# Patient Record
Sex: FEMALE | Race: BLACK OR AFRICAN AMERICAN | Employment: OTHER | URBAN - METROPOLITAN AREA
[De-identification: names, ages, dates, MRNs, and addresses within clinical notes are randomized per-mention and may not be internally consistent; named-entity substitution may affect disease eponyms.]

---

## 2017-01-05 RX ORDER — LISINOPRIL 10 MG/1
TABLET ORAL
Qty: 270 TAB | Refills: 0 | Status: SHIPPED | OUTPATIENT
Start: 2017-01-05 | End: 2017-10-13 | Stop reason: SDUPTHER

## 2017-01-05 NOTE — TELEPHONE ENCOUNTER
She was here in October for visit with Ellen Erazo and is on the appt for recheck 2/17/17.  Done for 90 days a courtesy since going to mail order

## 2017-01-26 ENCOUNTER — TELEPHONE (OUTPATIENT)
Dept: FAMILY MEDICINE CLINIC | Age: 82
End: 2017-01-26

## 2017-02-17 ENCOUNTER — OFFICE VISIT (OUTPATIENT)
Dept: FAMILY MEDICINE CLINIC | Age: 82
End: 2017-02-17

## 2017-02-17 VITALS
OXYGEN SATURATION: 96 % | HEIGHT: 62 IN | TEMPERATURE: 97.6 F | DIASTOLIC BLOOD PRESSURE: 79 MMHG | WEIGHT: 170.7 LBS | HEART RATE: 70 BPM | BODY MASS INDEX: 31.41 KG/M2 | RESPIRATION RATE: 16 BRPM | SYSTOLIC BLOOD PRESSURE: 126 MMHG

## 2017-02-17 DIAGNOSIS — E66.9 OBESITY, CLASS I, BMI 30-34.9: ICD-10-CM

## 2017-02-17 DIAGNOSIS — E55.9 VITAMIN D DEFICIENCY: ICD-10-CM

## 2017-02-17 DIAGNOSIS — I65.23 INTERNAL CAROTID ARTERY STENOSIS, BILATERAL: ICD-10-CM

## 2017-02-17 DIAGNOSIS — R29.898 BILATERAL ARM WEAKNESS: ICD-10-CM

## 2017-02-17 DIAGNOSIS — G89.29 INSOMNIA SECONDARY TO CHRONIC PAIN: ICD-10-CM

## 2017-02-17 DIAGNOSIS — G89.29 CHRONIC PAIN OF BOTH SHOULDERS: ICD-10-CM

## 2017-02-17 DIAGNOSIS — R41.89 COGNITIVE IMPAIRMENT: ICD-10-CM

## 2017-02-17 DIAGNOSIS — H26.9 CATARACT: ICD-10-CM

## 2017-02-17 DIAGNOSIS — M25.511 CHRONIC PAIN OF BOTH SHOULDERS: ICD-10-CM

## 2017-02-17 DIAGNOSIS — E78.00 HYPERCHOLESTEREMIA: ICD-10-CM

## 2017-02-17 DIAGNOSIS — R73.9 HYPERGLYCEMIA: ICD-10-CM

## 2017-02-17 DIAGNOSIS — M25.512 CHRONIC PAIN OF BOTH SHOULDERS: ICD-10-CM

## 2017-02-17 DIAGNOSIS — M54.2 NECK PAIN: ICD-10-CM

## 2017-02-17 DIAGNOSIS — R06.09 DOE (DYSPNEA ON EXERTION): ICD-10-CM

## 2017-02-17 DIAGNOSIS — Z87.891 HISTORY OF TOBACCO ABUSE: ICD-10-CM

## 2017-02-17 DIAGNOSIS — H91.92 HEARING LOSS, LEFT: ICD-10-CM

## 2017-02-17 DIAGNOSIS — G47.01 INSOMNIA SECONDARY TO CHRONIC PAIN: ICD-10-CM

## 2017-02-17 DIAGNOSIS — I10 ESSENTIAL HYPERTENSION: Primary | ICD-10-CM

## 2017-02-17 LAB — HBA1C MFR BLD HPLC: 5.5 %

## 2017-02-17 RX ORDER — TRAMADOL HYDROCHLORIDE 50 MG/1
50 TABLET ORAL
Qty: 40 TAB | Refills: 0 | Status: SHIPPED | OUTPATIENT
Start: 2017-02-17 | End: 2018-02-19 | Stop reason: ALTCHOICE

## 2017-02-17 RX ORDER — METHOCARBAMOL 500 MG/1
500 TABLET, FILM COATED ORAL 4 TIMES DAILY
Qty: 40 TAB | Refills: 1 | Status: SHIPPED | OUTPATIENT
Start: 2017-02-17 | End: 2018-02-19

## 2017-02-17 NOTE — PROGRESS NOTES
HISTORY OF PRESENT ILLNESS  Manjeet Gomes is a [de-identified] y.o. female presents with High Blood Sugar; Cholesterol Problem; Referral Follow Up (Eye and lungs); Memory Loss; Hearing Problem; Shoulder Pain; Arm Pain; and Neck Pain    Agree with nurse note. Her daughter is present today. Hypertensive, hyperglycemic pt with hypercholesterolemia, Vitamin D deficiency, BL internal carotid artery stenosis, and hx of tobacco abuse presents to the office with a BP of 126/79. For BP, she takes Lisinopril 10 mg BID, tolerating well. She weighs 170 lbs, gained 2 lbs since last ov. Today, Hgb A1C was 5.5, down from 5.8. She takes Aspirin 81 mg daily, Vitamin D 2,000IU daily, Calcium with Vitamin D daily, and Omega 3 Fatty Acids, tolerating well. Pt reports some difficulty remembering names. She also forgets why she goes to do something but eventually the \"why\" will come to her. She is not forgetting her family members. Of note, she scored 12 on 6CIT in 08/2016. She requests a referral to neuropsych testing. Pt complains of BL shoulder pain, BL UE pain, BL neck pain, and HAs that keep her awake at night. The pain worsens when she bends her head forward. She has difficulty combing her hair due to her upper arm pain and weakness. She also has difficulty lifting items due to the shoulder pain and upper arm pain. She takes Tylenol with temporary relief. She uses a heating pad with relief. 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 She continues with difficulty and requests a referral to an audiologist.     Pt is sp L cataract removal on 2/22/16 and R cataract removal on 10/24/16, performed by ophthalmologist, Dr. Armana Wright. She has new glasses and denies any problems. Health Maintenance    Pt's most recent colonoscopy due to GIB was on 11/24/14 with GI, Dr. Aden Recinos. No further colonoscopy needed.      Pt's most recent DEXA scan was on 7/22/14; normal.  Pt's most recent mammogram was on 4/29/15; normal     Written by dara Ventura, as dictated by Dr. Perfecto Suggs DO.    ROS    Review of Systems negative except as noted above in HPI. ALLERGIES:    Allergies   Allergen Reactions    Penicillins Hives       CURRENT MEDICATIONS:    Outpatient Prescriptions Marked as Taking for the 2/17/17 encounter (Office Visit) with Yarelis Carcamo DO   Medication Sig Dispense Refill    traMADol (ULTRAM) 50 mg tablet Take 1 Tab by mouth every six (6) hours as needed for Pain. Max Daily Amount: 200 mg. Indications: Pain 40 Tab 0    methocarbamol (ROBAXIN) 500 mg tablet Take 1 Tab by mouth four (4) times daily. Indications: MUSCLE SPASM 40 Tab 1    lisinopril (PRINIVIL, ZESTRIL) 10 mg tablet TAKE 1 TABLET IN THE MORNING AND TAKE 2 TABLETS IN THE EVENING FOR HYPERTENSION (Patient taking differently: TAKE 1 TABLET IN THE MORNING AND TAKE 1 TABLETS IN THE EVENING FOR HYPERTENSION) 270 Tab 0    cholecalciferol, vitamin D3, 2,000 unit tab Take 1 Tab by mouth daily.  Omega-3 Fatty Acids 300 mg cap Take 1 Cap by mouth daily.  Calcium-Cholecalciferol, D3, 600 mg(1,500mg) -400 unit cap Take  by mouth daily.  ACETAMINOPHEN (TYLENOL PO) Take 325 mg by mouth two (2) times a day.  aspirin 81 mg chewable tablet Take 81 mg by mouth daily. PAST MEDICAL HISTORY:    Past Medical History   Diagnosis Date    Cataract      Bilaterally. Dr. Brandy Simpson. Dr. Marylou Ledesma. Dr. Natalie Dotson.  Chest pain 10/2015     Dr. Atiya Esquivel.  Diverticulosis 09/2008     Dr. Aneesh Stevens. Dr. Mica Benavides.  DJD (degenerative joint disease) 09/30/05     cervical, worse C6-7, C7-T1. Left AC joint.  DJD (degenerative joint disease) of knee      bilateral  Dr. Kerman Schwab. Dr. Riddhi NULL (dyspnea on exertion) 10/2015     Dr. Atiya Esquivel.     Essential hypertension, benign 1968    Foster child     GIB (gastrointestinal bleeding) 11/17/14     due to internal hemorrhoids. Dr. Disha Ramos Heart palpitations 11/12/15     due to PVCs. Dr. Yogi Salmon.  Heartburn     Hypercholesteremia     Hyperglycemia 2014    Internal carotid artery stenosis 2007     bilateral.  Dr. Oskar Wiley Internal hemorrhoids 09/2008, 11/2014     Dr. Cee Ratliff. Dr. Alvino Matamoros.  Knee pain      Left. Dr. Red De La Torre.  Pulmonic valve insufficiency 10/2015     Mild to Mod. Dr. Roman Reyna. EF 50-55%    PVD (peripheral vascular disease) ([address] 2007     Dr. Susan Villa    Raynaud's phenomenon 1968    Shortening, leg, congenital      left    Vitamin D deficiency 10/10/10       PAST SURGICAL HISTORY:    Past Surgical History   Procedure Laterality Date    Hx polypectomy  09/05/2008     rectal Dr. Alvarado Friendly Hx hemorrhoidectomy  10/03/2008     internal and external    Hx carotid endarterectomy Right 01/19/2011     [facility]. Dr. Susan Villa    Hx gi  1957     FISSURECTOMY.  Hx gi  10/03/2008     PROCTOPLASTY due to rectal prolapse    Hx knee replacement Left 07/2013     due to Severe OA. Dr. Timur Barrett.  Hx breast lumpectomy Right 2008     benign. Dr. Gaby Rushing.  Hx colonoscopy  11/24/14     due to GIB. Dr. Alvino Matamoros.  Hx cataract removal Bilateral 02/22/2016, 10/24/2016     L then R Dr. Rubin John.  Hx colonoscopy  09/05/08     with polypectomy. benign. Dr. Omega Parrish. due q 10 yrs.        FAMILY HISTORY:    Family History   Problem Relation Age of Onset    Heart Attack Mother 79    Heart Attack Sister      x 3 sisters    Heart Attack Brother      x 3 brothers       SOCIAL HISTORY:    Social History     Social History    Marital status:      Spouse name: N/A    Number of children: N/A    Years of education: N/A     Social History Main Topics    Smoking status: Former Smoker     Packs/day: 1.00     Years: 15.00     Types: Cigarettes     Quit date: 1/1/1968    Smokeless tobacco: Never Used    Alcohol use No    Drug use: No    Sexual activity: No     Other Topics Concern    None     Social History Narrative       IMMUNIZATIONS:    Immunization History   Administered Date(s) Administered    Pneumococcal Conjugate (PCV-13) 08/17/2016    Pneumococcal Vaccine (Unspecified Type) 10/22/2009         PHYSICAL EXAMINATION    Vital Signs    Visit Vitals    /79 (BP 1 Location: Left arm, BP Patient Position: Sitting)    Pulse 70    Temp 97.6 °F (36.4 °C) (Oral)    Resp 16    Ht 5' 2\" (1.575 m)    Wt 170 lb 11.2 oz (77.4 kg)    SpO2 96%    BMI 31.22 kg/m2       Weight Metrics 2/17/2017 10/12/2016 8/17/2016 7/8/2016 6/21/2016 6/19/2016 4/14/2016   Weight 170 lb 11.2 oz 168 lb 3.2 oz 170 lb 169 lb 3.2 oz 171 lb 3.2 oz 170 lb 174 lb 4.8 oz   BMI 31.22 kg/m2 30.76 kg/m2 31.09 kg/m2 30.94 kg/m2 33.44 kg/m2 33.2 kg/m2 32.95 kg/m2       General appearance - Well nourished. Well appearing. Well developed. No acute distress. Overweight. Head - Normocephalic. Atraumatic. Eyes - pupils equal and reactive. Extraocular eye movements intact. Sclera anicteric. Mildly injected sclera. Ears - Hearing is grossly normal bilaterally. Nose - normal and patent. No polyps noted. No erythema. No discharge. Mouth - mucous membranes with adequate moisture. Posterior pharynx normal with cobblestone appearance. No erythema, white exudate or obstruction. Neck - supple. Midline trachea. No carotid bruits noted bilaterally. No thyromegaly noted. Chest - clear to auscultation bilaterally anteriorly and posteriorly. No wheezes. No rales or rhonchi. Breath sounds are symmetrical bilaterally. Unlabored respirations. Heart - normal rate. Regular rhythm. Normal S1, S2. No murmur noted. No rubs, clicks or gallops noted. Abdomen - soft and distended. No masses or organomegaly. No rebound, rigidity or guarding. Bowel sounds normal x 4 quadrants. No tenderness noted.   Neurological - awake, alert and oriented to person, place, and time and event. Cranial nerves II through XII intact. Clear speech. Muscle strength is +5/5 x 4 extremities. Sensation is intact to light touch bilaterally. Steady gait. Heme/Lymph - peripheral pulses normal x 4 extremities. No peripheral edema is noted. Musculoskeletal - Intact x 4 extremities. Decreased ROM x 2 UE. BL pain UE pain with abduction greater than 90 degrees. .  Pain on palpation at BL shoulder girdles extending into proximal UEs. No pain on palpation of the bilateral elbows, wrists, hands. Back exam - normal range of motion. No pain on palpation of the spinous processes in the cervical, thoracic, lumbar, sacral regions. No CVA tenderness. Increased muscle tension in the paravertebral musculature in the occipital, cervical, thoracic, lumbar and sacral regions. Skin - no rashes, erythema, ecchymosis, lacerations, abrasions, suspicious moles noted  Psychological -   normal behavior, dress and thought processes. Good insight. Good eye contact. Normal affect. Appropriate mood. Normal speech.       DATA REVIEWED    Results for orders placed or performed in visit on 02/17/17   AMB POC HEMOGLOBIN A1C   Result Value Ref Range    Hemoglobin A1c (POC) 5.5 %     Lab Results   Component Value Date/Time    WBC 4.6 06/19/2016 06:00 PM    HGB 12.7 06/19/2016 06:00 PM    HCT 37.5 06/19/2016 06:00 PM    PLATELET 004 37/02/6578 06:00 PM    MCV 94.2 06/19/2016 06:00 PM     Lab Results   Component Value Date/Time    Sodium 139 06/19/2016 06:00 PM    Potassium 3.7 06/19/2016 06:00 PM    Chloride 106 06/19/2016 06:00 PM    CO2 25 06/19/2016 06:00 PM    Anion gap 8 06/19/2016 06:00 PM    Glucose 112 06/19/2016 06:00 PM    BUN 22 06/19/2016 06:00 PM    Creatinine 0.94 06/19/2016 06:00 PM    BUN/Creatinine ratio 23 06/19/2016 06:00 PM    GFR est AA >60 06/19/2016 06:00 PM    GFR est non-AA 57 06/19/2016 06:00 PM    Calcium 8.5 06/19/2016 06:00 PM    Bilirubin, total 0.4 06/19/2016 06:00 PM    AST (SGOT) 15 06/19/2016 06:00 PM    Alk. phosphatase 95 06/19/2016 06:00 PM    Protein, total 7.1 06/19/2016 06:00 PM    Albumin 3.5 06/19/2016 06:00 PM    Globulin 3.6 06/19/2016 06:00 PM    A-G Ratio 1.0 06/19/2016 06:00 PM    ALT (SGPT) 17 06/19/2016 06:00 PM     Lab Results   Component Value Date/Time    Cholesterol, total 201 04/18/2016 09:23 AM    Cholesterol, Total 208 04/15/2014 08:45 AM    HDL Cholesterol 76 04/18/2016 09:23 AM    LDL, calculated 111 04/18/2016 09:23 AM    VLDL, calculated 14 04/18/2016 09:23 AM    Triglyceride 72 04/18/2016 09:23 AM    CHOL/HDL Ratio 2.8 05/05/2010 08:45 AM     Lab Results   Component Value Date/Time    Vitamin D 25-Hydroxy 24 10/08/2010 04:19 PM    VITAMIN D, 25-HYDROXY 39.9 04/18/2016 09:23 AM       Lab Results   Component Value Date/Time    Hemoglobin A1c 5.8 04/18/2016 09:23 AM    Hemoglobin A1c (POC) 5.5 02/17/2017 09:40 AM     Lab Results   Component Value Date/Time    TSH 1.440 04/18/2016 09:23 AM       ASSESSMENT and PLAN      ICD-10-CM ICD-9-CM    1. Essential hypertension I10 401.9 LIPID PANEL      METABOLIC PANEL, COMPREHENSIVE      TSH 3RD GENERATION      VITAMIN D, 25 HYDROXY      MICROALBUMIN, UR, RAND W/ MICROALBUMIN/CREA RATIO      URINALYSIS W/ RFLX MICROSCOPIC      VITAMIN B12 & FOLATE    stable   2. Hypercholesteremia E78.00 272.0 LIPID PANEL      METABOLIC PANEL, COMPREHENSIVE      TSH 3RD GENERATION   3. Cognitive impairment R41.89 294.9 REFERRAL TO NEUROPSYCHOLOGY      TSH 3RD GENERATION      VITAMIN B12 & FOLATE   4. Hyperglycemia R73.9 790.29 AMB POC HEMOGLOBIN X1W      METABOLIC PANEL, COMPREHENSIVE      URINALYSIS W/ RFLX MICROSCOPIC   5. Vitamin D deficiency E55.9 268.9 VITAMIN D, 25 HYDROXY   6. Chronic pain of both shoulders M25.512 719.41 REFERRAL TO ORTHOPEDICS    G89.29 338.29 traMADol (ULTRAM) 50 mg tablet    M25.511  methocarbamol (ROBAXIN) 500 mg tablet   7. History of tobacco abuse Z87.891 V15.82    8.  Hearing loss, left H91.92 389.9 REFERRAL TO ENT-OTOLARYNGOLOGY   9. Internal carotid artery stenosis, bilateral I65.23 433.10      433.30    10. Bilateral arm weakness M62.81 729.89     due to neck vs shoulder pain   11. Cataract H26.9 366.9     resolved since removed 2016   12. NULL (dyspnea on exertion) R06.09 786.09 CBC W/O DIFF    resolved   13. Obesity, Class I, BMI 30-34.9 E66.9 278.00    14. Neck pain M54.2 723.1 traMADol (ULTRAM) 50 mg tablet      methocarbamol (ROBAXIN) 500 mg tablet    bilaterally   15. Insomnia secondary to chronic pain G89.29 338.29     G47.01 327.01        Discussed the patient's BMI with her. The BMI follow up plan is as follows: I have counseled this patient on diet and exercise regimens. Addressed weight, diet and exercise with patient. Decrease carbohydrates (white foods, sweet foods, sweet drinks and alcohol), increase green leafy vegetables and protein (lean meats and beans) with each meal.  Avoid fried foods. Eat 3-5 small meals daily. Do not skip meals. Increase water intake. Increase physical activity to 30 minutes daily for health benefit or 60 minutes daily to prevent weight regain, as tolerated. Get 7-8 hours uninterrupted sleep nightly. Chart reviewed and updated. Continue current medications and care. Start OTC Vitamin B12 daily. If Tylenol does not work enough then take Tramadol 50 mg. Take a 1/2 tablet of Robaxin 750 mg prn spasms. Prescriptions written and sent to pharmacy; medication side effects discussed. Robaxin 750 mg.   Prescription given to patient during office visit today. Tramadol 50 mg. Cautioned pt about addictive potential.   Most recent tests reviewed. Recheck pertinent labs when fasting. Get recent office visit notes from Dr. John Garcia. Advised pt to sign release. Referrals given; patient urged to keep appointments with specialists. Neuropsych. ENT. Ortho.    Counseled patient on health concerns:  BP, hyperglycemia, memory changes, hearing loss, shoulder care, upper back care, and neck care. Relevant handouts given and discussed with patient. Immunizations noted. Declines shingles and flu vaccines. Advise tetanus vaccine when pt has a cut, animal bite, or burn. Offered empathy, support, legitimation, prayers, partnership to patient. Praised patient for progress. Advance Care Booklet given at office visit. Discussed with pt today. Referred to [facility] and blue folder given. Staff message sent to [facility] [employer] team.   Follow-up Disposition:  Return in about 6 months (around 8/17/2017) for referral follow up, bp, results, mwv after 08/17/17. Patient was offered a choice/choices in the treatment plan today. Patient expresses understanding of the plan and agrees with recommendations. Written by dara Robin, as dictated by Dr. Christa Gowers, DO. Documentation True and Accepted by Lesli Gold. Lizabeth Ayala. Patient Instructions        Healthy Upper Back: Exercises  Your Care Instructions  Here are some examples of exercises for your upper back. Start each exercise slowly. Ease off the exercise if you start to have pain. Your doctor or physical therapist will tell you when you can start these exercises and which ones will work best for you. How to do the exercises  Lower neck and upper back stretch    1. Stretch your arms out in front of your body. Clasp one hand on top of your other hand. 2. Gently reach out so that you feel your shoulder blades stretching away from each other. 3. Gently bend your head forward. 4. Hold for 15 to 30 seconds. 5. Repeat 2 to 4 times. Midback stretch    Note: If you have knee pain, do not do this exercise. 1. Kneel on the floor, and sit back on your ankles. 2. Lean forward, place your hands on the floor, and stretch your arms out in front of you. Rest your head between your arms. 3. Gently push your chest toward the floor, reaching as far in front of you as possible.   4. Hold for 15 to 30 seconds. 5. Repeat 2 to 4 times. Shoulder rolls    1. Sit comfortably with your feet shoulder-width apart. You can also do this exercise while standing. 2. Roll your shoulders up, then back, and then down in a smooth, circular motion. 3. Repeat 2 to 4 times. Wall push-up    1. Stand against a wall with your feet about 12 to 24 inches back from the wall. If you feel any pain when you do this exercise, stand closer to the wall. 2. Place your hands on the wall slightly wider apart than your shoulders, and lean forward. 3. Gently lean your body toward the wall. Then push back to your starting position. Keep the motion smooth and controlled. 4. Repeat 8 to 12 times. Resisted shoulder blade squeeze    Note: For this exercise, you will need elastic exercise material, such as surgical tubing or Thera-Band. 1. Sit or stand, holding the band in both hands in front of you. Keep your elbows close to your sides, bent at a 90-degree angle. Your palms should face up. 2. Squeeze your shoulder blades together, and move your arms to the outside, stretching the band. Be sure to keep your elbows at your sides while you do this. 3. Relax. 4. Repeat 8 to 12 times. Resisted rows    Note: For this exercise, you will need elastic exercise material, such as surgical tubing or Thera-Band. 1. Put the band around a solid object, such as a bedpost, at about waist level. Hold one end of the band in each hand. 2. With your elbows at your sides and bent to 90 degrees, pull the band back to move your shoulder blades toward each other. Return to the starting position. 3. Repeat 8 to 12 times. Follow-up care is a key part of your treatment and safety. Be sure to make and go to all appointments, and call your doctor if you are having problems. It's also a good idea to know your test results and keep a list of the medicines you take. Where can you learn more? Go to http://pepe-constantino.info/.   Enter I972 in the search box to learn more about \"Healthy Upper Back: Exercises. \"  Current as of: May 23, 2016  Content Version: 11.1  © 4883-4747 [employer]. Care instructions adapted under license by [employer] (which disclaims liability or warranty for this information). If you have questions about a medical condition or this instruction, always ask your healthcare professional. [facility] [address] any warranty or liability for your use of this information. Shoulder Stretches: Exercises  Your Care Instructions  Here are some examples of exercises for your shoulder. Start each exercise slowly. Ease off the exercise if you start to have pain. Your doctor or physical therapist will tell you when you can start these exercises and which ones will work best for you. How to do the exercises  Note: These exercises should cause you to feel a gentle stretch, but no pain. Shoulder stretch    6.  a doorway and place one arm against the door frame. Your elbow should be a little higher than your shoulder. 7. Relax your shoulders as you lean forward, allowing your chest and shoulder muscles to stretch. You can also turn your body slightly away from your arm to stretch the muscles even more. 8. Hold for 15 to 30 seconds. 9. Repeat 2 to 4 times with each arm. Shoulder and chest stretch    Shoulder and chest stretch  6. While sitting, relax your upper body so you slump slightly in your chair. 7. As you breathe in, straighten your back and open your arms out to the sides. 8. Gently pull your shoulder blades back and downward. 9. Hold for 15 to 30 seconds as your breathe normally. 10. Repeat 2 to 4 times. Overhead stretch    4. Reach up over your head with both arms. 5. Hold for 15 to 30 seconds. 6. Repeat 2 to 4 times. Follow-up care is a key part of your treatment and safety. Be sure to make and go to all appointments, and call your doctor if you are having problems.  It's also a good idea to know your test results and keep a list of the medicines you take. Where can you learn more? Go to http://pepe-constantino.info/. Enter S254 in the search box to learn more about \"Shoulder Stretches: Exercises. \"  Current as of: May 23, 2016  Content Version: 11.1  © 0964-9851 [employer]. Care instructions adapted under license by [employer] (which disclaims liability or warranty for this information). If you have questions about a medical condition or this instruction, always ask your healthcare professional. [address] any warranty or liability for your use of this information. Neck: Exercises  Your Care Instructions  Here are some examples of typical rehabilitation exercises for your condition. Start each exercise slowly. Ease off the exercise if you start to have pain. Your doctor or physical therapist will tell you when you can start these exercises and which ones will work best for you. How to do the exercises  Note: Stretching should make you feel a gentle stretch, but no pain. Stop any strengthening exercise that makes pain worse. Neck stretch    10. This stretch works best if you keep your shoulder down as you lean away from it. To help you remember to do this, start by relaxing your shoulders and lightly holding on to your thighs or your chair. 11. Tilt your head toward your shoulder and hold for 15 to 30 seconds. Let the weight of your head stretch your muscles. 12. If you would like a little added stretch, use your hand to gently and steadily pull your head toward your shoulder. For example, keeping your right shoulder down, lean your head to the left. 13. Repeat 2 to 4 times toward each shoulder. Diagonal neck stretch    11. Turn your head slightly toward the direction you will be stretching, and tilt your head diagonally toward your chest and hold for 15 to 30 seconds.   12. If you would like a little added stretch, use your hand to gently and steadily pull your head forward on the diagonal.  13. Repeat 2 to 4 times toward each side. Dorsal glide stretch    7. Sit or stand tall and look straight ahead. 8. Slowly tuck your chin as you glide your head backward over your body  9. Hold for a count of 6, and then relax for up to 10 seconds. 10. Repeat 8 to 12 times. Note: The dorsal glide stretches the back of the neck. If you feel pain, do not glide so far back. Some people find this exercise easier to do while lying on their backs with an ice pack on the neck. Chest and shoulder stretch    5. Sit or stand tall and glide your head backward as in the dorsal glide stretch. 6. Raise both arms so that your hands are next to your ears. 7. Take a deep breath, and as you breathe out, lower your elbows down and behind your back. You will feel your shoulder blades slide down and together, and at the same time you will feel a stretch across your chest and the front of your shoulders. 8. Hold for about 6 seconds, and then relax for up to 10 seconds. 9. Repeat 8 to 12 times. Strengthening: Hands on head    5. Move your head backward, forward, and side to side against gentle pressure from your hands, holding each position for about 6 seconds. 6. Repeat 8 to 12 times. Follow-up care is a key part of your treatment and safety. Be sure to make and go to all appointments, and call your doctor if you are having problems. It's also a good idea to know your test results and keep a list of the medicines you take. Where can you learn more? Go to http://pepe-constantino.info/. Enter P975 in the search box to learn more about \"Neck: Exercises. \"  Current as of: May 23, 2016  Content Version: 11.1  © 2482-9831 [employer]. Care instructions adapted under license by [employer] (which disclaims liability or warranty for this information).  If you have questions about a medical condition or this instruction, always ask your healthcare professional. [place] [address] any warranty or liability for your use of this information. Neck Pain: Care Instructions  Your Care Instructions  You can have neck pain anywhere from the bottom of your head to the top of your shoulders. It can spread to the upper back or arms. Injuries, painting a ceiling, sleeping with your neck twisted, staying in one position for too long, and many other activities can cause neck pain. Most neck pain gets better with home care. Your doctor may recommend medicine to relieve pain or relax your muscles. He or she may suggest exercise and physical therapy to increase flexibility and relieve stress. You may need to wear a special (cervical) collar to support your neck for a day or two. Follow-up care is a key part of your treatment and safety. Be sure to make and go to all appointments, and call your doctor if you are having problems. It's also a good idea to know your test results and keep a list of the medicines you take. How can you care for yourself at home? · Try using a heating pad on a low or medium setting for 15 to 20 minutes every 2 or 3 hours. Try a warm shower in place of one session with the heating pad. · You can also try an ice pack for 10 to 15 minutes every 2 to 3 hours. Put a thin cloth between the ice and your skin. · Take pain medicines exactly as directed. ¨ If the doctor gave you a prescription medicine for pain, take it as prescribed. ¨ If you are not taking a prescription pain medicine, ask your doctor if you can take an over-the-counter medicine. · If your doctor recommends a cervical collar, wear it exactly as directed. When should you call for help? Call your doctor now or seek immediate medical care if:  · You have new or worsening numbness in your arms, buttocks or legs. · You have new or worsening weakness in your arms or legs.  (This could make it hard to stand up.)  · You lose control of your bladder or bowels. Watch closely for changes in your health, and be sure to contact your doctor if:  · Your neck pain is getting worse. · You are not getting better after 1 week. · You do not get better as expected. Where can you learn more? Go to http://pepe-constantino.info/. Enter [id number] in the search box to learn more about \"Neck Pain: Care Instructions. \"  Current as of: May 23, 2016  Content Version: 11.1  © 7140-1161 [employer]. Care instructions adapted under license by [employer] (which disclaims liability or warranty for this information). If you have questions about a medical condition or this instruction, always ask your healthcare professional. [address] any warranty or liability for your use of this information.

## 2017-02-17 NOTE — PATIENT INSTRUCTIONS
Healthy Upper Back: Exercises  Your Care Instructions  Here are some examples of exercises for your upper back. Start each exercise slowly. Ease off the exercise if you start to have pain. Your doctor or physical therapist will tell you when you can start these exercises and which ones will work best for you. How to do the exercises  Lower neck and upper back stretch    1. Stretch your arms out in front of your body. Clasp one hand on top of your other hand. 2. Gently reach out so that you feel your shoulder blades stretching away from each other. 3. Gently bend your head forward. 4. Hold for 15 to 30 seconds. 5. Repeat 2 to 4 times. Midback stretch    Note: If you have knee pain, do not do this exercise. 1. Kneel on the floor, and sit back on your ankles. 2. Lean forward, place your hands on the floor, and stretch your arms out in front of you. Rest your head between your arms. 3. Gently push your chest toward the floor, reaching as far in front of you as possible. 4. Hold for 15 to 30 seconds. 5. Repeat 2 to 4 times. Shoulder rolls    1. Sit comfortably with your feet shoulder-width apart. You can also do this exercise while standing. 2. Roll your shoulders up, then back, and then down in a smooth, circular motion. 3. Repeat 2 to 4 times. Wall push-up    1. Stand against a wall with your feet about 12 to 24 inches back from the wall. If you feel any pain when you do this exercise, stand closer to the wall. 2. Place your hands on the wall slightly wider apart than your shoulders, and lean forward. 3. Gently lean your body toward the wall. Then push back to your starting position. Keep the motion smooth and controlled. 4. Repeat 8 to 12 times. Resisted shoulder blade squeeze    Note: For this exercise, you will need elastic exercise material, such as surgical tubing or Thera-Band. 1. Sit or stand, holding the band in both hands in front of you.  Keep your elbows close to your sides, bent at a 90-degree angle. Your palms should face up. 2. Squeeze your shoulder blades together, and move your arms to the outside, stretching the band. Be sure to keep your elbows at your sides while you do this. 3. Relax. 4. Repeat 8 to 12 times. Resisted rows    Note: For this exercise, you will need elastic exercise material, such as surgical tubing or Thera-Band. 1. Put the band around a solid object, such as a bedpost, at about waist level. Hold one end of the band in each hand. 2. With your elbows at your sides and bent to 90 degrees, pull the band back to move your shoulder blades toward each other. Return to the starting position. 3. Repeat 8 to 12 times. Follow-up care is a key part of your treatment and safety. Be sure to make and go to all appointments, and call your doctor if you are having problems. It's also a good idea to know your test results and keep a list of the medicines you take. Where can you learn more? Go to http://pepe-constantino.info/. Enter O920 in the search box to learn more about \"Healthy Upper Back: Exercises. \"  Current as of: May 23, 2016  Content Version: 11.1  © 5318-8087 [employer]. Care instructions adapted under license by [employer] (which disclaims liability or warranty for this information). If you have questions about a medical condition or this instruction, always ask your healthcare professional. [place] [address] any warranty or liability for your use of this information. Shoulder Stretches: Exercises  Your Care Instructions  Here are some examples of exercises for your shoulder. Start each exercise slowly. Ease off the exercise if you start to have pain. Your doctor or physical therapist will tell you when you can start these exercises and which ones will work best for you. How to do the exercises  Note: These exercises should cause you to feel a gentle stretch, but no pain.   Shoulder stretch    6.  a doorway and place one arm against the door frame. Your elbow should be a little higher than your shoulder. 7. Relax your shoulders as you lean forward, allowing your chest and shoulder muscles to stretch. You can also turn your body slightly away from your arm to stretch the muscles even more. 8. Hold for 15 to 30 seconds. 9. Repeat 2 to 4 times with each arm. Shoulder and chest stretch    Shoulder and chest stretch  6. While sitting, relax your upper body so you slump slightly in your chair. 7. As you breathe in, straighten your back and open your arms out to the sides. 8. Gently pull your shoulder blades back and downward. 9. Hold for 15 to 30 seconds as your breathe normally. 10. Repeat 2 to 4 times. Overhead stretch    4. Reach up over your head with both arms. 5. Hold for 15 to 30 seconds. 6. Repeat 2 to 4 times. Follow-up care is a key part of your treatment and safety. Be sure to make and go to all appointments, and call your doctor if you are having problems. It's also a good idea to know your test results and keep a list of the medicines you take. Where can you learn more? Go to http://pepe-constantino.info/. Enter S254 in the search box to learn more about \"Shoulder Stretches: Exercises. \"  Current as of: May 23, 2016  Content Version: 11.1  © 2824-4151 [employer] Care instructions adapted under license by [employer] (which disclaims liability or warranty for this information). If you have questions about a medical condition or this instruction, always ask your healthcare professional. [place] [address] any warranty or liability for your use of this information. Neck: Exercises  Your Care Instructions  Here are some examples of typical rehabilitation exercises for your condition. Start each exercise slowly. Ease off the exercise if you start to have pain.   Your doctor or physical therapist will tell you when you can start these exercises and which ones will work best for you. How to do the exercises  Note: Stretching should make you feel a gentle stretch, but no pain. Stop any strengthening exercise that makes pain worse. Neck stretch    10. This stretch works best if you keep your shoulder down as you lean away from it. To help you remember to do this, start by relaxing your shoulders and lightly holding on to your thighs or your chair. 11. Tilt your head toward your shoulder and hold for 15 to 30 seconds. Let the weight of your head stretch your muscles. 12. If you would like a little added stretch, use your hand to gently and steadily pull your head toward your shoulder. For example, keeping your right shoulder down, lean your head to the left. 13. Repeat 2 to 4 times toward each shoulder. Diagonal neck stretch    11. Turn your head slightly toward the direction you will be stretching, and tilt your head diagonally toward your chest and hold for 15 to 30 seconds. 12. If you would like a little added stretch, use your hand to gently and steadily pull your head forward on the diagonal.  13. Repeat 2 to 4 times toward each side. Dorsal glide stretch    7. Sit or stand tall and look straight ahead. 8. Slowly tuck your chin as you glide your head backward over your body  9. Hold for a count of 6, and then relax for up to 10 seconds. 10. Repeat 8 to 12 times. Note: The dorsal glide stretches the back of the neck. If you feel pain, do not glide so far back. Some people find this exercise easier to do while lying on their backs with an ice pack on the neck. Chest and shoulder stretch    5. Sit or stand tall and glide your head backward as in the dorsal glide stretch. 6. Raise both arms so that your hands are next to your ears. 7. Take a deep breath, and as you breathe out, lower your elbows down and behind your back.  You will feel your shoulder blades slide down and together, and at the same time you will feel a stretch across your chest and the front of your shoulders. 8. Hold for about 6 seconds, and then relax for up to 10 seconds. 9. Repeat 8 to 12 times. Strengthening: Hands on head    5. Move your head backward, forward, and side to side against gentle pressure from your hands, holding each position for about 6 seconds. 6. Repeat 8 to 12 times. Follow-up care is a key part of your treatment and safety. Be sure to make and go to all appointments, and call your doctor if you are having problems. It's also a good idea to know your test results and keep a list of the medicines you take. Where can you learn more? Go to http://pepe-constantino.info/. Enter P975 in the search box to learn more about \"Neck: Exercises. \"  Current as of: May 23, 2016  Content Version: 11.1  © 3971-2468 [employer]. Care instructions adapted under license by [employer] (which disclaims liability or warranty for this information). If you have questions about a medical condition or this instruction, always ask your healthcare professional. [address] any warranty or liability for your use of this information. Neck Pain: Care Instructions  Your Care Instructions  You can have neck pain anywhere from the bottom of your head to the top of your shoulders. It can spread to the upper back or arms. Injuries, painting a ceiling, sleeping with your neck twisted, staying in one position for too long, and many other activities can cause neck pain. Most neck pain gets better with home care. Your doctor may recommend medicine to relieve pain or relax your muscles. He or she may suggest exercise and physical therapy to increase flexibility and relieve stress. You may need to wear a special (cervical) collar to support your neck for a day or two. Follow-up care is a key part of your treatment and safety.  Be sure to make and go to all appointments, and call your doctor if you are having problems. It's also a good idea to know your test results and keep a list of the medicines you take. How can you care for yourself at home? · Try using a heating pad on a low or medium setting for 15 to 20 minutes every 2 or 3 hours. Try a warm shower in place of one session with the heating pad. · You can also try an ice pack for 10 to 15 minutes every 2 to 3 hours. Put a thin cloth between the ice and your skin. · Take pain medicines exactly as directed. ¨ If the doctor gave you a prescription medicine for pain, take it as prescribed. ¨ If you are not taking a prescription pain medicine, ask your doctor if you can take an over-the-counter medicine. · If your doctor recommends a cervical collar, wear it exactly as directed. When should you call for help? Call your doctor now or seek immediate medical care if:  · You have new or worsening numbness in your arms, buttocks or legs. · You have new or worsening weakness in your arms or legs. (This could make it hard to stand up.)  · You lose control of your bladder or bowels. Watch closely for changes in your health, and be sure to contact your doctor if:  · Your neck pain is getting worse. · You are not getting better after 1 week. · You do not get better as expected. Where can you learn more? Go to http://pepe-constantino.info/. Enter [id number] in the search box to learn more about \"Neck Pain: Care Instructions. \"  Current as of: May 23, 2016  Content Version: 11.1  © 5948-4000 [employer]. Care instructions adapted under license by "[employer]" (which disclaims liability or warranty for this information). If you have questions about a medical condition or this instruction, always ask your healthcare professional. [address] any warranty or liability for your use of this information.

## 2017-02-17 NOTE — MR AVS SNAPSHOT
Visit Information Date & Time Provider Department Dept. Phone Encounter #  
 2/17/2017  9:15 AM Merna Huang DO [facility] [phone] [id number] Follow-up Instructions Return in about 6 months (around 8/17/2017) for referral follow up, bp, results, mwv after 08/17/17. Routing History Upcoming Health Maintenance Date Due  
 MEDICARE YEARLY EXAM 8/18/2017 GLAUCOMA SCREENING Q2Y 8/22/2018 DTaP/Tdap/Td series (2 - Td) 2/17/2027 Allergies as of 2/17/2017  Review Complete On: 2/17/2017 By: Merna Huang DO Severity Noted Reaction Type Reactions Penicillins High 10/22/2009    Hives Current Immunizations  Reviewed on 2/17/2017 Name Date Pneumococcal Conjugate (PCV-13) 8/17/2016 Pneumococcal Vaccine (Unspecified Type) 10/22/2009 Reviewed by Merna Huang DO on 2/17/2017 at  9:59 AM  
You Were Diagnosed With   
  
 Codes Comments Essential hypertension    -  Primary ICD-10-CM: I10 
ICD-9-CM: 401.9 stable Hypercholesteremia     ICD-10-CM: E78.00 ICD-9-CM: 272.0 Cognitive impairment     ICD-10-CM: R41.89 ICD-9-CM: 294.9 Hyperglycemia     ICD-10-CM: R73.9 ICD-9-CM: 790.29 Vitamin D deficiency     ICD-10-CM: E55.9 ICD-9-CM: 268.9 Chronic pain of both shoulders     ICD-10-CM: M25.512, G89.29, M25.511 ICD-9-CM: 719.41, 338.29 History of tobacco abuse     ICD-10-CM: Z87.891 ICD-9-CM: V15.82 Hearing loss, left     ICD-10-CM: H91.92 
ICD-9-CM: 389.9 Internal carotid artery stenosis, bilateral     ICD-10-CM: I65.23 ICD-9-CM: 433.10, 433.30 Bilateral arm weakness     ICD-10-CM: M62.81 ICD-9-CM: 729.89 due to neck vs shoulder pain Cataract     ICD-10-CM: H26.9 ICD-9-CM: 366.9 resolved since removed 2016 NULL (dyspnea on exertion)     ICD-10-CM: R06.09 
ICD-9-CM: 786.09 resolved Obesity, Class I, BMI 30-34.9     ICD-10-CM: E66.9 ICD-9-CM: 278.00   
 Neck pain     ICD-10-CM: M54.2 ICD-9-CM: 723.1 bilaterally Insomnia secondary to chronic pain     ICD-10-CM: G89.29, G47.01 
ICD-9-CM: 338.29, 327.01 Vitals BP Pulse Temp Resp Height(growth percentile) Weight(growth percentile) 126/79 (BP 1 Location: Left arm, BP Patient Position: Sitting) 70 97.6 °F (36.4 °C) (Oral) 16 5' 2\" (1.575 m) 170 lb 11.2 oz (77.4 kg) SpO2 BMI OB Status Smoking Status 96% 31.22 kg/m2 Postmenopausal Former Smoker BMI and BSA Data Body Mass Index Body Surface Area  
 31.22 kg/m 2 1.84 m 2 Preferred Pharmacy Pharmacy Name [facility] [address] [place] [address] Your Updated Medication List  
  
   
This list is accurate as of: 2/17/17 10:26 AM.  Always use your most recent med list.  
  
  
  
  
 albuterol 90 mcg/actuation inhaler Commonly known as:  PROVENTIL HFA, VENTOLIN HFA, PROAIR HFA Take 2 Puffs by inhalation every four (4) hours as needed for Wheezing or Shortness of Breath. Indications: BRONCHOSPASM PREVENTION  
  
 aspirin 81 mg chewable tablet Take 81 mg by mouth daily. Calcium-Cholecalciferol (D3) 600 mg(1,500mg) -400 unit Cap Take  by mouth daily. cholecalciferol (vitamin D3) 2,000 unit Tab Take 1 Tab by mouth daily. fluticasone-salmeterol 250-50 mcg/dose diskus inhaler Commonly known as:  ADVAIR DISKUS Take 1 Puff by inhalation two (2) times a day. Indications: PREVENTION OF BRONCHOSPASMS WITH EMPHYSEMA  
  
 lisinopril 10 mg tablet Commonly known as:  PRINIVIL, ZESTRIL  
TAKE 1 TABLET IN THE MORNING AND TAKE 2 TABLETS IN THE EVENING FOR HYPERTENSION  
  
 methocarbamol 500 mg tablet Commonly known as:  ROBAXIN Take 1 Tab by mouth four (4) times daily. Indications: MUSCLE SPASM  
  
 mometasone 50 mcg/actuation nasal spray Commonly known as:  NASONEX  
2 Sprays by Both Nostrils route daily. Indications: ALLERGIC RHINITIS Omega-3 Fatty Acids 300 mg Cap Take 1 Cap by mouth daily. traMADol 50 mg tablet Commonly known as:  ULTRAM  
Take 1 Tab by mouth every six (6) hours as needed for Pain. Max Daily Amount: 200 mg. Indications: Pain TYLENOL PO Take 325 mg by mouth two (2) times a day. Prescriptions Printed Refills  
 traMADol (ULTRAM) 50 mg tablet 0 Sig: Take 1 Tab by mouth every six (6) hours as needed for Pain. Max Daily Amount: 200 mg. Indications: Pain Class: Print Route: Oral  
  
Prescriptions Sent to Pharmacy Refills  
 methocarbamol (ROBAXIN) 500 mg tablet 1 Sig: Take 1 Tab by mouth four (4) times daily. Indications: MUSCLE SPASM Class: Normal  
 Pharmacy: [facility] [address], [place]en [facility] #: [phone] Route: Oral  
  
We Performed the Following AMB POC HEMOGLOBIN A1C [08277 CPT(R)] CBC W/O DIFF [44165 CPT(R)] LIPID PANEL [03277 CPT(R)] METABOLIC PANEL, COMPREHENSIVE [37879 CPT(R)] MICROALBUMIN, UR, RAND W/ MICROALBUMIN/CREA RATIO [id number] CPT(R)] REFERRAL TO ENT-OTOLARYNGOLOGY [XYQ47 Custom] Comments:  
 Please evaluate patient for L hearing loss REFERRAL TO NEUROPSYCHOLOGY [[id number] Custom] Comments:  
 Please evaluate patient for cognitive impairment, 6CIT score of 12. REFERRAL TO ORTHOPEDICS [[id number] Custom] Comments:  
 Please evaluate patient for BL shoulder pain TSH 3RD GENERATION [07131 CPT(R)] URINALYSIS W/ RFLX MICROSCOPIC [05055 CPT(R)] VITAMIN B12 & FOLATE [29884 CPT(R)] VITAMIN D, 25 HYDROXY [id number] CPT(R)] Follow-up Instructions Return in about 6 months (around 8/17/2017) for referral follow up, bp, results, mwv after 08/17/17. Referral Information Referral ID Referred By Referred To  
  
 [id number] [facility] [address], [facility] Zay Jackson Visits Status Start Date End Date 1 New Request 2/17/17 2/17/18 If your referral has a status of pending review or denied, additional information will be sent to support the outcome of this decision. Referral ID Referred By Referred To  
 [id number] [address] Yaminie Feast [place] [address] Nguyen [address] [place], [address] [facility] Phone: [phone] Fax: [phone] Visits Status Start Date End Date 1 New Request 2/17/17 2/17/18 If your referral has a status of pending review or denied, additional information will be sent to support the outcome of this decision. Referral ID Referred By Referred To  
 [id number] [facility] [address] [place], [address] Phone: [phone] Fax: [phone] Visits Status Start Date End Date 1 New Request 2/17/17 2/17/18 If your referral has a status of pending review or denied, additional information will be sent to support the outcome of this decision. Patient Instructions Healthy Upper Back: Exercises Your Care Instructions Here are some examples of exercises for your upper back. Start each exercise slowly. Ease off the exercise if you start to have pain. Your doctor or physical therapist will tell you when you can start these exercises and which ones will work best for you. How to do the exercises Lower neck and upper back stretch 1. Stretch your arms out in front of your body. Clasp one hand on top of your other hand. 2. Gently reach out so that you feel your shoulder blades stretching away from each other. 3. Gently bend your head forward. 4. Hold for 15 to 30 seconds. 5. Repeat 2 to 4 times. Midback stretch Note: If you have knee pain, do not do this exercise. 1. Kneel on the floor, and sit back on your ankles.  
2. Lean forward, place your hands on the floor, and stretch your arms out in front of you. Rest your head between your arms. 3. Gently push your chest toward the floor, reaching as far in front of you as possible. 4. Hold for 15 to 30 seconds. 5. Repeat 2 to 4 times. Shoulder rolls 1. Sit comfortably with your feet shoulder-width apart. You can also do this exercise while standing. 2. Roll your shoulders up, then back, and then down in a smooth, circular motion. 3. Repeat 2 to 4 times. Wall push-up 1. Stand against a wall with your feet about 12 to 24 inches back from the wall. If you feel any pain when you do this exercise, stand closer to the wall. 2. Place your hands on the wall slightly wider apart than your shoulders, and lean forward. 3. Gently lean your body toward the wall. Then push back to your starting position. Keep the motion smooth and controlled. 4. Repeat 8 to 12 times. Resisted shoulder blade squeeze Note: For this exercise, you will need elastic exercise material, such as surgical tubing or Thera-Band. 1. Sit or stand, holding the band in both hands in front of you. Keep your elbows close to your sides, bent at a 90-degree angle. Your palms should face up. 2. Squeeze your shoulder blades together, and move your arms to the outside, stretching the band. Be sure to keep your elbows at your sides while you do this. 3. Relax. 4. Repeat 8 to 12 times. Resisted rows Note: For this exercise, you will need elastic exercise material, such as surgical tubing or Thera-Band. 1. Put the band around a solid object, such as a bedpost, at about waist level. Hold one end of the band in each hand. 2. With your elbows at your sides and bent to 90 degrees, pull the band back to move your shoulder blades toward each other. Return to the starting position. 3. Repeat 8 to 12 times. Follow-up care is a key part of your treatment and safety.  Be sure to make and go to all appointments, and call your doctor if you are having problems. It's also a good idea to know your test results and keep a list of the medicines you take. Where can you learn more? Go to http://pepe-constantino.info/. Enter E411 in the search box to learn more about \"Healthy Upper Back: Exercises. \" Current as of: May 23, 2016 Content Version: 11.1 © 6612-7425 Eye[employer]te. Care instructions adapted under license by [employer] (which disclaims liability or warranty for this information). If you have questions about a medical condition or this instruction, always ask your healthcare professional. [address] any warranty or liability for your use of this information. Shoulder Stretches: Exercises Your Care Instructions Here are some examples of exercises for your shoulder. Start each exercise slowly. Ease off the exercise if you start to have pain. Your doctor or physical therapist will tell you when you can start these exercises and which ones will work best for you. How to do the exercises Note: These exercises should cause you to feel a gentle stretch, but no pain. Shoulder stretch 6.  a doorway and place one arm against the door frame. Your elbow should be a little higher than your shoulder. 7. Relax your shoulders as you lean forward, allowing your chest and shoulder muscles to stretch. You can also turn your body slightly away from your arm to stretch the muscles even more. 8. Hold for 15 to 30 seconds. 9. Repeat 2 to 4 times with each arm. Shoulder and chest stretch Shoulder and chest stretch 6. While sitting, relax your upper body so you slump slightly in your chair. 7. As you breathe in, straighten your back and open your arms out to the sides. 8. Gently pull your shoulder blades back and downward. 9. Hold for 15 to 30 seconds as your breathe normally. 10. Repeat 2 to 4 times. Overhead stretch 4. Reach up over your head with both arms. 5. Hold for 15 to 30 seconds. 6. Repeat 2 to 4 times. Follow-up care is a key part of your treatment and safety. Be sure to make and go to all appointments, and call your doctor if you are having problems. It's also a good idea to know your test results and keep a list of the medicines you take. Where can you learn more? Go to http://pepe-constantino.info/. Enter S254 in the search box to learn more about \"Shoulder Stretches: Exercises. \" Current as of: May 23, 2016 Content Version: 11.1 © 2187-3951 [employer] Moment. Care instructions adapted under license by PaymentOne (which disclaims liability or warranty for this information). If you have questions about a medical condition or this instruction, always ask your healthcare professional. [address] any warranty or liability for your use of this information. Neck: Exercises Your Care Instructions Here are some examples of typical rehabilitation exercises for your condition. Start each exercise slowly. Ease off the exercise if you start to have pain. Your doctor or physical therapist will tell you when you can start these exercises and which ones will work best for you. How to do the exercises Note: Stretching should make you feel a gentle stretch, but no pain. Stop any strengthening exercise that makes pain worse. Neck stretch 10. This stretch works best if you keep your shoulder down as you lean away from it. To help you remember to do this, start by relaxing your shoulders and lightly holding on to your thighs or your chair. 11. Tilt your head toward your shoulder and hold for 15 to 30 seconds. Let the weight of your head stretch your muscles. 12. If you would like a little added stretch, use your hand to gently and steadily pull your head toward your shoulder. For example, keeping your right shoulder down, lean your head to the left. 13. Repeat 2 to 4 times toward each shoulder. Diagonal neck stretch 11. Turn your head slightly toward the direction you will be stretching, and tilt your head diagonally toward your chest and hold for 15 to 30 seconds. 12. If you would like a little added stretch, use your hand to gently and steadily pull your head forward on the diagonal. 
13. Repeat 2 to 4 times toward each side. Dorsal glide stretch 7. Sit or stand tall and look straight ahead. 8. Slowly tuck your chin as you glide your head backward over your body 9. Hold for a count of 6, and then relax for up to 10 seconds. 10. Repeat 8 to 12 times. Note: The dorsal glide stretches the back of the neck. If you feel pain, do not glide so far back. Some people find this exercise easier to do while lying on their backs with an ice pack on the neck. Chest and shoulder stretch 5. Sit or stand tall and glide your head backward as in the dorsal glide stretch. 6. Raise both arms so that your hands are next to your ears. 7. Take a deep breath, and as you breathe out, lower your elbows down and behind your back. You will feel your shoulder blades slide down and together, and at the same time you will feel a stretch across your chest and the front of your shoulders. 8. Hold for about 6 seconds, and then relax for up to 10 seconds. 9. Repeat 8 to 12 times. Strengthening: Hands on head 5. Move your head backward, forward, and side to side against gentle pressure from your hands, holding each position for about 6 seconds. 6. Repeat 8 to 12 times. Follow-up care is a key part of your treatment and safety. Be sure to make and go to all appointments, and call your doctor if you are having problems. It's also a good idea to know your test results and keep a list of the medicines you take. Where can you learn more? Go to http://pepe-constantino.info/. Enter P975 in the search box to learn more about \"Neck: Exercises. \" Current as of: May 23, 2016 Content Version: 11.1 © 2331-7311 InComm. Care instructions adapted under license by Flaskon (which disclaims liability or warranty for this information). If you have questions about a medical condition or this instruction, always ask your healthcare professional. [address] any warranty or liability for your use of this information. Neck Pain: Care Instructions Your Care Instructions You can have neck pain anywhere from the bottom of your head to the top of your shoulders. It can spread to the upper back or arms. Injuries, painting a ceiling, sleeping with your neck twisted, staying in one position for too long, and many other activities can cause neck pain. Most neck pain gets better with home care. Your doctor may recommend medicine to relieve pain or relax your muscles. He or she may suggest exercise and physical therapy to increase flexibility and relieve stress. You may need to wear a special (cervical) collar to support your neck for a day or two. Follow-up care is a key part of your treatment and safety. Be sure to make and go to all appointments, and call your doctor if you are having problems. It's also a good idea to know your test results and keep a list of the medicines you take. How can you care for yourself at home? · Try using a heating pad on a low or medium setting for 15 to 20 minutes every 2 or 3 hours. Try a warm shower in place of one session with the heating pad. · You can also try an ice pack for 10 to 15 minutes every 2 to 3 hours. Put a thin cloth between the ice and your skin. · Take pain medicines exactly as directed. ¨ If the doctor gave you a prescription medicine for pain, take it as prescribed. ¨ If you are not taking a prescription pain medicine, ask your doctor if you can take an over-the-counter medicine. · If your doctor recommends a cervical collar, wear it exactly as directed. When should you call for help? Call your doctor now or seek immediate medical care if: 
· You have new or worsening numbness in your arms, buttocks or legs. · You have new or worsening weakness in your arms or legs. (This could make it hard to stand up.) · You lose control of your bladder or bowels. Watch closely for changes in your health, and be sure to contact your doctor if: 
· Your neck pain is getting worse. · You are not getting better after 1 week. · You do not get better as expected. Where can you learn more? Go to http://pepe-constantino.info/. Enter [id number] in the search box to learn more about \"Neck Pain: Care Instructions. \" Current as of: May 23, 2016 Content Version: 11.1 © 4388-9882 Chilicon Power. Care instructions adapted under license by [employer] ([employer]) (which disclaims liability or warranty for this information). If you have questions about a medical condition or this instruction, always ask your healthcare professional. [address] any warranty or liability for your use of this information. Introducing [facility]! Christen Salinas introduces [employer] patient portal. Now you can access parts of your medical record, email your doctor's office, and request medication refills online. 1. In your internet browser, go to [email or web address] 2. Click on the First Time User? Click Here link in the Sign In box. You will see the New Member Sign Up page. 3. Enter your [employer] Access Code exactly as it appears below. You will not need to use this code after youve completed the sign-up process. If you do not sign up before the expiration date, you must request a new code. · [employer]k-[employer] Access Code: [email or web address] Expires: 5/18/2017 10:26 AM 
 
4. Enter the last four digits of your Social Security Number (xxxx) and Date of Birth (mm/dd/yyyy) as indicated and click Submit. You will be taken to the next sign-up page. 5. Create a [employer] ID. This will be your [employer] login ID and cannot be changed, so think of one that is secure and easy to remember. 6. Create a [employer] password. You can change your password at any time. 7. Enter your Password Reset Question and Answer. This can be used at a later time if you forget your password. 8. Enter your e-mail address. You will receive e-mail notification when new information is available in [address] 9. Click Sign Up. You can now view and download portions of your medical record. 10. Click the Download Summary menu link to download a portable copy of your medical information. If you have questions, please visit the Frequently Asked Questions section of the [employer] website. Remember, [employer] is NOT to be used for urgent needs. For medical emergencies, dial 911. Now available from your iPhone and Android! Please provide this summary of care documentation to your next provider. Your primary care clinician is listed as Radha Hagen. If you have any questions after today's visit, please call [phone].

## 2017-02-17 NOTE — ACP (ADVANCE CARE PLANNING)
Discussed ACP with patient. Gave pt an [facility] folder. Accepts referral to [facility] Cho[facility] team.  NN Radha Williamson RN and Luke Fernández RN notified by staff message for follow up.

## 2017-02-17 NOTE — PROGRESS NOTES
Chief Complaint   Patient presents with    High Blood Sugar    Cholesterol Problem    Referral Follow Up     Eye and lungs     1. Have you been to the ER, urgent care clinic since your last visit? Hospitalized since your last visit? No    2. Have you seen or consulted any other health care providers outside of the [address] [place] [address] since your last visit? Include any pap smears or colon screening. No    In the event something were to happen to you and you were unable to speak on your behalf, do you have an Advance Directive/ Living Will in place stating your wishes? NO    If yes, do we have a copy on file NO    If no, would you like information:   Yes, patient offered booklet and accepted. Was given at office visit.

## 2017-02-18 LAB
APPEARANCE UR: CLEAR
BILIRUB UR QL STRIP: NEGATIVE
COLOR UR: YELLOW
GLUCOSE UR QL: NEGATIVE
HGB UR QL STRIP: NEGATIVE
KETONES UR QL STRIP: NEGATIVE
LEUKOCYTE ESTERASE UR QL STRIP: NEGATIVE
MICRO URNS: NORMAL
NITRITE UR QL STRIP: NEGATIVE
PH UR STRIP: 6.5 [PH] (ref 5–7.5)
PROT UR QL STRIP: NEGATIVE
SP GR UR: 1.01 (ref 1–1.03)
UROBILINOGEN UR STRIP-MCNC: 0.2 MG/DL (ref 0.2–1)

## 2017-02-25 LAB
25(OH)D3+25(OH)D2 SERPL-MCNC: 49.1 NG/ML (ref 30–100)
ALBUMIN SERPL-MCNC: 4.3 G/DL (ref 3.5–4.7)
ALBUMIN/CREAT UR: 2.9 MG/G CREAT (ref 0–30)
ALBUMIN/GLOB SERPL: 1.7 {RATIO} (ref 1.1–2.5)
ALP SERPL-CCNC: 110 IU/L (ref 39–117)
ALT SERPL-CCNC: 8 IU/L (ref 0–32)
AST SERPL-CCNC: 19 IU/L (ref 0–40)
BILIRUB SERPL-MCNC: 0.6 MG/DL (ref 0–1.2)
BUN SERPL-MCNC: 16 MG/DL (ref 8–27)
BUN/CREAT SERPL: 18 (ref 11–26)
CALCIUM SERPL-MCNC: 10.3 MG/DL (ref 8.7–10.3)
CHLORIDE SERPL-SCNC: 98 MMOL/L (ref 96–106)
CHOLEST SERPL-MCNC: 227 MG/DL (ref 100–199)
CO2 SERPL-SCNC: 25 MMOL/L (ref 18–29)
CREAT SERPL-MCNC: 0.87 MG/DL (ref 0.57–1)
CREAT UR-MCNC: 113 MG/DL
ERYTHROCYTE [DISTWIDTH] IN BLOOD BY AUTOMATED COUNT: 15.2 % (ref 12.3–15.4)
FOLATE SERPL-MCNC: 13.5 NG/ML
GLOBULIN SER CALC-MCNC: 2.6 G/DL (ref 1.5–4.5)
GLUCOSE SERPL-MCNC: 95 MG/DL (ref 65–99)
HCT VFR BLD AUTO: 44.5 % (ref 34–46.6)
HDLC SERPL-MCNC: 78 MG/DL
HGB BLD-MCNC: 14.6 G/DL (ref 11.1–15.9)
INTERPRETATION, 910389: NORMAL
LDLC SERPL CALC-MCNC: 134 MG/DL (ref 0–99)
MCH RBC QN AUTO: 31.9 PG (ref 26.6–33)
MCHC RBC AUTO-ENTMCNC: 32.8 G/DL (ref 31.5–35.7)
MCV RBC AUTO: 97 FL (ref 79–97)
MICROALBUMIN UR-MCNC: 3.3 UG/ML
PLATELET # BLD AUTO: 249 X10E3/UL (ref 150–379)
POTASSIUM SERPL-SCNC: 5.3 MMOL/L (ref 3.5–5.2)
PROT SERPL-MCNC: 6.9 G/DL (ref 6–8.5)
RBC # BLD AUTO: 4.58 X10E6/UL (ref 3.77–5.28)
SODIUM SERPL-SCNC: 140 MMOL/L (ref 134–144)
TRIGL SERPL-MCNC: 77 MG/DL (ref 0–149)
TSH SERPL DL<=0.005 MIU/L-ACNC: 3.24 UIU/ML (ref 0.45–4.5)
VIT B12 SERPL-MCNC: 424 PG/ML (ref 211–946)
VLDLC SERPL CALC-MCNC: 15 MG/DL (ref 5–40)
WBC # BLD AUTO: 6 X10E3/UL (ref 3.4–10.8)

## 2017-06-12 ENCOUNTER — OFFICE VISIT (OUTPATIENT)
Dept: NEUROLOGY | Age: 82
End: 2017-06-12

## 2017-06-12 DIAGNOSIS — R47.89 WORD FINDING PROBLEM: ICD-10-CM

## 2017-06-12 DIAGNOSIS — F09 MILD COGNITIVE DISORDER: Primary | ICD-10-CM

## 2017-06-12 DIAGNOSIS — F43.22 ADJUSTMENT DISORDER WITH ANXIETY: ICD-10-CM

## 2017-06-12 NOTE — PROGRESS NOTES
[address] [facility],5Th Floor  [address]   [place] [address] [place] [address] [place] [address], [facility] [address]   [phone] Office   [phone] Fax      Neuropsychology    Initial Diagnostic Interview Note      Referral:  DO Macario Martinez is a [de-identified] y.o. right handed   female who was accompanied by her daughter to the initial clinical interview on 6/12/17 . She was a late to this appointment, but I was able to see them today. Please refer to her medical records for details pertaining to her history. Briefly, the patient reported that she completed the 11th grade without having to repeat a grade or major cognitive issues. She lives alone. She sometimes forgets the content of conversations, misplaces things, and forgets people's names. She starts tasks and does not complete all the time. This has been going on for about six months now. Daughter says there is short term memory decline. Forgets when they are going to meet for breakfast the next day. She reports being independent for driving without issues. She takes her own medications and does her own bills. Takes care of her home the best she can, she has some orthopaedic issues. No falls. Food tastes like nothing to her now. Sense of smell okay. No history of stroke, meningitis/encephalitis, ZABRINA Fever, Lupus, Lyme, CVA, TBI, sz, etc.  No known family of dementia. She gets anxious and down sometimes. Daughter agrees that patient gets anxious from time to time, more of a recent issue. No counseling or psychiatrist.   No legal troubles. No known family history of dementia, mood problems. Enjoys going out with family, going to [employer]. No previous neuropsych.        Neuropsychological Mental Status Exam (NMSE):  Historian: Good  Praxis: No UE apraxia  R/L Orientation: Intact to self and to other  Dress: within normal limits Weight:Overweight  Appearance/Hygiene: within normal limits   Gait: within normal limits   Assistive Devices: Glasses  Mood: within normal limits   Affect: within normal limits   Comprehension: within normal limits   Thought Process: within normal limits   Expressive Language: within normal limits   Receptive Language: within normal limits   Motor:  No cognitive or motor perseveration  ETOH: Denied  Tobacco: Denied  Illicit: Denied  SI/HI: Denied  Psychosis: Denied  Insight: Within normal limits  Judgment: Within normal limits  Other Psych:      Past Medical History:   Diagnosis Date    Cataract     Bilaterally. Dr. Yaakov Johnston. Dr. Prather Comment. Dr. Em Vasquez.  Chest pain 10/2015    Dr. Demetri Plasencia.  Diverticulosis 09/2008    Dr. Rachel Menendez. Dr. Pau Hope.  DJD (degenerative joint disease) 09/30/05    cervical, worse C6-7, C7-T1. Left AC joint.  DJD (degenerative joint disease) of knee     bilateral  Dr. Yary Reynoso. Dr. Carlos NULL (dyspnea on exertion) 10/2015    Dr. Demetri Plasencia.  Essential hypertension, benign 1968    Foster child     GIB (gastrointestinal bleeding) 11/17/14    due to internal hemorrhoids. Dr. Raul Mills Heart palpitations 11/12/15    due to PVCs. Dr. Dori Hilton.  Heartburn     Hypercholesteremia     Hyperglycemia 2014    Internal carotid artery stenosis 2007    bilateral.  Dr. Helen Crump Internal hemorrhoids 09/2008, 11/2014    Dr. Domi Mcclendon. Dr. Emiliana Alejo.  Knee pain     Left. Dr. Sejal Priest. 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 Dr. Demetri Plasencia. EF 50-55%    PVD (peripheral vascular disease) ([facility] [address] 2007    Dr. Jean Escalante    Raynaud's phenomenon 0284    Shortening, leg, congenital     left    Vitamin D deficiency 10/10/10       Past Surgical History:   Procedure Laterality Date    HX BREAST LUMPECTOMY Right 2008    benign. Dr. Last Berman.  HX CAROTID ENDARTERECTOMY Right 01/19/2011    [facility].   Dr. Shade Chakraborty Rosy    HX CATARACT REMOVAL Bilateral 02/22/2016, 10/24/2016    L then R Dr. Neena Hayes.  HX COLONOSCOPY  11/24/14    due to GIB. Dr. Stefania Escamilla.  HX COLONOSCOPY  09/05/08    with polypectomy. benign. Dr. Marthe Osler. due q 10 yrs.  HX GI  1957    FISSURECTOMY.  HX GI  10/03/2008    PROCTOPLASTY due to rectal prolapse    HX HEMORRHOIDECTOMY  10/03/2008    internal and external    HX KNEE REPLACEMENT Left 07/2013    due to Severe OA. Dr. Zenobia Hollis.  HX POLYPECTOMY  09/05/2008    rectal Dr. Kym Serna       Allergies   Allergen Reactions    Penicillins Hives       Family History   Problem Relation Age of Onset    Heart Attack Mother 79    Heart Attack Sister      x 3 sisters    Heart Attack Brother      x 3 brothers       Social History   Substance Use Topics    Smoking status: Former Smoker     Packs/day: 1.00     Years: 15.00     Types: Cigarettes     Quit date: 1/1/1968    Smokeless tobacco: Never Used    Alcohol use No       Current Outpatient Prescriptions   Medication Sig Dispense Refill    traMADol (ULTRAM) 50 mg tablet Take 1 Tab by mouth every six (6) hours as needed for Pain. Max Daily Amount: 200 mg. Indications: Pain 40 Tab 0    methocarbamol (ROBAXIN) 500 mg tablet Take 1 Tab by mouth four (4) times daily. Indications: MUSCLE SPASM 40 Tab 1    lisinopril (PRINIVIL, ZESTRIL) 10 mg tablet TAKE 1 TABLET IN THE MORNING AND TAKE 2 TABLETS IN THE EVENING FOR HYPERTENSION (Patient taking differently: TAKE 1 TABLET IN THE MORNING AND TAKE 1 TABLETS IN THE EVENING FOR HYPERTENSION) 270 Tab 0    mometasone (NASONEX) 50 mcg/actuation nasal spray 2 Sprays by Both Nostrils route daily. Indications: ALLERGIC RHINITIS 1 Container 5    albuterol (PROVENTIL HFA, VENTOLIN HFA, PROAIR HFA) 90 mcg/actuation inhaler Take 2 Puffs by inhalation every four (4) hours as needed for Wheezing or Shortness of Breath.  Indications: BRONCHOSPASM PREVENTION 1 Inhaler 1    fluticasone-salmeterol (ADVAIR DISKUS) 250-50 mcg/dose diskus inhaler Take 1 Puff by inhalation two (2) times a day. Indications: PREVENTION OF BRONCHOSPASMS WITH EMPHYSEMA 1 Inhaler 0    cholecalciferol, vitamin D3, 2,000 unit tab Take 1 Tab by mouth daily.  Omega-3 Fatty Acids 300 mg cap Take 1 Cap by mouth daily.  Calcium-Cholecalciferol, D3, 600 mg(1,500mg) -400 unit cap Take  by mouth daily.  ACETAMINOPHEN (TYLENOL PO) Take 325 mg by mouth two (2) times a day.  aspirin 81 mg chewable tablet Take 81 mg by mouth daily. Plan:  Obtain authorization for testing from insurance company. Report to follow once testing, scoring, and interpretation completed. ? Organic based neurocognitive issues versus mood disorder or combination of same. ? Problems organic, functional, or both? This note will not be viewable in [address]       MMSE (not billed as [id number]): do on testing date    Clock: (not billed as [id number]): do on testing date    TOPF: (not billed as [id number]): 31

## 2017-07-27 ENCOUNTER — OFFICE VISIT (OUTPATIENT)
Dept: NEUROLOGY | Age: 82
End: 2017-07-27

## 2017-07-27 DIAGNOSIS — G30.1 LATE ONSET ALZHEIMER'S DISEASE WITHOUT BEHAVIORAL DISTURBANCE (HCC): Primary | ICD-10-CM

## 2017-07-27 DIAGNOSIS — F41.1 GENERALIZED ANXIETY DISORDER: ICD-10-CM

## 2017-07-27 DIAGNOSIS — F02.80 LATE ONSET ALZHEIMER'S DISEASE WITHOUT BEHAVIORAL DISTURBANCE (HCC): Primary | ICD-10-CM

## 2017-07-27 DIAGNOSIS — F33.0 DEPRESSION, MAJOR, RECURRENT, MILD (HCC): ICD-10-CM

## 2017-08-01 NOTE — PROGRESS NOTES
[address] [facility],5Th Floor  [address]   [place] [address] [facility]    [phone] Office   [phone] Fax      Neuropsychological Evaluation Report    Referral:  Elmer Adu, DO Marisue Dubin is a [de-identified] y.o. right handed   female who was accompanied by her daughter to the initial clinical interview on 6/12/17 . She was a late to this appointment, but I was able to see them today. Please refer to her medical records for details pertaining to her history. Briefly, the patient reported that she completed the 11th grade without having to repeat a grade or major cognitive issues. She lives alone. She sometimes forgets the content of conversations, misplaces things, and forgets people's names. She starts tasks and does not complete all the time. This has been going on for about six months now. Daughter says there is short term memory decline. Forgets when they are going to meet for breakfast the next day. She reports being independent for driving without issues. She takes her own medications and does her own bills. Takes care of her home the best she can, she has some orthopaedic issues. No falls. Food tastes like nothing to her now. Sense of smell okay. No history of stroke, meningitis/encephalitis, ZABRINA Fever, Lupus, Lyme, CVA, TBI, sz, etc.  No known family of dementia. She gets anxious and down sometimes. Daughter agrees that patient gets anxious from time to time, more of a recent issue. No counseling or psychiatrist.   No legal troubles. No known family history of dementia, mood problems. Enjoys going out with family, going to [employer]. No previous neuropsych.        Neuropsychological Mental Status Exam (NMSE):  Historian: Good  Praxis: No UE apraxia  R/L Orientation: Intact to self and to other  Dress: within normal limits   Weight:Overweight  Appearance/Hygiene: within normal limits   Gait: within normal limits   Assistive Devices: Glasses  Mood: within normal limits   Affect: within normal limits   Comprehension: within normal limits   Thought Process: within normal limits   Expressive Language: within normal limits   Receptive Language: within normal limits   Motor:  No cognitive or motor perseveration  ETOH: Denied  Tobacco: Denied  Illicit: Denied  SI/HI: Denied  Psychosis: Denied  Insight: Within normal limits  Judgment: Within normal limits  Other Psych:      Past Medical History:   Diagnosis Date    Cataract     Bilaterally. Dr. Margaret Viveros. Dr. Christian Haas. Dr. Jose Soto.  Chest pain 10/2015    Dr. Shaaron Schaumann.  Diverticulosis 09/2008    Dr. Ria Shultz. Dr. Quyen Parham.  DJD (degenerative joint disease) 09/30/05    cervical, worse C6-7, C7-T1. Left AC joint.  DJD (degenerative joint disease) of knee     bilateral  Dr. Vicki Estrada. Dr. Jenifer NULL (dyspnea on exertion) 10/2015    Dr. Shaaron Schaumann.  Essential hypertension, benign 1968    Foster child     GIB (gastrointestinal bleeding) 11/17/14    due to internal hemorrhoids. Dr. Warren Nicole Heart palpitations 11/12/15    due to PVCs. Dr. Pina Mahoney.  Heartburn     Hypercholesteremia     Hyperglycemia 2014    Internal carotid artery stenosis 2007    bilateral.  Dr. Pulido Figures Internal hemorrhoids 09/2008, 11/2014    Dr. Dain Figueredo. Dr. Jeffry Moncada.  Knee pain     Left. Dr. Ariel Mix. 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 Dr. Shaaron Schaumann. EF 50-55%    PVD (peripheral vascular disease) ([facility] [address] 2007    Dr. Yaw Moreau    Raynaud's phenomenon 1220    Shortening, leg, congenital     left    Vitamin D deficiency 10/10/10       Past Surgical History:   Procedure Laterality Date    HX BREAST LUMPECTOMY Right 2008    benign. Dr. Semaj Mehta.  HX CAROTID ENDARTERECTOMY Right 01/19/2011    [facility].   Dr. Escobar Neighbor Bilateral 02/22/2016, 10/24/2016    L then R Dr. Lynn Celeste.  HX COLONOSCOPY  11/24/14    due to GIB. Dr. Diego Darden.  HX COLONOSCOPY  09/05/08    with polypectomy. benign. Dr. Vikki Bravo. due q 10 yrs.  HX GI  1957    FISSURECTOMY.  HX GI  10/03/2008    PROCTOPLASTY due to rectal prolapse    HX HEMORRHOIDECTOMY  10/03/2008    internal and external    HX KNEE REPLACEMENT Left 07/2013    due to Severe OA. Dr. Winston Najera.  HX POLYPECTOMY  09/05/2008    rectal Dr. Sonja Horne       Allergies   Allergen Reactions    Penicillins Hives       Family History   Problem Relation Age of Onset    Heart Attack Mother 79    Heart Attack Sister      x 3 sisters    Heart Attack Brother      x 3 brothers       Social History   Substance Use Topics    Smoking status: Former Smoker     Packs/day: 1.00     Years: 15.00     Types: Cigarettes     Quit date: 1/1/1968    Smokeless tobacco: Never Used    Alcohol use No       Current Outpatient Prescriptions   Medication Sig Dispense Refill    traMADol (ULTRAM) 50 mg tablet Take 1 Tab by mouth every six (6) hours as needed for Pain. Max Daily Amount: 200 mg. Indications: Pain 40 Tab 0    methocarbamol (ROBAXIN) 500 mg tablet Take 1 Tab by mouth four (4) times daily. Indications: MUSCLE SPASM 40 Tab 1    lisinopril (PRINIVIL, ZESTRIL) 10 mg tablet TAKE 1 TABLET IN THE MORNING AND TAKE 2 TABLETS IN THE EVENING FOR HYPERTENSION (Patient taking differently: TAKE 1 TABLET IN THE MORNING AND TAKE 1 TABLETS IN THE EVENING FOR HYPERTENSION) 270 Tab 0    mometasone (NASONEX) 50 mcg/actuation nasal spray 2 Sprays by Both Nostrils route daily. Indications: ALLERGIC RHINITIS 1 Container 5    albuterol (PROVENTIL HFA, VENTOLIN HFA, PROAIR HFA) 90 mcg/actuation inhaler Take 2 Puffs by inhalation every four (4) hours as needed for Wheezing or Shortness of Breath.  Indications: BRONCHOSPASM PREVENTION 1 Inhaler 1    fluticasone-salmeterol (ADVAIR DISKUS) 250-50 mcg/dose diskus inhaler Take 1 Puff by inhalation two (2) times a day. Indications: PREVENTION OF BRONCHOSPASMS WITH EMPHYSEMA 1 Inhaler 0    cholecalciferol, vitamin D3, 2,000 unit tab Take 1 Tab by mouth daily.  Omega-3 Fatty Acids 300 mg cap Take 1 Cap by mouth daily.  Calcium-Cholecalciferol, D3, 600 mg(1,500mg) -400 unit cap Take  by mouth daily.  ACETAMINOPHEN (TYLENOL PO) Take 325 mg by mouth two (2) times a day.  aspirin 81 mg chewable tablet Take 81 mg by mouth daily. Plan:  Obtain authorization for testing from insurance company. Report to follow once testing, scoring, and interpretation completed. ? Organic based neurocognitive issues versus mood disorder or combination of same. ? Problems organic, functional, or both? This note will not be viewable in [address] Neuropsychological Test Results  Patient Testing 7/27/17 Report Completed 8/1/17  A Psychometrist Assisted w/ portions of this evaluation while under my direct  supervision    The following evaluation procedures/tests were administered:      Neuropsychologist Performed, Interpreted, & Reported:  Neuropsychological Mental Status Exam, Revised Memory & Behavior Checklist,  Mini Mental Status Exam, Clock Drawing Test, Bal-Melzack Pain Questionnaire, Test Of Premorbid Functioning, History Taking  & Clinical Interview With The Patient, Additional History Taking w/ The Patient's Daughter,  Review Of Available Records. Psychometrist Administered under Neuropsychologist Supervision & Neuropsychologist Interpreted & Neuropsychologist Reported:  Verbal Fluency Tests, Rogelio & Rogelio - Revised, Trailmaking Test Parts A & B, Wechsler Adult Intelligence Scale - IV, Sarah Continuous Performance Test - III, [employer] - II, Grooved Pegboard, Douglas Depression Inventory - II, Douglas Anxiety Inventory, Personality Assessment Inventory.       Test Findings:  Test Findings:  Note:  The patients raw data have been compared with currently available norms which include demographic corrections for age, gender, and/or education. Sometimes, the patients scores are compared to demographically similar individuals as close to the patients age, education level, etc., as possible. \"Average\" is viewed as being +/- 1 standard deviation (SD) from the stated mean for a particular test score. \"Low average\" is viewed as being between 1 and 2 SD below the mean, and above average is viewed as being 1 and 2 SD above the mean. Scores falling in the borderline range (between 1-1/2 and 2 SD below the mean) are viewed with particular attention as to whether they are normal or abnormal neurocognitive test scores. Other methods of inference in analyzing the test data are also utilized, including the pattern and range of scores in the profile, bilateral motor functions, and the presence, if any, of pathognomonic signs. Behaviorally, the patient was friendly and cooperative and appeared motivated to perform well during this examination. Within this context, the results of this evaluation are viewed as a valid reflection of the patients actual neurocognitive and emotional status. The patient's score of 22/30 on the Mini-Mental Status Exam was impaired. In this regard, she was not oriented to date or floor. Serial 7s were 4/5 correct. Recall for three words after a brief delay was 0/3 correct. Comprehension and carry out of a three step command was 2/3 correct. Visual construction was impaired. Clock drawing was impaired. Her structured word list fluency, as assessed by the FAS Test, was within the average range with a T score of 48. Category fluency was within the below average range with a T score of 44. Confrontation naming ability, as assessed by the [facility] - Revised, was within the above average range at 47/60 correct (T = 55).   This pattern of performance is not indicative of a patient who is at increased risk for day-to-day problems with verbal fluency or confrontation naming. The patient was administered the Sarah Continuous Performance Test - III and review of the subscales within this instrument revealed numerous concerns for inattentiveness without impulsivity. This pattern of performance is indicative of a patient who is at increased risk for day-to-day problems with sustained visual attention/concentration. The patient is not showing problems with working memory capacity (18th %ile) though processing speed (5th %ile) was borderline on the WAIS-IV. Her Verbal Comprehension Index score of 81 was within the low average range. Her Perceptual Reasoning Index score of 86 was low average. These scores are somewhat lower than expected based on an assessment of premorbid functioning. The patient was administered the [facility] Verbal Learning Test  - II and generated an impaired range (and positive) learning curve over five repeated auditory word list learning trials. An interference trial was impaired. Free and cued, short and long delayed recall were all impaired. Recognition and forced choice recall were impaired. This pattern of performance is indicative of a patient who is at increased risk for day-to-day problems with auditory learning and/or memory. Simple timed visual motor sequencing (Trailmaking Test Part A) was within the mildly impaired range with a T score of 39. Her performance on a similar, but more complex task of timed visual motor sequencing (Trailmaking Test Part B) was within the average range with a T score of 50. She made only one sequencing error on this latter test.  This pattern of performance is not indicative of a patient who is at increased risk for day-to-day problems with executive functioning. Fine motor dexterity was within the normal range bilaterally.   This does not raise concern for a particularly lateralized brain dysfunction. The patient rated her current level of pain as \"2/5 - Discomforting\" on the Bal-Melzack Pain Questionnaire. She reported pain in her feet/toes. Her Douglas Depression Inventory -II score of 11 was within the minimally depressed range. Her Douglas Anxiety Inventory score of 8 reflected mild anxiety. The patient's responses on the Personality Assessment Inventory were deemed valid for interpretation. Within this context, there are physical signs of mild depression and anxiety. Self-concept is fixed and negative, and she may inwardly be more troubled by self-doubt and misgivings about her adequacy than is readily apparent to others. The personality profile is otherwise normal.      Impressions & Recommendations: This is an abnormal range Neuropsychological Evaluation with respect to neurocognitive functioning. In this regard, she is showing impairments with mental status, visual attention, processing speed, auditory learning, and auditory memory. At the same time, her verbal fluency, confrontation naming, working memory, perceptual reasoning, verbal comprehension, bilateral fine motor dexterity, and executive functioning remain normal.  These are important strengths which may serve to mask underlying cognitive deficits at times. Emotionally, there is concern for mild depression and anxiety. In my opinion, this appears to be a case of mild dementia exacerbated by mild depression and anxiety. I suggest consideration for medication for memory, attention, and depression. Counseling may prove helpful as well. She should be encouraged to remain as mentally, socially, and physically active as possible. I find her competent to make medical decisions, financial decisions, to vote, to , and to own a firearm. She should, however, assign a POA if this has not been done so already. Additionally, she likely requires supervision for those domains pertaining to memory.   This includes medication management supervision and supervision of financial dealings. While I am not yet overly concerned about driving safety, a formal evaluation of driving safety may be wise in the near future. The family is supportive and so long as they are able to assist with the supervision as noted, I agree with her current living arrangement. Baseline now established. Follow up prn. Clinical correlation is, of course, indicated. I will discuss these findings with the patient when she follows up with me in the near future. A follow up Neuropsychological Evaluation is indicated on a prn basis, especially if there are any cognitive and/or emotional changes. DIAGNOSES:  Dementia - Mild     Major Depression - Mild     Anxiety - Mild     The above information is based upon information currently available to me. If there is any additional information of which I am currently unaware, I would be more than happy to review it upon having it made available to me. Thank you for the opportunity to see this interesting individual.     Sincerely,       Keiyr Bethea. Chastity Au PsyD, EdS    CC:  Madisyn Tuttle DO    2 units -[id number]-  1.75 hours Record review. Review of history provided by patient. Review of collaborative information. Testing by Clinician. Review of raw data. Scoring. Report writing of individual tests administered by Clinician. Integration of individual tests administered by psychometrist (that were previously reported and billed under psychometry code below) with testing by clinician and review of records/history/collaborative information. Case Conceptualization, Report writing. Coordination Of Care. 4 units  -20104 - 3.75 hours Psychometrist test prep, administration, and scoring under clinician's direct supervision.   Clinical interpretation of individual tests administered by psychometrist .  Clinician report of individual tests administered by psychometrist.    \"Unit\" is defined by CPT/National Guidelines (31 - 60 minutes). Integral services including scoring of raw data, data interpretation, case conceptualization, report writing etcetera were initiated after the patient finished testing/raw data collected and was completed on the date the report was signed.

## 2017-08-18 ENCOUNTER — OFFICE VISIT (OUTPATIENT)
Dept: FAMILY MEDICINE CLINIC | Age: 82
End: 2017-08-18

## 2017-08-18 VITALS
HEIGHT: 62 IN | DIASTOLIC BLOOD PRESSURE: 80 MMHG | WEIGHT: 177.1 LBS | OXYGEN SATURATION: 97 % | BODY MASS INDEX: 32.59 KG/M2 | RESPIRATION RATE: 16 BRPM | SYSTOLIC BLOOD PRESSURE: 128 MMHG | HEART RATE: 71 BPM | TEMPERATURE: 97.7 F

## 2017-08-18 DIAGNOSIS — F03.A0 MILD DEMENTIA: ICD-10-CM

## 2017-08-18 DIAGNOSIS — Z00.00 MEDICARE ANNUAL WELLNESS VISIT, SUBSEQUENT: Primary | ICD-10-CM

## 2017-08-18 DIAGNOSIS — R26.81 GAIT INSTABILITY: ICD-10-CM

## 2017-08-18 DIAGNOSIS — Z96.652 S/P TKR (TOTAL KNEE REPLACEMENT) USING CEMENT, LEFT: ICD-10-CM

## 2017-08-18 DIAGNOSIS — E55.9 VITAMIN D DEFICIENCY: ICD-10-CM

## 2017-08-18 DIAGNOSIS — Q72.812 SHORTENING, LEG, CONGENITAL, LEFT: ICD-10-CM

## 2017-08-18 DIAGNOSIS — M25.561 CHRONIC PAIN OF BOTH KNEES: ICD-10-CM

## 2017-08-18 DIAGNOSIS — I10 ESSENTIAL HYPERTENSION: ICD-10-CM

## 2017-08-18 DIAGNOSIS — Z87.19 HISTORY OF GI BLEED: ICD-10-CM

## 2017-08-18 DIAGNOSIS — G89.29 CHRONIC PAIN OF BOTH KNEES: ICD-10-CM

## 2017-08-18 DIAGNOSIS — E78.00 HYPERCHOLESTEREMIA: ICD-10-CM

## 2017-08-18 DIAGNOSIS — R73.9 HYPERGLYCEMIA: ICD-10-CM

## 2017-08-18 DIAGNOSIS — M25.562 CHRONIC PAIN OF BOTH KNEES: ICD-10-CM

## 2017-08-18 DIAGNOSIS — Z98.890 HISTORY OF CEA (CAROTID ENDARTERECTOMY): ICD-10-CM

## 2017-08-18 DIAGNOSIS — M79.604 BILATERAL LEG PAIN: ICD-10-CM

## 2017-08-18 DIAGNOSIS — Z13.31 DEPRESSION SCREENING: ICD-10-CM

## 2017-08-18 DIAGNOSIS — I73.9 PVD (PERIPHERAL VASCULAR DISEASE) (HCC): ICD-10-CM

## 2017-08-18 DIAGNOSIS — E66.9 OBESITY, CLASS I, BMI 30-34.9: ICD-10-CM

## 2017-08-18 DIAGNOSIS — I65.23 INTERNAL CAROTID ARTERY STENOSIS, BILATERAL: ICD-10-CM

## 2017-08-18 DIAGNOSIS — M54.2 NECK PAIN: ICD-10-CM

## 2017-08-18 DIAGNOSIS — H61.23 BILATERAL IMPACTED CERUMEN: ICD-10-CM

## 2017-08-18 DIAGNOSIS — I37.1 PULMONARY VALVE INSUFFICIENCY, UNSPECIFIED ETIOLOGY: ICD-10-CM

## 2017-08-18 DIAGNOSIS — I35.8 AORTIC VALVE SCLEROSIS: ICD-10-CM

## 2017-08-18 DIAGNOSIS — M79.605 BILATERAL LEG PAIN: ICD-10-CM

## 2017-08-18 DIAGNOSIS — H04.123 DRY EYE SYNDROME, BILATERAL: ICD-10-CM

## 2017-08-18 RX ORDER — ASPIRIN 81 MG/1
81 TABLET ORAL DAILY
Qty: 90 TAB | Refills: 3
Start: 2017-08-18 | End: 2021-01-01

## 2017-08-18 RX ORDER — DONEPEZIL HYDROCHLORIDE 5 MG/1
5 TABLET, FILM COATED ORAL
Qty: 30 TAB | Refills: 11 | Status: SHIPPED | OUTPATIENT
Start: 2017-08-18 | End: 2018-02-19 | Stop reason: ALTCHOICE

## 2017-08-18 NOTE — PROGRESS NOTES
Chief Complaint   Patient presents with    Annual Wellness Visit    Referral Follow Up     ENT/Ortho/Neuropsych    Results    Blood Pressure Check     1. Have you been to the ER, urgent care clinic since your last visit? Hospitalized since your last visit? No    2. Have you seen or consulted any other health care providers outside of the [address] [place] [address] since your last visit? Include any pap smears or colon screening. No    In the event something were to happen to you and you were unable to speak on your behalf, do you have an Advance Directive/ Living Will in place stating your wishes? NO    If yes, do we have a copy on file NO    If no, would you like information:   Pt offered and declined. Pt's daughter is with her today. Pt did not go see the ENT or Ortho as referred; did go see neuropsych--note in encounters.

## 2017-08-18 NOTE — PROGRESS NOTES
Socorro Pandey is a 81 y.o. female and presents for annual Medicare Wellness Visit. Problem List: Reviewed with patient and discussed risk factors. 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 Madelyn Zuleta MD (Gastroenterology)  Jerome Loco MD (Orthopedic Surgery)    PSH: Reviewed with patient  Past Surgical History:   Procedure Laterality Date    HX ATHERECTOMY Left 09/01/2011    popliteal atherectomy and angioplasty. Dr. Osmar Leon Right 2008    benign. Dr. Lynnae Claude.  HX CAROTID ENDARTERECTOMY Right 01/19/2011    ICA. Dr. Marlen Orr Bilateral 02/22/2016, 10/24/2016    L then R Dr. Ara Soulier.  HX COLONOSCOPY  11/24/14    due to GIB. Dr. Debbie Rainey.  HX COLONOSCOPY  09/05/08    with polypectomy. benign. Dr. Mahesh Hernandez. due q 10 yrs.  HX GI  1957    FISSURECTOMY.     HX GI  10/03/2008    PROCTOPLASTY due to rectal prolapse    HX HEMORRHOIDECTOMY  10/03/2008    internal and external    HX KNEE REPLACEMENT Left 07/2013    due to Severe OA. Dr. Amaris Cooper.  HX POLYPECTOMY  09/05/2008    rectal Dr. Carissa Avery        SH: Reviewed with patient  Social History   Substance Use Topics    Smoking status: Former Smoker     Packs/day: 1.00     Years: 15.00     Types: Cigarettes     Quit date: 1/1/1968    Smokeless tobacco: Never Used    Alcohol use No       FH: Reviewed with patient  Family History   Problem Relation Age of Onset    Heart Attack Mother 79    Heart Attack Sister      x 3 sisters    Heart Attack Brother      x 3 brothers       Medications/Allergies: Reviewed with patient  Current Outpatient Prescriptions on File Prior to Visit   Medication Sig Dispense Refill    lisinopril (PRINIVIL, ZESTRIL) 10 mg tablet TAKE 1 TABLET IN THE MORNING AND TAKE 2 TABLETS IN THE EVENING FOR HYPERTENSION (Patient taking differently: TAKE 1 TABLET IN THE MORNING AND TAKE 1 TABLETS IN THE EVENING FOR HYPERTENSION) 270 Tab 0    cholecalciferol, vitamin D3, 2,000 unit tab Take 1 Tab by mouth daily.  Omega-3 Fatty Acids 300 mg cap Take 1 Cap by mouth daily.  Calcium-Cholecalciferol, D3, 600 mg(1,500mg) -400 unit cap Take  by mouth daily.  ACETAMINOPHEN (TYLENOL PO) Take 325 mg by mouth two (2) times a day.  traMADol (ULTRAM) 50 mg tablet Take 1 Tab by mouth every six (6) hours as needed for Pain. Max Daily Amount: 200 mg. Indications: Pain 40 Tab 0    methocarbamol (ROBAXIN) 500 mg tablet Take 1 Tab by mouth four (4) times daily. Indications: MUSCLE SPASM 40 Tab 1    mometasone (NASONEX) 50 mcg/actuation nasal spray 2 Sprays by Both Nostrils route daily. Indications: ALLERGIC RHINITIS 1 Container 5    albuterol (PROVENTIL HFA, VENTOLIN HFA, PROAIR HFA) 90 mcg/actuation inhaler Take 2 Puffs by inhalation every four (4) hours as needed for Wheezing or Shortness of Breath.  Indications: BRONCHOSPASM PREVENTION 1 Inhaler 1  fluticasone-salmeterol (ADVAIR DISKUS) 250-50 mcg/dose diskus inhaler Take 1 Puff by inhalation two (2) times a day. Indications: PREVENTION OF BRONCHOSPASMS WITH EMPHYSEMA 1 Inhaler 0     No current facility-administered medications on file prior to visit. Allergies   Allergen Reactions    Penicillins Hives       Objective:  Visit Vitals    /79 (BP 1 Location: Right arm, BP Patient Position: Sitting)    Pulse 71    Temp 97.7 °F (36.5 °C) (Oral)    Resp 16    Ht 5' 2.01\" (1.575 m)    Wt 177 lb 1.6 oz (80.3 kg)    SpO2 97%    BMI 32.38 kg/m2    Body mass index is 32.38 kg/(m^2). Assessment of cognitive impairment: Alert and oriented x 4    Depression Screen:   PHQ over the last two weeks 8/18/2017   Little interest or pleasure in doing things Not at all   Feeling down, depressed or hopeless Not at all   Total Score PHQ 2 0       Fall Risk Assessment:    Fall Risk Assessment, last 12 mths 8/18/2017   Able to walk? Yes   Fall in past 12 months? No       Functional Ability:   Does the patient exhibit a steady gait? Yes with distances, due to leg pain. Sees Dr. Raleigh Lorenzo. How long did it take the patient to get up and walk from a sitting position? INSTANTLY AND ASSISTANCE   Is the patient self reliant?  (ie can do own laundry, meals, household chores)  yes     Does the patient handle his/her own medications? yes     Does the patient handle his/her own money? yes     Is the patients home safe (ie good lighting, handrails on stairs and bath, etc.)? yes     Did you notice or did patient express any hearing difficulties? Yes. PT DECLINES REFERRAL TODAY. Did you notice or did patient express any vision difficulties? yes     Were distance and reading eye charts used? no       Advance Care Planning:   Patient was offered the opportunity to discuss advance care planning:  yes     Does patient have an Advance Directive:  no   If no, did you provide information on [facility]? Yes.  REFERRED TO HONORING CHOICES. Plan:      ICD-10-CM ICD-9-CM    1. Medicare annual wellness visit, subsequent Z00.00 V70.0    2. Essential hypertension I10 401.9     stable   3. Hypercholesteremia E78.00 272.0    4. Internal carotid artery stenosis, bilateral I65.23 433.10      433.30     L 50-79%, R <10%  after R endardectomy   5. Mild dementia F03.90 294.20 donepezil (ARICEPT) 5 mg tablet   6. Bilateral leg pain M79.604 729.5 REFERRAL TO ORTHOPEDIC SURGERY    M79.605      due to PVD vs knee pain vs other   7. Neck pain M54.2 723.1     due to OA vs somatic dysfunction vs other   8. Chronic pain of both knees M25.561 719.46 REFERRAL TO ORTHOPEDIC SURGERY    M25.562 338.29     G89.29      L>R, due to L short leg vs s/p L TKR vs OA vs other   9. Hyperglycemia R73.9 790.29    10. Vitamin D deficiency E55.9 268.9     stable   11. S/P TKR (total knee replacement) using cement, left Z96.652 V43.65 REFERRAL TO ORTHOPEDIC SURGERY   12. PVD (peripheral vascular disease) (HCC) I73.9 443.9 aspirin delayed-release 81 mg tablet   13. Gait instability R26.81 781.2 REFERRAL TO ORTHOPEDIC SURGERY    due to leg pain vs dementia vs other   14. Shortening, leg, congenital, left Q72.812 755.30 REFERRAL TO ORTHOPEDIC SURGERY   15. History of GI bleed Z87.19 V12.79    16. History of CEA (carotid endarterectomy) Z98.890 V45.89 aspirin delayed-release 81 mg tablet   17. Obesity, Class I, BMI 30-34.9 E66.9 278.00    18. Pulmonary valve insufficiency, unspecified etiology I37.1 424.3    19. Aortic valve sclerosis I35.8 424.1    20. Dry eye syndrome, bilateral H04. 123 375.15    21.  Depression screening Z13.89 V79.0        Orders Placed This Encounter    REFERRAL TO ORTHOPEDIC SURGERY    aspirin delayed-release 81 mg tablet    donepezil (ARICEPT) 5 mg tablet       Health Maintenance   Topic Date Due    MEDICARE YEARLY EXAM  08/18/2017    INFLUENZA AGE 9 TO ADULT  10/02/2017 (Originally 8/1/2017)    GLAUCOMA SCREENING Q2Y 06/22/2019    DTaP/Tdap/Td series (2 - Td) 02/17/2027    OSTEOPOROSIS SCREENING (DEXA)  Completed    ZOSTER VACCINE AGE 60>  Addressed    Pneumococcal 65+ Low/Medium Risk  Completed       *Patient verbalized understanding and agreement with the plan. A copy of the After Visit Summary with personalized health plan was given to the patient today.

## 2017-08-18 NOTE — PROGRESS NOTES
HISTORY OF PRESENT ILLNESS  Gurjit Williamson is a 81 y.o. female presents with Annual Wellness Visit; Referral Follow Up (ENT/Ortho/Neuropsych); Results; and Blood Pressure Check    Agree with nurse note. Her daughter is present with her today. Hypertensive, hyperglycemic pt with hypercholesterolemia, aortic valve sclerosis, pulmonary valve insufficiency, Vit D deficiency, and hx of GI bleed presents to the office with a BP of 144/79. For BP, she takes Lisinopril 10 mg daily, tolerating well. She requests her most recent labs from 02/24/17. Vit B12 was 424. CBC was WNL. Urine microalbumin was 3.3. Vit D was 49.1. TSH was 3.24. Potassium was 5.3. Creatinine was 0.97. LDL was 134, up from 111. She has had some nasal congestion with discharge and occasional sinus headaches. She is not using any allergy mediations. Tylenol helps the headaches. Pt weighs 177 lbs, gained 7 lbs since 02/2017. Her BL leg pain keeps her from walking as much. Her knees also \"almost give away\" sometimes. Her L knee is worse than the R and her L leg is shorter than her R. She has a hx of L TKR in 07/2013 due to severe OA, performed by orthopedist, Dr. Ayla Mckeon. Pt with BL ICA stenosis, PVA, and hx of R CEA resulting in <10% stenosis on the R side. She last saw Dr. Riddhi Caceres on 07/19/17. She is s/p L popliteal atherectomy and angioplasty since 09/2011. He ordered a carotid doppler US which she had performed that day. Ana Torres noted it was essentially unchanged when compared to previous studies but velocities have increase in L ICA but remain with 50-79%. He also planned to order a LE Resting PVR but pt does not recall having that performed. She is able to tolerate Aspirin 81 mg daily. She continues with neck pain, which she attributes to arthritis. She had neuropsych testing performed with Dr. Iesha Pedroza on 07/27/17.  Dx'd Late onset Alzheimer's disease w/o behavioral disturbance, mild recurrent major depression, and LACHELLE. Dr. Jeffrey Ragsdale noted, \"In my opinion, this appears to be a case of mild dementia exacerbated by mild depression and anxiety. I suggest consideration for medication for memory, attention, and depression. Counseling may prove helpful as well. She should be encouraged to remain as mentally, socially, and physically active as possible. I find her competent to make medical decisions, financial decisions, to vote, to , and to own a firearm. She should, however, assign a POA if this has not been done so already. Additionally, she likely requires supervision for those domains pertaining to memory. This includes medication management supervision and supervision of financial dealings. While I am not yet overly concerned about driving safety, a formal evaluation of driving safety may be wise in the near future. The family is supportive and so long as they are able to assist with the supervision as noted, I agree with her current living arrangement. Baseline now established. \"     Pt denies depression today and admits to worrying sometimes but feels like it is not too bothersome. She is very comfortable going to her daughter when she worries and feels comfortable after that interaction. No SI/SA. She admits she has difficulty hearing but does not want to see anymore doctors or have anymore testing right now. Health Maintenance    Pt's most recent colonoscopy was on 09/05/08 with GI, Dr. Neeta Norton F/U 10 years. She saw ophthalmologist, Dr. Hal Reyes on 06/22/17. Dx'd dry eye syndrome BL and BL pseudophakia. Subsequent MWV completed today. Written by dara Stevens, as dictated by Dr. Steven Peter DO.    HOLLIE    Review of Systems negative except as noted above in HPI.     ALLERGIES:    Allergies   Allergen Reactions    Penicillins Hives       CURRENT MEDICATIONS:    Outpatient Prescriptions Marked as Taking for the 8/18/17 encounter (Office Visit) with Tony Butler, DO   Medication Sig Dispense Refill    aspirin delayed-release 81 mg tablet Take 1 Tab by mouth daily. Indications: myocardial infarction prevention 90 Tab 3    donepezil (ARICEPT) 5 mg tablet Take 1 Tab by mouth nightly. Indications: MILD TO MODERATE ALZHEIMER'S TYPE DEMENTIA 30 Tab 11    lisinopril (PRINIVIL, ZESTRIL) 10 mg tablet TAKE 1 TABLET IN THE MORNING AND TAKE 2 TABLETS IN THE EVENING FOR HYPERTENSION (Patient taking differently: TAKE 1 TABLET IN THE MORNING AND TAKE 1 TABLETS IN THE EVENING FOR HYPERTENSION) 270 Tab 0    cholecalciferol, vitamin D3, 2,000 unit tab Take 1 Tab by mouth daily.  Omega-3 Fatty Acids 300 mg cap Take 1 Cap by mouth daily.  Calcium-Cholecalciferol, D3, 600 mg(1,500mg) -400 unit cap Take  by mouth daily.  ACETAMINOPHEN (TYLENOL PO) Take 325 mg by mouth two (2) times a day. PAST MEDICAL HISTORY:    Past Medical History:   Diagnosis Date    Bilateral dry eyes 2017    Dr. Benton Fleming    Cataract     Bilaterally. Dr. Dallas Deal. Dr. Caprice Lopez. Dr. Benton Fleming.  Chest pain 10/2015    Dr. Miguel Estevez.  Dementia 07/2017    triggered by mild LACHELLE, depression. Dr. Marie [facility].  Diverticulosis 09/2008    Dr. Nina Sinclair. Dr. Morgan Blount.  DJD (degenerative joint disease) 09/30/05    cervical, worse C6-7, C7-T1. Left AC joint.  DJD (degenerative joint disease) of knee     bilateral  Dr. Sohan Mahan. Dr. Cindy NULL (dyspnea on exertion) 10/2015    Dr. Miguel Estevez.  Essential hypertension, benign 1968    Foster child     GIB (gastrointestinal bleeding) 11/17/14    due to internal hemorrhoids. Dr. Aleisha Carter Heart palpitations 11/12/15    due to PVCs. Dr. Jeannine Pitt.  Heartburn     Hypercholesteremia     Hyperglycemia 2014    Internal carotid artery stenosis 2007    bilateral.  Dr. De Acosta Internal hemorrhoids 09/2008, 11/2014    Dr. Carissa Avery. Dr. Janice Carrasquillo.  Knee pain     Left. Dr. Ladonna Hassan. 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 Dr. Miguel Estevez. EF 50-55%    PVD (peripheral vascular disease) ([facility] [address] 2007    Dr. Freeman Aldridge    Raynaud's phenomenon 1968    Shortening, leg, congenital     left    Vitamin D deficiency 10/10/10       PAST SURGICAL HISTORY:    Past Surgical History:   Procedure Laterality Date    HX ATHERECTOMY Left 09/01/2011    popliteal atherectomy and angioplasty. Dr. Marilee Caban Right 2008    benign. Dr. Moustapha Michelle.  HX CAROTID ENDARTERECTOMY Right 01/19/2011    ICA. Dr. Luda Buckner Bilateral 02/22/2016, 10/24/2016    L then R Dr. Benton Fleming.  HX COLONOSCOPY  11/24/14    due to GIB. Dr. Cleveland Burr.  HX COLONOSCOPY  09/05/08    with polypectomy. benign. Dr. Nina Sinclair. due q 10 yrs.  HX GI  1957    FISSURECTOMY.  HX GI  10/03/2008    PROCTOPLASTY due to rectal prolapse    HX HEMORRHOIDECTOMY  10/03/2008    internal and external    HX KNEE REPLACEMENT Left 07/2013    due to Severe OA. Dr. Amaris Cooper.      HX POLYPECTOMY  09/05/2008    rectal Dr. Jamar Cedillo:    Family History   Problem Relation Age of Onset    Heart Attack Mother 79    Heart Attack Sister      x 3 sisters    Heart Attack Brother      x 3 brothers       SOCIAL HISTORY:    Social History     Social History    Marital status:      Spouse name: N/A    Number of children: N/A    Years of education: N/A     Social History Main Topics    Smoking status: Former Smoker     Packs/day: 1.00     Years: 15.00     Types: Cigarettes     Quit date: 1/1/1968    Smokeless tobacco: Never Used    Alcohol use No    Drug use: No    Sexual activity: No     Other Topics Concern    None     Social History Narrative       IMMUNIZATIONS:    Immunization History   Administered Date(s) Administered    Pneumococcal Conjugate (PCV-13) 08/17/2016    ZZZ-RETIRED (DO NOT USE) Pneumococcal Vaccine (Unspecified Type) 10/22/2009         PHYSICAL EXAMINATION    Vital Signs    Visit Vitals    /79 (BP 1 Location: Right arm, BP Patient Position: Sitting)    Pulse 71    Temp 97.7 °F (36.5 °C) (Oral)    Resp 16    Ht 5' 2.01\" (1.575 m)    Wt 177 lb 1.6 oz (80.3 kg)    SpO2 97%    BMI 32.38 kg/m2       Weight Metrics 8/18/2017 2/17/2017 10/12/2016 8/17/2016 7/8/2016 6/21/2016 6/19/2016   Weight 177 lb 1.6 oz 170 lb 11.2 oz 168 lb 3.2 oz 170 lb 169 lb 3.2 oz 171 lb 3.2 oz 170 lb   BMI 32.38 kg/m2 31.22 kg/m2 30.76 kg/m2 31.09 kg/m2 30.94 kg/m2 33.44 kg/m2 33.2 kg/m2       General appearance - Well nourished. Well appearing. Well developed. No acute distress. Obese. Head - Normocephalic. Atraumatic. Eyes - pupils equal and reactive. Extraocular eye movements intact. Sclera anicteric. Mildly injected sclera. Ears - Hearing is grossly normal bilaterally. 75% occlusion with dark brown cerumen on L. 25% occlusion on the R. Nose - normal and patent. No polyps noted. No erythema. No discharge. Mouth - mucous membranes with adequate moisture. Posterior pharynx normal with cobblestone appearance. No erythema, white exudate or obstruction. Neck - supple. Midline trachea. No carotid bruits noted bilaterally. No thyromegaly noted. Chest - clear to auscultation bilaterally anteriorly and posteriorly. No wheezes. No rales or rhonchi. Breath sounds are symmetrical bilaterally. Unlabored respirations. Heart - normal rate. Regular rhythm. Normal S1, S2. No murmur noted. No rubs, clicks or gallops noted. Abdomen - soft and distended. No masses or organomegaly. No rebound, rigidity or guarding. Bowel sounds normal x 4 quadrants. No tenderness noted. Neurological - awake, alert and oriented to person, place, and time and event. Cranial nerves II through XII intact. Clear speech. Muscle strength is +5/5 x 4 extremities.   Sensation is intact to light touch bilaterally. Rocking steady gait with choppy strides. Heme/Lymph - peripheral pulses normal x 4 extremities. No peripheral edema is noted. Musculoskeletal - Intact x 4 extremities. Full ROM x 4 extremities. No pain with movement. Arthritic changes noted in BL knees, R>L. No tenderness in the pelvis, pubic bone, bilateral hips, knees, ankles. Back exam - normal range of motion. No pain on palpation of the spinous processes in the cervical, thoracic, lumbar, sacral regions. No CVA tenderness. Skin - no rashes, erythema, ecchymosis, lacerations, abrasions, suspicious moles noted  Psychological -   normal behavior, dress and thought processes. Good insight. Good eye contact. Normal affect. Appropriate mood. Normal speech. DATA REVIEWED    Results for orders placed or performed in visit on 02/17/17   LIPID PANEL   Result Value Ref Range    Cholesterol, total 227 (H) 100 - 199 mg/dL    Triglyceride 77 0 - 149 mg/dL    HDL Cholesterol 78 >39 mg/dL    VLDL, calculated 15 5 - 40 mg/dL    LDL, calculated 134 (H) 0 - 99 mg/dL   METABOLIC PANEL, COMPREHENSIVE   Result Value Ref Range    Glucose 95 65 - 99 mg/dL    BUN 16 8 - 27 mg/dL    Creatinine 0.87 0.57 - 1.00 mg/dL    GFR est non-AA 63 >59 mL/min/1.73    GFR est AA 73 >59 mL/min/1.73    BUN/Creatinine ratio 18 11 - 26    Sodium 140 134 - 144 mmol/L    Potassium 5.3 (H) 3.5 - 5.2 mmol/L    Chloride 98 96 - 106 mmol/L    CO2 25 18 - 29 mmol/L    Calcium 10.3 8.7 - 10.3 mg/dL    Protein, total 6.9 6.0 - 8.5 g/dL    Albumin 4.3 3.5 - 4.7 g/dL    GLOBULIN, TOTAL 2.6 1.5 - 4.5 g/dL    A-G Ratio 1.7 1.1 - 2.5    Bilirubin, total 0.6 0.0 - 1.2 mg/dL    Alk.  phosphatase 110 39 - 117 IU/L    AST (SGOT) 19 0 - 40 IU/L    ALT (SGPT) 8 0 - 32 IU/L   TSH 3RD GENERATION   Result Value Ref Range    TSH 3.240 0.450 - 4.500 uIU/mL   VITAMIN D, 25 HYDROXY   Result Value Ref Range    VITAMIN D, 25-HYDROXY 49.1 30.0 - 100.0 ng/mL   MICROALBUMIN, UR, RAND W/ MICROALBUMIN/CREA RATIO   Result Value Ref Range    Creatinine, urine 113.0 Not Estab. mg/dL    Microalbumin, urine 3.3 Not Estab. ug/mL    Microalb/Creat ratio (ug/mg creat.) 2.9 0.0 - 30.0 mg/g creat   URINALYSIS W/ RFLX MICROSCOPIC   Result Value Ref Range    Specific Gravity 1.015 1.005 - 1.030    pH (UA) 6.5 5.0 - 7.5    Color Yellow Yellow    Appearance Clear Clear    Leukocyte Esterase Negative Negative    Protein Negative Negative/Trace    Glucose Negative Negative    Ketone Negative Negative    Blood Negative Negative    Bilirubin Negative Negative    Urobilinogen 0.2 0.2 - 1.0 mg/dL    Nitrites Negative Negative    Microscopic Examination Comment    CBC W/O DIFF   Result Value Ref Range    WBC 6.0 3.4 - 10.8 x10E3/uL    RBC 4.58 3.77 - 5.28 x10E6/uL    HGB 14.6 11.1 - 15.9 g/dL    HCT 44.5 34.0 - 46.6 %    MCV 97 79 - 97 fL    MCH 31.9 26.6 - 33.0 pg    MCHC 32.8 31.5 - 35.7 g/dL    RDW 15.2 12.3 - 15.4 %    PLATELET 279 339 - 038 x10E3/uL   VITAMIN B12 & FOLATE   Result Value Ref Range    Vitamin B12 424 211 - 946 pg/mL    Folate 13.5 >3.0 ng/mL   CVD REPORT   Result Value Ref Range    INTERPRETATION Note    AMB POC HEMOGLOBIN A1C   Result Value Ref Range    Hemoglobin A1c (POC) 5.5 %       ASSESSMENT and PLAN      ICD-10-CM ICD-9-CM    1. Medicare annual wellness visit, subsequent Z00.00 V70.0    2. Essential hypertension I10 401.9     stable   3. Hypercholesteremia E78.00 272.0    4. Internal carotid artery stenosis, bilateral I65.23 433.10      433.30     L 50-79%, R <10%  after R endardectomy   5. Mild dementia F03.90 294.20 donepezil (ARICEPT) 5 mg tablet   6. Bilateral leg pain M79.604 729.5 REFERRAL TO ORTHOPEDIC SURGERY    M79.605      due to PVD vs knee pain vs other   7. Neck pain M54.2 723.1     due to OA vs somatic dysfunction vs other   8.  Chronic pain of both knees M25.561 719.46 REFERRAL TO ORTHOPEDIC SURGERY    M25.562 338.29     G89.29      L>R, due to L short leg vs s/p L TKR vs OA vs other   9. Hyperglycemia R73.9 790.29    10. Vitamin D deficiency E55.9 268.9     stable   11. S/P TKR (total knee replacement) using cement, left Z96.652 V43.65 REFERRAL TO ORTHOPEDIC SURGERY   12. PVD (peripheral vascular disease) ([facility]) I73.9 443.9 aspirin delayed-release 81 mg tablet   13. Gait instability R26.81 781.2 REFERRAL TO ORTHOPEDIC SURGERY    due to leg pain vs dementia vs other   14. Shortening, leg, congenital, left Q72.812 755.30 REFERRAL TO ORTHOPEDIC SURGERY   15. History of GI bleed Z87.19 V12.79    16. History of CEA (carotid endarterectomy) Z98.890 V45.89 aspirin delayed-release 81 mg tablet   17. Obesity, Class I, BMI 30-34.9 E66.9 278.00    18. Pulmonary valve insufficiency, unspecified etiology I37.1 424.3    19. Aortic valve sclerosis I35.8 424.1    20. Dry eye syndrome, bilateral H04. 123 375.15    21. Depression screening Z13.89 V79.0    22. Bilateral impacted cerumen H61.23 380.4     L>R        Discussed the patient's BMI with her. The BMI follow up plan is as follows: I have counseled this patient on diet and exercise regimens. Decrease carbohydrates (white foods, sweet foods, sweet drinks and alcohol), increase green leafy vegetables and protein (lean meats and beans) with each meal.  Avoid fried foods. Eat 3-5 small meals daily. Do not skip meals. Increase water intake. Increase physical activity to 30 minutes daily for health benefit or 60 minutes daily to prevent weight regain, as tolerated. Get 7-8 hours uninterrupted sleep nightly. Chart reviewed and updated. Ear irrigation procedure explained. Verbal consent received for BL ear irrigation. Pt tolerated procedure well. No complications. Her daughter will contact Dr. Zuleyka Burgess office regarding PVR testing which they do not believe was performed. Continue current medications and care. Start Aricept 5 mg daily. Pt declines statin therapy today and prefers to work harder on diet and exercise.  Recommend OTC nasal spray daily. Ok to continue with Tylenol; do not exceed 3g per day. Prescriptions written and sent to pharmacy; medication side effects discussed. Aricept 5 mg. Most recent tests reviewed from 02/2017. Discussed neuropsych testing results with pt and her daughter. Recent office visit notes from Dr. Vasile Lawrence, Dr. Jeff Krause, and Dr. Amber Grimaldo reviewed. Referrals given; patient urged to keep appointments with specialists. Ortho. Counseled patient on health concerns:  Knee care, PVD, leg pain, memory, cholesterol, BP, and allergies. Relevant handouts given and discussed with patient. Immunizations noted. Offered empathy, support, legitimation, prayers, partnership to patient. Praised patient for progress. Advance Care Booklet given at office visit. Discussed with pt today. Referred to [facility] choices team.   Follow-up Disposition:  Return in about 6 months (around 2/18/2018) for bp check, cholesterol, memory . Patient was offered a choice/choices in the treatment plan today. Patient expresses understanding of the plan and agrees with recommendations. More than 60 mins spent face to face with patient and more than 50% of this time spent in counseling and coordinating care. Written by dara Sim, as dictated by Dr. Anastasiia Niño DO. Documentation True and Accepted by Nettie Mota. Gauri Miller. Patient Instructions          Neck Arthritis: Exercises  Your Care Instructions  Here are some examples of typical rehabilitation exercises for your condition. Start each exercise slowly. Ease off the exercise if you start to have pain. Your doctor or physical therapist will tell you when you can start these exercises and which ones will work best for you. How to do the exercises  Neck stretches to the side    1. This stretch works best if you keep your shoulder down as you lean away from it.  To help you remember to do this, start by relaxing your shoulders and lightly holding on to your thighs or your chair. 2. Tilt your head toward your shoulder and hold for 15 to 30 seconds. Let the weight of your head stretch your muscles. 3. Repeat 2 to 4 times toward each shoulder. Chin tuck    1. Lie on the floor with a rolled-up towel under your neck. Your head should be touching the floor. 2. Slowly bring your chin toward your chest.  3. Hold for a count of 6, and then relax for up to 10 seconds. 4. Repeat 8 to 12 times. Active cervical rotation    1. Sit in a firm chair, or stand up straight. 2. Keeping your chin level, turn your head to the right, and hold for 15 to 30 seconds. 3. Turn your head to the left and hold for 15 to 30 seconds. 4. Repeat 2 to 4 times to each side. Shoulder blade squeeze    1. While standing, squeeze your shoulder blades together. 2. Do not raise your shoulders up as you are squeezing. 3. Hold for 6 seconds. 4. Repeat 8 to 12 times. Shoulder rolls    1. Sit comfortably with your feet shoulder-width apart. You can also do this exercise standing up. 2. Roll your shoulders up, then back, and then down in a smooth, circular motion. 3. Repeat 2 to 4 times. Follow-up care is a key part of your treatment and safety. Be sure to make and go to all appointments, and call your doctor if you are having problems. It's also a good idea to know your test results and keep a list of the medicines you take. Where can you learn more? Go to http://pepe-constantino.info/. Enter R485 in the search box to learn more about \"Neck Arthritis: Exercises. \"  Current as of: March 21, 2017  Content Version: 11.3  © 8151-9980 [employer], [employer] (powering [employer]). Care instructions adapted under license by [employer] (which disclaims liability or warranty for this information).  If you have questions about a medical condition or this instruction, always ask your healthcare professional. [address] any warranty or liability for your use of this information. Neck: Exercises  Your Care Instructions  Here are some examples of typical rehabilitation exercises for your condition. Start each exercise slowly. Ease off the exercise if you start to have pain. Your doctor or physical therapist will tell you when you can start these exercises and which ones will work best for you. How to do the exercises  Note: Stretching should make you feel a gentle stretch, but no pain. Stop any strengthening exercise that makes pain worse. Neck stretch    4. This stretch works best if you keep your shoulder down as you lean away from it. To help you remember to do this, start by relaxing your shoulders and lightly holding on to your thighs or your chair. 5. Tilt your head toward your shoulder and hold for 15 to 30 seconds. Let the weight of your head stretch your muscles. 6. If you would like a little added stretch, use your hand to gently and steadily pull your head toward your shoulder. For example, keeping your right shoulder down, lean your head to the left. 7. Repeat 2 to 4 times toward each shoulder. Diagonal neck stretch    5. Turn your head slightly toward the direction you will be stretching, and tilt your head diagonally toward your chest and hold for 15 to 30 seconds. 6. If you would like a little added stretch, use your hand to gently and steadily pull your head forward on the diagonal.  7. Repeat 2 to 4 times toward each side. Dorsal glide stretch    5. Sit or stand tall and look straight ahead. 6. Slowly tuck your chin as you glide your head backward over your body  7. Hold for a count of 6, and then relax for up to 10 seconds. 8. Repeat 8 to 12 times. Note: The dorsal glide stretches the back of the neck. If you feel pain, do not glide so far back. Some people find this exercise easier to do while lying on their backs with an ice pack on the neck. Chest and shoulder stretch    5.  Sit or stand tall and glide your head backward as in the dorsal glide stretch. 6. Raise both arms so that your hands are next to your ears. 7. Take a deep breath, and as you breathe out, lower your elbows down and behind your back. You will feel your shoulder blades slide down and together, and at the same time you will feel a stretch across your chest and the front of your shoulders. 8. Hold for about 6 seconds, and then relax for up to 10 seconds. 9. Repeat 8 to 12 times. Strengthening: Hands on head    4. Move your head backward, forward, and side to side against gentle pressure from your hands, holding each position for about 6 seconds. 5. Repeat 8 to 12 times. Follow-up care is a key part of your treatment and safety. Be sure to make and go to all appointments, and call your doctor if you are having problems. It's also a good idea to know your test results and keep a list of the medicines you take. Where can you learn more? Go to http://pepeKeyViewconstantino.info/. Enter P975 in the search box to learn more about \"Neck: Exercises. \"  Current as of: March 21, 2017  Content Version: 11.3  © 6014-5344 [employer]. Care instructions adapted under license by [employer] (which disclaims liability or warranty for this information). If you have questions about a medical condition or this instruction, always ask your healthcare professional. [address] any warranty or liability for your use of this information. Healthy Upper Back: Exercises  Your Care Instructions  Here are some examples of exercises for your upper back. Start each exercise slowly. Ease off the exercise if you start to have pain. Your doctor or physical therapist will tell you when you can start these exercises and which ones will work best for you. How to do the exercises  Lower neck and upper back stretch    8. Stretch your arms out in front of your body. Clasp one hand on top of your other hand.   9. Gently reach out so that you feel your shoulder blades stretching away from each other. 10. Gently bend your head forward. 11. Hold for 15 to 30 seconds. 12. Repeat 2 to 4 times. Midback stretch    Note: If you have knee pain, do not do this exercise. 8. Kneel on the floor, and sit back on your ankles. 9. Lean forward, place your hands on the floor, and stretch your arms out in front of you. Rest your head between your arms. 10. Gently push your chest toward the floor, reaching as far in front of you as possible. 11. Hold for 15 to 30 seconds. 12. Repeat 2 to 4 times. Shoulder rolls    9. Sit comfortably with your feet shoulder-width apart. You can also do this exercise while standing. 10. Roll your shoulders up, then back, and then down in a smooth, circular motion. 11. Repeat 2 to 4 times. Wall push-up    10. Stand against a wall with your feet about 12 to 24 inches back from the wall. If you feel any pain when you do this exercise, stand closer to the wall. 11. Place your hands on the wall slightly wider apart than your shoulders, and lean forward. 12. Gently lean your body toward the wall. Then push back to your starting position. Keep the motion smooth and controlled. 13. Repeat 8 to 12 times. Resisted shoulder blade squeeze    Note: For this exercise, you will need elastic exercise material, such as surgical tubing or Thera-Band. 6. Sit or stand, holding the band in both hands in front of you. Keep your elbows close to your sides, bent at a 90-degree angle. Your palms should face up. 7. Squeeze your shoulder blades together, and move your arms to the outside, stretching the band. Be sure to keep your elbows at your sides while you do this. 8. Relax. 9. Repeat 8 to 12 times. Resisted rows    Note: For this exercise, you will need elastic exercise material, such as surgical tubing or Thera-Band. 1. Put the band around a solid object, such as a bedpost, at about waist level. Hold one end of the band in each hand.   2. With your elbows at your sides and bent to 90 degrees, pull the band back to move your shoulder blades toward each other. Return to the starting position. 3. Repeat 8 to 12 times. Follow-up care is a key part of your treatment and safety. Be sure to make and go to all appointments, and call your doctor if you are having problems. It's also a good idea to know your test results and keep a list of the medicines you take. Where can you learn more? Go to http://pepe-constantino.info/. Enter W279 in the search box to learn more about \"Healthy Upper Back: Exercises. \"  Current as of: March 21, 2017  Content Version: 11.3  © 9056-5992 [employer]. Care instructions adapted under license by "[employer] (which disclaims liability or warranty for this information). If you have questions about a medical condition or this instruction, always ask your healthcare professional. [place] [address] any warranty or liability for your use of this information. Hyperlipidemia: After Your Visit  Your Care Instructions  Hyperlipidemia is too much fat in your blood. The body has several kinds of fat, including cholesterol and triglycerides. Your body needs fat for many things, such as making new cells. But too much fat in your blood increases your chances of having a heart attack or stroke. You may be able to lower your cholesterol and triglycerides with a heart-healthy diet, exercise, and if needed, medicine. Your doctor may want you to try lifestyle changes first to see whether they lower the fat in your blood. You may need to take medicine if lifestyle changes do not lower the fat in your blood enough. Follow-up care is a key part of your treatment and safety. Be sure to make and go to all appointments, and call your doctor if you are having problems. Its also a good idea to know your test results and keep a list of the medicines you take.   How can you care for yourself at home?  Take your medicines  · Take your medicines exactly as prescribed. Call your doctor if you think you are having a problem with your medicine. · If you take medicine to lower your cholesterol, go to follow-up visits. You will need to have blood tests. · Do not take large doses of niacin, which is a B vitamin, while taking medicine called statins. It may increase the chance of muscle pain and liver problems. · Talk to your doctor about avoiding grapefruit juice if you are taking statins. Grapefruit juice can raise the level of this medicine in your blood. This could increase side effects. Eat more fruits, vegetables, and fiber  · Fruits and vegetables have lots of nutrients that help protect against heart disease, and they have littleif anyfat. Try to eat at least five servings a day. Dark green, deep orange, or yellow fruits and vegetables are healthy choices. · Keep carrots, celery, and other veggies handy for snacks. Buy fruit that is in season and store it where you can see it so that you will be tempted to eat it. Cook dishes that have a lot of veggies in them, such as stir-fries and soups. · Foods high in fiber may reduce your cholesterol and provide important vitamins and minerals. High-fiber foods include whole-grain cereals and breads, oatmeal, beans, brown rice, citrus fruits, and apples. · Buy whole-grain breads and cereals instead of white bread and pastries. Limit saturated fat  · Read food labels and try to avoid saturated fat and trans fat. They increase your risk of heart disease. · Use olive or canola oil when you cook. Try cholesterol-lowering spreads, such as Benecol or Take Control. · Bake, broil, grill, or steam foods instead of frying them. · Limit the amount of high-fat meats you eat, including hot dogs and sausages. Cut out all visible fat when you prepare meat. · Eat fish, skinless poultry, and soy products such as tofu instead of high-fat meats.  Soybeans may be especially good for your heart. Eat at least two servings of fish a week. Certain fish, such as salmon, contain omega-3 fatty acids, which may help reduce your risk of heart attack. · Choose low-fat or fat-free milk and dairy products. Get exercise, limit alcohol, and quit smoking  · Get more exercise. Work with your doctor to set up an exercise program. Even if you can do only a small amount, exercise will help you get stronger, have more energy, and manage your weight and your stress. Walking is an easy way to get exercise. Gradually increase the amount you walk every day. Aim for at least 30 minutes on most days of the week. You also may want to swim, bike, or do other activities. · Limit alcohol to no more than 2 drinks a day for men and 1 drink a day for women. · Do not smoke. If you need help quitting, talk to your doctor about stop-smoking programs and medicines. These can increase your chances of quitting for good. When should you call for help? Call 911 anytime you think you may need emergency care. For example, call if:  · You have symptoms of a heart attack. These may include:  ¨ Chest pain or pressure, or a strange feeling in the chest.  ¨ Sweating. ¨ Shortness of breath. ¨ Nausea or vomiting. ¨ Pain, pressure, or a strange feeling in the back, neck, jaw, or upper belly or in one or both shoulders or arms. ¨ Lightheadedness or sudden weakness. ¨ A fast or irregular heartbeat. After you call 911, the  may tell you to chew 1 adult-strength or 2 to 4 low-dose aspirin. Wait for an ambulance. Do not try to drive yourself. · You have signs of a stroke. These may include:  ¨ Sudden numbness, paralysis, or weakness in your face, arm, or leg, especially on only one side of your body. ¨ New problems with walking or balance. ¨ Sudden vision changes. ¨ Drooling or slurred speech. ¨ New problems speaking or understanding simple statements, or feeling confused.   ¨ A sudden, severe headache that is different from past headaches. · You passed out (lost consciousness). Call your doctor now or seek immediate medical care if:  · You have muscle pain or weakness. Watch closely for changes in your health, and be sure to contact your doctor if:  · You are very tired. · You have an upset stomach, gas, constipation, or belly pain or cramps. Where can you learn more? Go to OrSense.be  Enter C406 in the search box to learn more about \"Hyperlipidemia: After Your Visit. \"   © 1384-4373 [employer]. Care instructions adapted under license by [address] [place] [address] (which disclaims liability or warranty for this information). This care instruction is for use with your licensed healthcare professional. If you have questions about a medical condition or this instruction, always ask your healthcare professional. [place] [address] any warranty or liability for your use of this information. Content Version: 9.9.645783; Last Revised: October 13, 2011                 Alzheimer's Disease: Care Instructions  Your Care Instructions  Alzheimer's disease is a type of dementia. It causes memory loss and affects judgment, language, and behavior. You may have trouble making decisions or may get lost in places that you used to know well. Alzheimer's disease is different than mild memory loss that occurs with aging. It is not clear what causes Alzheimer's disease, but it is the most common form of dementia in older adults. Finding out that you have this disease is a shock. You may be afraid and worried about how the condition will change your life. Although there is no cure at this time, medicine in some cases may slow memory loss for a while. Other medicines may be able to help you sleep or cope with depression and behavior changes. Alzheimer's disease is different for everyone. It may take many years to develop. In some cases, people can function well for a long time.  In the early stage of the disease, you can do things at home to make life easier and safer. You also can keep doing your hobbies and other activities. Many people find comfort in planning now for their future needs. Follow-up care is a key part of your treatment and safety. Be sure to make and go to all appointments, and call your doctor if you are having problems. Its also a good idea to know your test results and keep a list of the medicines you take. How can you care for yourself at home? Taking care of yourself  · If your doctor gives you medicines, take them exactly as prescribed. Call your doctor if you think you are having a problem with your medicine. You will get more details on the medicines your doctor prescribes. · Eat a balanced diet. Get plenty of whole grains, fruits, and vegetables every day. If you are not hungry at mealtimes, eat snacks at midmorning and in the afternoon. Try drinks such as Boost, Ensure, or Sustacal if you are having trouble keeping your weight up. · Stay active. Exercise such as walking may slow the decline of your mental abilities. Try to stay active mentally too. Read and work crossword puzzles if you enjoy these activities. · If you have trouble sleeping, do not nap during the day. Get regular exercise (but not within several hours of bedtime). Drink a glass of warm milk or caffeine-free herbal tea before going to bed. · Ask your doctor about support groups and other resources in your area. They can help people who have Alzheimer's disease and their families. · Be patient. You may find that a task takes you longer than it used to. · If you have not already done so, make a list of advance directives. Advance directives are instructions to your doctor and family members about what kind of care you want if you become unable to speak or express yourself. Talk to a  about making a will, if you do not already have one. Keeping schedules  · Develop a routine.  You will feel less frustrated or confused if you have a clear, simple plan of what to do every day. ¨ Make lists of your medicines and when to take them. ¨ Write down appointments and other tasks in a calendar. ¨ Put sticky notes around the house to help you remember events and other things you have to do. ¨ Schedule activities and tasks for times of the day when you are best able to handle them. Staying safe  · Tell someone when you are going out and where you are going. Let the person know when you will be back. Before you go out alone, write down where you are going, how to get there, and how to get back home. Do this even if you have gone there many times before. Take someone along with you when possible. · Make your home safe. Tack down rugs, put no-slip tape in the tub, use handrails, and put safety switches on stoves and appliances. · Have a family member or other caregiver tell you whether you are driving badly. Deciding to stop driving is very hard for many people. Driving helps you feel independent. Your state s license bureau can do a driving test if there is any question. Plan for other means of getting around when you are no longer able to drive. · Use strong lighting, especially at night. Put night-lights in bedrooms, hallways, and bathrooms. · Lower the hot water temperature setting to 120°F or lower to avoid burns. When should you call for help? Call 911 anytime you think you may need emergency care. For example, call if:  · You are lost and do not know whom to call. · You are injured and do not know whom to call. Call your doctor now or seek immediate medical care if:  · Your symptoms suddenly get much worse. Watch closely for changes in your health, and be sure to contact your doctor if:  · You want more information about how you can take care of yourself. Where can you learn more? Go to http://pepe-constantino.info/.   Enter Y179 in the search box to learn more about \"Alzheimer's Disease: Care Instructions. \"  Current as of: July 26, 2016  Content Version: 11.3  © 6126-7834 [employer]. Care instructions adapted under license by [employer] (which disclaims liability or warranty for this information). If you have questions about a medical condition or this instruction, always ask your healthcare professional. [facility] [address] any warranty or liability for your use of this information. Seasonal Allergies: Care Instructions  Your Care Instructions  Allergies occur when your body's defense system (immune system) overreacts to certain substances. The immune system treats a harmless substance as if it were a harmful germ or virus. Many things can cause this to happen. Examples include pollens, medicine, food, dust, animal dander, and mold. Your allergies are seasonal if you have symptoms just at certain times of the year. In that case, you are probably allergic to pollens from certain trees, grasses, or weeds. Allergies can be mild or severe. Over-the-counter allergy medicine may help with some symptoms. Read and follow all instructions on the label. Managing your allergies is an important part of staying healthy. Your doctor may suggest that you have tests to help find the cause of your allergies. When you know what things trigger your symptoms, you can avoid them. This can prevent allergy symptoms and other health problems. In some cases, immunotherapy might help. For this treatment, you get shots or use pills that have a small amount of certain allergens in them. Your body \"gets used to\" the allergen, so you react less to it over time. This kind of treatment may help prevent or reduce some allergy symptoms. Follow-up care is a key part of your treatment and safety. Be sure to make and go to all appointments, and call your doctor if you are having problems.  It's also a good idea to know your test results and keep a list of the medicines you take. How can you care for yourself at home? · Be safe with medicines. Take your medicines exactly as prescribed. Call your doctor if you think you are having a problem with your medicine. · During your allergy season, keep windows closed. If you need to use air-conditioning, change or clean all filters every month. Take a shower and change your clothes after you have been outside. · Stay inside when pollen counts are high. Vacuum once or twice a week. Use a vacuum  with a HEPA filter or a double-thickness filter. When should you call for help? Give an epinephrine shot if:  · You think you are having a severe allergic reaction. After giving an epinephrine shot, call 911, even if you feel better. Call 911 if:  · You have symptoms of a severe allergic reaction. These may include:  ¨ Sudden raised, red areas (hives) all over your body. ¨ Swelling of the throat, mouth, lips, or tongue. ¨ Trouble breathing. ¨ Passing out (losing consciousness). Or you may feel very lightheaded or suddenly feel weak, confused, or restless. · You have been given an epinephrine shot, even if you feel better. Call your doctor now or seek immediate medical care if:  · You have symptoms of an allergic reaction, such as:  ¨ A rash or hives (raised, red areas on the skin). ¨ Itching. ¨ Swelling. ¨ Belly pain, nausea, or vomiting. Watch closely for changes in your health, and be sure to contact your doctor if:  · You do not get better as expected. Where can you learn more? Go to http://pepe-constantino.info/. Enter J912 in the search box to learn more about \"Seasonal Allergies: Care Instructions. \"  Current as of: September 29, 2016  Content Version: 11.3  © 9722-5361 [employer]. Care instructions adapted under license by [employer] (which disclaims liability or warranty for this information).  If you have questions about a medical condition or this instruction, always ask your healthcare professional. [place] [address] any warranty or liability for your use of this information. Advise patient to start taking Over The Counter allergy medication (Allegra, Zyrtec, Claritin, Xyzal or Alavert) daily. If you have itchy, watery eyes you can try OTC Zaditor or Zyrtec Allergy Eye drops. If you have a stuffy nose, please try OTC Flonase, Flonase Sensimist, Rhinocort, or Nasacort AQ 2 squirts up each nostril once a day (adults) or 1 squirt up each nostril once a day (children). Use allergy free, dye free, fragrance free products on your body and hair. After being outdoors, brush hair vigorously, wash face and arms, rinse nostrils with a nasal saline spray and consider changing your clothing. Dust furniture frequently and wear a mask while doing it. Vacuum floors weekly. Remove stuffed animals or extra pillows from the bed. Clean bedding in hot water weekly. Sometimes allergies can be so severe that 2 nasal sprays and 2 pills may be needed to control symptoms. Seasonal Allergies: Care Instructions  Your Care Instructions  Allergies occur when your body's defense system (immune system) overreacts to certain substances. The immune system treats a harmless substance as if it were a harmful germ or virus. Many things can cause this to happen. Examples include pollens, medicine, food, dust, animal dander, and mold. Your allergies are seasonal if you have symptoms just at certain times of the year. In that case, you are probably allergic to pollens from certain trees, grasses, or weeds. Allergies can be mild or severe. Over-the-counter allergy medicine may help with some symptoms. Read and follow all instructions on the label. Managing your allergies is an important part of staying healthy. Your doctor may suggest that you have tests to help find the cause of your allergies. When you know what things trigger your symptoms, you can avoid them.  This can prevent allergy symptoms and other health problems. In some cases, immunotherapy might help. For this treatment, you get shots or use pills that have a small amount of certain allergens in them. Your body \"gets used to\" the allergen, so you react less to it over time. This kind of treatment may help prevent or reduce some allergy symptoms. Follow-up care is a key part of your treatment and safety. Be sure to make and go to all appointments, and call your doctor if you are having problems. It's also a good idea to know your test results and keep a list of the medicines you take. How can you care for yourself at home? · Be safe with medicines. Take your medicines exactly as prescribed. Call your doctor if you think you are having a problem with your medicine. · During your allergy season, keep windows closed. If you need to use air-conditioning, change or clean all filters every month. Take a shower and change your clothes after you have been outside. · Stay inside when pollen counts are high. Vacuum once or twice a week. Use a vacuum  with a HEPA filter or a double-thickness filter. When should you call for help? Give an epinephrine shot if:  · You think you are having a severe allergic reaction. After giving an epinephrine shot, call 911, even if you feel better. Call 911 if:  · You have symptoms of a severe allergic reaction. These may include:  ¨ Sudden raised, red areas (hives) all over your body. ¨ Swelling of the throat, mouth, lips, or tongue. ¨ Trouble breathing. ¨ Passing out (losing consciousness). Or you may feel very lightheaded or suddenly feel weak, confused, or restless. · You have been given an epinephrine shot, even if you feel better. Call your doctor now or seek immediate medical care if:  · You have symptoms of an allergic reaction, such as:  ¨ A rash or hives (raised, red areas on the skin). ¨ Itching. ¨ Swelling. ¨ Belly pain, nausea, or vomiting.   Watch closely for changes in your health, and be sure to contact your doctor if:  · You do not get better as expected. Where can you learn more? Go to http://pepe-constantino.info/. Enter J912 in the search box to learn more about \"Seasonal Allergies: Care Instructions. \"  Current as of: September 29, 2016  Content Version: 11.3  © 1446-0595 [employer]. Care instructions adapted under license by [employer] (which disclaims liability or warranty for this information). If you have questions about a medical condition or this instruction, always ask your healthcare professional. [place] [address] any warranty or liability for your use of this information.

## 2017-08-18 NOTE — MR AVS SNAPSHOT
Visit Information Date & Time Provider Department Dept. Phone Encounter #  
 8/18/2017  9:15 AM DO Rojelio Tomlin [phone] [id number] Follow-up Instructions Return in about 6 months (around 2/18/2018) for bp check, cholesterol, memory . Your Appointments 9/26/2017  2:20 PM  
Follow Up with Shade Smalls PsyD 1990 [facility] ([address]) Appt Note: office feedback. ..[address] [facility] 99 [id number] [phone]  
  
   
 [place] 1922 [facility] [address] [id number] [address] Upcoming Health Maintenance Date Due INFLUENZA AGE 9 TO ADULT 10/2/2017* MEDICARE YEARLY EXAM 8/19/2018 GLAUCOMA SCREENING Q2Y 6/22/2019 DTaP/Tdap/Td series (2 - Td) 2/17/2027 *Topic was postponed. The date shown is not the original due date. Allergies as of 8/18/2017  Review Complete On: 8/18/2017 By: Quentin Brooks Severity Noted Reaction Type Reactions Penicillins High 10/22/2009    Hives Current Immunizations  Reviewed on 2/17/2017 Name Date Pneumococcal Conjugate (PCV-13) 8/17/2016 ZZZ-RETIRED (DO NOT USE) Pneumococcal Vaccine (Unspecified Type) 10/22/2009 Not reviewed this visit You Were Diagnosed With   
  
 Codes Comments Medicare annual wellness visit, subsequent    -  Primary ICD-10-CM: Z00.00 ICD-9-CM: V70.0 Essential hypertension     ICD-10-CM: I10 
ICD-9-CM: 401.9 stable Hypercholesteremia     ICD-10-CM: E78.00 ICD-9-CM: 272.0 Internal carotid artery stenosis, bilateral     ICD-10-CM: I65.23 ICD-9-CM: 433.10, 433.30 L 50-79%, R <10%  after R endardectomy Mild dementia     ICD-10-CM: F03.90 ICD-9-CM: 294.20 Bilateral leg pain     ICD-10-CM: M79.604, M79.605 ICD-9-CM: 729.5 due to PVD vs knee pain vs other Neck pain     ICD-10-CM: M54.2 ICD-9-CM: 723.1 due to OA vs somatic dysfunction vs other Chronic pain of both knees     ICD-10-CM: M25.561, M25.562, G89.29 ICD-9-CM: 719.46, 338.29 L>R, due to L short leg vs s/p L TKR vs OA vs other Hyperglycemia     ICD-10-CM: R73.9 ICD-9-CM: 790.29 Vitamin D deficiency     ICD-10-CM: E55.9 ICD-9-CM: 268.9 stable S/P TKR (total knee replacement) using cement, left     ICD-10-CM: T67.353 ICD-9-CM: V43.65 PVD (peripheral vascular disease) ([facility]     ICD-10-CM: I73.9 ICD-9-CM: 443.9 Gait instability     ICD-10-CM: R26.81 
ICD-9-CM: 781.2 due to leg pain vs dementia vs other Shortening, leg, congenital, left     ICD-10-CM: J35.693 ICD-9-CM: 755.30 History of GI bleed     ICD-10-CM: Z87.19 ICD-9-CM: V12.79 History of CEA (carotid endarterectomy)     ICD-10-CM: I26.144 ICD-9-CM: V45.89 Obesity, Class I, BMI 30-34.9     ICD-10-CM: E66.9 ICD-9-CM: 278.00 Pulmonary valve insufficiency, unspecified etiology     ICD-10-CM: I37.1 ICD-9-CM: 424.3 Aortic valve sclerosis     ICD-10-CM: I35.8 ICD-9-CM: 424.1 Dry eye syndrome, bilateral     ICD-10-CM: R88.621 ICD-9-CM: 375.15 Depression screening     ICD-10-CM: Z13.89 ICD-9-CM: V79.0 Vitals BP Pulse Temp Resp Height(growth percentile) Weight(growth percentile) 144/79 (BP 1 Location: Right arm, BP Patient Position: Sitting) 71 97.7 °F (36.5 °C) (Oral) 16 5' 2.01\" (1.575 m) 177 lb 1.6 oz (80.3 kg) SpO2 BMI OB Status Smoking Status 97% 32.38 kg/m2 Postmenopausal Former Smoker BMI and BSA Data Body Mass Index Body Surface Area  
 32.38 kg/m 2 1.87 m 2 Preferred Pharmacy Pharmacy Name Phone [facility] [address] [place] [address] Your Updated Medication List  
  
   
This list is accurate as of: 8/18/17 10:51 AM.  Always use your most recent med list.  
  
  
  
  
 albuterol 90 mcg/actuation inhaler Commonly known as:  PROVENTIL HFA, VENTOLIN HFA, PROAIR HFA Take 2 Puffs by inhalation every four (4) hours as needed for Wheezing or Shortness of Breath. Indications: BRONCHOSPASM PREVENTION  
  
 aspirin delayed-release 81 mg tablet Take 1 Tab by mouth daily. Indications: myocardial infarction prevention Calcium-Cholecalciferol (D3) 600 mg(1,500mg) -400 unit Cap Take  by mouth daily. cholecalciferol (vitamin D3) 2,000 unit Tab Take 1 Tab by mouth daily. donepezil 5 mg tablet Commonly known as:  ARICEPT Take 1 Tab by mouth nightly. 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  
  
 fluticasone-salmeterol 250-50 mcg/dose diskus inhaler Commonly known as:  ADVAIR DISKUS Take 1 Puff by inhalation two (2) times a day. Indications: PREVENTION OF BRONCHOSPASMS WITH EMPHYSEMA  
  
 lisinopril 10 mg tablet Commonly known as:  PRINIVIL, ZESTRIL  
TAKE 1 TABLET IN THE MORNING AND TAKE 2 TABLETS IN THE EVENING FOR HYPERTENSION  
  
 methocarbamol 500 mg tablet Commonly known as:  ROBAXIN Take 1 Tab by mouth four (4) times daily. Indications: MUSCLE SPASM  
  
 mometasone 50 mcg/actuation nasal spray Commonly known as:  NASONEX  
2 Sprays by Both Nostrils route daily. Indications: ALLERGIC RHINITIS Omega-3 Fatty Acids 300 mg Cap Take 1 Cap by mouth daily. traMADol 50 mg tablet Commonly known as:  ULTRAM  
Take 1 Tab by mouth every six (6) hours as needed for Pain. Max Daily Amount: 200 mg. Indications: Pain TYLENOL PO Take 325 mg by mouth two (2) times a day. Prescriptions Sent to Pharmacy Refills  
 donepezil (ARICEPT) 5 mg tablet 11 Sig: Take 1 Tab by mouth nightly. 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  
 Pharmacy: [address], [facility] #: [phone] Route: Oral  
  
We Performed the Following REFERRAL TO ORTHOPEDIC SURGERY [REF62 Custom] Comments:  
 Please evaluate patient for BL knee pain, L TKR. Follow-up Instructions Return in about 6 months (around 2/18/2018) for bp check, cholesterol, memory . Referral Information Referral ID Referred By Referred To  
  
 [id number] [facility]   
   [address] [place] Visits Status Start Date End Date 1 New Request 8/18/17 8/18/18 If your referral has a status of pending review or denied, additional information will be sent to support the outcome of this decision. Patient Instructions Neck Arthritis: Exercises Your Care Instructions Here are some examples of typical rehabilitation exercises for your condition. Start each exercise slowly. Ease off the exercise if you start to have pain. Your doctor or physical therapist will tell you when you can start these exercises and which ones will work best for you. How to do the exercises Neck stretches to the side 1. This stretch works best if you keep your shoulder down as you lean away from it. To help you remember to do this, start by relaxing your shoulders and lightly holding on to your thighs or your chair. 2. Tilt your head toward your shoulder and hold for 15 to 30 seconds. Let the weight of your head stretch your muscles. 3. Repeat 2 to 4 times toward each shoulder. Chin tuck 1. Lie on the floor with a rolled-up towel under your neck. Your head should be touching the floor. 2. Slowly bring your chin toward your chest. 
3. Hold for a count of 6, and then relax for up to 10 seconds. 4. Repeat 8 to 12 times. Active cervical rotation 1. Sit in a firm chair, or stand up straight. 2. Keeping your chin level, turn your head to the right, and hold for 15 to 30 seconds. 3. Turn your head to the left and hold for 15 to 30 seconds. 4. Repeat 2 to 4 times to each side. Shoulder blade squeeze 1. While standing, squeeze your shoulder blades together. 2. Do not raise your shoulders up as you are squeezing. 3. Hold for 6 seconds. 4. Repeat 8 to 12 times. Shoulder rolls 1. Sit comfortably with your feet shoulder-width apart. You can also do this exercise standing up. 2. Roll your shoulders up, then back, and then down in a smooth, circular motion. 3. Repeat 2 to 4 times. Follow-up care is a key part of your treatment and safety. Be sure to make and go to all appointments, and call your doctor if you are having problems. It's also a good idea to know your test results and keep a list of the medicines you take. Where can you learn more? Go to http://pepe-constantino.info/. Enter Q978 in the search box to learn more about \"Neck Arthritis: Exercises. \" Current as of: March 21, 2017 Content Version: 11.3 © 3254-1604 Kera. Care instructions adapted under license by [employer] (which disclaims liability or warranty for this information). If you have questions about a medical condition or this instruction, always ask your healthcare professional. [address] any warranty or liability for your use of this information. Neck: Exercises Your Care Instructions Here are some examples of typical rehabilitation exercises for your condition. Start each exercise slowly. Ease off the exercise if you start to have pain. Your doctor or physical therapist will tell you when you can start these exercises and which ones will work best for you. How to do the exercises Note: Stretching should make you feel a gentle stretch, but no pain. Stop any strengthening exercise that makes pain worse. Neck stretch 4. This stretch works best if you keep your shoulder down as you lean away from it. To help you remember to do this, start by relaxing your shoulders and lightly holding on to your thighs or your chair. 5. Tilt your head toward your shoulder and hold for 15 to 30 seconds. Let the weight of your head stretch your muscles. 6. If you would like a little added stretch, use your hand to gently and steadily pull your head toward your shoulder. For example, keeping your right shoulder down, lean your head to the left. 7. Repeat 2 to 4 times toward each shoulder. Diagonal neck stretch 5. Turn your head slightly toward the direction you will be stretching, and tilt your head diagonally toward your chest and hold for 15 to 30 seconds. 6. If you would like a little added stretch, use your hand to gently and steadily pull your head forward on the diagonal. 
7. Repeat 2 to 4 times toward each side. Dorsal glide stretch 5. Sit or stand tall and look straight ahead. 6. Slowly tuck your chin as you glide your head backward over your body 7. Hold for a count of 6, and then relax for up to 10 seconds. 8. Repeat 8 to 12 times. Note: The dorsal glide stretches the back of the neck. If you feel pain, do not glide so far back. Some people find this exercise easier to do while lying on their backs with an ice pack on the neck. Chest and shoulder stretch 5. Sit or stand tall and glide your head backward as in the dorsal glide stretch. 6. Raise both arms so that your hands are next to your ears. 7. Take a deep breath, and as you breathe out, lower your elbows down and behind your back. You will feel your shoulder blades slide down and together, and at the same time you will feel a stretch across your chest and the front of your shoulders. 8. Hold for about 6 seconds, and then relax for up to 10 seconds. 9. Repeat 8 to 12 times. Strengthening: Hands on head 4. Move your head backward, forward, and side to side against gentle pressure from your hands, holding each position for about 6 seconds. 5. Repeat 8 to 12 times. Follow-up care is a key part of your treatment and safety.  Be sure to make and go to all appointments, and call your doctor if you are having problems. It's also a good idea to know your test results and keep a list of the medicines you take. Where can you learn more? Go to http://pepe-constantino.info/. Enter P975 in the search box to learn more about \"Neck: Exercises. \" Current as of: March 21, 2017 Content Version: 11.3 © 6112-5747 MailTrack.io. Care instructions adapted under license by [employer] (which disclaims liability or warranty for this information). If you have questions about a medical condition or this instruction, always ask your healthcare professional. [address] any warranty or liability for your use of this information. Healthy Upper Back: Exercises Your Care Instructions Here are some examples of exercises for your upper back. Start each exercise slowly. Ease off the exercise if you start to have pain. Your doctor or physical therapist will tell you when you can start these exercises and which ones will work best for you. How to do the exercises Lower neck and upper back stretch 8. Stretch your arms out in front of your body. Clasp one hand on top of your other hand. 9. Gently reach out so that you feel your shoulder blades stretching away from each other. 10. Gently bend your head forward. 11. Hold for 15 to 30 seconds. 12. Repeat 2 to 4 times. Midback stretch Note: If you have knee pain, do not do this exercise. 8. Kneel on the floor, and sit back on your ankles. 9. Lean forward, place your hands on the floor, and stretch your arms out in front of you. Rest your head between your arms. 10. Gently push your chest toward the floor, reaching as far in front of you as possible. 11. Hold for 15 to 30 seconds. 12. Repeat 2 to 4 times. Shoulder rolls 9. Sit comfortably with your feet shoulder-width apart. You can also do this exercise while standing. 10. Roll your shoulders up, then back, and then down in a smooth, circular motion. 11. Repeat 2 to 4 times. Wall push-up 10. Stand against a wall with your feet about 12 to 24 inches back from the wall. If you feel any pain when you do this exercise, stand closer to the wall. 11. Place your hands on the wall slightly wider apart than your shoulders, and lean forward. 12. Gently lean your body toward the wall. Then push back to your starting position. Keep the motion smooth and controlled. 13. Repeat 8 to 12 times. Resisted shoulder blade squeeze Note: For this exercise, you will need elastic exercise material, such as surgical tubing or Thera-Band. 6. Sit or stand, holding the band in both hands in front of you. Keep your elbows close to your sides, bent at a 90-degree angle. Your palms should face up. 7. Squeeze your shoulder blades together, and move your arms to the outside, stretching the band. Be sure to keep your elbows at your sides while you do this. 8. Relax. 9. Repeat 8 to 12 times. Resisted rows Note: For this exercise, you will need elastic exercise material, such as surgical tubing or Thera-Band. 1. Put the band around a solid object, such as a bedpost, at about waist level. Hold one end of the band in each hand. 2. With your elbows at your sides and bent to 90 degrees, pull the band back to move your shoulder blades toward each other. Return to the starting position. 3. Repeat 8 to 12 times. Follow-up care is a key part of your treatment and safety. Be sure to make and go to all appointments, and call your doctor if you are having problems. It's also a good idea to know your test results and keep a list of the medicines you take. Where can you learn more? Go to http://pepe-constantino.info/. Enter I690 in the search box to learn more about \"Healthy Upper Back: Exercises. \" Current as of: March 21, 2017 Content Version: 11.3 © 0221-0393 Healthwise, Amore[employer]. Care instructions adapted under license by [employer] (which disclaims liability or warranty for this information). If you have questions about a medical condition or this instruction, always ask your healthcare professional. [address] any warranty or liability for your use of this information. Hyperlipidemia: After Your Visit Your Care Instructions Hyperlipidemia is too much fat in your blood. The body has several kinds of fat, including cholesterol and triglycerides. Your body needs fat for many things, such as making new cells. But too much fat in your blood increases your chances of having a heart attack or stroke. You may be able to lower your cholesterol and triglycerides with a heart-healthy diet, exercise, and if needed, medicine. Your doctor may want you to try lifestyle changes first to see whether they lower the fat in your blood. You may need to take medicine if lifestyle changes do not lower the fat in your blood enough. Follow-up care is a key part of your treatment and safety. Be sure to make and go to all appointments, and call your doctor if you are having problems. Its also a good idea to know your test results and keep a list of the medicines you take. How can you care for yourself at home? Take your medicines · Take your medicines exactly as prescribed. Call your doctor if you think you are having a problem with your medicine. · If you take medicine to lower your cholesterol, go to follow-up visits. You will need to have blood tests. · Do not take large doses of niacin, which is a B vitamin, while taking medicine called statins. It may increase the chance of muscle pain and liver problems. · Talk to your doctor about avoiding grapefruit juice if you are taking statins. Grapefruit juice can raise the level of this medicine in your blood. This could increase side effects. Eat more fruits, vegetables, and fiber · Fruits and vegetables have lots of nutrients that help protect against heart disease, and they have littleif anyfat. Try to eat at least five servings a day. Dark green, deep orange, or yellow fruits and vegetables are healthy choices. · Keep carrots, celery, and other veggies handy for snacks. Buy fruit that is in season and store it where you can see it so that you will be tempted to eat it. Cook dishes that have a lot of veggies in them, such as stir-fries and soups. · Foods high in fiber may reduce your cholesterol and provide important vitamins and minerals. High-fiber foods include whole-grain cereals and breads, oatmeal, beans, brown rice, citrus fruits, and apples. · Buy whole-grain breads and cereals instead of white bread and pastries. Limit saturated fat · Read food labels and try to avoid saturated fat and trans fat. They increase your risk of heart disease. · Use olive or canola oil when you cook. Try cholesterol-lowering spreads, such as Benecol or Take Control. · Bake, broil, grill, or steam foods instead of frying them. · Limit the amount of high-fat meats you eat, including hot dogs and sausages. Cut out all visible fat when you prepare meat. · Eat fish, skinless poultry, and soy products such as tofu instead of high-fat meats. Soybeans may be especially good for your heart. Eat at least two servings of fish a week. Certain fish, such as salmon, contain omega-3 fatty acids, which may help reduce your risk of heart attack. · Choose low-fat or fat-free milk and dairy products. Get exercise, limit alcohol, and quit smoking · Get more exercise. Work with your doctor to set up an exercise program. Even if you can do only a small amount, exercise will help you get stronger, have more energy, and manage your weight and your stress. Walking is an easy way to get exercise. Gradually increase the amount you walk every day. Aim for at least 30 minutes on most days of the week.  You also may want to swim, bike, or do other activities. · Limit alcohol to no more than 2 drinks a day for men and 1 drink a day for women. · Do not smoke. If you need help quitting, talk to your doctor about stop-smoking programs and medicines. These can increase your chances of quitting for good. When should you call for help? Call 911 anytime you think you may need emergency care. For example, call if: 
· You have symptoms of a heart attack. These may include: ¨ Chest pain or pressure, or a strange feeling in the chest. 
¨ Sweating. ¨ Shortness of breath. ¨ Nausea or vomiting. ¨ Pain, pressure, or a strange feeling in the back, neck, jaw, or upper belly or in one or both shoulders or arms. ¨ Lightheadedness or sudden weakness. ¨ A fast or irregular heartbeat. After you call 911, the  may tell you to chew 1 adult-strength or 2 to 4 low-dose aspirin. Wait for an ambulance. Do not try to drive yourself. · You have signs of a stroke. These may include: 
¨ Sudden numbness, paralysis, or weakness in your face, arm, or leg, especially on only one side of your body. ¨ New problems with walking or balance. ¨ Sudden vision changes. ¨ Drooling or slurred speech. ¨ New problems speaking or understanding simple statements, or feeling confused. ¨ A sudden, severe headache that is different from past headaches. · You passed out (lost consciousness). Call your doctor now or seek immediate medical care if: 
· You have muscle pain or weakness. Watch closely for changes in your health, and be sure to contact your doctor if: 
· You are very tired. · You have an upset stomach, gas, constipation, or belly pain or cramps. Where can you learn more? Go to Litesprite.be Enter C406 in the search box to learn more about \"Hyperlipidemia: After Your Visit. \"  
© 1086-3589 Healthwise, Incorporated.  Care instructions adapted under license by [employer] (which disclaims liability or warranty for this information). This care instruction is for use with your licensed healthcare professional. If you have questions about a medical condition or this instruction, always ask your healthcare professional. [address] any warranty or liability for your use of this information. Content Version: 4.0.507071; Last Revised: October 13, 2011 Alzheimer's Disease: Care Instructions Your Care Instructions Alzheimer's disease is a type of dementia. It causes memory loss and affects judgment, language, and behavior. You may have trouble making decisions or may get lost in places that you used to know well. Alzheimer's disease is different than mild memory loss that occurs with aging. It is not clear what causes Alzheimer's disease, but it is the most common form of dementia in older adults. Finding out that you have this disease is a shock. You may be afraid and worried about how the condition will change your life. Although there is no cure at this time, medicine in some cases may slow memory loss for a while. Other medicines may be able to help you sleep or cope with depression and behavior changes. Alzheimer's disease is different for everyone. It may take many years to develop. In some cases, people can function well for a long time. In the early stage of the disease, you can do things at home to make life easier and safer. You also can keep doing your hobbies and other activities. Many people find comfort in planning now for their future needs. Follow-up care is a key part of your treatment and safety. Be sure to make and go to all appointments, and call your doctor if you are having problems. Its also a good idea to know your test results and keep a list of the medicines you take. How can you care for yourself at home? Taking care of yourself · If your doctor gives you medicines, take them exactly as prescribed. Call your doctor if you think you are having a problem with your medicine. You will get more details on the medicines your doctor prescribes. · Eat a balanced diet. Get plenty of whole grains, fruits, and vegetables every day. If you are not hungry at mealtimes, eat snacks at midmorning and in the afternoon. Try drinks such as Boost, Ensure, or Sustacal if you are having trouble keeping your weight up. · Stay active. Exercise such as walking may slow the decline of your mental abilities. Try to stay active mentally too. Read and work crossword puzzles if you enjoy these activities. · If you have trouble sleeping, do not nap during the day. Get regular exercise (but not within several hours of bedtime). Drink a glass of warm milk or caffeine-free herbal tea before going to bed. · Ask your doctor about support groups and other resources in your area. They can help people who have Alzheimer's disease and their families. · Be patient. You may find that a task takes you longer than it used to. · If you have not already done so, make a list of advance directives. Advance directives are instructions to your doctor and family members about what kind of care you want if you become unable to speak or express yourself. Talk to a  about making a will, if you do not already have one. Keeping schedules · Develop a routine. You will feel less frustrated or confused if you have a clear, simple plan of what to do every day. ¨ Make lists of your medicines and when to take them. ¨ Write down appointments and other tasks in a calendar. ¨ Put sticky notes around the house to help you remember events and other things you have to do. ¨ Schedule activities and tasks for times of the day when you are best able to handle them. Staying safe · Tell someone when you are going out and where you are going.  Let the person know when you will be back. Before you go out alone, write down where you are going, how to get there, and how to get back home. Do this even if you have gone there many times before. Take someone along with you when possible. · Make your home safe. Tack down rugs, put no-slip tape in the tub, use handrails, and put safety switches on stoves and appliances. · Have a family member or other caregiver tell you whether you are driving badly. Deciding to stop driving is very hard for many people. Driving helps you feel independent. Your state s license bureau can do a driving test if there is any question. Plan for other means of getting around when you are no longer able to drive. · Use strong lighting, especially at night. Put night-lights in bedrooms, hallways, and bathrooms. · Lower the hot water temperature setting to 120°F or lower to avoid burns. When should you call for help? Call 911 anytime you think you may need emergency care. For example, call if: 
· You are lost and do not know whom to call. · You are injured and do not know whom to call. Call your doctor now or seek immediate medical care if: 
· Your symptoms suddenly get much worse. Watch closely for changes in your health, and be sure to contact your doctor if: 
· You want more information about how you can take care of yourself. Where can you learn more? Go to http://pepe-constantino.info/. Enter Y179 in the search box to learn more about \"Alzheimer's Disease: Care Instructions. \" Current as of: July 26, 2016 Content Version: 11.3 © 6606-8631 [employer]. Care instructions adapted under license by [employer] (which disclaims liability or warranty for this information). If you have questions about a medical condition or this instruction, always ask your healthcare professional. [address] any warranty or liability for your use of this information. Seasonal Allergies: Care Instructions Your Care Instructions Allergies occur when your body's defense system (immune system) overreacts to certain substances. The immune system treats a harmless substance as if it were a harmful germ or virus. Many things can cause this to happen. Examples include pollens, medicine, food, dust, animal dander, and mold. Your allergies are seasonal if you have symptoms just at certain times of the year. In that case, you are probably allergic to pollens from certain trees, grasses, or weeds. Allergies can be mild or severe. Over-the-counter allergy medicine may help with some symptoms. Read and follow all instructions on the label. Managing your allergies is an important part of staying healthy. Your doctor may suggest that you have tests to help find the cause of your allergies. When you know what things trigger your symptoms, you can avoid them. This can prevent allergy symptoms and other health problems. In some cases, immunotherapy might help. For this treatment, you get shots or use pills that have a small amount of certain allergens in them. Your body \"gets used to\" the allergen, so you react less to it over time. This kind of treatment may help prevent or reduce some allergy symptoms. Follow-up care is a key part of your treatment and safety. Be sure to make and go to all appointments, and call your doctor if you are having problems. It's also a good idea to know your test results and keep a list of the medicines you take. How can you care for yourself at home? · Be safe with medicines. Take your medicines exactly as prescribed. Call your doctor if you think you are having a problem with your medicine. · During your allergy season, keep windows closed. If you need to use air-conditioning, change or clean all filters every month. Take a shower and change your clothes after you have been outside. · Stay inside when pollen counts are high. Vacuum once or twice a week. Use a vacuum  with a HEPA filter or a double-thickness filter. When should you call for help? Give an epinephrine shot if: 
· You think you are having a severe allergic reaction. After giving an epinephrine shot, call 911, even if you feel better. Call 911 if: 
· You have symptoms of a severe allergic reaction. These may include: 
¨ Sudden raised, red areas (hives) all over your body. ¨ Swelling of the throat, mouth, lips, or tongue. ¨ Trouble breathing. ¨ Passing out (losing consciousness). Or you may feel very lightheaded or suddenly feel weak, confused, or restless. · You have been given an epinephrine shot, even if you feel better. Call your doctor now or seek immediate medical care if: 
· You have symptoms of an allergic reaction, such as: ¨ A rash or hives (raised, red areas on the skin). ¨ Itching. ¨ Swelling. ¨ Belly pain, nausea, or vomiting. Watch closely for changes in your health, and be sure to contact your doctor if: 
· You do not get better as expected. Where can you learn more? Go to http://pepeFusion Garageconstantino.info/. Enter J912 in the search box to learn more about \"Seasonal Allergies: Care Instructions. \" Current as of: September 29, 2016 Content Version: 11.3 © 2736-0986 [employer]. Care instructions adapted under license by [employer] (which disclaims liability or warranty for this information). If you have questions about a medical condition or this instruction, always ask your healthcare professional. [place] [address] any warranty or liability for your use of this information. Advise patient to start taking Over The Counter allergy medication (Allegra, Zyrtec, Claritin, Xyzal or Alavert) daily. If you have itchy, watery eyes you can try OTC Zaditor or Zyrtec Allergy Eye drops.   If you have a stuffy nose, please try OTC Flonase, Flonase Sensimist, Rhinocort, or Nasacort AQ 2 squirts up each nostril once a day (adults) or 1 squirt up each nostril once a day (children). Use allergy free, dye free, fragrance free products on your body and hair. After being outdoors, brush hair vigorously, wash face and arms, rinse nostrils with a nasal saline spray and consider changing your clothing. Dust furniture frequently and wear a mask while doing it. Vacuum floors weekly. Remove stuffed animals or extra pillows from the bed. Clean bedding in hot water weekly. Sometimes allergies can be so severe that 2 nasal sprays and 2 pills may be needed to control symptoms. Seasonal Allergies: Care Instructions Your Care Instructions Allergies occur when your body's defense system (immune system) overreacts to certain substances. The immune system treats a harmless substance as if it were a harmful germ or virus. Many things can cause this to happen. Examples include pollens, medicine, food, dust, animal dander, and mold. Your allergies are seasonal if you have symptoms just at certain times of the year. In that case, you are probably allergic to pollens from certain trees, grasses, or weeds. Allergies can be mild or severe. Over-the-counter allergy medicine may help with some symptoms. Read and follow all instructions on the label. Managing your allergies is an important part of staying healthy. Your doctor may suggest that you have tests to help find the cause of your allergies. When you know what things trigger your symptoms, you can avoid them. This can prevent allergy symptoms and other health problems. In some cases, immunotherapy might help. For this treatment, you get shots or use pills that have a small amount of certain allergens in them. Your body \"gets used to\" the allergen, so you react less to it over time. This kind of treatment may help prevent or reduce some allergy symptoms. Follow-up care is a key part of your treatment and safety.  Be sure to make and go to all appointments, and call your doctor if you are having problems. It's also a good idea to know your test results and keep a list of the medicines you take. How can you care for yourself at home? · Be safe with medicines. Take your medicines exactly as prescribed. Call your doctor if you think you are having a problem with your medicine. · During your allergy season, keep windows closed. If you need to use air-conditioning, change or clean all filters every month. Take a shower and change your clothes after you have been outside. · Stay inside when pollen counts are high. Vacuum once or twice a week. Use a vacuum  with a HEPA filter or a double-thickness filter. When should you call for help? Give an epinephrine shot if: 
· You think you are having a severe allergic reaction. After giving an epinephrine shot, call 911, even if you feel better. Call 911 if: 
· You have symptoms of a severe allergic reaction. These may include: 
¨ Sudden raised, red areas (hives) all over your body. ¨ Swelling of the throat, mouth, lips, or tongue. ¨ Trouble breathing. ¨ Passing out (losing consciousness). Or you may feel very lightheaded or suddenly feel weak, confused, or restless. · You have been given an epinephrine shot, even if you feel better. Call your doctor now or seek immediate medical care if: 
· You have symptoms of an allergic reaction, such as: ¨ A rash or hives (raised, red areas on the skin). ¨ Itching. ¨ Swelling. ¨ Belly pain, nausea, or vomiting. Watch closely for changes in your health, and be sure to contact your doctor if: 
· You do not get better as expected. Where can you learn more? Go to http://pepe-constantino.info/. Enter J912 in the search box to learn more about \"Seasonal Allergies: Care Instructions. \" Current as of: September 29, 2016 Content Version: 11.3 © 6906-9648 [employer].  Care instructions adapted under license by [address] (which disclaims liability or warranty for this information). If you have questions about a medical condition or this instruction, always ask your healthcare professional. [address] any warranty or liability for your use of this information. Introducing [facility]! Rishabh Pardo introduces [employer] patient portal. Now you can access parts of your medical record, email your doctor's office, and request medication refills online. 1. In your internet browser, go to [email or web address] 2. Click on the First Time User? Click Here link in the Sign In box. You will see the New Member Sign Up page. 3. Enter your [employer] Access Code exactly as it appears below. You will not need to use this code after youve completed the sign-up process. If you do not sign up before the expiration date, you must request a new code. · [employer] Access Code: [email or web address] Expires: 9/10/2017  1:12 PM 
 
4. Enter the last four digits of your Social Security Number (xxxx) and Date of Birth (mm/dd/yyyy) as indicated and click Submit. You will be taken to the next sign-up page. 5. Create a [employer] ID. This will be your [employer] login ID and cannot be changed, so think of one that is secure and easy to remember. 6. Create a [employer] password. You can change your password at any time. 7. Enter your Password Reset Question and Answer. This can be used at a later time if you forget your password. 8. Enter your e-mail address. You will receive e-mail notification when new information is available in [address] 9. Click Sign Up. You can now view and download portions of your medical record. 10. Click the Download Summary menu link to download a portable copy of your medical information. If you have questions, please visit the Frequently Asked Questions section of the [employer] website.  Remember, [employer] is NOT to be used for urgent needs. For medical emergencies, dial 911. Now available from your iPhone and Android! Please provide this summary of care documentation to your next provider. Your primary care clinician is listed as [place]. If you have any questions after today's visit, please call [phone].

## 2017-08-18 NOTE — PATIENT INSTRUCTIONS
Neck Arthritis: Exercises  Your Care Instructions  Here are some examples of typical rehabilitation exercises for your condition. Start each exercise slowly. Ease off the exercise if you start to have pain. Your doctor or physical therapist will tell you when you can start these exercises and which ones will work best for you. How to do the exercises  Neck stretches to the side    1. This stretch works best if you keep your shoulder down as you lean away from it. To help you remember to do this, start by relaxing your shoulders and lightly holding on to your thighs or your chair. 2. Tilt your head toward your shoulder and hold for 15 to 30 seconds. Let the weight of your head stretch your muscles. 3. Repeat 2 to 4 times toward each shoulder. Chin tuck    1. Lie on the floor with a rolled-up towel under your neck. Your head should be touching the floor. 2. Slowly bring your chin toward your chest.  3. Hold for a count of 6, and then relax for up to 10 seconds. 4. Repeat 8 to 12 times. Active cervical rotation    1. Sit in a firm chair, or stand up straight. 2. Keeping your chin level, turn your head to the right, and hold for 15 to 30 seconds. 3. Turn your head to the left and hold for 15 to 30 seconds. 4. Repeat 2 to 4 times to each side. Shoulder blade squeeze    1. While standing, squeeze your shoulder blades together. 2. Do not raise your shoulders up as you are squeezing. 3. Hold for 6 seconds. 4. Repeat 8 to 12 times. Shoulder rolls    1. Sit comfortably with your feet shoulder-width apart. You can also do this exercise standing up. 2. Roll your shoulders up, then back, and then down in a smooth, circular motion. 3. Repeat 2 to 4 times. Follow-up care is a key part of your treatment and safety. Be sure to make and go to all appointments, and call your doctor if you are having problems. It's also a good idea to know your test results and keep a list of the medicines you take.   Where can you learn more? Go to http://pepe-constantino.info/. Enter Z418 in the search box to learn more about \"Neck Arthritis: Exercises. \"  Current as of: March 21, 2017  Content Version: 11.3  © 7451-3330 [employer]. Care instructions adapted under license by [employer] (which disclaims liability or warranty for this information). If you have questions about a medical condition or this instruction, always ask your healthcare professional. [address] any warranty or liability for your use of this information. Neck: Exercises  Your Care Instructions  Here are some examples of typical rehabilitation exercises for your condition. Start each exercise slowly. Ease off the exercise if you start to have pain. Your doctor or physical therapist will tell you when you can start these exercises and which ones will work best for you. How to do the exercises  Note: Stretching should make you feel a gentle stretch, but no pain. Stop any strengthening exercise that makes pain worse. Neck stretch    4. This stretch works best if you keep your shoulder down as you lean away from it. To help you remember to do this, start by relaxing your shoulders and lightly holding on to your thighs or your chair. 5. Tilt your head toward your shoulder and hold for 15 to 30 seconds. Let the weight of your head stretch your muscles. 6. If you would like a little added stretch, use your hand to gently and steadily pull your head toward your shoulder. For example, keeping your right shoulder down, lean your head to the left. 7. Repeat 2 to 4 times toward each shoulder. Diagonal neck stretch    5. Turn your head slightly toward the direction you will be stretching, and tilt your head diagonally toward your chest and hold for 15 to 30 seconds.   6. If you would like a little added stretch, use your hand to gently and steadily pull your head forward on the diagonal.  7. Repeat 2 to 4 times toward each side. Dorsal glide stretch    5. Sit or stand tall and look straight ahead. 6. Slowly tuck your chin as you glide your head backward over your body  7. Hold for a count of 6, and then relax for up to 10 seconds. 8. Repeat 8 to 12 times. Note: The dorsal glide stretches the back of the neck. If you feel pain, do not glide so far back. Some people find this exercise easier to do while lying on their backs with an ice pack on the neck. Chest and shoulder stretch    5. Sit or stand tall and glide your head backward as in the dorsal glide stretch. 6. Raise both arms so that your hands are next to your ears. 7. Take a deep breath, and as you breathe out, lower your elbows down and behind your back. You will feel your shoulder blades slide down and together, and at the same time you will feel a stretch across your chest and the front of your shoulders. 8. Hold for about 6 seconds, and then relax for up to 10 seconds. 9. Repeat 8 to 12 times. Strengthening: Hands on head    4. Move your head backward, forward, and side to side against gentle pressure from your hands, holding each position for about 6 seconds. 5. Repeat 8 to 12 times. Follow-up care is a key part of your treatment and safety. Be sure to make and go to all appointments, and call your doctor if you are having problems. It's also a good idea to know your test results and keep a list of the medicines you take. Where can you learn more? Go to http://pepe-constantino.info/. Enter P975 in the search box to learn more about \"Neck: Exercises. \"  Current as of: March 21, 2017  Content Version: 11.3  © 3924-0739 [employer]. Care instructions adapted under license by [employer] (which disclaims liability or warranty for this information).  If you have questions about a medical condition or this instruction, always ask your healthcare professional. Areli Arana disclaims any warranty or liability for your use of this information. Healthy Upper Back: Exercises  Your Care Instructions  Here are some examples of exercises for your upper back. Start each exercise slowly. Ease off the exercise if you start to have pain. Your doctor or physical therapist will tell you when you can start these exercises and which ones will work best for you. How to do the exercises  Lower neck and upper back stretch    8. Stretch your arms out in front of your body. Clasp one hand on top of your other hand. 9. Gently reach out so that you feel your shoulder blades stretching away from each other. 10. Gently bend your head forward. 11. Hold for 15 to 30 seconds. 12. Repeat 2 to 4 times. Midback stretch    Note: If you have knee pain, do not do this exercise. 8. Kneel on the floor, and sit back on your ankles. 9. Lean forward, place your hands on the floor, and stretch your arms out in front of you. Rest your head between your arms. 10. Gently push your chest toward the floor, reaching as far in front of you as possible. 11. Hold for 15 to 30 seconds. 12. Repeat 2 to 4 times. Shoulder rolls    9. Sit comfortably with your feet shoulder-width apart. You can also do this exercise while standing. 10. Roll your shoulders up, then back, and then down in a smooth, circular motion. 11. Repeat 2 to 4 times. Wall push-up    10. Stand against a wall with your feet about 12 to 24 inches back from the wall. If you feel any pain when you do this exercise, stand closer to the wall. 11. Place your hands on the wall slightly wider apart than your shoulders, and lean forward. 12. Gently lean your body toward the wall. Then push back to your starting position. Keep the motion smooth and controlled. 13. Repeat 8 to 12 times. Resisted shoulder blade squeeze    Note: For this exercise, you will need elastic exercise material, such as surgical tubing or Thera-Band.   6. Sit or stand, holding the band in both hands in front of you. Keep your elbows close to your sides, bent at a 90-degree angle. Your palms should face up. 7. Squeeze your shoulder blades together, and move your arms to the outside, stretching the band. Be sure to keep your elbows at your sides while you do this. 8. Relax. 9. Repeat 8 to 12 times. Resisted rows    Note: For this exercise, you will need elastic exercise material, such as surgical tubing or Thera-Band. 1. Put the band around a solid object, such as a bedpost, at about waist level. Hold one end of the band in each hand. 2. With your elbows at your sides and bent to 90 degrees, pull the band back to move your shoulder blades toward each other. Return to the starting position. 3. Repeat 8 to 12 times. Follow-up care is a key part of your treatment and safety. Be sure to make and go to all appointments, and call your doctor if you are having problems. It's also a good idea to know your test results and keep a list of the medicines you take. Where can you learn more? Go to http://pepe-constantino.info/. Enter A052 in the search box to learn more about \"Healthy Upper Back: Exercises. \"  Current as of: March 21, 2017  Content Version: 11.3  © 7778-6745 [employer]. Care instructions adapted under license by [employer] (which disclaims liability or warranty for this information). If you have questions about a medical condition or this instruction, always ask your healthcare professional. [place] [address] any warranty or liability for your use of this information. Hyperlipidemia: After Your Visit  Your Care Instructions  Hyperlipidemia is too much fat in your blood. The body has several kinds of fat, including cholesterol and triglycerides. Your body needs fat for many things, such as making new cells. But too much fat in your blood increases your chances of having a heart attack or stroke.   You may be able to lower your cholesterol and triglycerides with a heart-healthy diet, exercise, and if needed, medicine. Your doctor may want you to try lifestyle changes first to see whether they lower the fat in your blood. You may need to take medicine if lifestyle changes do not lower the fat in your blood enough. Follow-up care is a key part of your treatment and safety. Be sure to make and go to all appointments, and call your doctor if you are having problems. Its also a good idea to know your test results and keep a list of the medicines you take. How can you care for yourself at home? Take your medicines  · Take your medicines exactly as prescribed. Call your doctor if you think you are having a problem with your medicine. · If you take medicine to lower your cholesterol, go to follow-up visits. You will need to have blood tests. · Do not take large doses of niacin, which is a B vitamin, while taking medicine called statins. It may increase the chance of muscle pain and liver problems. · Talk to your doctor about avoiding grapefruit juice if you are taking statins. Grapefruit juice can raise the level of this medicine in your blood. This could increase side effects. Eat more fruits, vegetables, and fiber  · Fruits and vegetables have lots of nutrients that help protect against heart disease, and they have littleif anyfat. Try to eat at least five servings a day. Dark green, deep orange, or yellow fruits and vegetables are healthy choices. · Keep carrots, celery, and other veggies handy for snacks. Buy fruit that is in season and store it where you can see it so that you will be tempted to eat it. Cook dishes that have a lot of veggies in them, such as stir-fries and soups. · Foods high in fiber may reduce your cholesterol and provide important vitamins and minerals. High-fiber foods include whole-grain cereals and breads, oatmeal, beans, brown rice, citrus fruits, and apples.   · Buy whole-grain breads and cereals instead of white bread and pastries. Limit saturated fat  · Read food labels and try to avoid saturated fat and trans fat. They increase your risk of heart disease. · Use olive or canola oil when you cook. Try cholesterol-lowering spreads, such as Benecol or Take Control. · Bake, broil, grill, or steam foods instead of frying them. · Limit the amount of high-fat meats you eat, including hot dogs and sausages. Cut out all visible fat when you prepare meat. · Eat fish, skinless poultry, and soy products such as tofu instead of high-fat meats. Soybeans may be especially good for your heart. Eat at least two servings of fish a week. Certain fish, such as salmon, contain omega-3 fatty acids, which may help reduce your risk of heart attack. · Choose low-fat or fat-free milk and dairy products. Get exercise, limit alcohol, and quit smoking  · Get more exercise. Work with your doctor to set up an exercise program. Even if you can do only a small amount, exercise will help you get stronger, have more energy, and manage your weight and your stress. Walking is an easy way to get exercise. Gradually increase the amount you walk every day. Aim for at least 30 minutes on most days of the week. You also may want to swim, bike, or do other activities. · Limit alcohol to no more than 2 drinks a day for men and 1 drink a day for women. · Do not smoke. If you need help quitting, talk to your doctor about stop-smoking programs and medicines. These can increase your chances of quitting for good. When should you call for help? Call 911 anytime you think you may need emergency care. For example, call if:  · You have symptoms of a heart attack. These may include:  ¨ Chest pain or pressure, or a strange feeling in the chest.  ¨ Sweating. ¨ Shortness of breath. ¨ Nausea or vomiting. ¨ Pain, pressure, or a strange feeling in the back, neck, jaw, or upper belly or in one or both shoulders or arms. ¨ Lightheadedness or sudden weakness.   ¨ A fast or irregular heartbeat. After you call 911, the  may tell you to chew 1 adult-strength or 2 to 4 low-dose aspirin. Wait for an ambulance. Do not try to drive yourself. · You have signs of a stroke. These may include:  ¨ Sudden numbness, paralysis, or weakness in your face, arm, or leg, especially on only one side of your body. ¨ New problems with walking or balance. ¨ Sudden vision changes. ¨ Drooling or slurred speech. ¨ New problems speaking or understanding simple statements, or feeling confused. ¨ A sudden, severe headache that is different from past headaches. · You passed out (lost consciousness). Call your doctor now or seek immediate medical care if:  · You have muscle pain or weakness. Watch closely for changes in your health, and be sure to contact your doctor if:  · You are very tired. · You have an upset stomach, gas, constipation, or belly pain or cramps. Where can you learn more? Go to Triparazzi.be  Enter C406 in the search box to learn more about \"Hyperlipidemia: After Your Visit. \"   © 0844-4530 [employer]. Care instructions adapted under license by [facility] (which disclaims liability or warranty for this information). This care instruction is for use with your licensed healthcare professional. If you have questions about a medical condition or this instruction, always ask your healthcare professional. [address] any warranty or liability for your use of this information. Content Version: 0.9.572611; Last Revised: October 13, 2011                 Alzheimer's Disease: Care Instructions  Your Care Instructions  Alzheimer's disease is a type of dementia. It causes memory loss and affects judgment, language, and behavior. You may have trouble making decisions or may get lost in places that you used to know well. Alzheimer's disease is different than mild memory loss that occurs with aging.  It is not clear what causes Alzheimer's disease, but it is the most common form of dementia in older adults. Finding out that you have this disease is a shock. You may be afraid and worried about how the condition will change your life. Although there is no cure at this time, medicine in some cases may slow memory loss for a while. Other medicines may be able to help you sleep or cope with depression and behavior changes. Alzheimer's disease is different for everyone. It may take many years to develop. In some cases, people can function well for a long time. In the early stage of the disease, you can do things at home to make life easier and safer. You also can keep doing your hobbies and other activities. Many people find comfort in planning now for their future needs. Follow-up care is a key part of your treatment and safety. Be sure to make and go to all appointments, and call your doctor if you are having problems. Its also a good idea to know your test results and keep a list of the medicines you take. How can you care for yourself at home? Taking care of yourself  · If your doctor gives you medicines, take them exactly as prescribed. Call your doctor if you think you are having a problem with your medicine. You will get more details on the medicines your doctor prescribes. · Eat a balanced diet. Get plenty of whole grains, fruits, and vegetables every day. If you are not hungry at mealtimes, eat snacks at midmorning and in the afternoon. Try drinks such as Boost, Ensure, or Sustacal if you are having trouble keeping your weight up. · Stay active. Exercise such as walking may slow the decline of your mental abilities. Try to stay active mentally too. Read and work crossword puzzles if you enjoy these activities. · If you have trouble sleeping, do not nap during the day. Get regular exercise (but not within several hours of bedtime). Drink a glass of warm milk or caffeine-free herbal tea before going to bed.   · Ask your doctor about support groups and other resources in your area. They can help people who have Alzheimer's disease and their families. · Be patient. You may find that a task takes you longer than it used to. · If you have not already done so, make a list of advance directives. Advance directives are instructions to your doctor and family members about what kind of care you want if you become unable to speak or express yourself. Talk to a  about making a will, if you do not already have one. Keeping schedules  · Develop a routine. You will feel less frustrated or confused if you have a clear, simple plan of what to do every day. ¨ Make lists of your medicines and when to take them. ¨ Write down appointments and other tasks in a calendar. ¨ Put sticky notes around the house to help you remember events and other things you have to do. ¨ Schedule activities and tasks for times of the day when you are best able to handle them. Staying safe  · Tell someone when you are going out and where you are going. Let the person know when you will be back. Before you go out alone, write down where you are going, how to get there, and how to get back home. Do this even if you have gone there many times before. Take someone along with you when possible. · Make your home safe. Tack down rugs, put no-slip tape in the tub, use handrails, and put safety switches on stoves and appliances. · Have a family member or other caregiver tell you whether you are driving badly. Deciding to stop driving is very hard for many people. Driving helps you feel independent. Your state s license bureau can do a driving test if there is any question. Plan for other means of getting around when you are no longer able to drive. · Use strong lighting, especially at night. Put night-lights in bedrooms, hallways, and bathrooms. · Lower the hot water temperature setting to 120°F or lower to avoid burns. When should you call for help?   Call [phone] anytime you think you may need emergency care. For example, call if:  · You are lost and do not know whom to call. · You are injured and do not know whom to call. Call your doctor now or seek immediate medical care if:  · Your symptoms suddenly get much worse. Watch closely for changes in your health, and be sure to contact your doctor if:  · You want more information about how you can take care of yourself. Where can you learn more? Go to http://pepe-constantino.info/. Enter Y179 in the search box to learn more about \"Alzheimer's Disease: Care Instructions. \"  Current as of: July 26, 2016  Content Version: 11.3  © 8931-2104 [employer]. Care instructions adapted under license by [employer] (which disclaims liability or warranty for this information). If you have questions about a medical condition or this instruction, always ask your healthcare professional. [place] [address] any warranty or liability for your use of this information. Seasonal Allergies: Care Instructions  Your Care Instructions  Allergies occur when your body's defense system (immune system) overreacts to certain substances. The immune system treats a harmless substance as if it were a harmful germ or virus. Many things can cause this to happen. Examples include pollens, medicine, food, dust, animal dander, and mold. Your allergies are seasonal if you have symptoms just at certain times of the year. In that case, you are probably allergic to pollens from certain trees, grasses, or weeds. Allergies can be mild or severe. Over-the-counter allergy medicine may help with some symptoms. Read and follow all instructions on the label. Managing your allergies is an important part of staying healthy. Your doctor may suggest that you have tests to help find the cause of your allergies. When you know what things trigger your symptoms, you can avoid them.  This can prevent allergy symptoms and other health problems. In some cases, immunotherapy might help. For this treatment, you get shots or use pills that have a small amount of certain allergens in them. Your body \"gets used to\" the allergen, so you react less to it over time. This kind of treatment may help prevent or reduce some allergy symptoms. Follow-up care is a key part of your treatment and safety. Be sure to make and go to all appointments, and call your doctor if you are having problems. It's also a good idea to know your test results and keep a list of the medicines you take. How can you care for yourself at home? · Be safe with medicines. Take your medicines exactly as prescribed. Call your doctor if you think you are having a problem with your medicine. · During your allergy season, keep windows closed. If you need to use air-conditioning, change or clean all filters every month. Take a shower and change your clothes after you have been outside. · Stay inside when pollen counts are high. Vacuum once or twice a week. Use a vacuum  with a HEPA filter or a double-thickness filter. When should you call for help? Give an epinephrine shot if:  · You think you are having a severe allergic reaction. After giving an epinephrine shot, call 911, even if you feel better. Call 911 if:  · You have symptoms of a severe allergic reaction. These may include:  ¨ Sudden raised, red areas (hives) all over your body. ¨ Swelling of the throat, mouth, lips, or tongue. ¨ Trouble breathing. ¨ Passing out (losing consciousness). Or you may feel very lightheaded or suddenly feel weak, confused, or restless. · You have been given an epinephrine shot, even if you feel better. Call your doctor now or seek immediate medical care if:  · You have symptoms of an allergic reaction, such as:  ¨ A rash or hives (raised, red areas on the skin). ¨ Itching. ¨ Swelling. ¨ Belly pain, nausea, or vomiting.   Watch closely for changes in your health, and be sure to contact your doctor if:  · You do not get better as expected. Where can you learn more? Go to http://pepe-constantino.info/. Enter J912 in the search box to learn more about \"Seasonal Allergies: Care Instructions. \"  Current as of: September 29, 2016  Content Version: 11.3  © 2728-2436 [employer]. Care instructions adapted under license by [employer] (which disclaims liability or warranty for this information). If you have questions about a medical condition or this instruction, always ask your healthcare professional. [address] any warranty or liability for your use of this information. Advise patient to start taking Over The Counter allergy medication (Allegra, Zyrtec, Claritin, Xyzal or Alavert) daily. If you have itchy, watery eyes you can try OTC Zaditor or Zyrtec Allergy Eye drops. If you have a stuffy nose, please try OTC Flonase, Flonase Sensimist, Rhinocort, or Nasacort AQ 2 squirts up each nostril once a day (adults) or 1 squirt up each nostril once a day (children). Use allergy free, dye free, fragrance free products on your body and hair. After being outdoors, brush hair vigorously, wash face and arms, rinse nostrils with a nasal saline spray and consider changing your clothing. Dust furniture frequently and wear a mask while doing it. Vacuum floors weekly. Remove stuffed animals or extra pillows from the bed. Clean bedding in hot water weekly. Sometimes allergies can be so severe that 2 nasal sprays and 2 pills may be needed to control symptoms. Seasonal Allergies: Care Instructions  Your Care Instructions  Allergies occur when your body's defense system (immune system) overreacts to certain substances. The immune system treats a harmless substance as if it were a harmful germ or virus. Many things can cause this to happen. Examples include pollens, medicine, food, dust, animal dander, and mold.   Your allergies are seasonal if you have symptoms just at certain times of the year. In that case, you are probably allergic to pollens from certain trees, grasses, or weeds. Allergies can be mild or severe. Over-the-counter allergy medicine may help with some symptoms. Read and follow all instructions on the label. Managing your allergies is an important part of staying healthy. Your doctor may suggest that you have tests to help find the cause of your allergies. When you know what things trigger your symptoms, you can avoid them. This can prevent allergy symptoms and other health problems. In some cases, immunotherapy might help. For this treatment, you get shots or use pills that have a small amount of certain allergens in them. Your body \"gets used to\" the allergen, so you react less to it over time. This kind of treatment may help prevent or reduce some allergy symptoms. Follow-up care is a key part of your treatment and safety. Be sure to make and go to all appointments, and call your doctor if you are having problems. It's also a good idea to know your test results and keep a list of the medicines you take. How can you care for yourself at home? · Be safe with medicines. Take your medicines exactly as prescribed. Call your doctor if you think you are having a problem with your medicine. · During your allergy season, keep windows closed. If you need to use air-conditioning, change or clean all filters every month. Take a shower and change your clothes after you have been outside. · Stay inside when pollen counts are high. Vacuum once or twice a week. Use a vacuum  with a HEPA filter or a double-thickness filter. When should you call for help? Give an epinephrine shot if:  · You think you are having a severe allergic reaction. After giving an epinephrine shot, call 911, even if you feel better. Call 911 if:  · You have symptoms of a severe allergic reaction.  These may include:  ¨ Sudden raised, red areas (hives) all over your body. ¨ Swelling of the throat, mouth, lips, or tongue. ¨ Trouble breathing. ¨ Passing out (losing consciousness). Or you may feel very lightheaded or suddenly feel weak, confused, or restless. · You have been given an epinephrine shot, even if you feel better. Call your doctor now or seek immediate medical care if:  · You have symptoms of an allergic reaction, such as:  ¨ A rash or hives (raised, red areas on the skin). ¨ Itching. ¨ Swelling. ¨ Belly pain, nausea, or vomiting. Watch closely for changes in your health, and be sure to contact your doctor if:  · You do not get better as expected. Where can you learn more? Go to http://pepe-constantino.info/. Enter J912 in the search box to learn more about \"Seasonal Allergies: Care Instructions. \"  Current as of: September 29, 2016  Content Version: 11.3  © 4578-5638 [employer]. Care instructions adapted under license by [employer] (which disclaims liability or warranty for this information). If you have questions about a medical condition or this instruction, always ask your healthcare professional. [address] any warranty or liability for your use of this information.

## 2017-10-24 RX ORDER — LISINOPRIL 10 MG/1
TABLET ORAL
Qty: 270 TAB | Refills: 3 | Status: SHIPPED | OUTPATIENT
Start: 2017-10-24 | End: 2018-12-10 | Stop reason: SDUPTHER

## 2017-10-24 NOTE — TELEPHONE ENCOUNTER
Local pharmacy on file called this morning for a weeks worth of Lisinopril to hold pt over until mail order delivers medication to pt. [employer] approved.

## 2017-11-01 ENCOUNTER — TELEPHONE (OUTPATIENT)
Dept: FAMILY MEDICINE CLINIC | Age: 82
End: 2017-11-01

## 2017-11-01 NOTE — TELEPHONE ENCOUNTER
Patients daughter calling on behalf of patient. Says patient is suffering with bad sinus h/a and in the front of her eyes. She wants to know what she can get over the counter for the patient to take. Her contact # is [phone].

## 2017-11-02 NOTE — TELEPHONE ENCOUNTER
Advised protocol for clearing congestion:  Increase fluid intake, especially water to thin mucous and boost the immune system. Avoid sugar and dairy while congested since they thicken mucous. Get plenty of rest!  Gargle 3 times daily and as needed in Listerine or warm salt water vinegar solutions (1 tsp salt, 1 tsp vinegar in 1 cup lukewarm water.)  Use OTC nasal saline spray up each nostril twice daily. Use humidifier at bedtime. Use OTC Mucinex 600 mg twice daily to loosen mucous. Use OTC Tylenol Arthritis or Ibuprofen up to 800 mg up to 3 times daily as needed for pain, fever or headaches. Avoid decongestants and Ibuprofen if you have high blood pressure! If mucous is consistently discolored yellow or green throughout the day for more than a week, call the doctor for an evaluation. I her symptoms last longer than 2 weeks, she will need to be evaluated. Our Nurse Practitioners are happy to see her before the weekend if she desires.

## 2017-11-06 NOTE — TELEPHONE ENCOUNTER
Writer called pt's daughter with Dr. Ara Pierre recommendations for pt's sinus HA's. Writer spoke with Rand pate, verified on PHI/HIPPA release form. Daughter verified pt's . Daughter was informed of recommendations: Advised protocol for clearing congestion:  Increase fluid intake, especially water to thin mucous and boost the immune system. Avoid sugar and dairy while congested since they thicken mucous. Get plenty of rest!  Gargle 3 times daily and as needed in Listerine or warm salt water vinegar solutions (1 tsp salt, 1 tsp vinegar in 1 cup lukewarm water.)  Use OTC nasal saline spray up each nostril twice daily. Use humidifier at bedtime. Use OTC Mucinex 600 mg twice daily to loosen mucous. Use OTC Tylenol Arthritis or Ibuprofen up to 800 mg up to 3 times daily as needed for pain, fever or headaches. Avoid decongestants and Ibuprofen if you have high blood pressure! If mucous is consistently discolored yellow or green throughout the day for more than a week, call the doctor for an evaluation.     I her symptoms last longer than 2 weeks, she will need to be evaluated. Our Nurse Practitioners are happy to see her before the weekend if she desires. Pt verbalized understanding and appreciation.

## 2018-02-19 ENCOUNTER — OFFICE VISIT (OUTPATIENT)
Dept: FAMILY MEDICINE CLINIC | Age: 83
End: 2018-02-19

## 2018-02-19 VITALS
HEIGHT: 62 IN | SYSTOLIC BLOOD PRESSURE: 157 MMHG | BODY MASS INDEX: 30.62 KG/M2 | TEMPERATURE: 98.4 F | OXYGEN SATURATION: 99 % | WEIGHT: 166.4 LBS | HEART RATE: 68 BPM | RESPIRATION RATE: 12 BRPM | DIASTOLIC BLOOD PRESSURE: 71 MMHG

## 2018-02-19 DIAGNOSIS — I65.23 STENOSIS OF BOTH INTERNAL CAROTID ARTERIES: ICD-10-CM

## 2018-02-19 DIAGNOSIS — Z87.19 HISTORY OF GI BLEED: ICD-10-CM

## 2018-02-19 DIAGNOSIS — F03.90 DEMENTIA WITHOUT BEHAVIORAL DISTURBANCE, UNSPECIFIED DEMENTIA TYPE: ICD-10-CM

## 2018-02-19 DIAGNOSIS — E78.00 HYPERCHOLESTEREMIA: ICD-10-CM

## 2018-02-19 DIAGNOSIS — R63.4 WEIGHT LOSS: ICD-10-CM

## 2018-02-19 DIAGNOSIS — Z98.890 HISTORY OF CEA (CAROTID ENDARTERECTOMY): ICD-10-CM

## 2018-02-19 DIAGNOSIS — Z87.891 HISTORY OF TOBACCO ABUSE: ICD-10-CM

## 2018-02-19 DIAGNOSIS — F41.1 GAD (GENERALIZED ANXIETY DISORDER): ICD-10-CM

## 2018-02-19 DIAGNOSIS — M79.604 PAIN IN BOTH LOWER EXTREMITIES: ICD-10-CM

## 2018-02-19 DIAGNOSIS — I10 ESSENTIAL HYPERTENSION: Primary | ICD-10-CM

## 2018-02-19 DIAGNOSIS — I73.9 PVD (PERIPHERAL VASCULAR DISEASE) (HCC): ICD-10-CM

## 2018-02-19 DIAGNOSIS — E55.9 VITAMIN D DEFICIENCY: ICD-10-CM

## 2018-02-19 DIAGNOSIS — H04.123 BILATERAL DRY EYES: ICD-10-CM

## 2018-02-19 DIAGNOSIS — Z96.652 HISTORY OF KNEE REPLACEMENT, TOTAL, LEFT: ICD-10-CM

## 2018-02-19 DIAGNOSIS — E66.9 OBESITY, CLASS I, BMI 30-34.9: ICD-10-CM

## 2018-02-19 DIAGNOSIS — R73.9 HYPERGLYCEMIA: ICD-10-CM

## 2018-02-19 DIAGNOSIS — M79.605 PAIN IN BOTH LOWER EXTREMITIES: ICD-10-CM

## 2018-02-19 RX ORDER — SERTRALINE HYDROCHLORIDE 25 MG/1
25 TABLET, FILM COATED ORAL DAILY
Qty: 30 TAB | Refills: 11 | Status: SHIPPED | OUTPATIENT
Start: 2018-02-19 | End: 2018-03-09

## 2018-02-19 NOTE — MR AVS SNAPSHOT
[address] [place] [address] 
[address] [place]z [address] 
[phone] Patient: Shannan Gonzalez MRN:  [id number] Visit Information Date & Time Provider Department Dept. Phone Encounter #  
 2/19/2018 10:00 AM Roger Li DO [facility] [phone] [id number] Follow-up Instructions Return in about 6 months (around 8/19/2018) for bp, results, mwv. Routing History Upcoming Health Maintenance Date Due  
 MEDICARE YEARLY EXAM 8/19/2018 GLAUCOMA SCREENING Q2Y 6/22/2019 DTaP/Tdap/Td series (2 - Td) 2/17/2027 Allergies as of 2/19/2018  Review Complete On: 2/19/2018 By: Roger Li DO Severity Noted Reaction Type Reactions Penicillins High 10/22/2009    Hives Current Immunizations  Reviewed on 2/17/2017 Name Date Pneumococcal Conjugate (PCV-13) 8/17/2016 ZZZ-RETIRED (DO NOT USE) Pneumococcal Vaccine (Unspecified Type) 10/22/2009 Not reviewed this visit You Were Diagnosed With   
  
 Codes Comments Essential hypertension    -  Primary ICD-10-CM: I10 
ICD-9-CM: 401.9 Hypercholesteremia     ICD-10-CM: E78.00 ICD-9-CM: 272.0 Hyperglycemia     ICD-10-CM: R73.9 ICD-9-CM: 790.29 Dementia without behavioral disturbance, unspecified dementia type     ICD-10-CM: F03.90 ICD-9-CM: 294.20 Mild LACHELLE (generalized anxiety disorder)     ICD-10-CM: F41.1 ICD-9-CM: 300.02 mild, intermittently Weight loss     ICD-10-CM: R63.4 ICD-9-CM: 783.21 11# since 08/2017 due to dementia vs   
 Vitamin D deficiency     ICD-10-CM: E55.9 ICD-9-CM: 268.9 Obesity, Class I, BMI 30-34.9     ICD-10-CM: E66.9 ICD-9-CM: 278.00 Bilateral dry eyes     ICD-10-CM: A07.784 ICD-9-CM: 375.15 Stenosis of both internal carotid arteries     ICD-10-CM: I65.23 ICD-9-CM: 433.10, 433.30 PVD (peripheral vascular disease) ([facility] [place] [facility]     ICD-10-CM: I73.9 ICD-9-CM: 443.9 Pain in both lower extremities     ICD-10-CM: M79.604, M79.605 ICD-9-CM: 729.5 \"tired feeling\" with ambulation History of tobacco abuse     ICD-10-CM: Z87.891 ICD-9-CM: V15.82 History of CEA (carotid endarterectomy)     ICD-10-CM: T04.643 ICD-9-CM: V45.89 History of GI bleed     ICD-10-CM: Z87.19 ICD-9-CM: V12.79 History of knee replacement, total, left     ICD-10-CM: R74.960 ICD-9-CM: V43.65 Vitals BP Pulse Temp Resp Height(growth percentile) Weight(growth percentile) 147/68 (BP 1 Location: Right arm, BP Patient Position: Sitting) 68 98.4 °F (36.9 °C) (Oral) 12 5' 2.01\" (1.575 m) 166 lb 6.4 oz (75.5 kg) SpO2 BMI OB Status Smoking Status 99% 30.43 kg/m2 Postmenopausal Former Smoker Vitals History BMI and BSA Data Body Mass Index Body Surface Area  
 30.43 kg/m 2 1.82 m 2 Preferred Pharmacy Pharmacy Name Phone 500 [address], [address] [place] [address]  [phone] Your Updated Medication List  
  
   
This list is accurate as of: 2/19/18 11:46 AM.  Always use your most recent med list.  
  
  
  
  
 albuterol 90 mcg/actuation inhaler Commonly known as:  PROVENTIL HFA, VENTOLIN HFA, PROAIR HFA Take 2 Puffs by inhalation every four (4) hours as needed for Wheezing or Shortness of Breath. Indications: BRONCHOSPASM PREVENTION  
  
 aspirin delayed-release 81 mg tablet Take 1 Tab by mouth daily. Indications: myocardial infarction prevention Calcium-Cholecalciferol (D3) 600 mg(1,500mg) -400 unit Cap Take  by mouth daily. cholecalciferol (vitamin D3) 2,000 unit Tab Take 1 Tab by mouth daily. lisinopril 10 mg tablet Commonly known as:  PRINIVIL, ZESTRIL  
TAKE 1 TABLET IN THE MORNING AND TAKE 2 TABLETS IN THE EVENING FOR HYPERTENSION  
  
 mometasone 50 mcg/actuation nasal spray Commonly known as:  NASONEX  
2 Sprays by Both Nostrils route daily. Indications: ALLERGIC RHINITIS Omega-3 Fatty Acids 300 mg Cap Take 1 Cap by mouth daily. sertraline 25 mg tablet Commonly known as:  ZOLOFT Take 1 Tab by mouth daily. Indications: Generalized Anxiety Disorder TYLENOL PO Take 325 mg by mouth two (2) times a day. Prescriptions Sent to Pharmacy Refills  
 sertraline (ZOLOFT) 25 mg tablet 11 Sig: Take 1 Tab by mouth daily. Indications: Generalized Anxiety Disorder Class: Normal  
 Pharmacy: [address], [facility] [place]  Ph #: [phone] Route: Oral  
  
We Performed the Following CBC W/O DIFF [40466 CPT(R)] HEMOGLOBIN A1C WITH EAG [40577 CPT(R)] LIPID PANEL [20547 CPT(R)] METABOLIC PANEL, COMPREHENSIVE [58512 CPT(R)] MICROALBUMIN, UR, RAND W/ MICROALBUMIN/CREA RATIO [id number] CPT(R)] TSH 3RD GENERATION [85387 CPT(R)] URINALYSIS W/ RFLX MICROSCOPIC [80663 CPT(R)] VITAMIN D, 25 HYDROXY [id number] CPT(R)] Follow-up Instructions Return in about 6 months (around 8/19/2018) for bp, results, mwv. Patient Instructions Allergies: Care Instructions Your Care Instructions Allergies occur when your body's defense system (immune system) overreacts to certain substances. The immune system treats a harmless substance as if it were a harmful germ or virus. Many things can cause this overreaction, including pollens, medicine, food, dust, animal dander, and mold. Allergies can be mild or severe. Mild allergies can be managed with home treatment. But medicine may be needed to prevent problems. Managing your allergies is an important part of staying healthy. Your doctor may suggest that you have allergy testing to help find out what is causing your allergies. When you know what things trigger your symptoms, you can avoid them. This can prevent allergy symptoms and other health problems.  
For severe allergies that cause reactions that affect your whole body (anaphylactic reactions), your doctor may prescribe a shot of epinephrine to carry with you in case you have a severe reaction. Learn how to give yourself the shot and keep it with you at all times. Make sure it is not . Follow-up care is a key part of your treatment and safety. Be sure to make and go to all appointments, and call your doctor if you are having problems. It's also a good idea to know your test results and keep a list of the medicines you take. How can you care for yourself at home? · If you have been told by your doctor that dust or dust mites are causing your allergy, decrease the dust around your bed: 
[facility] sheets, pillowcases, and other bedding in hot water every week. ¨ Use dust-proof covers for pillows, duvets, and mattresses. Avoid plastic covers because they tear easily and do not \"breathe. \" Wash as instructed on the label. ¨ Do not use any blankets and pillows that you do not need. ¨ Use blankets that you can wash in your washing machine. ¨ Consider removing drapes and carpets, which attract and hold dust, from your bedroom. · If you are allergic to house dust and mites, do not use home humidifiers. Your doctor can suggest ways you can control dust and mites. · Look for signs of cockroaches. Cockroaches cause allergic reactions. Use cockroach baits to get rid of them. Then, clean your home well. Cockroaches like areas where grocery bags, newspapers, empty bottles, or cardboard boxes are stored. Do not keep these inside your home, and keep trash and food containers sealed. Seal off any spots where cockroaches might enter your home. · If you are allergic to mold, get rid of furniture, rugs, and drapes that smell musty. Check for mold in the bathroom. · If you are allergic to outdoor pollen or mold spores, use air-conditioning. Change or clean all filters every month. Keep windows closed. · If you are allergic to pollen, stay inside when pollen counts are high. Use a vacuum  with a HEPA filter or a double-thickness filter at least two times each week. · Stay inside when air pollution is bad. Avoid paint fumes, perfumes, and other strong odors. · Avoid conditions that make your allergies worse. Stay away from smoke. Do not smoke or let anyone else smoke in your house. Do not use fireplaces or wood-burning stoves. · If you are allergic to your pets, change the air filter in your furnace every month. Use high-efficiency filters. · If you are allergic to pet dander, keep pets outside or out of your bedroom. Old carpet and cloth furniture can hold a lot of animal dander. You may need to replace them. When should you call for help? Give an epinephrine shot if: 
? · You think you are having a severe allergic reaction. ? · You have symptoms in more than one body area, such as mild nausea and an itchy mouth. ? After giving an epinephrine shot call 911, even if you feel better. ?Call 911 if: 
? · You have symptoms of a severe allergic reaction. These may include: 
¨ Sudden raised, red areas (hives) all over your body. ¨ Swelling of the throat, mouth, lips, or tongue. ¨ Trouble breathing. ¨ Passing out (losing consciousness). Or you may feel very lightheaded or suddenly feel weak, confused, or restless. ? · You have been given an epinephrine shot, even if you feel better. ?Call your doctor now or seek immediate medical care if: 
? · You have symptoms of an allergic reaction, such as: ¨ A rash or hives (raised, red areas on the skin). ¨ Itching. ¨ Swelling. ¨ Belly pain, nausea, or vomiting. ? Watch closely for changes in your health, and be sure to contact your doctor if: 
? · You do not get better as expected. Where can you learn more? Go to http://pepe-constantino.info/. Enter T108 in the search box to learn more about \"Allergies: Care Instructions. \" Current as of: September 29, 2016 Content Version: 11.4 © 4306-6073 Near Page. Care instructions adapted under license by [employer] (which disclaims liability or warranty for this information). If you have questions about a medical condition or this instruction, always ask your healthcare professional. [address] any warranty or liability for your use of this information. Managing Your Allergies: Care Instructions Your Care Instructions Managing your allergies is an important part of staying healthy. Your doctor will help you find out what may be causing the allergies. Common causes of allergy symptoms are house dust and dust mites, animal dander, mold, and pollen. As soon as you know what triggers your symptoms, try to reduce your exposure to your triggers. This can help prevent allergy symptoms, asthma, and other health problems. Ask your doctor about allergy medicine or immunotherapy. These treatments may help reduce or prevent allergy symptoms. Follow-up care is a key part of your treatment and safety. Be sure to make and go to all appointments, and call your doctor if you are having problems. It's also a good idea to know your test results and keep a list of the medicines you take. How can you care for yourself at home? · If you think that dust or dust mites are causing your allergies: 
¨ Wash sheets, pillowcases, and other bedding every week in hot water. ¨ Use airtight, dust-proof covers for pillows, duvets, and mattresses. Avoid plastic covers, because they tend to tear quickly and do not \"breathe. \" Wash according to the instructions. ¨ Remove extra blankets and pillows that you don't need. ¨ Use blankets that are machine-washable. ¨ Don't use home humidifiers. They can help mites live longer. · Use air-conditioning. Change or clean all filters every month. Keep windows closed. Use high-efficiency air filters. Don't use window or attic fans, which draw dust into the air. · If you're allergic to pet dander, keep pets outside or, at the very least, out of your bedroom. Old carpet and cloth-covered furniture can hold a lot of animal dander. You may need to replace them. · Look for signs of cockroaches. Use cockroach baits to get rid of them. Then clean your home well. · If you're allergic to mold, don't keep indoor plants, because molds can grow in soil. Get rid of furniture, rugs, and drapes that smell musty. Check for mold in the bathroom. · If you're allergic to pollen, stay inside when pollen counts are high. · Don't smoke or let anyone else smoke in your house. Don't use fireplaces or wood-burning stoves. Avoid paint fumes, perfumes, and other strong odors. When should you call for help? Give an epinephrine shot if: 
? · You think you are having a severe allergic reaction. ? After giving an epinephrine shot call 911, even if you feel better. ?Call 911 if: 
? · You have symptoms of a severe allergic reaction. These may include: 
¨ Sudden raised, red areas (hives) all over your body. ¨ Swelling of the throat, mouth, lips, or tongue. ¨ Trouble breathing. ¨ Passing out (losing consciousness). Or you may feel very lightheaded or suddenly feel weak, confused, or restless. ? · You have been given an epinephrine shot, even if you feel better. ?Call your doctor now or seek immediate medical care if: 
? · You have symptoms of an allergic reaction, such as: ¨ A rash or hives (raised, red areas on the skin). ¨ Itching. ¨ Swelling. ¨ Belly pain, nausea, or vomiting. ? Watch closely for changes in your health, and be sure to contact your doctor if: 
? · Your allergies get worse. ? · You need help controlling your allergies. ? · You have questions about allergy testing. ? · You do not get better as expected. Where can you learn more? Go to http://pepe-constantino.info/.  
Enter L249 in the search box to learn more about \"Managing Your Allergies: Care Instructions. \" Current as of: September 29, 2016 Content Version: 11.4 © 0052-1955 [employer]ven. Care instructions adapted under license by [employer] (which disclaims liability or warranty for this information). If you have questions about a medical condition or this instruction, always ask your healthcare professional. [facility] [address] any warranty or liability for your use of this information. Seasonal Allergies: Care Instructions Your Care Instructions Allergies occur when your body's defense system (immune system) overreacts to certain substances. The immune system treats a harmless substance as if it were a harmful germ or virus. Many things can cause this to happen. Examples include pollens, medicine, food, dust, animal dander, and mold. Your allergies are seasonal if you have symptoms just at certain times of the year. In that case, you are probably allergic to pollens from certain trees, grasses, or weeds. Allergies can be mild or severe. Over-the-counter allergy medicine may help with some symptoms. Read and follow all instructions on the label. Managing your allergies is an important part of staying healthy. Your doctor may suggest that you have tests to help find the cause of your allergies. When you know what things trigger your symptoms, you can avoid them. This can prevent allergy symptoms and other health problems. In some cases, immunotherapy might help. For this treatment, you get shots or use pills that have a small amount of certain allergens in them. Your body \"gets used to\" the allergen, so you react less to it over time. This kind of treatment may help prevent or reduce some allergy symptoms. Follow-up care is a key part of your treatment and safety. Be sure to make and go to all appointments, and call your doctor if you are having problems. It's also a good idea to know your test results and keep a list of the medicines you take. How can you care for yourself at home? · Be safe with medicines. Take your medicines exactly as prescribed. Call your doctor if you think you are having a problem with your medicine. · During your allergy season, keep windows closed. If you need to use air-conditioning, change or clean all filters every month. Take a shower and change your clothes after you have been outside. · Stay inside when pollen counts are high. Vacuum once or twice a week. Use a vacuum  with a HEPA filter or a double-thickness filter. When should you call for help? Give an epinephrine shot if: 
? · You think you are having a severe allergic reaction. ? After giving an epinephrine shot, call 911, even if you feel better. ?Call 911 if: 
? · You have symptoms of a severe allergic reaction. These may include: 
¨ Sudden raised, red areas (hives) all over your body. ¨ Swelling of the throat, mouth, lips, or tongue. ¨ Trouble breathing. ¨ Passing out (losing consciousness). Or you may feel very lightheaded or suddenly feel weak, confused, or restless. ? · You have been given an epinephrine shot, even if you feel better. ?Call your doctor now or seek immediate medical care if: 
? · You have symptoms of an allergic reaction, such as: ¨ A rash or hives (raised, red areas on the skin). ¨ Itching. ¨ Swelling. ¨ Belly pain, nausea, or vomiting. ? Watch closely for changes in your health, and be sure to contact your doctor if: 
? · You do not get better as expected. Where can you learn more? Go to http://pepe-constantino.info/. Enter J912 in the search box to learn more about \"Seasonal Allergies: Care Instructions. \" Current as of: September 29, 2016 Content Version: 11.4 © 3663-5311 [employer]. Care instructions adapted under license by [employer] (which disclaims liability or warranty for this information).  If you have questions about a medical condition or this instruction, always ask your healthcare professional. [place] [address] any warranty or liability for your use of this information. Advise patient to start taking Over The Counter allergy medication (Allegra, Zyrtec, Claritin, Xyzal or Alavert) daily. If you have itchy, watery eyes you can try OTC Zaditor or Zyrtec Allergy Eye drops. If you have a stuffy nose, please try OTC Flonase, Flonase Sensimist, Rhinocort, or Nasacort AQ 2 squirts up each nostril once a day (adults) or 1 squirt up each nostril once a day (children). Use allergy free, dye free, fragrance free products on your body and hair. After being outdoors, brush hair vigorously, wash face and arms, rinse nostrils with a nasal saline spray and consider changing your clothing. Dust furniture frequently and wear a mask while doing it. Vacuum floors weekly. Remove stuffed animals or extra pillows from the bed. Clean bedding in hot water weekly. Sometimes allergies can be so severe that 2 nasal sprays and 2 pills may be needed to control symptoms. Dry Eyes: Care Instructions Your Care Instructions Dry eyes can be uncomfortable. The dryness may make your eyes feel dry or hot. Your eyes may also water a lot. In some cases, dry eyes make it feel like there is sand or dirt in your eyes. From time to time, dry eyes may cause you to have blurry vision. But dry eyes don't usually cause lasting problems with vision. There are many causes of dry eyes. Sometimes dry weather, smoke, or pollution can bother the eyes. Other times, allergies or contact lenses irritate the eyes. Older people often have dry eyes because our eyes do not make as many tears as we age. In some cases, diseases can cause dry eyes. These include rheumatoid arthritis, lupus, and Sjögren's syndrome. In other cases, medicines are to blame. Your doctor may want to do tests to help find the cause of your dry eyes. You can work with your doctor to find ways to help your eyes feel better. Home treatment often helps. Follow-up care is a key part of your treatment and safety. Be sure to make and go to all appointments, and call your doctor if you are having problems. It's also a good idea to know your test results and keep a list of the medicines you take. How can you care for yourself at home? · Take breaks often when you read, watch TV, or use a computer. Close your eyes. Do not rub your eyes. Artificial tears may help you when you do these activities. You can buy these without a prescription. · Avoid smoke and other things that irritate the eyes. · Wear sunglasses that wrap around the sides of the head. These can protect the eyes from sun, wind, dust, and dirt. · Use a vaporizer or humidifier to add moisture to your bedroom. Follow the directions for cleaning the machine. · Do not use fans while you sleep. · If you usually wear contact lenses, use rewetting drops or wear your glasses until your eyes feel better. · Be safe with medicines. Take your medicine exactly as prescribed. Call your doctor if you think you are having a problem with your medicine. · Try using artificial tears at least 4 times a day. · If you need drops more than 4 times a day, use artificial tears without preservatives. They may irritate the eyes less. · Use a lubricating eye ointment or eye gel at bedtime. These are thicker and last longer, so you may have less burning, dryness, and itching when you wake up. Be aware that they may blur your vision for a short time. · To put in eyedrops or ointment: ¨ Tilt your head back, and pull the lower eyelid down with one finger. ¨ Drop or squirt the medicine inside the lower lid. ¨ Close your eye for 30 to 60 seconds to let the drops or ointment move around. ¨ Do not touch the ointment or dropper tip to your eyelashes or any other surface. · Put a warm, moist cloth on your eyelids every morning for about 5 minutes. Then massage your eyelids lightly. This helps increase the natural wetness of your eyes. When should you call for help? Watch closely for changes in your health, and be sure to contact your doctor if: 
? · Your eyes are still dry, irritated, or teary, and artificial tears do not help. ? · You do not get better as expected. Where can you learn more? Go to http://pepe-constantino.info/. Enter D231 in the search box to learn more about \"Dry Eyes: Care Instructions. \" Current as of: March 3, 2017 Content Version: 11.4 © 2808-3832 [employer]. Care instructions adapted under license by [employer] (which disclaims liability or warranty for this information). If you have questions about a medical condition or this instruction, always ask your healthcare professional. [address] any warranty or liability for your use of this information. Introducing [facility]! Agustín Mesa introduces Eyeonplay patient portal. Now you can access parts of your medical record, email your doctor's office, and request medication refills online. 1. In your internet browser, go to [email or web address] 2. Click on the First Time User? Click Here link in the Sign In box. You will see the New Member Sign Up page. 3. Enter your Eyeonplay Access Code exactly as it appears below. You will not need to use this code after youve completed the sign-up process. If you do not sign up before the expiration date, you must request a new code. · Eyeonplay Access Code: [email or web address] Expires: 5/20/2018 11:46 AM 
 
4. Enter the last four digits of your Social Security Number (xxxx) and Date of Birth (mm/dd/yyyy) as indicated and click Submit. You will be taken to the next sign-up page. 5. Create a Eyeonplay ID.  This will be your Eyeonplay login ID and cannot be changed, so think of one that is secure and easy to remember. 6. Create a RingRang password. You can change your password at any time. 7. Enter your Password Reset Question and Answer. This can be used at a later time if you forget your password. 8. Enter your e-mail address. You will receive e-mail notification when new information is available in [address] 9. Click Sign Up. You can now view and download portions of your medical record. 10. Click the Download Summary menu link to download a portable copy of your medical information. If you have questions, please visit the Frequently Asked Questions section of the RingRang website. Remember, RingRang is NOT to be used for urgent needs. For medical emergencies, dial 911. Now available from your iPhone and Android! Please provide this summary of care documentation to your next provider. Your primary care clinician is listed as Ezekiel Lambert. If you have any questions after today's visit, please call [phone].

## 2018-02-19 NOTE — PROGRESS NOTES
Chief Complaint   Patient presents with    Blood Pressure Check    Cholesterol Problem    Memory Loss     F/U     1. Have you been to the ER, urgent care clinic since your last visit? Hospitalized since your last visit? No    2. Have you seen or consulted any other health care providers outside of the [address] [place] [address] since your last visit? Include any pap smears or colon screening. No     In the event something were to happen to you and you were unable to speak on your behalf, do you have an Advance Directive/ Living Will in place stating your wishes? NO    If yes, do we have a copy on file NO    If no, would you like information:   Pt declined    Pt's dtr is with her today.

## 2018-02-19 NOTE — PROGRESS NOTES
HISTORY OF PRESENT ILLNESS  Chris Mcguire is a 81 y.o. female presents with Blood Pressure Check; Cholesterol Problem; Memory Loss (F/U); and Labs    Agree with nurse note. Her daughter is present today. Hypertensive, hyperglycemic pt with hypercholesterolemia, vit d deficiency, and hx of GI bleed presents to the office with a BP of 147/68. For BP, she takes Lisinopril 10 mg daily, tolerating well. She weighs 166 lbs, down 11 lbs since 08/2017. Patient denies vision changes, headaches, dizziness, chest pain, SOB, or swelling. Pt with PVD, BL ICA stenosis, hx of CEA, and hx of tobacco abuse. She has BL leg weakness, which she describes as her legs feeling tired. She has a hx of L TKR in 2013. She saw vascular surgeon, Dr. Deja Hamilton on 07/19/17 for carotid stenosis. He was planning to perform LE resting PVR and BL cerebrovascular examination. F/U 1 year. She had neuropsych testing performed with Dr. Pat Kenyon on 07/27/17. Dx'd Late onset Alzheimer's disease w/o behavioral disturbance, mild recurrent major depression, and LACHELLE. She goes to [employer] and enjoys doing word puzzles. She manages her own finances. She denies feeling depressed but acknowledges feeling anxious sometimes. She was rx'd Aricept 5 mg in 08/2017 but never started. Denies SI/HI. She has a runny nose and dry eyes. Patient denies fever, chills, ear pain, dizziness, post nasal drainage, sore throat, headache, itchy or watery eyes, chest pain or tightness, SOB, wheezing, cough, GI symptoms, bladder symptoms and body aches. She has lost her taste for certain foods and is unsure why. Health Maintenance    She saw ophthalmologist, Dr. Magaly Chahal in 06/2017. Recommend artifical tears for dry eyes. F/U 1 year. Written by dara Shaffer, as dictated by Dr. Yumiko Sarmiento DO.    HOLLIE    Review of Systems negative except as noted above in HPI.     ALLERGIES:    Allergies   Allergen Reactions    Penicillins Hives CURRENT MEDICATIONS:    Outpatient Prescriptions Marked as Taking for the 2/19/18 encounter (Office Visit) with Jayce Paredes DO   Medication Sig Dispense Refill    sertraline (ZOLOFT) 25 mg tablet Take 1 Tab by mouth daily. Indications: Generalized Anxiety Disorder 30 Tab 11    lisinopril (PRINIVIL, ZESTRIL) 10 mg tablet TAKE 1 TABLET IN THE MORNING AND TAKE 2 TABLETS IN THE EVENING FOR HYPERTENSION 270 Tab 3    aspirin delayed-release 81 mg tablet Take 1 Tab by mouth daily. Indications: myocardial infarction prevention 90 Tab 3    mometasone (NASONEX) 50 mcg/actuation nasal spray 2 Sprays by Both Nostrils route daily. Indications: ALLERGIC RHINITIS 1 Container 5    cholecalciferol, vitamin D3, 2,000 unit tab Take 1 Tab by mouth daily.  Omega-3 Fatty Acids 300 mg cap Take 1 Cap by mouth daily.  Calcium-Cholecalciferol, D3, 600 mg(1,500mg) -400 unit cap Take  by mouth daily.  ACETAMINOPHEN (TYLENOL PO) Take 325 mg by mouth two (2) times a day. PAST MEDICAL HISTORY:    Past Medical History:   Diagnosis Date    Bilateral dry eyes 2017    Dr. Angelina Lopez    Cataract     Bilaterally. Dr. Bertha Eller. Dr. Saji Sánchez. Dr. Angelina Lopez.  Chest pain 10/2015    Dr. Michel Murray.  Dementia 07/2017    triggered by mild LACHELLE, depression. Dr. Fox Platt.  Diverticulosis 09/2008    Dr. Peter Vo. Dr. Nelson Washington.  DJD (degenerative joint disease) 09/30/05    cervical, worse C6-7, C7-T1. Left AC joint.  DJD (degenerative joint disease) of knee     bilateral  Dr. Lesley Kwon. Dr. Ollie NULL (dyspnea on exertion) 10/2015    Dr. Michel Murray.  Essential hypertension, benign 1968    Foster child     GIB (gastrointestinal bleeding) 11/17/14    due to internal hemorrhoids. Dr. Jess Linton Heart palpitations 11/12/15    due to PVCs. Dr. Darrion Ramos.     Heartburn     Hypercholesteremia     Hyperglycemia 2014    Internal carotid artery stenosis 2007 bilateral.  Dr. Mile Castro Internal hemorrhoids 09/2008, 11/2014    Dr. Troy Mendiola. Dr. Fabrizio Jaime.  Knee pain     Left. Dr. Valeri Cartwright. 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 Dr. Joseph Sethi. EF 50-55%    PVD (peripheral vascular disease) ([address] 2007    Dr. Baryden Zacarias    Raynaud's phenomenon 1968    Shortening, leg, congenital     left    Vitamin D deficiency 10/10/10       PAST SURGICAL HISTORY:    Past Surgical History:   Procedure Laterality Date    HX ATHERECTOMY Left 09/01/2011    popliteal atherectomy and angioplasty. Dr. Homero Gonzalez Right 2008    benign. Dr. Maurilio Summers.  HX CAROTID ENDARTERECTOMY Right 01/19/2011    ICA. Dr. Raleigh Rosario Bilateral 02/22/2016, 10/24/2016    L then R Dr. [facility].  HX COLONOSCOPY  11/24/14    due to GIB. Dr. Fabrizio Jaime.  HX COLONOSCOPY  09/05/08    with polypectomy. benign. Dr. Sid Mauricio. due q 10 yrs.  HX GI  1957    FISSURECTOMY.  HX GI  10/03/2008    PROCTOPLASTY due to rectal prolapse    HX HEMORRHOIDECTOMY  10/03/2008    internal and external    HX KNEE REPLACEMENT Left 07/2013    due to Severe OA. Dr. Luke Hernandez.      HX POLYPECTOMY  09/05/2008    rectal Dr. Latasha Beckford:    Family History   Problem Relation Age of Onset    Heart Attack Mother 79    Heart Attack Sister      x 3 sisters    Heart Attack Brother      x 3 brothers       SOCIAL HISTORY:    Social History     Social History    Marital status:      Spouse name: N/A    Number of children: N/A    Years of education: N/A     Social History Main Topics    Smoking status: Former Smoker     Packs/day: 1.00     Years: 15.00     Types: Cigarettes     Quit date: 1/1/1968    Smokeless tobacco: Never Used    Alcohol use No    Drug use: No    Sexual activity: No     Other Topics Concern    None     Social History Narrative       IMMUNIZATIONS: Immunization History   Administered Date(s) Administered    Pneumococcal Conjugate (PCV-13) 08/17/2016    ZZZ-RETIRED (DO NOT USE) Pneumococcal Vaccine (Unspecified Type) 10/22/2009         PHYSICAL EXAMINATION    Vital Signs    Visit Vitals    /68 (BP 1 Location: Right arm, BP Patient Position: Sitting)    Pulse 68    Temp 98.4 °F (36.9 °C) (Oral)    Resp 12    Ht 5' 2.01\" (1.575 m)    Wt 166 lb 6.4 oz (75.5 kg)    SpO2 99%    BMI 30.43 kg/m2       Weight Metrics 2/19/2018 8/18/2017 2/17/2017 10/12/2016 8/17/2016 7/8/2016 6/21/2016   Weight 166 lb 6.4 oz 177 lb 1.6 oz 170 lb 11.2 oz 168 lb 3.2 oz 170 lb 169 lb 3.2 oz 171 lb 3.2 oz   BMI 30.43 kg/m2 32.38 kg/m2 31.22 kg/m2 30.76 kg/m2 31.09 kg/m2 30.94 kg/m2 33.44 kg/m2       General appearance - Well nourished. Well appearing. Well developed. No acute distress. Obese. Head - Normocephalic. Atraumatic. Eyes - pupils equal and reactive. Extraocular eye movements intact. Sclera anicteric. Mildly injected sclera. Ears - Hearing is grossly normal bilaterally. Nose - normal and patent. No polyps noted. No erythema. No discharge. Occasionally sniffles. Mouth - mucous membranes with adequate moisture. Posterior pharynx normal with cobblestone appearance. No erythema, white exudate or obstruction. Neck - supple. Midline trachea. Carotid bruits noted on the L. No thyromegaly noted. Chest - clear to auscultation bilaterally anteriorly and posteriorly. No wheezes. No rales or rhonchi. Breath sounds are symmetrical bilaterally. Unlabored respirations. Heart - normal rate. Regular rhythm. Normal S1, S2. No murmur noted. No rubs, clicks or gallops noted. Abdomen - soft and distended. No masses or organomegaly. No rebound, rigidity or guarding. Bowel sounds normal x 4 quadrants. No tenderness noted. Neurological - awake, alert and oriented to person, place, and time and event. Cranial nerves II through XII intact.   Clear speech. Muscle strength is +5/5 x 4 extremities. Sensation is intact to light touch bilaterally. Steady gait. Heme/Lymph - peripheral pulses normal x 4 extremities. No peripheral edema is noted. Musculoskeletal - Intact x 4 extremities. Full ROM x 4 extremities. No pain with movement. Arthritic changes noted in BL hands. Back exam - normal range of motion. No pain on palpation of the spinous processes in the cervical, thoracic, lumbar, sacral regions. No CVA tenderness. Skin - no rashes, erythema, ecchymosis, lacerations, abrasions, suspicious moles noted  Psychological -   normal behavior, dress and thought processes. Good insight. Good eye contact. Normal affect. Appropriate mood. Normal speech. DATA REVIEWED    Lab Results   Component Value Date/Time    WBC 6.0 02/24/2017 08:51 AM    HGB 14.6 02/24/2017 08:51 AM    HCT 44.5 02/24/2017 08:51 AM    PLATELET 938 58/37/0763 08:51 AM    MCV 97 02/24/2017 08:51 AM     Lab Results   Component Value Date/Time    Sodium 140 02/24/2017 08:51 AM    Potassium 5.3 (H) 02/24/2017 08:51 AM    Chloride 98 02/24/2017 08:51 AM    CO2 25 02/24/2017 08:51 AM    Anion gap 8 06/19/2016 06:00 PM    Glucose 95 02/24/2017 08:51 AM    BUN 16 02/24/2017 08:51 AM    Creatinine 0.87 02/24/2017 08:51 AM    BUN/Creatinine ratio 18 02/24/2017 08:51 AM    GFR est AA 73 02/24/2017 08:51 AM    GFR est non-AA 63 02/24/2017 08:51 AM    Calcium 10.3 02/24/2017 08:51 AM    Bilirubin, total 0.6 02/24/2017 08:51 AM    AST (SGOT) 19 02/24/2017 08:51 AM    Alk.  phosphatase 110 02/24/2017 08:51 AM    Protein, total 6.9 02/24/2017 08:51 AM    Albumin 4.3 02/24/2017 08:51 AM    Globulin 3.6 06/19/2016 06:00 PM    A-G Ratio 1.7 02/24/2017 08:51 AM    ALT (SGPT) 8 02/24/2017 08:51 AM     Lab Results   Component Value Date/Time    Cholesterol, total 227 (H) 02/24/2017 08:51 AM    HDL Cholesterol 78 02/24/2017 08:51 AM    LDL, calculated 134 (H) 02/24/2017 08:51 AM    VLDL, calculated 15 02/24/2017 08:51 AM    Triglyceride 77 02/24/2017 08:51 AM    CHOL/HDL Ratio 2.8 05/05/2010 08:45 AM     Lab Results   Component Value Date/Time    Vitamin D 25-Hydroxy 24 (L) 10/08/2010 04:19 PM    VITAMIN D, 25-HYDROXY 49.1 02/24/2017 08:51 AM       Lab Results   Component Value Date/Time    Hemoglobin A1c 5.8 (H) 04/18/2016 09:23 AM    Hemoglobin A1c (POC) 5.5 02/17/2017 09:40 AM     Lab Results   Component Value Date/Time    TSH 3.240 02/24/2017 08:51 AM     Lab Results   Component Value Date/Time    Microalb/Creat ratio (ug/mg creat.) 2.9 02/24/2017 08:51 AM       ASSESSMENT and PLAN      ICD-10-CM ICD-9-CM    1. Essential hypertension I10 401.9 URINALYSIS W/ RFLX MICROSCOPIC      MICROALBUMIN, UR, RAND W/ MICROALBUMIN/CREA RATIO      LIPID PANEL      TSH 3RD GENERATION      METABOLIC PANEL, COMPREHENSIVE   2. Hypercholesteremia E78.00 272.0 LIPID PANEL      TSH 3RD GENERATION      METABOLIC PANEL, COMPREHENSIVE   3. Hyperglycemia R73.9 790.29 HEMOGLOBIN A1C WITH EAG      METABOLIC PANEL, COMPREHENSIVE   4. Dementia without behavioral disturbance, unspecified dementia type F03.90 294.20     Mild   5. LACHELLE (generalized anxiety disorder) F41.1 300.02 sertraline (ZOLOFT) 25 mg tablet      REFERRAL TO NEUROLOGY    mild, intermittently   6. Weight loss R63.4 783.21 CBC W/O DIFF      METABOLIC PANEL, COMPREHENSIVE    11# since 08/2017 due to dementia vs    7. Vitamin D deficiency E55.9 268.9 VITAMIN D, 25 HYDROXY   8. Obesity, Class I, BMI 30-34.9 E66.9 278.00    9. Bilateral dry eyes H04. 123 375.15    10. Stenosis of both internal carotid arteries I65.23 433.10      433.30    11. PVD (peripheral vascular disease) ([facility]) I73.9 443.9    12. Pain in both lower extremities M79.604 729.5     M79.605      \"tired feeling\" with ambulation   13. History of tobacco abuse Z87.891 V15.82    14. History of CEA (carotid endarterectomy) Z98.890 V45.89    15. History of GI bleed Z87.19 V12.79    16.  History of knee replacement, total, left Z96.652 V43.65        Discussed the patient's BMI with her. The BMI follow up plan is as follows: I have counseled this patient on diet and exercise regimens. Decrease carbohydrates (white foods, sweet foods, sweet drinks and alcohol), increase green leafy vegetables and protein (lean meats and beans) with each meal.  Avoid fried foods. Eat 3-5 small meals daily. Do not skip meals. Increase water intake. Increase physical activity to 30 minutes daily for health benefit, as tolerated. Get 7-8 hours uninterrupted sleep nightly. Chart reviewed and updated. Continue current medications and care. Start Zoloft 25 mg daily. Will defer asking pt to start Aricept to neurologist. If her taste aversion continues after better addressing allergies then I recommend she mention this to the neurologist and we can also consider ENT referral.  Prescriptions written and sent to pharmacy; medication side effects discussed. Zoloft 25 mg. Most recent tests reviewed. Recheck pertinent labs when fasting. Recent office visit notes from Dr. Fidel Sanchez reviewed. Referrals given; patient urged to keep appointments with specialists. Neuro. Counseled patient on health concerns:  BP, allergies, and LACHELLE. Relevant handouts given and discussed with patient. Immunizations noted. Offered empathy, support, legitimation, prayers, partnership to patient. Praised patient for progress. Advance Care Booklet given at office visit. Referred to honoring choices; staff message sent to facilitator. Follow-up Disposition:  Return in about 6 months (around 8/19/2018) for bp, results, mwv. Patient was offered a choice/choices in the treatment plan today. Patient expresses understanding of the plan and agrees with recommendations. Written by dara Shaffer, as dictated by Dr. Yumiko Sarmiento DO. Documentation True and Accepted by Baron Hobbs. Riley Adams.       Patient Instructions          Allergies: Care Instructions  Your Care Instructions    Allergies occur when your body's defense system (immune system) overreacts to certain substances. The immune system treats a harmless substance as if it were a harmful germ or virus. Many things can cause this overreaction, including pollens, medicine, food, dust, animal dander, and mold. Allergies can be mild or severe. Mild allergies can be managed with home treatment. But medicine may be needed to prevent problems. Managing your allergies is an important part of staying healthy. Your doctor may suggest that you have allergy testing to help find out what is causing your allergies. When you know what things trigger your symptoms, you can avoid them. This can prevent allergy symptoms and other health problems. For severe allergies that cause reactions that affect your whole body (anaphylactic reactions), your doctor may prescribe a shot of epinephrine to carry with you in case you have a severe reaction. Learn how to give yourself the shot and keep it with you at all times. Make sure it is not . Follow-up care is a key part of your treatment and safety. Be sure to make and go to all appointments, and call your doctor if you are having problems. It's also a good idea to know your test results and keep a list of the medicines you take. How can you care for yourself at home? · If you have been told by your doctor that dust or dust mites are causing your allergy, decrease the dust around your bed:  [facility] sheets, pillowcases, and other bedding in hot water every week. ¨ Use dust-proof covers for pillows, duvets, and mattresses. Avoid plastic covers because they tear easily and do not \"breathe. \" Wash as instructed on the label. ¨ Do not use any blankets and pillows that you do not need. ¨ Use blankets that you can wash in your washing machine. ¨ Consider removing drapes and carpets, which attract and hold dust, from your bedroom.   · If you are allergic to house dust and mites, do not use home humidifiers. Your doctor can suggest ways you can control dust and mites. · Look for signs of cockroaches. Cockroaches cause allergic reactions. Use cockroach baits to get rid of them. Then, clean your home well. Cockroaches like areas where grocery bags, newspapers, empty bottles, or cardboard boxes are stored. Do not keep these inside your home, and keep trash and food containers sealed. Seal off any spots where cockroaches might enter your home. · If you are allergic to mold, get rid of furniture, rugs, and drapes that smell musty. Check for mold in the bathroom. · If you are allergic to outdoor pollen or mold spores, use air-conditioning. Change or clean all filters every month. Keep windows closed. · If you are allergic to pollen, stay inside when pollen counts are high. Use a vacuum  with a HEPA filter or a double-thickness filter at least two times each week. · Stay inside when air pollution is bad. Avoid paint fumes, perfumes, and other strong odors. · Avoid conditions that make your allergies worse. Stay away from smoke. Do not smoke or let anyone else smoke in your house. Do not use fireplaces or wood-burning stoves. · If you are allergic to your pets, change the air filter in your furnace every month. Use high-efficiency filters. · If you are allergic to pet dander, keep pets outside or out of your bedroom. Old carpet and cloth furniture can hold a lot of animal dander. You may need to replace them. When should you call for help? Give an epinephrine shot if:  ? · You think you are having a severe allergic reaction. ? · You have symptoms in more than one body area, such as mild nausea and an itchy mouth. ? After giving an epinephrine shot call 911, even if you feel better. ?Call 911 if:  ? · You have symptoms of a severe allergic reaction. These may include:  ¨ Sudden raised, red areas (hives) all over your body.   ¨ Swelling of the throat, mouth, lips, or tongue. ¨ Trouble breathing. ¨ Passing out (losing consciousness). Or you may feel very lightheaded or suddenly feel weak, confused, or restless. ? · You have been given an epinephrine shot, even if you feel better. ?Call your doctor now or seek immediate medical care if:  ? · You have symptoms of an allergic reaction, such as:  ¨ A rash or hives (raised, red areas on the skin). ¨ Itching. ¨ Swelling. ¨ Belly pain, nausea, or vomiting. ? Watch closely for changes in your health, and be sure to contact your doctor if:  ? · You do not get better as expected. Where can you learn more? Go to http://pepeDobletconstantino.info/. Enter I576 in the search box to learn more about \"Allergies: Care Instructions. \"  Current as of: September 29, 2016  Content Version: 11.4  © 6795-8939 [employer]. Care instructions adapted under license by [employer] (which disclaims liability or warranty for this information). If you have questions about a medical condition or this instruction, always ask your healthcare professional. [address] any warranty or liability for your use of this information. Managing Your Allergies: Care Instructions  Your Care Instructions    Managing your allergies is an important part of staying healthy. Your doctor will help you find out what may be causing the allergies. Common causes of allergy symptoms are house dust and dust mites, animal dander, mold, and pollen. As soon as you know what triggers your symptoms, try to reduce your exposure to your triggers. This can help prevent allergy symptoms, asthma, and other health problems. Ask your doctor about allergy medicine or immunotherapy. These treatments may help reduce or prevent allergy symptoms. Follow-up care is a key part of your treatment and safety. Be sure to make and go to all appointments, and call your doctor if you are having problems.  It's also a good idea to know your test results and keep a list of the medicines you take. How can you care for yourself at home? · If you think that dust or dust mites are causing your allergies:  ¨ Wash sheets, pillowcases, and other bedding every week in hot water. ¨ Use airtight, dust-proof covers for pillows, duvets, and mattresses. Avoid plastic covers, because they tend to tear quickly and do not \"breathe. \" Wash according to the instructions. ¨ Remove extra blankets and pillows that you don't need. ¨ Use blankets that are machine-washable. ¨ Don't use home humidifiers. They can help mites live longer. · Use air-conditioning. Change or clean all filters every month. Keep windows closed. Use high-efficiency air filters. Don't use window or attic fans, which draw dust into the air. · If you're allergic to pet dander, keep pets outside or, at the very least, out of your bedroom. Old carpet and cloth-covered furniture can hold a lot of animal dander. You may need to replace them. · Look for signs of cockroaches. Use cockroach baits to get rid of them. Then clean your home well. · If you're allergic to mold, don't keep indoor plants, because molds can grow in soil. Get rid of furniture, rugs, and drapes that smell musty. Check for mold in the bathroom. · If you're allergic to pollen, stay inside when pollen counts are high. · Don't smoke or let anyone else smoke in your house. Don't use fireplaces or wood-burning stoves. Avoid paint fumes, perfumes, and other strong odors. When should you call for help? Give an epinephrine shot if:  ? · You think you are having a severe allergic reaction. ? After giving an epinephrine shot call 911, even if you feel better. ?Call 911 if:  ? · You have symptoms of a severe allergic reaction. These may include:  ¨ Sudden raised, red areas (hives) all over your body. ¨ Swelling of the throat, mouth, lips, or tongue. ¨ Trouble breathing. ¨ Passing out (losing consciousness).  Or you may feel very lightheaded or suddenly feel weak, confused, or restless. ? · You have been given an epinephrine shot, even if you feel better. ?Call your doctor now or seek immediate medical care if:  ? · You have symptoms of an allergic reaction, such as:  ¨ A rash or hives (raised, red areas on the skin). ¨ Itching. ¨ Swelling. ¨ Belly pain, nausea, or vomiting. ? Watch closely for changes in your health, and be sure to contact your doctor if:  ? · Your allergies get worse. ? · You need help controlling your allergies. ? · You have questions about allergy testing. ? · You do not get better as expected. Where can you learn more? Go to http://pepe-constantino.info/. Enter L249 in the search box to learn more about \"Managing Your Allergies: Care Instructions. \"  Current as of: September 29, 2016  Content Version: 11.4  © 8520-8476 [employer]. Care instructions adapted under license by [employer] (which disclaims liability or warranty for this information). If you have questions about a medical condition or this instruction, always ask your healthcare professional. [place] [address] any warranty or liability for your use of this information. Seasonal Allergies: Care Instructions  Your Care Instructions  Allergies occur when your body's defense system (immune system) overreacts to certain substances. The immune system treats a harmless substance as if it were a harmful germ or virus. Many things can cause this to happen. Examples include pollens, medicine, food, dust, animal dander, and mold. Your allergies are seasonal if you have symptoms just at certain times of the year. In that case, you are probably allergic to pollens from certain trees, grasses, or weeds. Allergies can be mild or severe. Over-the-counter allergy medicine may help with some symptoms. Read and follow all instructions on the label.   Managing your allergies is an important part of staying healthy. Your doctor may suggest that you have tests to help find the cause of your allergies. When you know what things trigger your symptoms, you can avoid them. This can prevent allergy symptoms and other health problems. In some cases, immunotherapy might help. For this treatment, you get shots or use pills that have a small amount of certain allergens in them. Your body \"gets used to\" the allergen, so you react less to it over time. This kind of treatment may help prevent or reduce some allergy symptoms. Follow-up care is a key part of your treatment and safety. Be sure to make and go to all appointments, and call your doctor if you are having problems. It's also a good idea to know your test results and keep a list of the medicines you take. How can you care for yourself at home? · Be safe with medicines. Take your medicines exactly as prescribed. Call your doctor if you think you are having a problem with your medicine. · During your allergy season, keep windows closed. If you need to use air-conditioning, change or clean all filters every month. Take a shower and change your clothes after you have been outside. · Stay inside when pollen counts are high. Vacuum once or twice a week. Use a vacuum  with a HEPA filter or a double-thickness filter. When should you call for help? Give an epinephrine shot if:  ? · You think you are having a severe allergic reaction. ? After giving an epinephrine shot, call 911, even if you feel better. ?Call 911 if:  ? · You have symptoms of a severe allergic reaction. These may include:  ¨ Sudden raised, red areas (hives) all over your body. ¨ Swelling of the throat, mouth, lips, or tongue. ¨ Trouble breathing. ¨ Passing out (losing consciousness). Or you may feel very lightheaded or suddenly feel weak, confused, or restless. ? · You have been given an epinephrine shot, even if you feel better.    ?Call your doctor now or seek immediate medical care if:  ? · You have symptoms of an allergic reaction, such as:  ¨ A rash or hives (raised, red areas on the skin). ¨ Itching. ¨ Swelling. ¨ Belly pain, nausea, or vomiting. ? Watch closely for changes in your health, and be sure to contact your doctor if:  ? · You do not get better as expected. Where can you learn more? Go to http://pepe-constantino.info/. Enter J912 in the search box to learn more about \"Seasonal Allergies: Care Instructions. \"  Current as of: September 29, 2016  Content Version: 11.4  © 7494-5569 [employer]. Care instructions adapted under license by [employer] (which disclaims liability or warranty for this information). If you have questions about a medical condition or this instruction, always ask your healthcare professional. [address] any warranty or liability for your use of this information. Advise patient to start taking Over The Counter allergy medication (Allegra, Zyrtec, Claritin, Xyzal or Alavert) daily. If you have itchy, watery eyes you can try OTC Zaditor or Zyrtec Allergy Eye drops. If you have a stuffy nose, please try OTC Flonase, Flonase Sensimist, Rhinocort, or Nasacort AQ 2 squirts up each nostril once a day (adults) or 1 squirt up each nostril once a day (children). Use allergy free, dye free, fragrance free products on your body and hair. After being outdoors, brush hair vigorously, wash face and arms, rinse nostrils with a nasal saline spray and consider changing your clothing. Dust furniture frequently and wear a mask while doing it. Vacuum floors weekly. Remove stuffed animals or extra pillows from the bed. Clean bedding in hot water weekly. Sometimes allergies can be so severe that 2 nasal sprays and 2 pills may be needed to control symptoms. Dry Eyes: Care Instructions  Your Care Instructions    Dry eyes can be uncomfortable. The dryness may make your eyes feel dry or hot. Your eyes may also water a lot. In some cases, dry eyes make it feel like there is sand or dirt in your eyes. From time to time, dry eyes may cause you to have blurry vision. But dry eyes don't usually cause lasting problems with vision. There are many causes of dry eyes. Sometimes dry weather, smoke, or pollution can bother the eyes. Other times, allergies or contact lenses irritate the eyes. Older people often have dry eyes because our eyes do not make as many tears as we age. In some cases, diseases can cause dry eyes. These include rheumatoid arthritis, lupus, and Sjögren's syndrome. In other cases, medicines are to blame. Your doctor may want to do tests to help find the cause of your dry eyes. You can work with your doctor to find ways to help your eyes feel better. Home treatment often helps. Follow-up care is a key part of your treatment and safety. Be sure to make and go to all appointments, and call your doctor if you are having problems. It's also a good idea to know your test results and keep a list of the medicines you take. How can you care for yourself at home? · Take breaks often when you read, watch TV, or use a computer. Close your eyes. Do not rub your eyes. Artificial tears may help you when you do these activities. You can buy these without a prescription. · Avoid smoke and other things that irritate the eyes. · Wear sunglasses that wrap around the sides of the head. These can protect the eyes from sun, wind, dust, and dirt. · Use a vaporizer or humidifier to add moisture to your bedroom. Follow the directions for cleaning the machine. · Do not use fans while you sleep. · If you usually wear contact lenses, use rewetting drops or wear your glasses until your eyes feel better. · Be safe with medicines. Take your medicine exactly as prescribed. Call your doctor if you think you are having a problem with your medicine. · Try using artificial tears at least 4 times a day.   · If you need drops more than 4 times a day, use artificial tears without preservatives. They may irritate the eyes less. · Use a lubricating eye ointment or eye gel at bedtime. These are thicker and last longer, so you may have less burning, dryness, and itching when you wake up. Be aware that they may blur your vision for a short time. · To put in eyedrops or ointment:  ¨ Tilt your head back, and pull the lower eyelid down with one finger. ¨ Drop or squirt the medicine inside the lower lid. ¨ Close your eye for 30 to 60 seconds to let the drops or ointment move around. ¨ Do not touch the ointment or dropper tip to your eyelashes or any other surface. · Put a warm, moist cloth on your eyelids every morning for about 5 minutes. Then massage your eyelids lightly. This helps increase the natural wetness of your eyes. When should you call for help? Watch closely for changes in your health, and be sure to contact your doctor if:  ? · Your eyes are still dry, irritated, or teary, and artificial tears do not help. ? · You do not get better as expected. Where can you learn more? Go to http://pepe-constantino.info/. Enter D231 in the search box to learn more about \"Dry Eyes: Care Instructions. \"  Current as of: March 3, 2017  Content Version: 11.4  © 0150-9962 [employer]. Care instructions adapted under license by [employer] (which disclaims liability or warranty for this information). If you have questions about a medical condition or this instruction, always ask your healthcare professional. [place] [address] any warranty or liability for your use of this information.

## 2018-02-19 NOTE — PATIENT INSTRUCTIONS
Allergies: Care Instructions  Your Care Instructions    Allergies occur when your body's defense system (immune system) overreacts to certain substances. The immune system treats a harmless substance as if it were a harmful germ or virus. Many things can cause this overreaction, including pollens, medicine, food, dust, animal dander, and mold. Allergies can be mild or severe. Mild allergies can be managed with home treatment. But medicine may be needed to prevent problems. Managing your allergies is an important part of staying healthy. Your doctor may suggest that you have allergy testing to help find out what is causing your allergies. When you know what things trigger your symptoms, you can avoid them. This can prevent allergy symptoms and other health problems. For severe allergies that cause reactions that affect your whole body (anaphylactic reactions), your doctor may prescribe a shot of epinephrine to carry with you in case you have a severe reaction. Learn how to give yourself the shot and keep it with you at all times. Make sure it is not . Follow-up care is a key part of your treatment and safety. Be sure to make and go to all appointments, and call your doctor if you are having problems. It's also a good idea to know your test results and keep a list of the medicines you take. How can you care for yourself at home? · If you have been told by your doctor that dust or dust mites are causing your allergy, decrease the dust around your bed:  [facility] sheets, pillowcases, and other bedding in hot water every week. ¨ Use dust-proof covers for pillows, duvets, and mattresses. Avoid plastic covers because they tear easily and do not \"breathe. \" Wash as instructed on the label. ¨ Do not use any blankets and pillows that you do not need. ¨ Use blankets that you can wash in your washing machine. ¨ Consider removing drapes and carpets, which attract and hold dust, from your bedroom.   · If you are allergic to house dust and mites, do not use home humidifiers. Your doctor can suggest ways you can control dust and mites. · Look for signs of cockroaches. Cockroaches cause allergic reactions. Use cockroach baits to get rid of them. Then, clean your home well. Cockroaches like areas where grocery bags, newspapers, empty bottles, or cardboard boxes are stored. Do not keep these inside your home, and keep trash and food containers sealed. Seal off any spots where cockroaches might enter your home. · If you are allergic to mold, get rid of furniture, rugs, and drapes that smell musty. Check for mold in the bathroom. · If you are allergic to outdoor pollen or mold spores, use air-conditioning. Change or clean all filters every month. Keep windows closed. · If you are allergic to pollen, stay inside when pollen counts are high. Use a vacuum  with a HEPA filter or a double-thickness filter at least two times each week. · Stay inside when air pollution is bad. Avoid paint fumes, perfumes, and other strong odors. · Avoid conditions that make your allergies worse. Stay away from smoke. Do not smoke or let anyone else smoke in your house. Do not use fireplaces or wood-burning stoves. · If you are allergic to your pets, change the air filter in your furnace every month. Use high-efficiency filters. · If you are allergic to pet dander, keep pets outside or out of your bedroom. Old carpet and cloth furniture can hold a lot of animal dander. You may need to replace them. When should you call for help? Give an epinephrine shot if:  ? · You think you are having a severe allergic reaction. ? · You have symptoms in more than one body area, such as mild nausea and an itchy mouth. ? After giving an epinephrine shot call 911, even if you feel better. ?Call 911 if:  ? · You have symptoms of a severe allergic reaction. These may include:  ¨ Sudden raised, red areas (hives) all over your body.   ¨ Swelling of the throat, mouth, lips, or tongue. ¨ Trouble breathing. ¨ Passing out (losing consciousness). Or you may feel very lightheaded or suddenly feel weak, confused, or restless. ? · You have been given an epinephrine shot, even if you feel better. ?Call your doctor now or seek immediate medical care if:  ? · You have symptoms of an allergic reaction, such as:  ¨ A rash or hives (raised, red areas on the skin). ¨ Itching. ¨ Swelling. ¨ Belly pain, nausea, or vomiting. ? Watch closely for changes in your health, and be sure to contact your doctor if:  ? · You do not get better as expected. Where can you learn more? Go to http://pepe-constantino.info/. Enter X610 in the search box to learn more about \"Allergies: Care Instructions. \"  Current as of: September 29, 2016  Content Version: 11.4  © 3881-9712 [employer]. Care instructions adapted under license by [employer] (which disclaims liability or warranty for this information). If you have questions about a medical condition or this instruction, always ask your healthcare professional. [place] [address] any warranty or liability for your use of this information. Managing Your Allergies: Care Instructions  Your Care Instructions    Managing your allergies is an important part of staying healthy. Your doctor will help you find out what may be causing the allergies. Common causes of allergy symptoms are house dust and dust mites, animal dander, mold, and pollen. As soon as you know what triggers your symptoms, try to reduce your exposure to your triggers. This can help prevent allergy symptoms, asthma, and other health problems. Ask your doctor about allergy medicine or immunotherapy. These treatments may help reduce or prevent allergy symptoms. Follow-up care is a key part of your treatment and safety. Be sure to make and go to all appointments, and call your doctor if you are having problems.  It's also a good idea to know your test results and keep a list of the medicines you take. How can you care for yourself at home? · If you think that dust or dust mites are causing your allergies:  ¨ Wash sheets, pillowcases, and other bedding every week in hot water. ¨ Use airtight, dust-proof covers for pillows, duvets, and mattresses. Avoid plastic covers, because they tend to tear quickly and do not \"breathe. \" Wash according to the instructions. ¨ Remove extra blankets and pillows that you don't need. ¨ Use blankets that are machine-washable. ¨ Don't use home humidifiers. They can help mites live longer. · Use air-conditioning. Change or clean all filters every month. Keep windows closed. Use high-efficiency air filters. Don't use window or attic fans, which draw dust into the air. · If you're allergic to pet dander, keep pets outside or, at the very least, out of your bedroom. Old carpet and cloth-covered furniture can hold a lot of animal dander. You may need to replace them. · Look for signs of cockroaches. Use cockroach baits to get rid of them. Then clean your home well. · If you're allergic to mold, don't keep indoor plants, because molds can grow in soil. Get rid of furniture, rugs, and drapes that smell musty. Check for mold in the bathroom. · If you're allergic to pollen, stay inside when pollen counts are high. · Don't smoke or let anyone else smoke in your house. Don't use fireplaces or wood-burning stoves. Avoid paint fumes, perfumes, and other strong odors. When should you call for help? Give an epinephrine shot if:  ? · You think you are having a severe allergic reaction. ? After giving an epinephrine shot call 911, even if you feel better. ?Call 911 if:  ? · You have symptoms of a severe allergic reaction. These may include:  ¨ Sudden raised, red areas (hives) all over your body. ¨ Swelling of the throat, mouth, lips, or tongue. ¨ Trouble breathing.   ¨ Passing out (losing consciousness). Or you may feel very lightheaded or suddenly feel weak, confused, or restless. ? · You have been given an epinephrine shot, even if you feel better. ?Call your doctor now or seek immediate medical care if:  ? · You have symptoms of an allergic reaction, such as:  ¨ A rash or hives (raised, red areas on the skin). ¨ Itching. ¨ Swelling. ¨ Belly pain, nausea, or vomiting. ? Watch closely for changes in your health, and be sure to contact your doctor if:  ? · Your allergies get worse. ? · You need help controlling your allergies. ? · You have questions about allergy testing. ? · You do not get better as expected. Where can you learn more? Go to http://pepe-constantino.info/. Enter L249 in the search box to learn more about \"Managing Your Allergies: Care Instructions. \"  Current as of: September 29, 2016  Content Version: 11.4  © 5163-1557 [employer]. Care instructions adapted under license by [employer] (which disclaims liability or warranty for this information). If you have questions about a medical condition or this instruction, always ask your healthcare professional. [place] [address] any warranty or liability for your use of this information. Seasonal Allergies: Care Instructions  Your Care Instructions  Allergies occur when your body's defense system (immune system) overreacts to certain substances. The immune system treats a harmless substance as if it were a harmful germ or virus. Many things can cause this to happen. Examples include pollens, medicine, food, dust, animal dander, and mold. Your allergies are seasonal if you have symptoms just at certain times of the year. In that case, you are probably allergic to pollens from certain trees, grasses, or weeds. Allergies can be mild or severe. Over-the-counter allergy medicine may help with some symptoms. Read and follow all instructions on the label.   Managing your allergies is an important part of staying healthy. Your doctor may suggest that you have tests to help find the cause of your allergies. When you know what things trigger your symptoms, you can avoid them. This can prevent allergy symptoms and other health problems. In some cases, immunotherapy might help. For this treatment, you get shots or use pills that have a small amount of certain allergens in them. Your body \"gets used to\" the allergen, so you react less to it over time. This kind of treatment may help prevent or reduce some allergy symptoms. Follow-up care is a key part of your treatment and safety. Be sure to make and go to all appointments, and call your doctor if you are having problems. It's also a good idea to know your test results and keep a list of the medicines you take. How can you care for yourself at home? · Be safe with medicines. Take your medicines exactly as prescribed. Call your doctor if you think you are having a problem with your medicine. · During your allergy season, keep windows closed. If you need to use air-conditioning, change or clean all filters every month. Take a shower and change your clothes after you have been outside. · Stay inside when pollen counts are high. Vacuum once or twice a week. Use a vacuum  with a HEPA filter or a double-thickness filter. When should you call for help? Give an epinephrine shot if:  ? · You think you are having a severe allergic reaction. ? After giving an epinephrine shot, call 911, even if you feel better. ?Call 911 if:  ? · You have symptoms of a severe allergic reaction. These may include:  ¨ Sudden raised, red areas (hives) all over your body. ¨ Swelling of the throat, mouth, lips, or tongue. ¨ Trouble breathing. ¨ Passing out (losing consciousness). Or you may feel very lightheaded or suddenly feel weak, confused, or restless. ? · You have been given an epinephrine shot, even if you feel better.    ?Call your doctor now or seek immediate medical care if:  ? · You have symptoms of an allergic reaction, such as:  ¨ A rash or hives (raised, red areas on the skin). ¨ Itching. ¨ Swelling. ¨ Belly pain, nausea, or vomiting. ? Watch closely for changes in your health, and be sure to contact your doctor if:  ? · You do not get better as expected. Where can you learn more? Go to http://pepe-constantino.info/. Enter J912 in the search box to learn more about \"Seasonal Allergies: Care Instructions. \"  Current as of: September 29, 2016  Content Version: 11.4  © 4412-4593 [employer]. Care instructions adapted under license by [employer] (which disclaims liability or warranty for this information). If you have questions about a medical condition or this instruction, always ask your healthcare professional. [address] any warranty or liability for your use of this information. Advise patient to start taking Over The Counter allergy medication (Allegra, Zyrtec, Claritin, Xyzal or Alavert) daily. If you have itchy, watery eyes you can try OTC Zaditor or Zyrtec Allergy Eye drops. If you have a stuffy nose, please try OTC Flonase, Flonase Sensimist, Rhinocort, or Nasacort AQ 2 squirts up each nostril once a day (adults) or 1 squirt up each nostril once a day (children). Use allergy free, dye free, fragrance free products on your body and hair. After being outdoors, brush hair vigorously, wash face and arms, rinse nostrils with a nasal saline spray and consider changing your clothing. Dust furniture frequently and wear a mask while doing it. Vacuum floors weekly. Remove stuffed animals or extra pillows from the bed. Clean bedding in hot water weekly. Sometimes allergies can be so severe that 2 nasal sprays and 2 pills may be needed to control symptoms. Dry Eyes: Care Instructions  Your Care Instructions    Dry eyes can be uncomfortable.  The dryness may make your eyes feel dry or hot. Your eyes may also water a lot. In some cases, dry eyes make it feel like there is sand or dirt in your eyes. From time to time, dry eyes may cause you to have blurry vision. But dry eyes don't usually cause lasting problems with vision. There are many causes of dry eyes. Sometimes dry weather, smoke, or pollution can bother the eyes. Other times, allergies or contact lenses irritate the eyes. Older people often have dry eyes because our eyes do not make as many tears as we age. In some cases, diseases can cause dry eyes. These include rheumatoid arthritis, lupus, and Sjögren's syndrome. In other cases, medicines are to blame. Your doctor may want to do tests to help find the cause of your dry eyes. You can work with your doctor to find ways to help your eyes feel better. Home treatment often helps. Follow-up care is a key part of your treatment and safety. Be sure to make and go to all appointments, and call your doctor if you are having problems. It's also a good idea to know your test results and keep a list of the medicines you take. How can you care for yourself at home? · Take breaks often when you read, watch TV, or use a computer. Close your eyes. Do not rub your eyes. Artificial tears may help you when you do these activities. You can buy these without a prescription. · Avoid smoke and other things that irritate the eyes. · Wear sunglasses that wrap around the sides of the head. These can protect the eyes from sun, wind, dust, and dirt. · Use a vaporizer or humidifier to add moisture to your bedroom. Follow the directions for cleaning the machine. · Do not use fans while you sleep. · If you usually wear contact lenses, use rewetting drops or wear your glasses until your eyes feel better. · Be safe with medicines. Take your medicine exactly as prescribed. Call your doctor if you think you are having a problem with your medicine.   · Try using artificial tears at least 4 times a day. · If you need drops more than 4 times a day, use artificial tears without preservatives. They may irritate the eyes less. · Use a lubricating eye ointment or eye gel at bedtime. These are thicker and last longer, so you may have less burning, dryness, and itching when you wake up. Be aware that they may blur your vision for a short time. · To put in eyedrops or ointment:  ¨ Tilt your head back, and pull the lower eyelid down with one finger. ¨ Drop or squirt the medicine inside the lower lid. ¨ Close your eye for 30 to 60 seconds to let the drops or ointment move around. ¨ Do not touch the ointment or dropper tip to your eyelashes or any other surface. · Put a warm, moist cloth on your eyelids every morning for about 5 minutes. Then massage your eyelids lightly. This helps increase the natural wetness of your eyes. When should you call for help? Watch closely for changes in your health, and be sure to contact your doctor if:  ? · Your eyes are still dry, irritated, or teary, and artificial tears do not help. ? · You do not get better as expected. Where can you learn more? Go to http://pepe-constantino.info/. Enter D231 in the search box to learn more about \"Dry Eyes: Care Instructions. \"  Current as of: March 3, 2017  Content Version: 11.4  © 6901-7314 [employer]. Care instructions adapted under license by [employer] (which disclaims liability or warranty for this information). If you have questions about a medical condition or this instruction, always ask your healthcare professional. [address] any warranty or liability for your use of this information.

## 2018-02-21 LAB
25(OH)D3+25(OH)D2 SERPL-MCNC: 41.2 NG/ML (ref 30–100)
ALBUMIN SERPL-MCNC: 4 G/DL (ref 3.5–4.7)
ALBUMIN/CREAT UR: <3.4 (ref 0–30)
ALBUMIN/GLOB SERPL: 1.5 {RATIO} (ref 1.2–2.2)
ALP SERPL-CCNC: 101 IU/L (ref 39–117)
ALT SERPL-CCNC: 12 IU/L (ref 0–32)
APPEARANCE UR: CLEAR
AST SERPL-CCNC: 17 IU/L (ref 0–40)
BILIRUB SERPL-MCNC: 0.5 MG/DL (ref 0–1.2)
BILIRUB UR QL STRIP: NEGATIVE
BUN SERPL-MCNC: 13 MG/DL (ref 8–27)
BUN/CREAT SERPL: 15 (ref 12–28)
CALCIUM SERPL-MCNC: 9.5 MG/DL (ref 8.7–10.3)
CHLORIDE SERPL-SCNC: 102 MMOL/L (ref 96–106)
CHOLEST SERPL-MCNC: 216 MG/DL (ref 100–199)
CO2 SERPL-SCNC: 25 MMOL/L (ref 18–29)
COLOR UR: YELLOW
CREAT SERPL-MCNC: 0.84 MG/DL (ref 0.57–1)
CREAT UR-MCNC: 88.8 MG/DL
ERYTHROCYTE [DISTWIDTH] IN BLOOD BY AUTOMATED COUNT: 15.4 % (ref 12.3–15.4)
EST. AVERAGE GLUCOSE BLD GHB EST-MCNC: 108 MG/DL
GFR SERPLBLD CREATININE-BSD FMLA CKD-EPI: 65 ML/MIN/{1.73_M2}
GFR SERPLBLD CREATININE-BSD FMLA CKD-EPI: 75 ML/MIN/{1.73_M2}
GLOBULIN SER CALC-MCNC: 2.6 G/L (ref 1.5–4.5)
GLUCOSE SERPL-MCNC: 102 MG/DL (ref 65–99)
GLUCOSE UR QL: NEGATIVE
HBA1C MFR BLD: 5.4 % (ref 4.8–5.6)
HCT VFR BLD AUTO: 41.3 % (ref 34–46.6)
HDLC SERPL-MCNC: 74 MG/DL
HGB BLD-MCNC: 13.9 G/DL (ref 11.1–15.9)
HGB UR QL STRIP: NEGATIVE
INTERPRETATION, 910389: NORMAL
KETONES UR QL STRIP: NEGATIVE
LDLC SERPL CALC-MCNC: 129 MG/DL (ref 0–99)
LEUKOCYTE ESTERASE UR QL STRIP: NEGATIVE
MCH RBC QN AUTO: 32 PG (ref 26.6–33)
MCHC RBC AUTO-ENTMCNC: 33.7 G/DL (ref 31.5–35.7)
MCV RBC AUTO: 95 FL (ref 79–97)
MICRO URNS: NORMAL
MICROALBUMIN UR-MCNC: <3 UG/ML
NITRITE UR QL STRIP: NEGATIVE
PH UR STRIP: 5 [PH] (ref 5–7.5)
PLATELET # BLD AUTO: 264 X10E3/UL (ref 150–379)
POTASSIUM SERPL-SCNC: 4.5 MMOL/L (ref 3.5–5.2)
PROT SERPL-MCNC: 6.6 G/DL (ref 6–8.5)
PROT UR QL STRIP: NEGATIVE
RBC # BLD AUTO: 4.34 X10E6/UL (ref 3.77–5.28)
SODIUM SERPL-SCNC: 140 MMOL/L (ref 134–144)
SP GR UR: 1.02 (ref 1–1.03)
TRIGL SERPL-MCNC: 67 MG/DL (ref 0–149)
TSH SERPL DL<=0.005 MIU/L-ACNC: 2.27 UIU/ML (ref 0.45–4.5)
UROBILINOGEN UR STRIP-MCNC: 0.2 MG/DL (ref 0.2–1)
VLDLC SERPL CALC-MCNC: 13 MG/DL (ref 5–40)
WBC # BLD AUTO: 4.7 X10E3/UL (ref 3.4–10.8)

## 2018-03-09 ENCOUNTER — OFFICE VISIT (OUTPATIENT)
Dept: NEUROLOGY | Age: 83
End: 2018-03-09

## 2018-03-09 VITALS
WEIGHT: 166 LBS | BODY MASS INDEX: 30.55 KG/M2 | RESPIRATION RATE: 20 BRPM | SYSTOLIC BLOOD PRESSURE: 120 MMHG | DIASTOLIC BLOOD PRESSURE: 74 MMHG | HEIGHT: 62 IN

## 2018-03-09 DIAGNOSIS — F02.80 LATE ONSET ALZHEIMER'S DISEASE WITHOUT BEHAVIORAL DISTURBANCE (HCC): Primary | ICD-10-CM

## 2018-03-09 DIAGNOSIS — G30.1 LATE ONSET ALZHEIMER'S DISEASE WITHOUT BEHAVIORAL DISTURBANCE (HCC): Primary | ICD-10-CM

## 2018-03-09 DIAGNOSIS — R41.3 MEMORY LOSS: ICD-10-CM

## 2018-03-09 RX ORDER — DONEPEZIL HYDROCHLORIDE 5 MG/1
5 TABLET, FILM COATED ORAL
Qty: 90 TAB | Refills: 1 | Status: SHIPPED | OUTPATIENT
Start: 2018-03-09 | End: 2018-08-22 | Stop reason: SDUPTHER

## 2018-03-09 NOTE — MR AVS SNAPSHOT
[facility] [address] [place] [address] 
[phone] Patient: Jenifer Walden MRN:  [id number] Visit Information Date & Time Provider Department Dept. Phone Encounter #  
 3/9/2018 10:00  [facility] at [address] [id number] Follow-up Instructions Routing History Your Appointments 8/21/2018  9:00 AM  
Medicare Physical with Kate Mcmullen DO [facility] [address] (KEANU Raosper) Appt Note: 6 month follow up for bp,results, mwv  
 [address] 
[address] [facility] [address]  
[phone]  
  
   
 [address] [facility] [address] [place] [address] Upcoming Health Maintenance Date Due  
 MEDICARE YEARLY EXAM 8/19/2018 GLAUCOMA SCREENING Q2Y 6/22/2019 DTaP/Tdap/Td series (2 - Td) 2/17/2027 Allergies as of 3/9/2018  Review Complete On: 2/19/2018 By: Kate Mcmullen DO Severity Noted Reaction Type Reactions Penicillins High 10/22/2009    Hives Current Immunizations  Reviewed on 2/17/2017 Name Date Pneumococcal Conjugate (PCV-13) 8/17/2016 ZZZ-RETIRED (DO NOT USE) Pneumococcal Vaccine (Unspecified Type) 10/22/2009 Not reviewed this visit You Were Diagnosed With   
  
 Codes Comments Late onset Alzheimer's disease without behavioral disturbance    -  Primary ICD-10-CM: G30.1, F02.80 ICD-9-CM: 331.0, 294.10 Memory loss     ICD-10-CM: R41.3 ICD-9-CM: 780.93 Vitals BP Resp Height(growth percentile) Weight(growth percentile) BMI OB Status 120/74 20 5' 2.01\" (1.575 m) 166 lb (75.3 kg) 30.35 kg/m2 Postmenopausal  
 Smoking Status Former Smoker Vitals History BMI and BSA Data Body Mass Index Body Surface Area  
 30.35 kg/m 2 1.82 m 2 Preferred Pharmacy Pharmacy Name Phone [facility], [address] [place] [address]  [phone] Your Updated Medication List  
  
   
This list is accurate as of 3/9/18 10:09 AM.  Always use your most recent med list.  
  
  
  
  
 albuterol 90 mcg/actuation inhaler Commonly known as:  PROVENTIL HFA, VENTOLIN HFA, PROAIR HFA Take 2 Puffs by inhalation every four (4) hours as needed for Wheezing or Shortness of Breath. Indications: BRONCHOSPASM PREVENTION  
  
 aspirin delayed-release 81 mg tablet Take 1 Tab by mouth daily. Indications: myocardial infarction prevention Calcium-Cholecalciferol (D3) 600 mg(1,500mg) -400 unit Cap Take  by mouth daily. cholecalciferol (vitamin D3) 2,000 unit Tab Take 1 Tab by mouth daily. donepezil 5 mg tablet Commonly known as:  ARICEPT Take 1 Tab by mouth daily (with breakfast). lisinopril 10 mg tablet Commonly known as:  PRINIVIL, ZESTRIL  
TAKE 1 TABLET IN THE MORNING AND TAKE 2 TABLETS IN THE EVENING FOR HYPERTENSION  
  
 mometasone 50 mcg/actuation nasal spray Commonly known as:  NASONEX  
2 Sprays by Both Nostrils route daily. Indications: ALLERGIC RHINITIS Omega-3 Fatty Acids 300 mg Cap Take 1 Cap by mouth daily. TYLENOL PO Take 325 mg by mouth two (2) times a day. Prescriptions Sent to Pharmacy Refills  
 donepezil (ARICEPT) 5 mg tablet 1 Sig: Take 1 Tab by mouth daily (with breakfast). Class: Normal  
 Pharmacy: [facility] [address] PRASHANTH JACK [address] [place] [address],[address], [facility] [address] Ph #: [phone] Route: Oral  
  
We Performed the Following VITAMIN B12 & FOLATE [80714 CPT(R)] Patient Instructions [address] 1721 What is a living will? A living will is a legal form you use to write down the kind of care you want at the end of your life. It is used by the health professionals who will treat you if you aren't able to decide for yourself. If you put your wishes in writing, your loved ones and others will know what kind of care you want. They won't need to guess. This can ease your mind and be helpful to others. A living will is not the same as an estate or property will. An estate will explains what you want to happen with your money and property after you die. Is a living will a legal document? A living will is a legal document. Each state has its own laws about living patel. If you move to another state, make sure that your living will is legal in the state where you now live. Or you might use a universal form that has been approved by many states. This kind of form can sometimes be completed and stored online. Your electronic copy will then be available wherever you have a connection to the Internet. In most cases, doctors will respect your wishes even if you have a form from a different state. · You don't need an  to complete a living will. But legal advice can be helpful if your state's laws are unclear, your health history is complicated, or your family can't agree on what should be in your living will. · You can change your living will at any time. Some people find that their wishes about end-of-life care change as their health changes. · In addition to making a living will, think about completing a medical power of  form. This form lets you name the person you want to make end-of-life treatment decisions for you (your \"health care agent\") if you're not able to. Many hospitals and nursing homes will give you the forms you need to complete a living will and a medical power of . · Your living will is used only if you can't make or communicate decisions for yourself anymore. If you become able to make decisions again, you can accept or refuse any treatment, no matter what you wrote in your living will. · Your state may offer an online registry.  This is a place where you can store your living will online so the doctors and nurses who need to treat you can find it right away. What should you think about when creating a living will? Talk about your end-of-life wishes with your family members and your doctor. Let them know what you want. That way the people making decisions for you won't be surprised by your choices. Think about these questions as you make your living will: · Do you know enough about life support methods that might be used? If not, talk to your doctor so you know what might be done if you can't breathe on your own, your heart stops, or you're unable to swallow. · What things would you still want to be able to do after you receive life-support methods? Would you want to be able to walk? To speak? To eat on your own? To live without the help of machines? · If you have a choice, where do you want to be cared for? In your home? At a hospital or nursing home? · Do you want certain Restoration practices performed if you become very ill? · If you have a choice at the end of your life, where would you prefer to die? At home? In a hospital or nursing home? Somewhere else? · Would you prefer to be buried or cremated? · Do you want your organs to be donated after you die? What should you do with your living will? · Make sure that your family members and your health care agent have copies of your living will. · Give your doctor a copy of your living will to keep in your medical record. If you have more than one doctor, make sure that each one has a copy. · You may want to put a copy of your living will where it can be easily found. Where can you learn more? Go to http://pepe-constantino.info/. Enter Y178 in the search box to learn more about \"Learning About Living Josafat Malone. \" Current as of: September 24, 2016 Content Version: 11.4 © 0502-1685 Healthwise, Incorporated.  Care instructions adapted under license by [address] (which disclaims liability or warranty for this information). If you have questions about a medical condition or this instruction, always ask your healthcare professional. [address] any warranty or liability for your use of this information. Advance Directives: Care Instructions Your Care Instructions An advance directive is a legal way to state your wishes at the end of your life. It tells your family and your doctor what to do if you can no longer say what you want. There are two main types of advance directives. You can change them any time that your wishes change. · A living will tells your family and your doctor your wishes about life support and other treatment. · A durable power of  for health care lets you name a person to make treatment decisions for you when you can't speak for yourself. This person is called a health care agent. If you do not have an advance directive, decisions about your medical care may be made by a doctor or a  who doesn't know you. It may help to think of an advance directive as a gift to the people who care for you. If you have one, they won't have to make tough decisions by themselves. Follow-up care is a key part of your treatment and safety. Be sure to make and go to all appointments, and call your doctor if you are having problems. It's also a good idea to know your test results and keep a list of the medicines you take. How can you care for yourself at home? · Discuss your wishes with your loved ones and your doctor. This way, there are no surprises. · Many states have a unique form. Or you might use a universal form that has been approved by many states. This kind of form can sometimes be completed and stored online. Your electronic copy will then be available wherever you have a connection to the Internet.  In most cases, doctors will respect your wishes even if you have a form from a different state. · You don't need a  to do an advance directive. But you may want to get legal advice. · Think about these questions when you prepare an advance directive: ¨ Who do you want to make decisions about your medical care if you are not able to? Many people choose a family member or close friend. ¨ Do you know enough about life support methods that might be used? If not, talk to your doctor so you understand. ¨ What are you most afraid of that might happen? You might be afraid of having pain, losing your independence, or being kept alive by machines. ¨ Where would you prefer to die? Choices include your home, a hospital, or a nursing home. ¨ Would you like to have information about hospice care to support you and your family? ¨ Do you want to donate organs when you die? ¨ Do you want certain Bahai practices performed before you die? If so, put your wishes in the advance directive. · Read your advance directive every year, and make changes as needed. When should you call for help? Be sure to contact your doctor if you have any questions. Where can you learn more? Go to http://pepe-constantino.info/. Enter R264 in the search box to learn more about \"Advance Directives: Care Instructions. \" Current as of: September 24, 2016 Content Version: 11.4 © 7640-4782 Healthwise, Incorporated. Care instructions adapted under license by [employer] (which disclaims liability or warranty for this information). If you have questions about a medical condition or this instruction, always ask your healthcare professional. [place] [address] any warranty or liability for your use of this information. PRESCRIPTION REFILL POLICY [facility] Statement to Patients April 1, 2014 In an effort to ensure the large volume of patient prescription refills is processed in the most efficient and expeditious manner, we are asking our patients to assist us by calling your Pharmacy for all prescription refills, this will include also your  Mail Order Pharmacy. The pharmacy will contact our office electronically to continue the refill process. Please do not wait until the last minute to call your pharmacy. We need at least 48 hours (2days) to fill prescriptions. We also encourage you to call your pharmacy before going to  your prescription to make sure it is ready. With regard to controlled substance prescription refill requests (narcotic refills) that need to be picked up at our office, we ask your cooperation by providing us with at least 72 hours (3days) notice that you will need a refill. We will not refill narcotic prescription refill requests after 4:00pm on any weekday, Monday through Thursday, or after 2:00pm on Fridays, or on the weekends. We encourage everyone to explore another way of getting your prescription refill request processed using [employer], our patient web portal through our electronic medical record system. [employer] is an efficient and effective way to communicate your medication request directly to the office and  downloadable as an rafael on your smart phone . [employer] also features a review functionality that allows you to view your medication list as well as leave messages for your physician. Are you ready to get connected? If so please review the attatched instructions or speak to any of our staff to get you set up right away! Thank you so much for your cooperation. Should you have any questions please contact our Practice Administrator. The Physicians and Staff,  [facility] Alzheimer's Disease: Care Instructions Your Care Instructions Alzheimer's disease is a type of dementia. It causes memory loss and affects judgment, language, and behavior.  You may have trouble making decisions or may get lost in places that you used to know well. Alzheimer's disease is different than mild memory loss that occurs with aging. It is not clear what causes Alzheimer's disease, but it is the most common form of dementia in older adults. Finding out that you have this disease is a shock. You may be afraid and worried about how the condition will change your life. Although there is no cure at this time, medicine in some cases may slow memory loss for a while. Other medicines may be able to help you sleep or cope with depression and behavior changes. Alzheimer's disease is different for everyone. It may take many years to develop. In some cases, people can function well for a long time. In the early stage of the disease, you can do things at home to make life easier and safer. You also can keep doing your hobbies and other activities. Many people find comfort in planning now for their future needs. Follow-up care is a key part of your treatment and safety. Be sure to make and go to all appointments, and call your doctor if you are having problems. It's also a good idea to know your test results and keep a list of the medicines you take. How can you care for yourself at home? Taking care of yourself · If your doctor gives you medicines, take them exactly as prescribed. Call your doctor if you think you are having a problem with your medicine. You will get more details on the medicines your doctor prescribes. · Eat a balanced diet. Get plenty of whole grains, fruits, and vegetables every day. If you are not hungry at mealtimes, eat snacks at midmorning and in the afternoon. Try drinks such as Boost, Ensure, or Sustacal if you are having trouble keeping your weight up. · Stay active. Exercise such as walking may slow the decline of your mental abilities. Try to stay active mentally too. Read and work crossword puzzles if you enjoy these activities. · If you have trouble sleeping, do not nap during the day. Get regular exercise (but not within several hours of bedtime). Drink a glass of warm milk or caffeine-free herbal tea before going to bed. · Ask your doctor about support groups and other resources in your area. They can help people who have Alzheimer's disease and their families. · Be patient. You may find that a task takes you longer than it used to. · If you have not already done so, make a list of advance directives. Advance directives are instructions to your doctor and family members about what kind of care you want if you become unable to speak or express yourself. Talk to a  about making a will, if you do not already have one. Keeping schedules · Develop a routine. You will feel less frustrated or confused if you have a clear, simple plan of what to do every day. ¨ Make lists of your medicines and when to take them. ¨ Write down appointments and other tasks in a calendar. ¨ Put sticky notes around the house to help you remember events and other things you have to do. ¨ Schedule activities and tasks for times of the day when you are best able to handle them. Staying safe · Tell someone when you are going out and where you are going. Let the person know when you will be back. Before you go out alone, write down where you are going, how to get there, and how to get back home. Do this even if you have gone there many times before. Take someone along with you when possible. · Make your home safe. Tack down rugs, put no-slip tape in the tub, use handrails, and put safety switches on stoves and appliances. · Have a family member or other caregiver tell you whether you are driving badly. Deciding to stop driving is very hard for many people. Driving helps you feel independent. Your state 's license bureau can do a driving test if there is any question. Plan for other means of getting around when you are no longer able to drive. · Use strong lighting, especially at night. Put night-lights in bedrooms, hallways, and bathrooms. · Lower the hot water temperature setting to 120°F or lower to avoid burns. When should you call for help? Call 911 anytime you think you may need emergency care. For example, call if: 
? · You are lost and do not know whom to call. ? · You are injured and do not know whom to call. ?Call your doctor now or seek immediate medical care if: 
? · Your symptoms suddenly get much worse. ? Watch closely for changes in your health, and be sure to contact your doctor if: 
? · You want more information about how you can take care of yourself. Where can you learn more? Go to http://pepe-constantino.info/. Enter Y179 in the search box to learn more about \"Alzheimer's Disease: Care Instructions. \" Current as of: May 12, 2017 Content Version: 11.4 © 9847-2732 [employer]. Care instructions adapted under license by [employer] (which disclaims liability or warranty for this information). If you have questions about a medical condition or this instruction, always ask your healthcare professional. [place] [address] any warranty or liability for your use of this information. Introducing [facility] [place] [facility]! [facility] introduces [employer] patient portal. Now you can access parts of your medical record, email your doctor's office, and request medication refills online. 1. In your internet browser, go to [email or web address] 2. Click on the First Time User? Click Here link in the Sign In box. You will see the New Member Sign Up page. 3. Enter your [employer] Access Code exactly as it appears below. You will not need to use this code after youve completed the sign-up process. If you do not sign up before the expiration date, you must request a new code. · [employer] Access Code: [email or web address] Expires: 5/20/2018 11:46 AM 
 
 4. Enter the last four digits of your Social Security Number (xxxx) and Date of Birth (mm/dd/yyyy) as indicated and click Submit. You will be taken to the next sign-up page. 5. Create a Bedloo ID. This will be your Bedloo login ID and cannot be changed, so think of one that is secure and easy to remember. 6. Create a Bedloo password. You can change your password at any time. 7. Enter your Password Reset Question and Answer. This can be used at a later time if you forget your password. 8. Enter your e-mail address. You will receive e-mail notification when new information is available in [address] 9. Click Sign Up. You can now view and download portions of your medical record. 10. Click the Download Summary menu link to download a portable copy of your medical information. If you have questions, please visit the Frequently Asked Questions section of the Bedloo website. Remember, [employer] is NOT to be used for urgent needs. For medical emergencies, dial 911. Now available from your iPhone and Android! Please provide this summary of care documentation to your next provider. Your primary care clinician is listed as [address]. If you have any questions after today's visit, please call [phone].

## 2018-03-09 NOTE — PATIENT INSTRUCTIONS
Learning About Living Debbie  What is a living will? A living will is a legal form you use to write down the kind of care you want at the end of your life. It is used by the health professionals who will treat you if you aren't able to decide for yourself. If you put your wishes in writing, your loved ones and others will know what kind of care you want. They won't need to guess. This can ease your mind and be helpful to others. A living will is not the same as an estate or property will. An estate will explains what you want to happen with your money and property after you die. Is a living will a legal document? A living will is a legal document. Each state has its own laws about living patel. If you move to another state, make sure that your living will is legal in the state where you now live. Or you might use a universal form that has been approved by many states. This kind of form can sometimes be completed and stored online. Your electronic copy will then be available wherever you have a connection to the Internet. In most cases, doctors will respect your wishes even if you have a form from a different state. · You don't need an  to complete a living will. But legal advice can be helpful if your state's laws are unclear, your health history is complicated, or your family can't agree on what should be in your living will. · You can change your living will at any time. Some people find that their wishes about end-of-life care change as their health changes. · In addition to making a living will, think about completing a medical power of  form. This form lets you name the person you want to make end-of-life treatment decisions for you (your \"health care agent\") if you're not able to. Many hospitals and nursing homes will give you the forms you need to complete a living will and a medical power of .   · Your living will is used only if you can't make or communicate decisions for yourself anymore. If you become able to make decisions again, you can accept or refuse any treatment, no matter what you wrote in your living will. · Your state may offer an online registry. This is a place where you can store your living will online so the doctors and nurses who need to treat you can find it right away. What should you think about when creating a living will? Talk about your end-of-life wishes with your family members and your doctor. Let them know what you want. That way the people making decisions for you won't be surprised by your choices. Think about these questions as you make your living will:  · Do you know enough about life support methods that might be used? If not, talk to your doctor so you know what might be done if you can't breathe on your own, your heart stops, or you're unable to swallow. · What things would you still want to be able to do after you receive life-support methods? Would you want to be able to walk? To speak? To eat on your own? To live without the help of machines? · If you have a choice, where do you want to be cared for? In your home? At a hospital or nursing home? · Do you want certain Restoration practices performed if you become very ill? · If you have a choice at the end of your life, where would you prefer to die? At home? In a hospital or nursing home? Somewhere else? · Would you prefer to be buried or cremated? · Do you want your organs to be donated after you die? What should you do with your living will? · Make sure that your family members and your health care agent have copies of your living will. · Give your doctor a copy of your living will to keep in your medical record. If you have more than one doctor, make sure that each one has a copy. · You may want to put a copy of your living will where it can be easily found. Where can you learn more? Go to http://pepe-constantino.info/.   Enter C937 in the search box to learn more about \"Learning About Living Debbie. \"  Current as of: September 24, 2016  Content Version: 11.4  © 9316-7100 [employer] Care instructions adapted under license by [employer] (which disclaims liability or warranty for this information). If you have questions about a medical condition or this instruction, always ask your healthcare professional. [address] any warranty or liability for your use of this information. Advance Directives: Care Instructions  Your Care Instructions  An advance directive is a legal way to state your wishes at the end of your life. It tells your family and your doctor what to do if you can no longer say what you want. There are two main types of advance directives. You can change them any time that your wishes change. · A living will tells your family and your doctor your wishes about life support and other treatment. · A durable power of  for health care lets you name a person to make treatment decisions for you when you can't speak for yourself. This person is called a health care agent. If you do not have an advance directive, decisions about your medical care may be made by a doctor or a  who doesn't know you. It may help to think of an advance directive as a gift to the people who care for you. If you have one, they won't have to make tough decisions by themselves. Follow-up care is a key part of your treatment and safety. Be sure to make and go to all appointments, and call your doctor if you are having problems. It's also a good idea to know your test results and keep a list of the medicines you take. How can you care for yourself at home? · Discuss your wishes with your loved ones and your doctor. This way, there are no surprises. · Many states have a unique form. Or you might use a universal form that has been approved by many states. This kind of form can sometimes be completed and stored online.  Your electronic copy will then be available wherever you have a connection to the Internet. In most cases, doctors will respect your wishes even if you have a form from a different state. · You don't need a  to do an advance directive. But you may want to get legal advice. · Think about these questions when you prepare an advance directive:  ¨ Who do you want to make decisions about your medical care if you are not able to? Many people choose a family member or close friend. ¨ Do you know enough about life support methods that might be used? If not, talk to your doctor so you understand. ¨ What are you most afraid of that might happen? You might be afraid of having pain, losing your independence, or being kept alive by machines. ¨ Where would you prefer to die? Choices include your home, a hospital, or a nursing home. ¨ Would you like to have information about hospice care to support you and your family? ¨ Do you want to donate organs when you die? ¨ Do you want certain Moravian practices performed before you die? If so, put your wishes in the advance directive. · Read your advance directive every year, and make changes as needed. When should you call for help? Be sure to contact your doctor if you have any questions. Where can you learn more? Go to http://pepe-constantino.info/. Enter R264 in the search box to learn more about \"Advance Directives: Care Instructions. \"  Current as of: September 24, 2016  Content Version: 11.4  © 6280-4962 [employer]. Care instructions adapted under license by [employer] (which disclaims liability or warranty for this information). If you have questions about a medical condition or this instruction, always ask your healthcare professional. [place] [address] any warranty or liability for your use of this information.   [facility]   Statement to Patients  April 1, 2014      In an effort to ensure the large volume of patient prescription refills is processed in the most efficient and expeditious manner, we are asking our patients to assist us by calling your Pharmacy for all prescription refills, this will include also your  Mail Order Pharmacy. The pharmacy will contact our office electronically to continue the refill process. Please do not wait until the last minute to call your pharmacy. We need at least 48 hours (2days) to fill prescriptions. We also encourage you to call your pharmacy before going to  your prescription to make sure it is ready. With regard to controlled substance prescription refill requests (narcotic refills) that need to be picked up at our office, we ask your cooperation by providing us with at least 72 hours (3days) notice that you will need a refill. We will not refill narcotic prescription refill requests after 4:00pm on any weekday, Monday through Thursday, or after 2:00pm on Fridays, or on the weekends. We encourage everyone to explore another way of getting your prescription refill request processed using [employer], our patient web portal through our electronic medical record system. [employer]IC is an efficient and effective way to communicate your medication request directly to the office and  downloadable as an rafael on your smart phone . [employer] also features a review functionality that allows you to view your medication list as well as leave messages for your physician. Are you ready to get connected? If so please review the attatched instructions or speak to any of our staff to get you set up right away! Thank you so much for your cooperation. Should you have any questions please contact our Practice Administrator. The Physicians and Staff,  [facility]        Alzheimer's Disease: Care Instructions  Your Care Instructions    Alzheimer's disease is a type of dementia.  It causes memory loss and affects judgment, language, and behavior. You may have trouble making decisions or may get lost in places that you used to know well. Alzheimer's disease is different than mild memory loss that occurs with aging. It is not clear what causes Alzheimer's disease, but it is the most common form of dementia in older adults. Finding out that you have this disease is a shock. You may be afraid and worried about how the condition will change your life. Although there is no cure at this time, medicine in some cases may slow memory loss for a while. Other medicines may be able to help you sleep or cope with depression and behavior changes. Alzheimer's disease is different for everyone. It may take many years to develop. In some cases, people can function well for a long time. In the early stage of the disease, you can do things at home to make life easier and safer. You also can keep doing your hobbies and other activities. Many people find comfort in planning now for their future needs. Follow-up care is a key part of your treatment and safety. Be sure to make and go to all appointments, and call your doctor if you are having problems. It's also a good idea to know your test results and keep a list of the medicines you take. How can you care for yourself at home? Taking care of yourself  · If your doctor gives you medicines, take them exactly as prescribed. Call your doctor if you think you are having a problem with your medicine. You will get more details on the medicines your doctor prescribes. · Eat a balanced diet. Get plenty of whole grains, fruits, and vegetables every day. If you are not hungry at mealtimes, eat snacks at midmorning and in the afternoon. Try drinks such as Boost, Ensure, or Sustacal if you are having trouble keeping your weight up. · Stay active. Exercise such as walking may slow the decline of your mental abilities. Try to stay active mentally too.  Read and work crossword puzzles if you enjoy these activities. · If you have trouble sleeping, do not nap during the day. Get regular exercise (but not within several hours of bedtime). Drink a glass of warm milk or caffeine-free herbal tea before going to bed. · Ask your doctor about support groups and other resources in your area. They can help people who have Alzheimer's disease and their families. · Be patient. You may find that a task takes you longer than it used to. · If you have not already done so, make a list of advance directives. Advance directives are instructions to your doctor and family members about what kind of care you want if you become unable to speak or express yourself. Talk to a  about making a will, if you do not already have one. Keeping schedules  · Develop a routine. You will feel less frustrated or confused if you have a clear, simple plan of what to do every day. ¨ Make lists of your medicines and when to take them. ¨ Write down appointments and other tasks in a calendar. ¨ Put sticky notes around the house to help you remember events and other things you have to do. ¨ Schedule activities and tasks for times of the day when you are best able to handle them. Staying safe  · Tell someone when you are going out and where you are going. Let the person know when you will be back. Before you go out alone, write down where you are going, how to get there, and how to get back home. Do this even if you have gone there many times before. Take someone along with you when possible. · Make your home safe. Tack down rugs, put no-slip tape in the tub, use handrails, and put safety switches on stoves and appliances. · Have a family member or other caregiver tell you whether you are driving badly. Deciding to stop driving is very hard for many people. Driving helps you feel independent. Your state 's license bureau can do a driving test if there is any question.  Plan for other means of getting around when you are no longer able to drive. · Use strong lighting, especially at night. Put night-lights in bedrooms, hallways, and bathrooms. · Lower the hot water temperature setting to 120°F or lower to avoid burns. When should you call for help? Call 911 anytime you think you may need emergency care. For example, call if:  ? · You are lost and do not know whom to call. ? · You are injured and do not know whom to call. ?Call your doctor now or seek immediate medical care if:  ? · Your symptoms suddenly get much worse. ? Watch closely for changes in your health, and be sure to contact your doctor if:  ? · You want more information about how you can take care of yourself. Where can you learn more? Go to http://pepe-constantino.info/. Enter Y179 in the search box to learn more about \"Alzheimer's Disease: Care Instructions. \"  Current as of: May 12, 2017  Content Version: 11.4  © 2430-5060 [employer]. Care instructions adapted under license by [employer] (which disclaims liability or warranty for this information). If you have questions about a medical condition or this instruction, always ask your healthcare professional. [address] any warranty or liability for your use of this information.

## 2018-03-09 NOTE — PROGRESS NOTES
Chief Complaint   Patient presents with    Memory Loss       Referred by: Dr. Wood Chakraborty      [facility] in the [place]    Ms. Elvie Bassett is an 72-year-old woman with a history of PAD, hypertension, arthritis here for memory loss. She had formal neuropsychological testing done last year showing mild dementia with depression and anxiety. She carries a diagnosis of mild Alzheimer's disease. She is here with her daughter. The patient continues to live independently and drives short distances. She is very active daily. She likes to go out on outings and visit friends and go shopping. She is reliable on taking her medication. As long as she writes down her reminders she does not forget meetings and the daughter confirms this. Occasional anxiety or depression but nothing sustained. Hygiene and ADLs intact. Review of blood work shows last B12 was checked in 2016 was on lower limits of normal.      Review of Systems   Musculoskeletal: Positive for joint pain. Psychiatric/Behavioral: Positive for memory loss. All other systems reviewed and are negative. Past Medical History:   Diagnosis Date    Bilateral dry eyes 2017    Dr. Radha Candelaria    Cataract     Bilaterally. Dr. Dalton Hernandez. Dr. Viviane Olivera. Dr. Radha Candelaria.  Chest pain 10/2015    Dr. Twana Cranker.  Dementia 07/2017    triggered by mild LACHELLE, depression. Dr. Merlene De Leon.  Diverticulosis 09/2008    Dr. Ludivina Rodríguez. Dr. Mirela Cline.  DJD (degenerative joint disease) 09/30/05    cervical, worse C6-7, C7-T1. Left AC joint.  DJD (degenerative joint disease) of knee     bilateral  Dr. Loyd Mcgrath. Dr. Adrián NULL (dyspnea on exertion) 10/2015    Dr. Twana Cranker.  Essential hypertension, benign 1968    Foster child     GIB (gastrointestinal bleeding) 11/17/14    due to internal hemorrhoids. Dr. Moreno Books Heart palpitations 11/12/15    due to PVCs. Dr. Maninder Del Cid.     Heartburn     Hypercholesteremia     Hyperglycemia 2014    Internal carotid artery stenosis 2007    bilateral.  Dr. Cookie Douglas Internal hemorrhoids 09/2008, 11/2014    Dr. Yosvany Morfin. Dr. Linda Simeon.  Knee pain     Left. Dr. Torin Domínguez. 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 Dr. Shahzad Piper. EF 50-55%    PVD (peripheral vascular disease) ([address] 2007    Dr. Hall Cousin    Raynaud's phenomenon 1968    Shortening, leg, congenital     left    Vitamin D deficiency 10/10/10     Family History   Problem Relation Age of Onset    Heart Attack Mother 79    Heart Attack Sister      x 3 sisters    Heart Attack Brother      x 3 brothers     Social History     Social History    Marital status:      Spouse name: N/A    Number of children: N/A    Years of education: N/A     Occupational History    Not on file. Social History Main Topics    Smoking status: Former Smoker     Packs/day: 1.00     Years: 15.00     Types: Cigarettes     Quit date: 1/1/1968    Smokeless tobacco: Never Used    Alcohol use No    Drug use: No    Sexual activity: No     Other Topics Concern    Not on file     Social History Narrative     Current Outpatient Prescriptions   Medication Sig    donepezil (ARICEPT) 5 mg tablet Take 1 Tab by mouth daily (with breakfast).  lisinopril (PRINIVIL, ZESTRIL) 10 mg tablet TAKE 1 TABLET IN THE MORNING AND TAKE 2 TABLETS IN THE EVENING FOR HYPERTENSION    aspirin delayed-release 81 mg tablet Take 1 Tab by mouth daily. Indications: myocardial infarction prevention    ACETAMINOPHEN (TYLENOL PO) Take 325 mg by mouth two (2) times a day.  mometasone (NASONEX) 50 mcg/actuation nasal spray 2 Sprays by Both Nostrils route daily. Indications: ALLERGIC RHINITIS    albuterol (PROVENTIL HFA, VENTOLIN HFA, PROAIR HFA) 90 mcg/actuation inhaler Take 2 Puffs by inhalation every four (4) hours as needed for Wheezing or Shortness of Breath.  Indications: BRONCHOSPASM PREVENTION    cholecalciferol, vitamin D3, 2,000 unit tab Take 1 Tab by mouth daily.  Omega-3 Fatty Acids 300 mg cap Take 1 Cap by mouth daily.  Calcium-Cholecalciferol, D3, 600 mg(1,500mg) -400 unit cap Take  by mouth daily. No current facility-administered medications for this visit. Allergies   Allergen Reactions    Penicillins Hives         Neurologic Exam     Mental Status   Oriented to person, place, and time. Cranial Nerves   Cranial nerves II through XII intact. Motor Exam   Muscle bulk: normal    Strength   Strength 5/5 throughout. Sensory Exam   Light touch normal.     Gait, Coordination, and Reflexes     Gait  Gait: normal (Slightly antalgic due to joint pain)    Coordination   Romberg: negative    Tremor   Resting tremor: absent    Physical Exam   Constitutional: She is oriented to person, place, and time. She appears well-developed and well-nourished. Cardiovascular: Normal rate. Pulmonary/Chest: Effort normal.   Neurological: She is oriented to person, place, and time. She has normal strength. She has a normal Romberg Test. Gait normal.   Skin: Skin is warm and dry. Psychiatric: She has a normal mood and affect. Her behavior is normal.   Vitals reviewed.     Visit Vitals    /74    Resp 20    Ht 5' 2.01\" (1.575 m)    Wt 75.3 kg (166 lb)    BMI 30.35 kg/m2       Lab Results  Component Value Date/Time   WBC 4.7 02/20/2018 08:25 AM   HGB 13.9 02/20/2018 08:25 AM   HCT 41.3 02/20/2018 08:25 AM   PLATELET 326 13/35/3103 08:25 AM   MCV 95 02/20/2018 08:25 AM     Lab Results  Component Value Date/Time   Hemoglobin A1c 5.4 02/20/2018 08:25 AM   Hemoglobin A1c 5.8 (H) 04/18/2016 09:23 AM   Hemoglobin A1c 5.7 (H) 04/15/2014 08:45 AM   Glucose 102 (H) 02/20/2018 08:25 AM   Glucose (POC) 136 (H) 01/19/2011 07:35 PM   Microalb/Creat ratio (ug/mg creat.) <3.4 02/20/2018 08:25 AM   LDL, calculated 129 (H) 02/20/2018 08:25 AM   Creatinine 0.84 02/20/2018 08:25 AM      Lab Results  Component Value Date/Time   Cholesterol, total 216 (H) 02/20/2018 08:25 AM   HDL Cholesterol 74 02/20/2018 08:25 AM   LDL, calculated 129 (H) 02/20/2018 08:25 AM   Triglyceride 67 02/20/2018 08:25 AM   CHOL/HDL Ratio 2.8 05/05/2010 08:45 AM     Lab Results  Component Value Date/Time   ALT (SGPT) 12 02/20/2018 08:25 AM   AST (SGOT) 17 02/20/2018 08:25 AM   Alk. phosphatase 101 02/20/2018 08:25 AM   Bilirubin, total 0.5 02/20/2018 08:25 AM   Albumin 4.0 02/20/2018 08:25 AM   Protein, total 6.6 02/20/2018 08:25 AM   INR 1.0 06/10/2013 02:22 PM   Prothrombin time 10.7 06/10/2013 02:22 PM   PLATELET 099 51/08/3664 08:25 AM          CT Results (maximum last 3): Results from [place] encounter on 06/19/16   CTA CHEST W WO CONT   Narrative **Final Report**      ICD Codes / Adm. Diagnosis: 894442  [id number] / Shortness of Breath    cough,congestion  Examination:  CT CHEST ANGIOGRAPHY  - [id number] - Jun 19 2016  8:30PM  Accession No:  [id number]  Reason:  Chest pain R/O PE      REPORT:  INDICATION: Chest pain and shortness of breath. COMPARISON: Coronal chest x-ray    EXAM: Precontrast localizer was first performed to verify the levels of the   pulmonary arteries. Subsequently, contiguous axial images were obtained   after the rapid IV bolus administration of 80 cc Isovue-370, followed by   thin section axial reconstructions with multiplanar reformations. Three-dimensional post - processing was performed. CT dose reduction was   achieved through use of a standardized protocol tailored for this   examination and automatic exposure control for dose modulation. Adaptive   statistical iterative reconstruction (ASIR) was utilized. FINDINGS: There is no evidence for pulmonary embolism. There is no pleural   or pericardial effusion. Cardiac size is mildly enlarged involving all 4   chambers. No mediastinal or hilar mass is shown. Mild centrilobular   emphysema is demonstrated. Numerous peripheral coarsened reticular opacities   are shown. IMPRESSION:   1.  No evidence for pulmonary embolism. 2. Coarsened peripheral reticular opacifications equivocal for pulmonary   fibrosis. Mild centrilobular emphysema. Signing/Reading Doctor: Destinee Mercado. Vicente Cervantes ([id number])    Approved: THELMA Cervantes ([id number])  Jun 19 2016  8:48PM                                    Assessment and Plan   Diagnoses and all orders for this visit:    1. Late onset Alzheimer's disease without behavioral disturbance  -     VITAMIN B12 & FOLATE    2. Memory loss  -     VITAMIN B12 & FOLATE    Other orders  -     donepezil (ARICEPT) 5 mg tablet; Take 1 Tab by mouth daily (with breakfast). 76-year-old woman with mild Alzheimer's. ADLs still intact. Still independent. I would recommend starting medication such as donepezil to stabilize her condition as much as possible. Check B12 since last level was lower limits of normal and over a year ago. Continue daily physical mental exercises. Depression and anxiety discussed. She denies any acute depression and anxiety currently. Okay to defer Zoloft for now. Donepezil may have a mild calming effect. I would like to reevaluate her in 3 months. A notice of this visit/encounter being completed has been sent electronically to the patient's PCP and/or referring provider.      Eve Gamez, [address]  Diplomate ABPN

## 2018-03-10 LAB
FOLATE SERPL-MCNC: 12.5 NG/ML
VIT B12 SERPL-MCNC: 632 PG/ML (ref 232–1245)

## 2018-03-13 ENCOUNTER — TELEPHONE (OUTPATIENT)
Dept: NEUROLOGY | Age: 83
End: 2018-03-13

## 2018-03-15 NOTE — TELEPHONE ENCOUNTER
Vitamin B12 looks great.  It is over 600.  This is improved from her last blood work a couple years ago. Pts daughter (on [place]) was notified of this result and voiced understanding of information given.

## 2018-03-22 NOTE — TELEPHONE ENCOUNTER
Pts daughter wants you to know that mother seems to have become a bit defiant since starting the new medication.   Is this normal?

## 2018-03-22 NOTE — TELEPHONE ENCOUNTER
----- Message from [employer] sent at 3/21/2018  3:51 PM EDT -----  Regarding: /Telephone   Heraclio Stacy, daughter would like to speak to nurse. Reason not disclosure. Best contact number is [phone].

## 2018-03-23 NOTE — TELEPHONE ENCOUNTER
Typically this medication would have a more calming effect. I think she should start taking it at bedtime instead of the morning to see if this improves some of her mood. Let us know if things get worse.

## 2018-06-19 ENCOUNTER — TELEPHONE (OUTPATIENT)
Dept: FAMILY MEDICINE CLINIC | Age: 83
End: 2018-06-19

## 2018-06-19 NOTE — TELEPHONE ENCOUNTER
Pharmacy updated to requested [facility] in [place] that dtr is requesting a medication be sent to.

## 2018-06-19 NOTE — TELEPHONE ENCOUNTER
Writer called pt's dtr back, Davonte Wood, in regards to pt being in [place] with her. Writer spoke with dtr. Dtr verified pt . Writer asked dtr if pt was in [place] with her. Dtr stated that yes, she was. Writer stated that a message would be placed in with Dr. Valente Mcburney so she could review since pt does not have any previous tx for fever blisters/cold sore in her chart. And that as soon as Dr. Valente Mcburney gets back with writer with any recommendations, writer will give dtr a call back. Dtr verbalized understanding and appreciation.

## 2018-06-19 NOTE — TELEPHONE ENCOUNTER
Chart reviewed. Please confirm which pharmacy pt used for her last prescription for cold sores. No record of treatment from our office found for this. If she is experiencing any SOB or difficulty swallowing, she must go to the nearest ER. What is she using to treat the sores? When did they start? It would be best if she was evaluated at an Urgent [facility] to confirm the diagnosis and give the proper treatment.

## 2018-06-20 NOTE — TELEPHONE ENCOUNTER
Spoke with patient after obtaining 2 patient identifiers  Per patients daughter her mother is no longer experiencing any issues. She stated she wants to let cold sores run its course and that no further assistance is needed from Dr. Fabiola Malave.

## 2018-08-21 ENCOUNTER — OFFICE VISIT (OUTPATIENT)
Dept: FAMILY MEDICINE CLINIC | Age: 83
End: 2018-08-21

## 2018-08-21 VITALS
SYSTOLIC BLOOD PRESSURE: 130 MMHG | WEIGHT: 159.2 LBS | BODY MASS INDEX: 31.25 KG/M2 | TEMPERATURE: 98 F | HEIGHT: 60 IN | RESPIRATION RATE: 16 BRPM | OXYGEN SATURATION: 98 % | DIASTOLIC BLOOD PRESSURE: 75 MMHG | HEART RATE: 61 BPM

## 2018-08-21 DIAGNOSIS — F03.90 DEMENTIA WITHOUT BEHAVIORAL DISTURBANCE, UNSPECIFIED DEMENTIA TYPE: ICD-10-CM

## 2018-08-21 DIAGNOSIS — R73.9 HYPERGLYCEMIA: ICD-10-CM

## 2018-08-21 DIAGNOSIS — Z00.00 MEDICARE ANNUAL WELLNESS VISIT, SUBSEQUENT: Primary | ICD-10-CM

## 2018-08-21 DIAGNOSIS — Q72.812: ICD-10-CM

## 2018-08-21 DIAGNOSIS — H04.123 CHRONICALLY DRY EYES, BILATERAL: ICD-10-CM

## 2018-08-21 DIAGNOSIS — I10 ESSENTIAL HYPERTENSION: ICD-10-CM

## 2018-08-21 DIAGNOSIS — Z13.31 SCREENING FOR DEPRESSION: ICD-10-CM

## 2018-08-21 DIAGNOSIS — R00.2 HEART PALPITATIONS: ICD-10-CM

## 2018-08-21 DIAGNOSIS — R63.4 WEIGHT LOSS: ICD-10-CM

## 2018-08-21 DIAGNOSIS — E78.00 HYPERCHOLESTEREMIA: ICD-10-CM

## 2018-08-21 DIAGNOSIS — Z13.39 SCREENING FOR ALCOHOLISM: ICD-10-CM

## 2018-08-21 DIAGNOSIS — Z98.890 HISTORY OF CEA (CAROTID ENDARTERECTOMY): ICD-10-CM

## 2018-08-21 DIAGNOSIS — I65.23 STENOSIS OF BOTH INTERNAL CAROTID ARTERIES: ICD-10-CM

## 2018-08-21 DIAGNOSIS — Z82.49 FAMILY HISTORY OF HEART ATTACK: ICD-10-CM

## 2018-08-21 DIAGNOSIS — F43.23 ADJUSTMENT DISORDER WITH MIXED ANXIETY AND DEPRESSED MOOD: ICD-10-CM

## 2018-08-21 DIAGNOSIS — I73.9 PVD (PERIPHERAL VASCULAR DISEASE) (HCC): ICD-10-CM

## 2018-08-21 DIAGNOSIS — Z96.652 HISTORY OF KNEE REPLACEMENT, TOTAL, LEFT: ICD-10-CM

## 2018-08-21 DIAGNOSIS — Z98.890 H/O CAROTID ENDARTERECTOMY: ICD-10-CM

## 2018-08-21 DIAGNOSIS — E55.9 VITAMIN D DEFICIENCY: ICD-10-CM

## 2018-08-21 DIAGNOSIS — E66.9 OBESITY, CLASS I, BMI 30-34.9: ICD-10-CM

## 2018-08-21 RX ORDER — ACETAMINOPHEN 325 MG/1
TABLET ORAL
COMMUNITY
End: 2019-09-25

## 2018-08-21 NOTE — PROGRESS NOTES
HISTORY OF PRESENT ILLNESS  Kyrie Dickey is a 81 y.o. female presents with Blood Pressure Check; Annual Wellness Visit; and Results    Agree with nurse note. Pt with hypertension, hypercholesteremia, hyperglycemia, hx of CEA, hx of L TKR, PVD, hx of carotid endartectomy, congential shortening of LLE, heart palpations, vit d deficiency, ICA stenosis, and family hx of heart attack presents to the office with a BP of 130/75. For BP, she takes Lisinopril 10 mg daily, tolerating well. Weight is 159 lbs, down 6 lbs since 2/2018. Breakfast is oatmeal with raisins. Lunch is a sandwich or hamburger. Dinner is meat with vegetables. She drinks a lot of water and an occasional glass of iced tea with sweet tea. She enjoys working in the yard. Pt requested to review labs from 2/20/2018. Glucose 102, Vit D , , down from 134, Trigs 67, down from 77, HDL 74, Hgb A1c 5.4. Pt with adjustment disorder with mixed anxiety and depressed mood. At last visit, I rx'd Zoloft 25 mg but patient has not taken it because she has not needed it. She feels she is in a good place and has no complaints. Denies SI/HI. She saw neurologist, Dr. Katie Sanchez on 3/9/2018 for f/u of late onset Alzheimer's disease. She is doing very well. Pt is still able to take care of herself and will remember to do things as long as she leaves reminders. Recommended checking B12 levels due to hx of low levels. Continue daily physical and mental exercises. Okay to defer Zoloft and start Aricept 5 mg daily. Pt is not sure if she is taking Aricept 5 mg. Health Maintenance    Annual [address] Pt had eye exam on 6/22/2017 and saw Dr. Arland Ahumada. BL dry eyes, continue with artifical tears. F/U 1 year. Pt has not had a recent mammogram and would like to \"leave it alone\".  DEXA on 7/22/2014 was normal.     Written by dara Feldman, as dictated by Dr. Magan Hill DO.    ROS    Review of Systems negative except as noted above in HPI.    ALLERGIES:    Allergies   Allergen Reactions    Penicillins Hives       CURRENT MEDICATIONS:    Outpatient Prescriptions Marked as Taking for the 8/21/18 encounter (Office Visit) with Vinicius Kimball, DO   Medication Sig Dispense Refill    acetaminophen (TYLENOL) 325 mg tablet Take  by mouth every four (4) hours as needed for Pain.  donepezil (ARICEPT) 5 mg tablet Take 1 Tab by mouth daily (with breakfast). 90 Tab 1    lisinopril (PRINIVIL, ZESTRIL) 10 mg tablet TAKE 1 TABLET IN THE MORNING AND TAKE 2 TABLETS IN THE EVENING FOR HYPERTENSION 270 Tab 3    aspirin delayed-release 81 mg tablet Take 1 Tab by mouth daily. Indications: myocardial infarction prevention 90 Tab 3    mometasone (NASONEX) 50 mcg/actuation nasal spray 2 Sprays by Both Nostrils route daily. Indications: ALLERGIC RHINITIS 1 Container 5    Omega-3 Fatty Acids 300 mg cap Take 1 Cap by mouth daily.  Calcium-Cholecalciferol, D3, 600 mg(1,500mg) -400 unit cap Take  by mouth daily. PAST MEDICAL HISTORY:    Past Medical History:   Diagnosis Date    Bilateral dry eyes 2017    Dr. Kyleigh Randolph    Cataract     Bilaterally. Dr. Corina Morin. Dr. Shlomo Ralph. Dr. Kyleigh Randolph.  Chest pain 10/2015, 2016    Dr. Mei Ferrell. negative Stress Test.    Dementia 07/2017    triggered by mild LACHELLE, depression. Dr. Ivan Veliz. Dr. Garcia People Diverticulosis 09/2008    Dr. Amparo Chavez. Dr. Avelina Jenkins.  DJD (degenerative joint disease) 09/30/05    cervical, worse C6-7, C7-T1. Left AC joint.  DJD (degenerative joint disease) of knee     bilateral  Dr. Lobito Bearden. Dr. Daja NULL (dyspnea on exertion) 10/2015    Dr. Mei Ferrell.  Essential hypertension, benign 1968    Foster child     GIB (gastrointestinal bleeding) 11/17/14    due to internal hemorrhoids. Dr. Yuval Humphries Heart palpitations 11/12/15    due to PVCs. Dr. Niels Lee.     Heartburn     Hypercholesteremia     Hyperglycemia 2014    Internal carotid artery stenosis 2007    bilateral.  Dr. Miguel Ángel Gilbert Internal hemorrhoids 09/2008, 11/2014    Dr. Devon Barrett. Dr. Enoch Moura.  Knee pain     Left. Dr. Maria Ines Tello. 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 Dr. Angelika Weber. EF 50-55%    PVD (peripheral vascular disease) ([address] 2007    Dr. Zeynep Linda    Raynaud's phenomenon 1968    Shortening, leg, congenital     left    Vitamin D deficiency 10/10/10       PAST SURGICAL HISTORY:    Past Surgical History:   Procedure Laterality Date    HX ATHERECTOMY Left 09/01/2011    popliteal atherectomy and angioplasty. Dr. Andrea Castanon Right 2008    benign. Dr. Ana Maria Mcdaniels.  HX CAROTID ENDARTERECTOMY Right 01/19/2011    ICA. Dr. Per Christine Bilateral 02/22/2016, 10/24/2016    L then R Dr. Juan Quispe.  HX COLONOSCOPY  11/24/14    due to GIB. Dr. Enoch Moura.  HX COLONOSCOPY  09/05/08    with polypectomy. benign. Dr. Minnie Alegre. due q 10 yrs.  HX GI  1957    FISSURECTOMY.  HX GI  10/03/2008    PROCTOPLASTY due to rectal prolapse    HX HEMORRHOIDECTOMY  10/03/2008    internal and external    HX KNEE REPLACEMENT Left 07/2013    due to Severe OA. Dr. Felix Sexton.      HX POLYPECTOMY  09/05/2008    rectal Dr. Darian Haque:    Family History   Problem Relation Age of Onset    Heart Attack Mother 79    Heart Attack Sister      x 3 sisters    Heart Attack Brother      x 3 brothers       SOCIAL HISTORY:    Social History     Social History    Marital status:      Spouse name: N/A    Number of children: N/A    Years of education: N/A     Social History Main Topics    Smoking status: Former Smoker     Packs/day: 1.00     Years: 15.00     Types: Cigarettes     Quit date: 1/1/1968    Smokeless tobacco: Never Used    Alcohol use No    Drug use: No    Sexual activity: No     Other Topics Concern    None     Social History Narrative       IMMUNIZATIONS:    Immunization History   Administered Date(s) Administered    Pneumococcal Conjugate (PCV-13) 08/17/2016    ZZZ-RETIRED (DO NOT USE) Pneumococcal Vaccine (Unspecified Type) 10/22/2009         PHYSICAL EXAMINATION    Vital Signs    Visit Vitals    /75 (BP 1 Location: Right arm, BP Patient Position: Sitting)    Pulse 61    Temp 98 °F (36.7 °C) (Oral)    Resp 16    Ht 4' 11.92\" (1.522 m)    Wt 159 lb 3.2 oz (72.2 kg)    SpO2 98%    BMI 31.17 kg/m2       Weight Metrics 8/21/2018 3/9/2018 2/19/2018 8/18/2017 2/17/2017 10/12/2016 8/17/2016   Weight 159 lb 3.2 oz 166 lb 166 lb 6.4 oz 177 lb 1.6 oz 170 lb 11.2 oz 168 lb 3.2 oz 170 lb   BMI 31.17 kg/m2 30.35 kg/m2 30.43 kg/m2 32.38 kg/m2 31.22 kg/m2 30.76 kg/m2 31.09 kg/m2       General appearance - Well nourished. Well appearing. Well developed. No acute distress. Obese. Head - Normocephalic. Atraumatic. Eyes - pupils equal and reactive. Extraocular eye movements intact. Sclera anicteric. Mildly injected sclera. Ears - Hearing is grossly normal bilaterally. Nose - normal and patent. No polyps noted. No erythema. No discharge. Mouth - mucous membranes with adequate moisture. Posterior pharynx normal with cobblestone appearance. No erythema, white exudate or obstruction. Neck - supple. Midline trachea. No carotid bruits noted bilaterally. No thyromegaly noted. Chest - clear to auscultation bilaterally anteriorly and posteriorly. No wheezes. No rales or rhonchi. Breath sounds are symmetrical bilaterally. Unlabored respirations. Heart - normal rate. Regular rhythm. Normal S1, S2. No murmur noted. No rubs, clicks or gallops noted. Abdomen - soft and distended. No masses or organomegaly. No rebound, rigidity or guarding. Bowel sounds normal x 4 quadrants. No tenderness noted. Neurological - awake, alert and oriented to person, place, and time and event.   Cranial nerves II through XII intact. Clear speech. Muscle strength is +5/5 x 4 extremities. Sensation is intact to light touch bilaterally. Steady gait. Heme/Lymph - peripheral pulses normal x 2 extremities. No peripheral edema is noted. +1/4 DP pulse BL   Musculoskeletal - Intact x 4 extremities. Full ROM x 4 extremities. No pain with movement. Back exam - normal range of motion. No pain on palpation of the spinous processes in the cervical, thoracic, lumbar, sacral regions. No CVA tenderness. Skin - no rashes, erythema, ecchymosis, lacerations, abrasions, suspicious moles noted  Psychological -   normal behavior, dress and thought processes. Good insight. Good eye contact. Normal affect. Appropriate mood. Normal speech. DATA REVIEWED    Lab Results   Component Value Date/Time    WBC 4.7 02/20/2018 08:25 AM    HGB 13.9 02/20/2018 08:25 AM    HCT 41.3 02/20/2018 08:25 AM    PLATELET 984 77/06/4710 08:25 AM    MCV 95 02/20/2018 08:25 AM     Lab Results   Component Value Date/Time    Sodium 140 02/20/2018 08:25 AM    Potassium 4.5 02/20/2018 08:25 AM    Chloride 102 02/20/2018 08:25 AM    CO2 25 02/20/2018 08:25 AM    Anion gap 8 06/19/2016 06:00 PM    Glucose 102 (H) 02/20/2018 08:25 AM    BUN 13 02/20/2018 08:25 AM    Creatinine 0.84 02/20/2018 08:25 AM    BUN/Creatinine ratio 15 02/20/2018 08:25 AM    GFR est AA 75 02/20/2018 08:25 AM    GFR est non-AA 65 02/20/2018 08:25 AM    Calcium 9.5 02/20/2018 08:25 AM    Bilirubin, total 0.5 02/20/2018 08:25 AM    AST (SGOT) 17 02/20/2018 08:25 AM    Alk.  phosphatase 101 02/20/2018 08:25 AM    Protein, total 6.6 02/20/2018 08:25 AM    Albumin 4.0 02/20/2018 08:25 AM    Globulin 3.6 06/19/2016 06:00 PM    A-G Ratio 1.5 02/20/2018 08:25 AM    ALT (SGPT) 12 02/20/2018 08:25 AM     Lab Results   Component Value Date/Time    Cholesterol, total 216 (H) 02/20/2018 08:25 AM    HDL Cholesterol 74 02/20/2018 08:25 AM    LDL, calculated 129 (H) 02/20/2018 08:25 AM    VLDL, calculated 13 02/20/2018 08:25 AM    Triglyceride 67 02/20/2018 08:25 AM    CHOL/HDL Ratio 2.8 05/05/2010 08:45 AM     Lab Results   Component Value Date/Time    Vitamin D 25-Hydroxy 24 (L) 10/08/2010 04:19 PM    VITAMIN D, 25-HYDROXY 41.2 02/20/2018 08:25 AM       Lab Results   Component Value Date/Time    Hemoglobin A1c 5.4 02/20/2018 08:25 AM    Hemoglobin A1c (POC) 5.5 02/17/2017 09:40 AM     Lab Results   Component Value Date/Time    TSH 2.270 02/20/2018 08:25 AM       Lab Results   Component Value Date/Time    Microalb/Creat ratio (ug/mg creat.) <3.4 02/20/2018 08:25 AM         ASSESSMENT and PLAN      ICD-10-CM ICD-9-CM    1. Medicare annual wellness visit, subsequent Z00.00 V70.0    2. Essential hypertension I10 401.9 LIPID PANEL      METABOLIC PANEL, COMPREHENSIVE      TSH 3RD GENERATION      MICROALBUMIN, UR, RAND W/ MICROALB/CREAT RATIO      URINALYSIS W/ RFLX MICROSCOPIC   3. Hypercholesteremia E78.00 272.0 LIPID PANEL      METABOLIC PANEL, COMPREHENSIVE   4. Dementia without behavioral disturbance, unspecified dementia type F03.90 294.20 VITAMIN B12 & FOLATE    stable on Aricept 5 mg   5. Adjustment disorder with mixed anxiety and depressed mood F43.23 309.28     stable without Zoloft   6. Hyperglycemia R38.9 032.30 METABOLIC PANEL, COMPREHENSIVE   7. Heart palpitations R00.2 785.1 TSH 3RD GENERATION    stable   8. Vitamin D deficiency E55.9 268.9 VITAMIN D, 25 HYDROXY   9. Chronically dry eyes, bilateral H04. 123 375.15    10. Weight loss R63.4 783.21     7# since 07/2018 due to eating a healthier diet   11. History of CEA (carotid endarterectomy) Z98.890 V45.89    12. Congenital shortening of left lower extremity Q72.812 755.30    13. Obesity, Class I, BMI 30-34.9 E66.9 278.00    14. Stenosis of both internal carotid arteries I65.23 433.10      433.30    15. PVD (peripheral vascular disease) ([facility]) I73.9 443.9    16. H/O carotid endarterectomy Z98.890 V45.89     Right, 2011, stable   17.  Family history of heart attack Z82.49 V17.3    18. History of knee replacement, total, left Z96.652 V43.65    19. Screening for alcoholism Z13.89 V79.1 [place] ANNUAL ALCOHOL SCREEN 15 MIN      IA ANNUAL ALCOHOL SCREEN 15 MIN   20. Screening for depression Z13.89 V79.0 [address]   AWV performed today. Discussed the patient's BMI with her. The BMI follow up plan is as follows: I have counseled this patient on diet and exercise regimens. Decrease carbohydrates (white foods, sweet foods, sweet drinks and alcohol), increase green leafy vegetables and protein (lean meats and beans) with each meal.  Avoid fried foods. Eat 3-5 small meals daily. Do not skip meals. Increase water intake. Increase physical activity to 30 minutes daily for health benefit, as tolerated. Get 7-8 hours uninterrupted sleep nightly. Chart reviewed and updated. Continue current medications and care. Most recent tests reviewed from 2/20/2018. Recheck pertinent labs 2/2019. Recent office visit notes from Dr. Suman Austin reviewed. Get recent office visit notes from Dr. Edie Doherty. Advised pt to sign release. Counseled patient on health concerns:  BP, cholesterol, dementia, hyperglycemia, vit d deficiency, dry eyes, weight management, heart palpations, PVD, and ICA stenosis. Relevant handouts given and discussed with patient. Immunizations noted; Advise flu vaccine between the months September to December. Pt declines Tdap today but advised pt to receive if they have a cut, burn, or bite from an animal. Advised pt to discuss Shingrix vaccine with local pharmacy and insurance company. Offered empathy, support, legitimation, prayers, partnership to patient. Praised patient for progress. ACP handout given today. Accepts honoring choices referral. Staff message sent to [facility]. Follow-up Disposition:  Return in about 6 months (around 2/21/2019) for blood pressure, referral follow up, results.     Patient was offered a choice/choices in the treatment plan today. Patient expresses understanding of the plan and agrees with recommendations. Written by dara Kamara, as dictated by Dr. Rachel Lofton DO. Documentation True and Accepted by Carol Gtz. Lee Odell. Patient Instructions       Medicare Wellness Visit, Female     The best way to live healthy is to have a lifestyle where you eat a well-balanced diet, exercise regularly, limit alcohol use, and quit all forms of tobacco/nicotine, if applicable. Regular preventive services are another way to keep healthy. Preventive services (vaccines, screening tests, monitoring & exams) can help personalize your care plan, which helps you manage your own care. Screening tests can find health problems at the earliest stages, when they are easiest to treat. [facility] follows the current, evidence-based guidelines published by the [facility] Petr Fabiana ([facility]) when recommending preventive services for our patients. Because we follow these guidelines, sometimes recommendations change over time as research supports it. (For example, mammograms used to be recommended annually. Even though Medicare will still pay for an annual mammogram, the newer guidelines recommend a mammogram every two years for women of average risk.)  Of course, you and your doctor may decide to screen more often for some diseases, based on your risk and your health status. Preventive services for you include:  - Medicare offers their members a free annual wellness visit, which is time for you and your primary care provider to discuss and plan for your preventive service needs. Take advantage of this benefit every year!  -All adults over the age of 72 should receive the recommended pneumonia vaccines.  Current USPSTF guidelines recommend a series of two vaccines for the best pneumonia protection.   -All adults should have a flu vaccine yearly and a tetanus vaccine every 10 years. All adults age 61 and older should receive a shingles vaccine once in their lifetime.    -A bone mass density test is recommended when a woman turns 65 to screen for osteoporosis. This test is only recommended one time, as a screening. Some providers will use this same test as a disease monitoring tool if you already have osteoporosis. -All adults age 38-68 who are overweight should have a diabetes screening test once every three years.   -Other screening tests and preventive services for persons with diabetes include: an eye exam to screen for diabetic retinopathy, a kidney function test, a foot exam, and stricter control over your cholesterol.   -Cardiovascular screening for adults with routine risk involves an electrocardiogram (ECG) at intervals determined by your doctor.   -Colorectal cancer screenings should be done for adults age 54-65 with no increased risk factors for colorectal cancer. There are a number of acceptable methods of screening for this type of cancer. Each test has its own benefits and drawbacks. Discuss with your doctor what is most appropriate for you during your annual wellness visit. The different tests include: colonoscopy (considered the best screening method), a fecal occult blood test, a fecal DNA test, and sigmoidoscopy. -Breast cancer screenings are recommended every other year for women of normal risk, age 54-69.  -Cervical cancer screenings for women over age 72 are only recommended with certain risk factors.   -All adults born between [facility] should be screened once for Hepatitis C. Here is a list of your current Health Maintenance items (your personalized list of preventive services) with a due date:  Health Maintenance Due   Topic Date Due    Annual Well Visit  08/19/2018              High Cholesterol: Care Instructions  Your Care Instructions    Cholesterol is a type of fat in your blood.  It is needed for many body functions, such as making new cells. Cholesterol is made by your body. It also comes from food you eat. High cholesterol means that you have too much of the fat in your blood. This raises your risk of a heart attack and stroke. LDL and HDL are part of your total cholesterol. LDL is the \"bad\" cholesterol. High LDL can raise your risk for heart disease, heart attack, and stroke. HDL is the \"good\" cholesterol. It helps clear bad cholesterol from the body. High HDL is linked with a lower risk of heart disease, heart attack, and stroke. Your cholesterol levels help your doctor find out your risk for having a heart attack or stroke. You and your doctor can talk about whether you need to lower your risk and what treatment is best for you. A heart-healthy lifestyle along with medicines can help lower your cholesterol and your risk. The way you choose to lower your risk will depend on how high your risk is for heart attack and stroke. It will also depend on how you feel about taking medicines. Follow-up care is a key part of your treatment and safety. Be sure to make and go to all appointments, and call your doctor if you are having problems. It's also a good idea to know your test results and keep a list of the medicines you take. How can you care for yourself at home? · Eat a variety of foods every day. Good choices include fruits, vegetables, whole grains (like oatmeal), dried beans and peas, nuts and seeds, soy products (like tofu), and fat-free or low-fat dairy products. · Replace butter, margarine, and hydrogenated or partially hydrogenated oils with olive and canola oils. (Canola oil margarine without trans fat is fine.)  · Replace red meat with fish, poultry, and soy protein (like tofu). · Limit processed and packaged foods like chips, crackers, and cookies. · Bake, broil, or steam foods. Don't mari them. · Be physically active. Get at least 30 minutes of exercise on most days of the week. Walking is a good choice.  You also may want to do other activities, such as running, swimming, cycling, or playing tennis or team sports. · Stay at a healthy weight or lose weight by making the changes in eating and physical activity listed above. Losing just a small amount of weight, even 5 to 10 pounds, can reduce your risk for having a heart attack or stroke. · Do not smoke. When should you call for help? Watch closely for changes in your health, and be sure to contact your doctor if:    · You need help making lifestyle changes.     · You have questions about your medicine. Where can you learn more? Go to http://pepeUnitywareconstantino.info/. Enter Q615 in the search box to learn more about \"High Cholesterol: Care Instructions. \"  Current as of: May 10, 2017  Content Version: 11.7  © 5019-2840 [employer]. Care instructions adapted under license by [employer] (which disclaims liability or warranty for this information). If you have questions about a medical condition or this instruction, always ask your healthcare professional. [place] [address] any warranty or liability for your use of this information. Learning About Saul Estrada Rafianneliese  What is a living will? A living will is a legal form you use to write down the kind of care you want at the end of your life. It is used by the health professionals who will treat you if you aren't able to decide for yourself. If you put your wishes in writing, your loved ones and others will know what kind of care you want. They won't need to guess. This can ease your mind and be helpful to others. A living will is not the same as an estate or property will. An estate will explains what you want to happen with your money and property after you die. Is a living will a legal document? A living will is a legal document. Each state has its own laws about living patel.  If you move to another state, make sure that your living will is legal in the state where you now live. Or you might use a universal form that has been approved by many states. This kind of form can sometimes be completed and stored online. Your electronic copy will then be available wherever you have a connection to the Internet. In most cases, doctors will respect your wishes even if you have a form from a different state. · You don't need an  to complete a living will. But legal advice can be helpful if your state's laws are unclear, your health history is complicated, or your family can't agree on what should be in your living will. · You can change your living will at any time. Some people find that their wishes about end-of-life care change as their health changes. · In addition to making a living will, think about completing a medical power of  form. This form lets you name the person you want to make end-of-life treatment decisions for you (your \"health care agent\") if you're not able to. Many hospitals and nursing homes will give you the forms you need to complete a living will and a medical power of . · Your living will is used only if you can't make or communicate decisions for yourself anymore. If you become able to make decisions again, you can accept or refuse any treatment, no matter what you wrote in your living will. · Your state may offer an online registry. This is a place where you can store your living will online so the doctors and nurses who need to treat you can find it right away. What should you think about when creating a living will? Talk about your end-of-life wishes with your family members and your doctor. Let them know what you want. That way the people making decisions for you won't be surprised by your choices. Think about these questions as you make your living will:  · Do you know enough about life support methods that might be used?  If not, talk to your doctor so you know what might be done if you can't breathe on your own, your heart stops, or you're unable to swallow. · What things would you still want to be able to do after you receive life-support methods? Would you want to be able to walk? To speak? To eat on your own? To live without the help of machines? · If you have a choice, where do you want to be cared for? In your home? At a hospital or nursing home? · Do you want certain Church practices performed if you become very ill? · If you have a choice at the end of your life, where would you prefer to die? At home? In a hospital or nursing home? Somewhere else? · Would you prefer to be buried or cremated? · Do you want your organs to be donated after you die? What should you do with your living will? · Make sure that your family members and your health care agent have copies of your living will. · Give your doctor a copy of your living will to keep in your medical record. If you have more than one doctor, make sure that each one has a copy. · You may want to put a copy of your living will where it can be easily found. Where can you learn more? Go to http://pepe-constantino.info/. Enter L895 in the search box to learn more about \"Learning About Living Perroy. \"  Current as of: August 8, 2016  Content Version: 11.3  © 0561-9968 [employer]. Care instructions adapted under license by [employer] (which disclaims liability or warranty for this information). If you have questions about a medical condition or this instruction, always ask your healthcare professional. [place] [address] any warranty or liability for your use of this information. Medicare Wellness Visit, Female     The best way to live healthy is to have a lifestyle where you eat a well-balanced diet, exercise regularly, limit alcohol use, and quit all forms of tobacco/nicotine, if applicable. Regular preventive services are another way to keep healthy.  Preventive services (vaccines, screening tests, monitoring & exams) can help personalize your care plan, which helps you manage your own care. Screening tests can find health problems at the earliest stages, when they are easiest to treat. [facility] follows the current, evidence-based guidelines published by the [facility] Petr Jung ([facility]) when recommending preventive services for our patients. Because we follow these guidelines, sometimes recommendations change over time as research supports it. (For example, mammograms used to be recommended annually. Even though Medicare will still pay for an annual mammogram, the newer guidelines recommend a mammogram every two years for women of average risk.)  Of course, you and your doctor may decide to screen more often for some diseases, based on your risk and your health status. Preventive services for you include:  - Medicare offers their members a free annual wellness visit, which is time for you and your primary care provider to discuss and plan for your preventive service needs. Take advantage of this benefit every year!  -All adults over the age of 72 should receive the recommended pneumonia vaccines. Current USPSTF guidelines recommend a series of two vaccines for the best pneumonia protection.   -All adults should have a flu vaccine yearly and a tetanus vaccine every 10 years. All adults age 61 and older should receive a shingles vaccine once in their lifetime.    -A bone mass density test is recommended when a woman turns 65 to screen for osteoporosis. This test is only recommended one time, as a screening. Some providers will use this same test as a disease monitoring tool if you already have osteoporosis.   -All adults age 38-68 who are overweight should have a diabetes screening test once every three years.   -Other screening tests and preventive services for persons with diabetes include: an eye exam to screen for diabetic retinopathy, a kidney function test, a foot exam, and stricter control over your cholesterol.   -Cardiovascular screening for adults with routine risk involves an electrocardiogram (ECG) at intervals determined by your doctor.   -Colorectal cancer screenings should be done for adults age 54-65 with no increased risk factors for colorectal cancer. There are a number of acceptable methods of screening for this type of cancer. Each test has its own benefits and drawbacks. Discuss with your doctor what is most appropriate for you during your annual wellness visit. The different tests include: colonoscopy (considered the best screening method), a fecal occult blood test, a fecal DNA test, and sigmoidoscopy. -Breast cancer screenings are recommended every other year for women of normal risk, age 54-69.  -Cervical cancer screenings for women over age 72 are only recommended with certain risk factors.   -All adults born between [facility] should be screened once for Hepatitis C. Here is a list of your current Health Maintenance items (your personalized list of preventive services) with a due date:  Health Maintenance Due   Topic Date Due    Annual Well Visit  08/19/2018            Dry Eyes: Care Instructions  Your Care Instructions    Dry eyes can be uncomfortable. The dryness may make your eyes feel dry or hot. Your eyes may also water a lot. In some cases, dry eyes make it feel like there is sand or dirt in your eyes. From time to time, dry eyes may cause you to have blurry vision. But dry eyes don't usually cause lasting problems with vision. There are many causes of dry eyes. Sometimes dry weather, smoke, or pollution can bother the eyes. Other times, allergies or contact lenses irritate the eyes. Older people often have dry eyes because our eyes do not make as many tears as we age. In some cases, diseases can cause dry eyes. These include rheumatoid arthritis, lupus, and Sjögren's syndrome. In other cases, medicines are to blame.  Your doctor may want to do tests to help find the cause of your dry eyes. You can work with your doctor to find ways to help your eyes feel better. Home treatment often helps. Follow-up care is a key part of your treatment and safety. Be sure to make and go to all appointments, and call your doctor if you are having problems. It's also a good idea to know your test results and keep a list of the medicines you take. How can you care for yourself at home? · Take breaks often when you read, watch TV, or use a computer. Close your eyes. Do not rub your eyes. Artificial tears may help you when you do these activities. You can buy these without a prescription. · Avoid smoke and other things that irritate the eyes. · Wear sunglasses that wrap around the sides of the head. These can protect the eyes from sun, wind, dust, and dirt. · Use a vaporizer or humidifier to add moisture to your bedroom. Follow the directions for cleaning the machine. · Do not use fans while you sleep. · If you usually wear contact lenses, use rewetting drops or wear your glasses until your eyes feel better. · Be safe with medicines. Take your medicine exactly as prescribed. Call your doctor if you think you are having a problem with your medicine. · Try using artificial tears at least 4 times a day. · If you need drops more than 4 times a day, use artificial tears without preservatives. They may irritate the eyes less. · Use a lubricating eye ointment or eye gel at bedtime. These are thicker and last longer, so you may have less burning, dryness, and itching when you wake up. Be aware that they may blur your vision for a short time. · To put in eyedrops or ointment:  ¨ Tilt your head back, and pull the lower eyelid down with one finger. ¨ Drop or squirt the medicine inside the lower lid. ¨ Close your eye for 30 to 60 seconds to let the drops or ointment move around. ¨ Do not touch the ointment or dropper tip to your eyelashes or any other surface.   · Put a warm, moist cloth on your eyelids every morning for about 5 minutes. Then massage your eyelids lightly. This helps increase the natural wetness of your eyes. When should you call for help? Watch closely for changes in your health, and be sure to contact your doctor if:    · Your eyes are still dry, irritated, or teary, and artificial tears do not help.     · You do not get better as expected. Where can you learn more? Go to http://pepe-constantino.info/. Enter D231 in the search box to learn more about \"Dry Eyes: Care Instructions. \"  Current as of: December 3, 2017  Content Version: 11.7  © 9648-1044 [employer]. Care instructions adapted under license by [employer] (which disclaims liability or warranty for this information). If you have questions about a medical condition or this instruction, always ask your healthcare professional. [place] [address] any warranty or liability for your use of this information. DASH Diet: Care Instructions  Your Care Instructions    The DASH diet is an eating plan that can help lower your blood pressure. DASH stands for Dietary Approaches to Stop Hypertension. Hypertension is high blood pressure. The DASH diet focuses on eating foods that are high in calcium, potassium, and magnesium. These nutrients can lower blood pressure. The foods that are highest in these nutrients are fruits, vegetables, low-fat dairy products, nuts, seeds, and legumes. But taking calcium, potassium, and magnesium supplements instead of eating foods that are high in those nutrients does not have the same effect. The DASH diet also includes whole grains, fish, and poultry. The DASH diet is one of several lifestyle changes your doctor may recommend to lower your high blood pressure. Your doctor may also want you to decrease the amount of sodium in your diet.  Lowering sodium while following the DASH diet can lower blood pressure even further than just the DASH diet alone. Follow-up care is a key part of your treatment and safety. Be sure to make and go to all appointments, and call your doctor if you are having problems. It's also a good idea to know your test results and keep a list of the medicines you take. How can you care for yourself at home? Following the DASH diet  · Eat 4 to 5 servings of fruit each day. A serving is 1 medium-sized piece of fruit, ½ cup chopped or canned fruit, 1/4 cup dried fruit, or 4 ounces (½ cup) of fruit juice. Choose fruit more often than fruit juice. · Eat 4 to 5 servings of vegetables each day. A serving is 1 cup of lettuce or raw leafy vegetables, ½ cup of chopped or cooked vegetables, or 4 ounces (½ cup) of vegetable juice. Choose vegetables more often than vegetable juice. · Get 2 to 3 servings of low-fat and fat-free dairy each day. A serving is 8 ounces of milk, 1 cup of yogurt, or 1 ½ ounces of cheese. · Eat 6 to 8 servings of grains each day. A serving is 1 slice of bread, 1 ounce of dry cereal, or ½ cup of cooked rice, pasta, or cooked cereal. Try to choose whole-grain products as much as possible. · Limit lean meat, poultry, and fish to 2 servings each day. A serving is 3 ounces, about the size of a deck of cards. · Eat 4 to 5 servings of nuts, seeds, and legumes (cooked dried beans, lentils, and split peas) each week. A serving is 1/3 cup of nuts, 2 tablespoons of seeds, or ½ cup of cooked beans or peas. · Limit fats and oils to 2 to 3 servings each day. A serving is 1 teaspoon of vegetable oil or 2 tablespoons of salad dressing. · Limit sweets and added sugars to 5 servings or less a week. A serving is 1 tablespoon jelly or jam, ½ cup sorbet, or 1 cup of lemonade. · Eat less than 2,300 milligrams (mg) of sodium a day. If you limit your sodium to 1,500 mg a day, you can lower your blood pressure even more. Tips for success  · Start small.  Do not try to make dramatic changes to your diet all at once. You might feel that you are missing out on your favorite foods and then be more likely to not follow the plan. Make small changes, and stick with them. Once those changes become habit, add a few more changes. · Try some of the following:  ¨ Make it a goal to eat a fruit or vegetable at every meal and at snacks. This will make it easy to get the recommended amount of fruits and vegetables each day. ¨ Try yogurt topped with fruit and nuts for a snack or healthy dessert. ¨ Add lettuce, tomato, cucumber, and onion to sandwiches. ¨ Combine a ready-made pizza crust with low-fat mozzarella cheese and lots of vegetable toppings. Try using tomatoes, squash, spinach, broccoli, carrots, cauliflower, and onions. ¨ Have a variety of cut-up vegetables with a low-fat dip as an appetizer instead of chips and dip. ¨ Sprinkle sunflower seeds or chopped almonds over salads. Or try adding chopped walnuts or almonds to cooked vegetables. ¨ Try some vegetarian meals using beans and peas. Add garbanzo or kidney beans to salads. Make burritos and tacos with mashed bowden beans or black beans. Where can you learn more? Go to http://pepe-constantino.info/. Enter E752 in the search box to learn more about \"DASH Diet: Care Instructions. \"  Current as of: December 6, 2017  Content Version: 11.7  © 9844-3147 [employer]. Care instructions adapted under license by [employer] (which disclaims liability or warranty for this information). If you have questions about a medical condition or this instruction, always ask your healthcare professional. [place] [address] any warranty or liability for your use of this information. Check BP twice weekly. Notify me if it consistently is above 140/90 or below 90/60. Bring your blood pressure log to your next office visit. Decrease salt, fats, caffeine, alcohol in your diet. Increase water, fiber.   Exercise 5 times weekly for 30 minutes daily as tolerated. Continue current medications, care and subspecialty follow up. Visit eye doctor yearly to check your eyes blood pressure related changes. DASH Diet: Care Instructions  Your Care Instructions    The DASH diet is an eating plan that can help lower your blood pressure. DASH stands for Dietary Approaches to Stop Hypertension. Hypertension is high blood pressure. The DASH diet focuses on eating foods that are high in calcium, potassium, and magnesium. These nutrients can lower blood pressure. The foods that are highest in these nutrients are fruits, vegetables, low-fat dairy products, nuts, seeds, and legumes. But taking calcium, potassium, and magnesium supplements instead of eating foods that are high in those nutrients does not have the same effect. The DASH diet also includes whole grains, fish, and poultry. The DASH diet is one of several lifestyle changes your doctor may recommend to lower your high blood pressure. Your doctor may also want you to decrease the amount of sodium in your diet. Lowering sodium while following the DASH diet can lower blood pressure even further than just the DASH diet alone. Follow-up care is a key part of your treatment and safety. Be sure to make and go to all appointments, and call your doctor if you are having problems. It's also a good idea to know your test results and keep a list of the medicines you take. How can you care for yourself at home? Following the DASH diet  · Eat 4 to 5 servings of fruit each day. A serving is 1 medium-sized piece of fruit, ½ cup chopped or canned fruit, 1/4 cup dried fruit, or 4 ounces (½ cup) of fruit juice. Choose fruit more often than fruit juice. · Eat 4 to 5 servings of vegetables each day. A serving is 1 cup of lettuce or raw leafy vegetables, ½ cup of chopped or cooked vegetables, or 4 ounces (½ cup) of vegetable juice. Choose vegetables more often than vegetable juice.   · Get 2 to 3 servings of low-fat and fat-free dairy each day. A serving is 8 ounces of milk, 1 cup of yogurt, or 1 ½ ounces of cheese. · Eat 6 to 8 servings of grains each day. A serving is 1 slice of bread, 1 ounce of dry cereal, or ½ cup of cooked rice, pasta, or cooked cereal. Try to choose whole-grain products as much as possible. · Limit lean meat, poultry, and fish to 2 servings each day. A serving is 3 ounces, about the size of a deck of cards. · Eat 4 to 5 servings of nuts, seeds, and legumes (cooked dried beans, lentils, and split peas) each week. A serving is 1/3 cup of nuts, 2 tablespoons of seeds, or ½ cup of cooked beans or peas. · Limit fats and oils to 2 to 3 servings each day. A serving is 1 teaspoon of vegetable oil or 2 tablespoons of salad dressing. · Limit sweets and added sugars to 5 servings or less a week. A serving is 1 tablespoon jelly or jam, ½ cup sorbet, or 1 cup of lemonade. · Eat less than 2,300 milligrams (mg) of sodium a day. If you limit your sodium to 1,500 mg a day, you can lower your blood pressure even more. Tips for success  · Start small. Do not try to make dramatic changes to your diet all at once. You might feel that you are missing out on your favorite foods and then be more likely to not follow the plan. Make small changes, and stick with them. Once those changes become habit, add a few more changes. · Try some of the following:  ¨ Make it a goal to eat a fruit or vegetable at every meal and at snacks. This will make it easy to get the recommended amount of fruits and vegetables each day. ¨ Try yogurt topped with fruit and nuts for a snack or healthy dessert. ¨ Add lettuce, tomato, cucumber, and onion to sandwiches. ¨ Combine a ready-made pizza crust with low-fat mozzarella cheese and lots of vegetable toppings. Try using tomatoes, squash, spinach, broccoli, carrots, cauliflower, and onions.   ¨ Have a variety of cut-up vegetables with a low-fat dip as an appetizer instead of chips and dip.  ¨ Sprinkle sunflower seeds or chopped almonds over salads. Or try adding chopped walnuts or almonds to cooked vegetables. ¨ Try some vegetarian meals using beans and peas. Add garbanzo or kidney beans to salads. Make burritos and tacos with mashed bowden beans or black beans. Where can you learn more? Go to http://pepe-constantino.info/. Enter B136 in the search box to learn more about \"DASH Diet: Care Instructions. \"  Current as of: December 6, 2017  Content Version: 11.7  © 1919-7618 [employer]. Care instructions adapted under license by [employer] (which disclaims liability or warranty for this information). If you have questions about a medical condition or this instruction, always ask your healthcare professional. [address] any warranty or liability for your use of this information.

## 2018-08-21 NOTE — ACP (ADVANCE CARE PLANNING)
Discussed ACP with patient. Gave pt an Honoring Choices folder. Accepts referral to Honoring Choices team.  Alois Dakins notified by staff message for follow up.

## 2018-08-21 NOTE — MR AVS SNAPSHOT
[facility] 
 
 
 [address] [facility] 
[address] [place] [address] 
[phone] Patient: Blair Modi MRN:  [id number] Visit Information Date & Time Provider Department Dept. Phone Encounter #  
 8/21/2018  9:00 AM DO Eduardo McgrawZandermichelle [phone] [id number] Follow-up Instructions Return in about 6 months (around 2/21/2019) for blood pressure, referral follow up, results. Routing History Your Appointments 8/22/2018  1:20 PM  
[facility] at [address] [facility] Appt Note: F/up memory loss [address] [address]  
[phone]  
  
   
 [address]  [address] Upcoming Health Maintenance Date Due Influenza Age 5 to Adult 9/1/2018* GLAUCOMA SCREENING Q2Y 6/22/2019 MEDICARE YEARLY EXAM 8/22/2019 DTaP/Tdap/Td series (2 - Td) 2/17/2027 *Topic was postponed. The date shown is not the original due date. Allergies as of 8/21/2018  Review Complete On: 8/21/2018 By: Himanshu Weldon DO Severity Noted Reaction Type Reactions Penicillins High 10/22/2009    Hives Current Immunizations  Reviewed on 8/21/2018 Name Date Pneumococcal Conjugate (PCV-13) 8/17/2016 ZZZ-RETIRED (DO NOT USE) Pneumococcal Vaccine (Unspecified Type) 10/22/2009 Reviewed by Himanshu Weldon DO on 8/21/2018 at  9:42 AM  
You Were Diagnosed With   
  
 Codes Comments Medicare annual wellness visit, subsequent    -  Primary ICD-10-CM: Z00.00 ICD-9-CM: V70.0 Essential hypertension     ICD-10-CM: I10 
ICD-9-CM: 401.9 Hypercholesteremia     ICD-10-CM: E78.00 ICD-9-CM: 272.0 Dementia without behavioral disturbance, unspecified dementia type     ICD-10-CM: F03.90 ICD-9-CM: 294.20 stable on Aricept 5 mg Adjustment disorder with mixed anxiety and depressed mood     ICD-10-CM: F43.23 
ICD-9-CM: 309.28 stable without Zoloft Hyperglycemia     ICD-10-CM: R73.9 ICD-9-CM: 790.29 Heart palpitations     ICD-10-CM: R00.2 ICD-9-CM: 785.1 stable Vitamin D deficiency     ICD-10-CM: E55.9 ICD-9-CM: 268.9 Chronically dry eyes, bilateral     ICD-10-CM: V15.963 ICD-9-CM: 375.15 Weight loss     ICD-10-CM: R63.4 ICD-9-CM: 783.21 7# since 07/2018 due to eating a healthier diet History of CEA (carotid endarterectomy)     ICD-10-CM: H94.122 ICD-9-CM: V45.89 Congenital shortening of left lower extremity     ICD-10-CM: Q72.812 ICD-9-CM: 755.30 Obesity, Class I, BMI 30-34.9     ICD-10-CM: E66.9 ICD-9-CM: 278.00 Stenosis of both internal carotid arteries     ICD-10-CM: I65.23 ICD-9-CM: 433.10, 433.30 PVD (peripheral vascular disease) ([facility] [address]     ICD-10-CM: I73.9 ICD-9-CM: 443.9 H/O carotid endarterectomy     ICD-10-CM: Z98.890 ICD-9-CM: V45.89 Right, 2011, stable Family history of heart attack     ICD-10-CM: Z82.49 
ICD-9-CM: V17.3 History of knee replacement, total, left     ICD-10-CM: B22.876 ICD-9-CM: V43.65 Screening for alcoholism     ICD-10-CM: Z13.89 ICD-9-CM: V79.1 Screening for depression     ICD-10-CM: Z13.89 ICD-9-CM: V79.0 Vitals BP Pulse Temp Resp Height(growth percentile) Weight(growth percentile) 130/75 (BP 1 Location: Right arm, BP Patient Position: Sitting) 61 98 °F (36.7 °C) (Oral) 16 4' 11.92\" (1.522 m) 159 lb 3.2 oz (72.2 kg) SpO2 BMI OB Status Smoking Status 98% 31.17 kg/m2 Postmenopausal Former Smoker BMI and BSA Data Body Mass Index Body Surface Area  
 31.17 kg/m 2 1.75 m 2 Preferred Pharmacy Pharmacy Name Phone [address], [address] [place] [address]  [phone] Your Updated Medication List  
  
   
This list is accurate as of 8/21/18 10:06 AM.  Always use your most recent med list.  
  
  
  
  
 aspirin delayed-release 81 mg tablet Take 1 Tab by mouth daily. Indications: myocardial infarction prevention Calcium-Cholecalciferol (D3) 600 mg(1,500mg) -400 unit Cap Take  by mouth daily. donepezil 5 mg tablet Commonly known as:  ARICEPT Take 1 Tab by mouth daily (with breakfast). lisinopril 10 mg tablet Commonly known as:  PRINIVIL, ZESTRIL  
TAKE 1 TABLET IN THE MORNING AND TAKE 2 TABLETS IN THE EVENING FOR HYPERTENSION  
  
 mometasone 50 mcg/actuation nasal spray Commonly known as:  NASONEX  
2 Sprays by Both Nostrils route daily. Indications: ALLERGIC RHINITIS Omega-3 Fatty Acids 300 mg Cap Take 1 Cap by mouth daily. TYLENOL 325 mg tablet Generic drug:  acetaminophen Take  by mouth every four (4) hours as needed for Pain. We Performed the Following [address] [[id number] HCPCS] [facility] [[id number] [facility]] LIPID PANEL [96990 CPT(R)] METABOLIC PANEL, COMPREHENSIVE [65532 CPT(R)] MICROALBUMIN, UR, RAND W/ MICROALB/CREAT RATIO [id number] CPT(R)] RI ANNUAL ALCOHOL SCREEN 15 MIN [id number] HCPCS] RI ANNUAL ALCOHOL SCREEN 15 MIN [id number] HCPCS] TSH 3RD GENERATION [14807 CPT(R)] URINALYSIS W/ RFLX MICROSCOPIC [24054 CPT(R)] VITAMIN B12 & FOLATE [83920 CPT(R)] VITAMIN D, 25 HYDROXY [id number] CPT(R)] Follow-up Instructions Return in about 6 months (around 2/21/2019) for blood pressure, referral follow up, results. Patient Instructions Medicare Wellness Visit, Female The best way to live healthy is to have a lifestyle where you eat a well-balanced diet, exercise regularly, limit alcohol use, and quit all forms of tobacco/nicotine, if applicable. Regular preventive services are another way to keep healthy. Preventive services (vaccines, screening tests, monitoring & exams) can help personalize your care plan, which helps you manage your own care. Screening tests can find health problems at the earliest stages, when they are easiest to treat. [facility] follows the current, evidence-based guidelines published by the [facility] Petr Fabiana (USPSTF) when recommending preventive services for our patients. Because we follow these guidelines, sometimes recommendations change over time as research supports it. (For example, mammograms used to be recommended annually. Even though Medicare will still pay for an annual mammogram, the newer guidelines recommend a mammogram every two years for women of average risk.) Of course, you and your doctor may decide to screen more often for some diseases, based on your risk and your health status. Preventive services for you include: - Medicare offers their members a free annual wellness visit, which is time for you and your primary care provider to discuss and plan for your preventive service needs. Take advantage of this benefit every year! 
-All adults over the age of 72 should receive the recommended pneumonia vaccines. Current USPSTF guidelines recommend a series of two vaccines for the best pneumonia protection.  
-All adults should have a flu vaccine yearly and a tetanus vaccine every 10 years. All adults age 61 and older should receive a shingles vaccine once in their lifetime.   
-A bone mass density test is recommended when a woman turns 65 to screen for osteoporosis. This test is only recommended one time, as a screening. Some providers will use this same test as a disease monitoring tool if you already have osteoporosis. -All adults age 38-68 who are overweight should have a diabetes screening test once every three years.  
-Other screening tests and preventive services for persons with diabetes include: an eye exam to screen for diabetic retinopathy, a kidney function test, a foot exam, and stricter control over your cholesterol. -Cardiovascular screening for adults with routine risk involves an electrocardiogram (ECG) at intervals determined by your doctor.  
-Colorectal cancer screenings should be done for adults age 54-65 with no increased risk factors for colorectal cancer. There are a number of acceptable methods of screening for this type of cancer. Each test has its own benefits and drawbacks. Discuss with your doctor what is most appropriate for you during your annual wellness visit. The different tests include: colonoscopy (considered the best screening method), a fecal occult blood test, a fecal DNA test, and sigmoidoscopy. -Breast cancer screenings are recommended every other year for women of normal risk, age 54-69. 
-Cervical cancer screenings for women over age 72 are only recommended with certain risk factors.  
-All adults born between [facility] should be screened once for Hepatitis C. Here is a list of your current Health Maintenance items (your personalized list of preventive services) with a due date: 
Health Maintenance Due Topic Date Due  
 Annual Well Visit  08/19/2018 High Cholesterol: Care Instructions Your Care Instructions Cholesterol is a type of fat in your blood. It is needed for many body functions, such as making new cells. Cholesterol is made by your body. It also comes from food you eat. High cholesterol means that you have too much of the fat in your blood. This raises your risk of a heart attack and stroke. LDL and HDL are part of your total cholesterol. LDL is the \"bad\" cholesterol. High LDL can raise your risk for heart disease, heart attack, and stroke. HDL is the \"good\" cholesterol. It helps clear bad cholesterol from the body. High HDL is linked with a lower risk of heart disease, heart attack, and stroke. Your cholesterol levels help your doctor find out your risk for having a heart attack or stroke.  You and your doctor can talk about whether you need to lower your risk and what treatment is best for you. A heart-healthy lifestyle along with medicines can help lower your cholesterol and your risk. The way you choose to lower your risk will depend on how high your risk is for heart attack and stroke. It will also depend on how you feel about taking medicines. Follow-up care is a key part of your treatment and safety. Be sure to make and go to all appointments, and call your doctor if you are having problems. It's also a good idea to know your test results and keep a list of the medicines you take. How can you care for yourself at home? · Eat a variety of foods every day. Good choices include fruits, vegetables, whole grains (like oatmeal), dried beans and peas, nuts and seeds, soy products (like tofu), and fat-free or low-fat dairy products. · Replace butter, margarine, and hydrogenated or partially hydrogenated oils with olive and canola oils. (Canola oil margarine without trans fat is fine.) · Replace red meat with fish, poultry, and soy protein (like tofu). · Limit processed and packaged foods like chips, crackers, and cookies. · Bake, broil, or steam foods. Don't mari them. · Be physically active. Get at least 30 minutes of exercise on most days of the week. Walking is a good choice. You also may want to do other activities, such as running, swimming, cycling, or playing tennis or team sports. · Stay at a healthy weight or lose weight by making the changes in eating and physical activity listed above. Losing just a small amount of weight, even 5 to 10 pounds, can reduce your risk for having a heart attack or stroke. · Do not smoke. When should you call for help? Watch closely for changes in your health, and be sure to contact your doctor if: 
  · You need help making lifestyle changes.  
  · You have questions about your medicine. Where can you learn more? Go to http://pepe-constantino.info/. Enter O028 in the search box to learn more about \"High Cholesterol: Care Instructions. \" Current as of: May 10, 2017 Content Version: 11.7 © 1459-6080 [employer]. Care instructions adapted under license by Qui[employer] (which disclaims liability or warranty for this information). If you have questions about a medical condition or this instruction, always ask your healthcare professional. [facility] [address] any warranty or liability for your use of this information. [address] What is a living will? A living will is a legal form you use to write down the kind of care you want at the end of your life. It is used by the health professionals who will treat you if you aren't able to decide for yourself. If you put your wishes in writing, your loved ones and others will know what kind of care you want. They won't need to guess. This can ease your mind and be helpful to others. A living will is not the same as an estate or property will. An estate will explains what you want to happen with your money and property after you die. Is a living will a legal document? A living will is a legal document. Each state has its own laws about living patel. If you move to another state, make sure that your living will is legal in the state where you now live. Or you might use a universal form that has been approved by many states. This kind of form can sometimes be completed and stored online. Your electronic copy will then be available wherever you have a connection to the Internet. In most cases, doctors will respect your wishes even if you have a form from a different state. · You don't need an  to complete a living will. But legal advice can be helpful if your state's laws are unclear, your health history is complicated, or your family can't agree on what should be in your living will. · You can change your living will at any time. Some people find that their wishes about end-of-life care change as their health changes. · In addition to making a living will, think about completing a medical power of  form. This form lets you name the person you want to make end-of-life treatment decisions for you (your \"health care agent\") if you're not able to. Many hospitals and nursing homes will give you the forms you need to complete a living will and a medical power of . · Your living will is used only if you can't make or communicate decisions for yourself anymore. If you become able to make decisions again, you can accept or refuse any treatment, no matter what you wrote in your living will. · Your state may offer an online registry. This is a place where you can store your living will online so the doctors and nurses who need to treat you can find it right away. What should you think about when creating a living will? Talk about your end-of-life wishes with your family members and your doctor. Let them know what you want. That way the people making decisions for you won't be surprised by your choices. Think about these questions as you make your living will: · Do you know enough about life support methods that might be used? If not, talk to your doctor so you know what might be done if you can't breathe on your own, your heart stops, or you're unable to swallow. · What things would you still want to be able to do after you receive life-support methods? Would you want to be able to walk? To speak? To eat on your own? To live without the help of machines? · If you have a choice, where do you want to be cared for? In your home? At a hospital or nursing home? · Do you want certain Restoration practices performed if you become very ill? · If you have a choice at the end of your life, where would you prefer to die? At home? In a hospital or nursing home? Somewhere else? · Would you prefer to be buried or cremated? · Do you want your organs to be donated after you die? What should you do with your living will? · Make sure that your family members and your health care agent have copies of your living will. · Give your doctor a copy of your living will to keep in your medical record. If you have more than one doctor, make sure that each one has a copy. · You may want to put a copy of your living will where it can be easily found. Where can you learn more? Go to http://pepe-constantino.info/. Enter I871 in the search box to learn more about \"Learning About Living Danna Both. \" Current as of: August 8, 2016 Content Version: 11.3 © 6546-5896 [employer]. Care instructions adapted under license by [employer] (which disclaims liability or warranty for this information). If you have questions about a medical condition or this instruction, always ask your healthcare professional. [place] [address] any warranty or liability for your use of this information. Medicare Wellness Visit, Female The best way to live healthy is to have a lifestyle where you eat a well-balanced diet, exercise regularly, limit alcohol use, and quit all forms of tobacco/nicotine, if applicable. Regular preventive services are another way to keep healthy. Preventive services (vaccines, screening tests, monitoring & exams) can help personalize your care plan, which helps you manage your own care. Screening tests can find health problems at the earliest stages, when they are easiest to treat. [facility] follows the current, evidence-based guidelines published by the [place] Petr Jung (USPSTF) when recommending preventive services for our patients. Because we follow these guidelines, sometimes recommendations change over time as research supports it. (For example, mammograms used to be recommended annually. Even though Medicare will still pay for an annual mammogram, the newer guidelines recommend a mammogram every two years for women of average risk.) Of course, you and your doctor may decide to screen more often for some diseases, based on your risk and your health status. Preventive services for you include: - Medicare offers their members a free annual wellness visit, which is time for you and your primary care provider to discuss and plan for your preventive service needs. Take advantage of this benefit every year! 
-All adults over the age of 72 should receive the recommended pneumonia vaccines. Current USPSTF guidelines recommend a series of two vaccines for the best pneumonia protection.  
-All adults should have a flu vaccine yearly and a tetanus vaccine every 10 years. All adults age 61 and older should receive a shingles vaccine once in their lifetime.   
-A bone mass density test is recommended when a woman turns 65 to screen for osteoporosis. This test is only recommended one time, as a screening. Some providers will use this same test as a disease monitoring tool if you already have osteoporosis. -All adults age 38-68 who are overweight should have a diabetes screening test once every three years.  
-Other screening tests and preventive services for persons with diabetes include: an eye exam to screen for diabetic retinopathy, a kidney function test, a foot exam, and stricter control over your cholesterol.  
-Cardiovascular screening for adults with routine risk involves an electrocardiogram (ECG) at intervals determined by your doctor.  
-Colorectal cancer screenings should be done for adults age 54-65 with no increased risk factors for colorectal cancer. There are a number of acceptable methods of screening for this type of cancer. Each test has its own benefits and drawbacks. Discuss with your doctor what is most appropriate for you during your annual wellness visit.  The different tests include: colonoscopy (considered the best screening method), a fecal occult blood test, a fecal DNA test, and sigmoidoscopy. -Breast cancer screenings are recommended every other year for women of normal risk, age 54-69. 
-Cervical cancer screenings for women over age 72 are only recommended with certain risk factors.  
-All adults born between [facility] should be screened once for Hepatitis C. Here is a list of your current Health Maintenance items (your personalized list of preventive services) with a due date: 
Health Maintenance Due Topic Date Due  
 Annual Well Visit  08/19/2018 Dry Eyes: Care Instructions Your Care Instructions Dry eyes can be uncomfortable. The dryness may make your eyes feel dry or hot. Your eyes may also water a lot. In some cases, dry eyes make it feel like there is sand or dirt in your eyes. From time to time, dry eyes may cause you to have blurry vision. But dry eyes don't usually cause lasting problems with vision. There are many causes of dry eyes. Sometimes dry weather, smoke, or pollution can bother the eyes. Other times, allergies or contact lenses irritate the eyes. Older people often have dry eyes because our eyes do not make as many tears as we age. In some cases, diseases can cause dry eyes. These include rheumatoid arthritis, lupus, and Sjögren's syndrome. In other cases, medicines are to blame. Your doctor may want to do tests to help find the cause of your dry eyes. You can work with your doctor to find ways to help your eyes feel better. Home treatment often helps. Follow-up care is a key part of your treatment and safety. Be sure to make and go to all appointments, and call your doctor if you are having problems. It's also a good idea to know your test results and keep a list of the medicines you take. How can you care for yourself at home? · Take breaks often when you read, watch TV, or use a computer.  Close your eyes. Do not rub your eyes. Artificial tears may help you when you do these activities. You can buy these without a prescription. · Avoid smoke and other things that irritate the eyes. · Wear sunglasses that wrap around the sides of the head. These can protect the eyes from sun, wind, dust, and dirt. · Use a vaporizer or humidifier to add moisture to your bedroom. Follow the directions for cleaning the machine. · Do not use fans while you sleep. · If you usually wear contact lenses, use rewetting drops or wear your glasses until your eyes feel better. · Be safe with medicines. Take your medicine exactly as prescribed. Call your doctor if you think you are having a problem with your medicine. · Try using artificial tears at least 4 times a day. · If you need drops more than 4 times a day, use artificial tears without preservatives. They may irritate the eyes less. · Use a lubricating eye ointment or eye gel at bedtime. These are thicker and last longer, so you may have less burning, dryness, and itching when you wake up. Be aware that they may blur your vision for a short time. · To put in eyedrops or ointment: ¨ Tilt your head back, and pull the lower eyelid down with one finger. ¨ Drop or squirt the medicine inside the lower lid. ¨ Close your eye for 30 to 60 seconds to let the drops or ointment move around. ¨ Do not touch the ointment or dropper tip to your eyelashes or any other surface. · Put a warm, moist cloth on your eyelids every morning for about 5 minutes. Then massage your eyelids lightly. This helps increase the natural wetness of your eyes. When should you call for help? Watch closely for changes in your health, and be sure to contact your doctor if: 
  · Your eyes are still dry, irritated, or teary, and artificial tears do not help.  
  · You do not get better as expected. Where can you learn more? Go to http://pepe-constantino.info/. Enter D231 in the search box to learn more about \"Dry Eyes: Care Instructions. \" Current as of: December 3, 2017 Content Version: 11.7 © 7468-3832 [employer]. Care instructions adapted under license by Search[employer] (which disclaims liability or warranty for this information). If you have questions about a medical condition or this instruction, always ask your healthcare professional. [address] any warranty or liability for your use of this information. DASH Diet: Care Instructions Your Care Instructions The DASH diet is an eating plan that can help lower your blood pressure. DASH stands for Dietary Approaches to Stop Hypertension. Hypertension is high blood pressure. The DASH diet focuses on eating foods that are high in calcium, potassium, and magnesium. These nutrients can lower blood pressure. The foods that are highest in these nutrients are fruits, vegetables, low-fat dairy products, nuts, seeds, and legumes. But taking calcium, potassium, and magnesium supplements instead of eating foods that are high in those nutrients does not have the same effect. The DASH diet also includes whole grains, fish, and poultry. The DASH diet is one of several lifestyle changes your doctor may recommend to lower your high blood pressure. Your doctor may also want you to decrease the amount of sodium in your diet. Lowering sodium while following the DASH diet can lower blood pressure even further than just the DASH diet alone. Follow-up care is a key part of your treatment and safety. Be sure to make and go to all appointments, and call your doctor if you are having problems. It's also a good idea to know your test results and keep a list of the medicines you take. How can you care for yourself at home? Following the DASH diet · Eat 4 to 5 servings of fruit each day.  A serving is 1 medium-sized piece of fruit, ½ cup chopped or canned fruit, 1/4 cup dried fruit, or 4 ounces (½ cup) of fruit juice. Choose fruit more often than fruit juice. · Eat 4 to 5 servings of vegetables each day. A serving is 1 cup of lettuce or raw leafy vegetables, ½ cup of chopped or cooked vegetables, or 4 ounces (½ cup) of vegetable juice. Choose vegetables more often than vegetable juice. · Get 2 to 3 servings of low-fat and fat-free dairy each day. A serving is 8 ounces of milk, 1 cup of yogurt, or 1 ½ ounces of cheese. · Eat 6 to 8 servings of grains each day. A serving is 1 slice of bread, 1 ounce of dry cereal, or ½ cup of cooked rice, pasta, or cooked cereal. Try to choose whole-grain products as much as possible. · Limit lean meat, poultry, and fish to 2 servings each day. A serving is 3 ounces, about the size of a deck of cards. · Eat 4 to 5 servings of nuts, seeds, and legumes (cooked dried beans, lentils, and split peas) each week. A serving is 1/3 cup of nuts, 2 tablespoons of seeds, or ½ cup of cooked beans or peas. · Limit fats and oils to 2 to 3 servings each day. A serving is 1 teaspoon of vegetable oil or 2 tablespoons of salad dressing. · Limit sweets and added sugars to 5 servings or less a week. A serving is 1 tablespoon jelly or jam, ½ cup sorbet, or 1 cup of lemonade. · Eat less than 2,300 milligrams (mg) of sodium a day. If you limit your sodium to 1,500 mg a day, you can lower your blood pressure even more. Tips for success · Start small. Do not try to make dramatic changes to your diet all at once. You might feel that you are missing out on your favorite foods and then be more likely to not follow the plan. Make small changes, and stick with them. Once those changes become habit, add a few more changes. · Try some of the following: ¨ Make it a goal to eat a fruit or vegetable at every meal and at snacks. This will make it easy to get the recommended amount of fruits and vegetables each day. ¨ Try yogurt topped with fruit and nuts for a snack or healthy dessert. ¨ Add lettuce, tomato, cucumber, and onion to sandwiches. ¨ Combine a ready-made pizza crust with low-fat mozzarella cheese and lots of vegetable toppings. Try using tomatoes, squash, spinach, broccoli, carrots, cauliflower, and onions. ¨ Have a variety of cut-up vegetables with a low-fat dip as an appetizer instead of chips and dip. ¨ Sprinkle sunflower seeds or chopped almonds over salads. Or try adding chopped walnuts or almonds to cooked vegetables. ¨ Try some vegetarian meals using beans and peas. Add garbanzo or kidney beans to salads. Make burritos and tacos with mashed bowden beans or black beans. Where can you learn more? Go to http://pe[facility] Allianceconstantino.info/. Enter Q295 in the search box to learn more about \"DASH Diet: Care Instructions. \" Current as of: December 6, 2017 Content Version: 11.7 © 3195-4600 [employer]. Care instructions adapted under license by [employer] (which disclaims liability or warranty for this information). If you have questions about a medical condition or this instruction, always ask your healthcare professional. [address] any warranty or liability for your use of this information. Check BP twice weekly. Notify me if it consistently is above 140/90 or below 90/60. Bring your blood pressure log to your next office visit. Decrease salt, fats, caffeine, alcohol in your diet. Increase water, fiber. Exercise 5 times weekly for 30 minutes daily as tolerated. Continue current medications, care and subspecialty follow up. Visit eye doctor yearly to check your eyes blood pressure related changes. DASH Diet: Care Instructions Your Care Instructions The DASH diet is an eating plan that can help lower your blood pressure. DASH stands for Dietary Approaches to Stop Hypertension. Hypertension is high blood pressure. The DASH diet focuses on eating foods that are high in calcium, potassium, and magnesium. These nutrients can lower blood pressure. The foods that are highest in these nutrients are fruits, vegetables, low-fat dairy products, nuts, seeds, and legumes. But taking calcium, potassium, and magnesium supplements instead of eating foods that are high in those nutrients does not have the same effect. The DASH diet also includes whole grains, fish, and poultry. The DASH diet is one of several lifestyle changes your doctor may recommend to lower your high blood pressure. Your doctor may also want you to decrease the amount of sodium in your diet. Lowering sodium while following the DASH diet can lower blood pressure even further than just the DASH diet alone. Follow-up care is a key part of your treatment and safety. Be sure to make and go to all appointments, and call your doctor if you are having problems. It's also a good idea to know your test results and keep a list of the medicines you take. How can you care for yourself at home? Following the DASH diet · Eat 4 to 5 servings of fruit each day. A serving is 1 medium-sized piece of fruit, ½ cup chopped or canned fruit, 1/4 cup dried fruit, or 4 ounces (½ cup) of fruit juice. Choose fruit more often than fruit juice. · Eat 4 to 5 servings of vegetables each day. A serving is 1 cup of lettuce or raw leafy vegetables, ½ cup of chopped or cooked vegetables, or 4 ounces (½ cup) of vegetable juice. Choose vegetables more often than vegetable juice. · Get 2 to 3 servings of low-fat and fat-free dairy each day. A serving is 8 ounces of milk, 1 cup of yogurt, or 1 ½ ounces of cheese. · Eat 6 to 8 servings of grains each day. A serving is 1 slice of bread, 1 ounce of dry cereal, or ½ cup of cooked rice, pasta, or cooked cereal. Try to choose whole-grain products as much as possible. · Limit lean meat, poultry, and fish to 2 servings each day.  A serving is 3 ounces, about the size of a deck of cards. · Eat 4 to 5 servings of nuts, seeds, and legumes (cooked dried beans, lentils, and split peas) each week. A serving is 1/3 cup of nuts, 2 tablespoons of seeds, or ½ cup of cooked beans or peas. · Limit fats and oils to 2 to 3 servings each day. A serving is 1 teaspoon of vegetable oil or 2 tablespoons of salad dressing. · Limit sweets and added sugars to 5 servings or less a week. A serving is 1 tablespoon jelly or jam, ½ cup sorbet, or 1 cup of lemonade. · Eat less than 2,300 milligrams (mg) of sodium a day. If you limit your sodium to 1,500 mg a day, you can lower your blood pressure even more. Tips for success · Start small. Do not try to make dramatic changes to your diet all at once. You might feel that you are missing out on your favorite foods and then be more likely to not follow the plan. Make small changes, and stick with them. Once those changes become habit, add a few more changes. · Try some of the following: ¨ Make it a goal to eat a fruit or vegetable at every meal and at snacks. This will make it easy to get the recommended amount of fruits and vegetables each day. ¨ Try yogurt topped with fruit and nuts for a snack or healthy dessert. ¨ Add lettuce, tomato, cucumber, and onion to sandwiches. ¨ Combine a ready-made pizza crust with low-fat mozzarella cheese and lots of vegetable toppings. Try using tomatoes, squash, spinach, broccoli, carrots, cauliflower, and onions. ¨ Have a variety of cut-up vegetables with a low-fat dip as an appetizer instead of chips and dip. ¨ Sprinkle sunflower seeds or chopped almonds over salads. Or try adding chopped walnuts or almonds to cooked vegetables. ¨ Try some vegetarian meals using beans and peas. Add garbanzo or kidney beans to salads. Make burritos and tacos with mashed bowden beans or black beans. Where can you learn more? Go to http://cantu-constantino.info/. Enter W833 in the search box to learn more about \"DASH Diet: Care Instructions. \" Current as of: December 6, 2017 Content Version: 11.7 © 9484-6413 [employer]. Care instructions adapted under license by [employer] (which disclaims liability or warranty for this information). If you have questions about a medical condition or this instruction, always ask your healthcare professional. [address] any warranty or liability for your use of this information. Introducing [facility]! [employer] introduces [employer] patient portal. Now you can access parts of your medical record, email your doctor's office, and request medication refills online. 1. In your internet browser, go to [email or web address] 2. Click on the First Time User? Click Here link in the Sign In box. You will see the New Member Sign Up page. 3. Enter your Involvio Access Code exactly as it appears below. You will not need to use this code after youve completed the sign-up process. If you do not sign up before the expiration date, you must request a new code. · Involvio Access Code: [email or web address] Expires: 11/19/2018  9:57 AM 
 
4. Enter the last four digits of your Social Security Number (xxxx) and Date of Birth (mm/dd/yyyy) as indicated and click Submit. You will be taken to the next sign-up page. 5. Create a Involvio ID. This will be your Involvio login ID and cannot be changed, so think of one that is secure and easy to remember. 6. Create a Involvio password. You can change your password at any time. 7. Enter your Password Reset Question and Answer. This can be used at a later time if you forget your password. 8. Enter your e-mail address. You will receive e-mail notification when new information is available in [address] 9. Click Sign Up. You can now view and download portions of your medical record. 10. Click the Download Summary menu link to download a portable copy of your medical information. If you have questions, please visit the Frequently Asked Questions section of the [employer] website. Remember, [employer] is NOT to be used for urgent needs. For medical emergencies, dial 911. Now available from your iPhone and Android! Please provide this summary of care documentation to your next provider. Your primary care clinician is listed as Laxmi Blackman. If you have any questions after today's visit, please call [phone].

## 2018-08-21 NOTE — PROGRESS NOTES
This is the Subsequent Medicare Annual Wellness Exam, performed 12 months or more after the Initial AWV or the last Subsequent AWV    I have reviewed the patient's medical history in detail and updated the computerized patient record. History     Past Medical History:   Diagnosis Date    Bilateral dry eyes 2017    Dr. Dalton Calvin    Cataract     Bilaterally. Dr. Ronny Cisneros. Dr. Stanislaw Sebastian. Dr. Dalton Calvin.  Chest pain 10/2015, 2016    Dr. Brodie Ramirez. negative Stress Test.    Dementia 07/2017    triggered by mild LACHELLE, depression. Dr. Janet Golden. Dr. Bar Ship Diverticulosis 09/2008    Dr. Bibiana Tobar. Dr. Sandra Price.  DJD (degenerative joint disease) 09/30/05    cervical, worse C6-7, C7-T1. Left AC joint.  DJD (degenerative joint disease) of knee     bilateral  Dr. Vidya James. Dr. Deborah NULL (dyspnea on exertion) 10/2015    Dr. Brodie Ramirez.  Essential hypertension, benign 1968    Foster child     GIB (gastrointestinal bleeding) 11/17/14    due to internal hemorrhoids. Dr. Avtar Ribeiro Heart palpitations 11/12/15    due to PVCs. Dr. Brandy London.  Heartburn     Hypercholesteremia     Hyperglycemia 2014    Internal carotid artery stenosis 2007    bilateral.  Dr. Torres Form Internal hemorrhoids 09/2008, 11/2014    Dr. Godfrey Cruz. Dr. Gema Knight.  Knee pain     Left. Dr. Cynthia High. 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 Dr. Brodie Ramirez. EF 50-55%    PVD (peripheral vascular disease) ([address] 2007    Dr. Soraya Armstrong    Raynaud's phenomenon 1968    Shortening, leg, congenital     left    Vitamin D deficiency 10/10/10      Past Surgical History:   Procedure Laterality Date    HX ATHERECTOMY Left 09/01/2011    popliteal atherectomy and angioplasty. Dr. Talon Boucher Right 2008    benign. Dr. Cristobal Palmer.  HX CAROTID ENDARTERECTOMY Right 01/19/2011    ICA.   Dr. Carney Dk Bilateral 02/22/2016, 10/24/2016    L then R Dr. Lesia Hemphill.  HX COLONOSCOPY  11/24/14    due to GIB. Dr. Roxie Moreno.  HX COLONOSCOPY  09/05/08    with polypectomy. benign. Dr. Crispin Austin. due q 10 yrs.  HX GI  1957    FISSURECTOMY.  HX GI  10/03/2008    PROCTOPLASTY due to rectal prolapse    HX HEMORRHOIDECTOMY  10/03/2008    internal and external    HX KNEE REPLACEMENT Left 07/2013    due to Severe OA. Dr. Lisa Medina.  HX POLYPECTOMY  09/05/2008    rectal Dr. Cervantes Courser     Current Outpatient Prescriptions   Medication Sig Dispense Refill    acetaminophen (TYLENOL) 325 mg tablet Take  by mouth every four (4) hours as needed for Pain.  donepezil (ARICEPT) 5 mg tablet Take 1 Tab by mouth daily (with breakfast). 90 Tab 1    lisinopril (PRINIVIL, ZESTRIL) 10 mg tablet TAKE 1 TABLET IN THE MORNING AND TAKE 2 TABLETS IN THE EVENING FOR HYPERTENSION 270 Tab 3    aspirin delayed-release 81 mg tablet Take 1 Tab by mouth daily. Indications: myocardial infarction prevention 90 Tab 3    mometasone (NASONEX) 50 mcg/actuation nasal spray 2 Sprays by Both Nostrils route daily. Indications: ALLERGIC RHINITIS 1 Container 5    Omega-3 Fatty Acids 300 mg cap Take 1 Cap by mouth daily.  Calcium-Cholecalciferol, D3, 600 mg(1,500mg) -400 unit cap Take  by mouth daily.        Allergies   Allergen Reactions    Penicillins Hives     Family History   Problem Relation Age of Onset    Heart Attack Mother 79    Heart Attack Sister      x 3 sisters    Heart Attack Brother      x 3 brothers     Social History   Substance Use Topics    Smoking status: Former Smoker     Packs/day: 1.00     Years: 15.00     Types: Cigarettes     Quit date: 1/1/1968    Smokeless tobacco: Never Used    Alcohol use No     Patient Active Problem List   Diagnosis Code    Internal carotid artery stenosis I65.29    PVD (peripheral vascular disease) ([facility] I73.9    Raynaud's phenomenon I73.00    GERD (gastroesophageal reflux disease) K21.9    Diverticulosis K57.90    Internal hemorrhoids K64.8    Hypercholesteremia E78.00    Obesity, Class I, BMI 30-34.9 E66.9    Essential hypertension I10    Adjustment disorder with mixed anxiety and depressed mood F43.23    Pulmonic valve insufficiency I37.1    Heart palpitations R00.2    Shortening, leg, congenital Q72.819    Aortic valve sclerosis I35.8    Hyperglycemia R73.9    Vitamin D deficiency E55.9    History of tobacco abuse Z87.891    History of GI bleed Z87.19    History of CEA (carotid endarterectomy) Z98.890    Advance care planning Z71.89    Dementia without behavioral disturbance F03.90    History of knee replacement, total, left Z96.652    Chronically dry eyes, bilateral H04.123    H/O carotid endarterectomy Z98.890    Family history of heart attack Z82.49       Depression Risk Factor Screening:     PHQ over the last two weeks 8/21/2018   Little interest or pleasure in doing things Not at all   Feeling down, depressed, irritable, or hopeless Not at all   Total Score PHQ 2 0     Alcohol Risk Factor Screening: You do not drink alcohol or very rarely. Functional Ability and Level of Safety:   Pt ambulates safely without assistance. Hearing Loss  Hearing is good. Activities of Daily Living  The home contains: [employer]  Patient does total self care    Fall Risk  Fall Risk Assessment, last 12 mths 8/21/2018   Able to walk? Yes   Fall in past 12 months? No       Abuse Screen  Patient is not abused    Cognitive Screening   Evaluation of Cognitive Function:  Has your family/caregiver stated any concerns about your memory: no  PT HAS HAD EXTENSIVE NEUROPSYCH TESTING BY DR. Cathryn Gan AND SEES HER NEUROLOGIST DR. Nessa Hopson FOR MILD DEMENTIA. SHE IS VERY STABLE AND VERY ACTIVE.     Patient Care Team   Patient Care Team:  iMtali Rocha DO as PCP - Jaya Senior MD (Vascular Surgery)  Guevara Meza MD (Cardiology)  Rodolfo Rosario MD (Ophthalmology)  Slade Orozco. Walton Boxer, MD (Gastroenterology)  Jaylyn Cisneros MD (Orthopedic Surgery)  [address] [place] [address] (Neurology)    Assessment/Plan   Education and counseling provided:  Are appropriate based on today's review and evaluation  End-of-Life planning (with patient's consent)  Pneumococcal Vaccine  Influenza Vaccine  Screening Mammography  Screening Pap and pelvic (covered once every 2 years)  Colorectal cancer screening tests  Cardiovascular screening blood test  Bone mass measurement (DEXA)  Screening for glaucoma  Diabetes screening test    Diagnoses and all orders for this visit:    1. Medicare annual wellness visit, subsequent    2. Essential hypertension  -     LIPID PANEL  -     METABOLIC PANEL, COMPREHENSIVE  -     TSH 3RD GENERATION  -     MICROALBUMIN, UR, RAND W/ MICROALB/CREAT RATIO  -     URINALYSIS W/ RFLX MICROSCOPIC    3. Hypercholesteremia  -     LIPID PANEL  -     METABOLIC PANEL, COMPREHENSIVE    4. Dementia without behavioral disturbance, unspecified dementia type  Comments:  stable on Aricept 5 mg  Orders:  -     VITAMIN B12 & FOLATE    5. Adjustment disorder with mixed anxiety and depressed mood  Comments:  stable without Zoloft    6. Hyperglycemia  -     METABOLIC PANEL, COMPREHENSIVE    7. Heart palpitations  Comments:  stable  Orders:  -     TSH 3RD GENERATION    8. Vitamin D deficiency  -     VITAMIN D, 25 HYDROXY    9. Chronically dry eyes, bilateral    10. Weight loss  Comments:  7# since 07/2018 due to eating a healthier diet    11. History of CEA (carotid endarterectomy)    12. Congenital shortening of left lower extremity    13. Obesity, Class I, BMI 30-34.9    14. Stenosis of both internal carotid arteries    15. PVD (peripheral vascular disease) ([address]    16. H/O carotid endarterectomy  Comments:  Right, 2011, stable    17. Family history of heart attack    18. History of knee replacement, total, left    19.  Screening for alcoholism  -     Annual  Alcohol Screen 15 min ()  -     Annual  Alcohol Screen 15 min ()    20.  Screening for depression  -     Depression Screen Annual  -     Depression Screen Annual        Health Maintenance Due   Topic Date Due    MEDICARE YEARLY EXAM  08/19/2018

## 2018-08-21 NOTE — PROGRESS NOTES
Eduardo De La O  Identified pt with two pt identifiers(name and ). Chief Complaint   Patient presents with    Blood Pressure Check    Annual Wellness Visit    Results       1. Have you been to the ER, urgent care clinic since your last visit? Hospitalized since your last visit? No    2. Have you seen or consulted any other health care providers outside of the [address] [place] [address] since your last visit? Include any pap smears or colon screening. No    In the event something were to happen to you and you were unable to speak on your behalf, do you have an Advance Directive/ Living Will in place stating your wishes? NO    If yes, do we have a copy on file NO    If no, would you like information:          Medication reconciliation up to date and corrected with patient at this time. Today's provider has been notified of reason for visit, vitals and flowsheets obtained on patients. Reviewed record in preparation for visit, huddled with provider and have obtained necessary documentation.       Health Maintenance Due   Topic    MEDICARE YEARLY EXAM        Wt Readings from Last 3 Encounters:   18 159 lb 3.2 oz (72.2 kg)   18 166 lb (75.3 kg)   18 166 lb 6.4 oz (75.5 kg)     Temp Readings from Last 3 Encounters:   18 98 °F (36.7 °C) (Oral)   18 98.4 °F (36.9 °C) (Oral)   17 97.7 °F (36.5 °C) (Oral)     BP Readings from Last 3 Encounters:   18 130/75   18 120/74   18 157/71     Pulse Readings from Last 3 Encounters:   18 61   18 68   17 71     Vitals:    18 0907   BP: 130/75   Pulse: 61   Resp: 16   Temp: 98 °F (36.7 °C)   TempSrc: Oral   SpO2: 98%   Weight: 159 lb 3.2 oz (72.2 kg)   Height: 4' 11.92\" (1.522 m)   PainSc:   0 - No pain         Learning Assessment:  :     Learning Assessment 2014   PRIMARY LEARNER Patient   HIGHEST LEVEL OF EDUCATION - PRIMARY LEARNER  GRADUATED HIGH SCHOOL OR GED   BARRIERS PRIMARY LEARNER NONE   PRIMARY LANGUAGE ENGLISH   LEARNER PREFERENCE PRIMARY READING   ANSWERED BY patient   RELATIONSHIP SELF       Depression Screening:  :     PHQ over the last two weeks 8/21/2018   Little interest or pleasure in doing things Not at all   Feeling down, depressed, irritable, or hopeless Not at all   Total Score PHQ 2 0       Fall Risk Assessment:  :     Fall Risk Assessment, last 12 mths 8/21/2018   Able to walk? Yes   Fall in past 12 months? No       Abuse Screening:  :     Abuse Screening Questionnaire 8/21/2018 8/18/2017   Do you ever feel afraid of your partner? N N   Are you in a relationship with someone who physically or mentally threatens you? N N   Is it safe for you to go home?  Y Y       ADL Screening:  :     ADL Assessment 8/21/2018   Feeding yourself No Help Needed   Getting from bed to chair No Help Needed   Getting dressed No Help Needed   Bathing or showering No Help Needed   Walk across the room (includes cane/walker) No Help Needed   Using the telphone No Help Needed   Taking your medications No Help Needed   Preparing meals No Help Needed   Managing money (expenses/bills) No Help Needed   Moderately strenuous housework (laundry) No Help Needed   Shopping for personal items (toiletries/medicines) No Help Needed   Shopping for groceries No Help Needed   Driving No Help Needed   Climbing a flight of stairs No Help Needed   Getting to places beyond walking distances No Help Needed

## 2018-08-21 NOTE — PATIENT INSTRUCTIONS
Medicare Wellness Visit, Female     The best way to live healthy is to have a lifestyle where you eat a well-balanced diet, exercise regularly, limit alcohol use, and quit all forms of tobacco/nicotine, if applicable. Regular preventive services are another way to keep healthy. Preventive services (vaccines, screening tests, monitoring & exams) can help personalize your care plan, which helps you manage your own care. Screening tests can find health problems at the earliest stages, when they are easiest to treat. [facility] follows the current, evidence-based guidelines published by the [facility] ([facility]) when recommending preventive services for our patients. Because we follow these guidelines, sometimes recommendations change over time as research supports it. (For example, mammograms used to be recommended annually. Even though Medicare will still pay for an annual mammogram, the newer guidelines recommend a mammogram every two years for women of average risk.)  Of course, you and your doctor may decide to screen more often for some diseases, based on your risk and your health status. Preventive services for you include:  - Medicare offers their members a free annual wellness visit, which is time for you and your primary care provider to discuss and plan for your preventive service needs. Take advantage of this benefit every year!  -All adults over the age of 72 should receive the recommended pneumonia vaccines. Current USPSTF guidelines recommend a series of two vaccines for the best pneumonia protection.   -All adults should have a flu vaccine yearly and a tetanus vaccine every 10 years. All adults age 61 and older should receive a shingles vaccine once in their lifetime.    -A bone mass density test is recommended when a woman turns 65 to screen for osteoporosis. This test is only recommended one time, as a screening.  Some providers will use this same test as a disease monitoring tool if you already have osteoporosis. -All adults age 38-68 who are overweight should have a diabetes screening test once every three years.   -Other screening tests and preventive services for persons with diabetes include: an eye exam to screen for diabetic retinopathy, a kidney function test, a foot exam, and stricter control over your cholesterol.   -Cardiovascular screening for adults with routine risk involves an electrocardiogram (ECG) at intervals determined by your doctor.   -Colorectal cancer screenings should be done for adults age 54-65 with no increased risk factors for colorectal cancer. There are a number of acceptable methods of screening for this type of cancer. Each test has its own benefits and drawbacks. Discuss with your doctor what is most appropriate for you during your annual wellness visit. The different tests include: colonoscopy (considered the best screening method), a fecal occult blood test, a fecal DNA test, and sigmoidoscopy. -Breast cancer screenings are recommended every other year for women of normal risk, age 54-69.  -Cervical cancer screenings for women over age 72 are only recommended with certain risk factors.   -All adults born between [facility] should be screened once for Hepatitis C. Here is a list of your current Health Maintenance items (your personalized list of preventive services) with a due date:  Health Maintenance Due   Topic Date Due    Annual Well Visit  08/19/2018              High Cholesterol: Care Instructions  Your Care Instructions    Cholesterol is a type of fat in your blood. It is needed for many body functions, such as making new cells. Cholesterol is made by your body. It also comes from food you eat. High cholesterol means that you have too much of the fat in your blood. This raises your risk of a heart attack and stroke. LDL and HDL are part of your total cholesterol. LDL is the \"bad\" cholesterol.  High LDL can raise your risk for heart disease, heart attack, and stroke. HDL is the \"good\" cholesterol. It helps clear bad cholesterol from the body. High HDL is linked with a lower risk of heart disease, heart attack, and stroke. Your cholesterol levels help your doctor find out your risk for having a heart attack or stroke. You and your doctor can talk about whether you need to lower your risk and what treatment is best for you. A heart-healthy lifestyle along with medicines can help lower your cholesterol and your risk. The way you choose to lower your risk will depend on how high your risk is for heart attack and stroke. It will also depend on how you feel about taking medicines. Follow-up care is a key part of your treatment and safety. Be sure to make and go to all appointments, and call your doctor if you are having problems. It's also a good idea to know your test results and keep a list of the medicines you take. How can you care for yourself at home? · Eat a variety of foods every day. Good choices include fruits, vegetables, whole grains (like oatmeal), dried beans and peas, nuts and seeds, soy products (like tofu), and fat-free or low-fat dairy products. · Replace butter, margarine, and hydrogenated or partially hydrogenated oils with olive and canola oils. (Canola oil margarine without trans fat is fine.)  · Replace red meat with fish, poultry, and soy protein (like tofu). · Limit processed and packaged foods like chips, crackers, and cookies. · Bake, broil, or steam foods. Don't mari them. · Be physically active. Get at least 30 minutes of exercise on most days of the week. Walking is a good choice. You also may want to do other activities, such as running, swimming, cycling, or playing tennis or team sports. · Stay at a healthy weight or lose weight by making the changes in eating and physical activity listed above.  Losing just a small amount of weight, even 5 to 10 pounds, can reduce your risk for having a heart attack or stroke. · Do not smoke. When should you call for help? Watch closely for changes in your health, and be sure to contact your doctor if:    · You need help making lifestyle changes.     · You have questions about your medicine. Where can you learn more? Go to http://pepe-constantino.info/. Enter X819 in the search box to learn more about \"High Cholesterol: Care Instructions. \"  Current as of: May 10, 2017  Content Version: 11.7  © 2334-4515 [employer]. Care instructions adapted under license by [employer] (which disclaims liability or warranty for this information). If you have questions about a medical condition or this instruction, always ask your healthcare professional. [address] any warranty or liability for your use of this information. Learning About Living Perroy  What is a living will? A living will is a legal form you use to write down the kind of care you want at the end of your life. It is used by the health professionals who will treat you if you aren't able to decide for yourself. If you put your wishes in writing, your loved ones and others will know what kind of care you want. They won't need to guess. This can ease your mind and be helpful to others. A living will is not the same as an estate or property will. An estate will explains what you want to happen with your money and property after you die. Is a living will a legal document? A living will is a legal document. Each state has its own laws about living patel. If you move to another state, make sure that your living will is legal in the state where you now live. Or you might use a universal form that has been approved by many states. This kind of form can sometimes be completed and stored online. Your electronic copy will then be available wherever you have a connection to the Internet.  In most cases, doctors will respect your wishes even if you have a form from a different state. · You don't need an  to complete a living will. But legal advice can be helpful if your state's laws are unclear, your health history is complicated, or your family can't agree on what should be in your living will. · You can change your living will at any time. Some people find that their wishes about end-of-life care change as their health changes. · In addition to making a living will, think about completing a medical power of  form. This form lets you name the person you want to make end-of-life treatment decisions for you (your \"health care agent\") if you're not able to. Many hospitals and nursing homes will give you the forms you need to complete a living will and a medical power of . · Your living will is used only if you can't make or communicate decisions for yourself anymore. If you become able to make decisions again, you can accept or refuse any treatment, no matter what you wrote in your living will. · Your state may offer an online registry. This is a place where you can store your living will online so the doctors and nurses who need to treat you can find it right away. What should you think about when creating a living will? Talk about your end-of-life wishes with your family members and your doctor. Let them know what you want. That way the people making decisions for you won't be surprised by your choices. Think about these questions as you make your living will:  · Do you know enough about life support methods that might be used? If not, talk to your doctor so you know what might be done if you can't breathe on your own, your heart stops, or you're unable to swallow. · What things would you still want to be able to do after you receive life-support methods? Would you want to be able to walk? To speak? To eat on your own? To live without the help of machines? · If you have a choice, where do you want to be cared for? In your home?  At a hospital or nursing home? · Do you want certain Confucianist practices performed if you become very ill? · If you have a choice at the end of your life, where would you prefer to die? At home? In a hospital or nursing home? Somewhere else? · Would you prefer to be buried or cremated? · Do you want your organs to be donated after you die? What should you do with your living will? · Make sure that your family members and your health care agent have copies of your living will. · Give your doctor a copy of your living will to keep in your medical record. If you have more than one doctor, make sure that each one has a copy. · You may want to put a copy of your living will where it can be easily found. Where can you learn more? Go to http://pepe-constantino.info/. Enter N840 in the search box to learn more about \"Learning About Living Perroy. \"  Current as of: August 8, 2016  Content Version: 11.3  © 0491-9565 [employer]. Care instructions adapted under license by [employer] (which disclaims liability or warranty for this information). If you have questions about a medical condition or this instruction, always ask your healthcare professional. [address] any warranty or liability for your use of this information. Medicare Wellness Visit, Female     The best way to live healthy is to have a lifestyle where you eat a well-balanced diet, exercise regularly, limit alcohol use, and quit all forms of tobacco/nicotine, if applicable. Regular preventive services are another way to keep healthy. Preventive services (vaccines, screening tests, monitoring & exams) can help personalize your care plan, which helps you manage your own care. Screening tests can find health problems at the earliest stages, when they are easiest to treat.    [facility] follows the current, evidence-based guidelines published by the [employer] (USPSTF) when recommending preventive services for our patients. Because we follow these guidelines, sometimes recommendations change over time as research supports it. (For example, mammograms used to be recommended annually. Even though Medicare will still pay for an annual mammogram, the newer guidelines recommend a mammogram every two years for women of average risk.)  Of course, you and your doctor may decide to screen more often for some diseases, based on your risk and your health status. Preventive services for you include:  - Medicare offers their members a free annual wellness visit, which is time for you and your primary care provider to discuss and plan for your preventive service needs. Take advantage of this benefit every year!  -All adults over the age of 72 should receive the recommended pneumonia vaccines. Current USPSTF guidelines recommend a series of two vaccines for the best pneumonia protection.   -All adults should have a flu vaccine yearly and a tetanus vaccine every 10 years. All adults age 61 and older should receive a shingles vaccine once in their lifetime.    -A bone mass density test is recommended when a woman turns 65 to screen for osteoporosis. This test is only recommended one time, as a screening. Some providers will use this same test as a disease monitoring tool if you already have osteoporosis. -All adults age 38-68 who are overweight should have a diabetes screening test once every three years.   -Other screening tests and preventive services for persons with diabetes include: an eye exam to screen for diabetic retinopathy, a kidney function test, a foot exam, and stricter control over your cholesterol.   -Cardiovascular screening for adults with routine risk involves an electrocardiogram (ECG) at intervals determined by your doctor.   -Colorectal cancer screenings should be done for adults age 54-65 with no increased risk factors for colorectal cancer.   There are a number of acceptable methods of screening for this type of cancer. Each test has its own benefits and drawbacks. Discuss with your doctor what is most appropriate for you during your annual wellness visit. The different tests include: colonoscopy (considered the best screening method), a fecal occult blood test, a fecal DNA test, and sigmoidoscopy. -Breast cancer screenings are recommended every other year for women of normal risk, age 54-69.  -Cervical cancer screenings for women over age 72 are only recommended with certain risk factors.   -All adults born between [facility] should be screened once for Hepatitis C. Here is a list of your current Health Maintenance items (your personalized list of preventive services) with a due date:  Health Maintenance Due   Topic Date Due    Annual Well Visit  08/19/2018            Dry Eyes: Care Instructions  Your Care Instructions    Dry eyes can be uncomfortable. The dryness may make your eyes feel dry or hot. Your eyes may also water a lot. In some cases, dry eyes make it feel like there is sand or dirt in your eyes. From time to time, dry eyes may cause you to have blurry vision. But dry eyes don't usually cause lasting problems with vision. There are many causes of dry eyes. Sometimes dry weather, smoke, or pollution can bother the eyes. Other times, allergies or contact lenses irritate the eyes. Older people often have dry eyes because our eyes do not make as many tears as we age. In some cases, diseases can cause dry eyes. These include rheumatoid arthritis, lupus, and Sjögren's syndrome. In other cases, medicines are to blame. Your doctor may want to do tests to help find the cause of your dry eyes. You can work with your doctor to find ways to help your eyes feel better. Home treatment often helps. Follow-up care is a key part of your treatment and safety. Be sure to make and go to all appointments, and call your doctor if you are having problems.  It's also a good idea to know your test results and keep a list of the medicines you take. How can you care for yourself at home? · Take breaks often when you read, watch TV, or use a computer. Close your eyes. Do not rub your eyes. Artificial tears may help you when you do these activities. You can buy these without a prescription. · Avoid smoke and other things that irritate the eyes. · Wear sunglasses that wrap around the sides of the head. These can protect the eyes from sun, wind, dust, and dirt. · Use a vaporizer or humidifier to add moisture to your bedroom. Follow the directions for cleaning the machine. · Do not use fans while you sleep. · If you usually wear contact lenses, use rewetting drops or wear your glasses until your eyes feel better. · Be safe with medicines. Take your medicine exactly as prescribed. Call your doctor if you think you are having a problem with your medicine. · Try using artificial tears at least 4 times a day. · If you need drops more than 4 times a day, use artificial tears without preservatives. They may irritate the eyes less. · Use a lubricating eye ointment or eye gel at bedtime. These are thicker and last longer, so you may have less burning, dryness, and itching when you wake up. Be aware that they may blur your vision for a short time. · To put in eyedrops or ointment:  ¨ Tilt your head back, and pull the lower eyelid down with one finger. ¨ Drop or squirt the medicine inside the lower lid. ¨ Close your eye for 30 to 60 seconds to let the drops or ointment move around. ¨ Do not touch the ointment or dropper tip to your eyelashes or any other surface. · Put a warm, moist cloth on your eyelids every morning for about 5 minutes. Then massage your eyelids lightly. This helps increase the natural wetness of your eyes. When should you call for help?   Watch closely for changes in your health, and be sure to contact your doctor if:    · Your eyes are still dry, irritated, or teary, and artificial tears do not help.     · You do not get better as expected. Where can you learn more? Go to http://pepe-constantino.info/. Enter D231 in the search box to learn more about \"Dry Eyes: Care Instructions. \"  Current as of: December 3, 2017  Content Version: 11.7  © 8219-2453 [employer]. Care instructions adapted under license by [employer] (which disclaims liability or warranty for this information). If you have questions about a medical condition or this instruction, always ask your healthcare professional. [address] any warranty or liability for your use of this information. DASH Diet: Care Instructions  Your Care Instructions    The DASH diet is an eating plan that can help lower your blood pressure. DASH stands for Dietary Approaches to Stop Hypertension. Hypertension is high blood pressure. The DASH diet focuses on eating foods that are high in calcium, potassium, and magnesium. These nutrients can lower blood pressure. The foods that are highest in these nutrients are fruits, vegetables, low-fat dairy products, nuts, seeds, and legumes. But taking calcium, potassium, and magnesium supplements instead of eating foods that are high in those nutrients does not have the same effect. The DASH diet also includes whole grains, fish, and poultry. The DASH diet is one of several lifestyle changes your doctor may recommend to lower your high blood pressure. Your doctor may also want you to decrease the amount of sodium in your diet. Lowering sodium while following the DASH diet can lower blood pressure even further than just the DASH diet alone. Follow-up care is a key part of your treatment and safety. Be sure to make and go to all appointments, and call your doctor if you are having problems. It's also a good idea to know your test results and keep a list of the medicines you take.   How can you care for yourself at home?  Following the DASH diet  · Eat 4 to 5 servings of fruit each day. A serving is 1 medium-sized piece of fruit, ½ cup chopped or canned fruit, 1/4 cup dried fruit, or 4 ounces (½ cup) of fruit juice. Choose fruit more often than fruit juice. · Eat 4 to 5 servings of vegetables each day. A serving is 1 cup of lettuce or raw leafy vegetables, ½ cup of chopped or cooked vegetables, or 4 ounces (½ cup) of vegetable juice. Choose vegetables more often than vegetable juice. · Get 2 to 3 servings of low-fat and fat-free dairy each day. A serving is 8 ounces of milk, 1 cup of yogurt, or 1 ½ ounces of cheese. · Eat 6 to 8 servings of grains each day. A serving is 1 slice of bread, 1 ounce of dry cereal, or ½ cup of cooked rice, pasta, or cooked cereal. Try to choose whole-grain products as much as possible. · Limit lean meat, poultry, and fish to 2 servings each day. A serving is 3 ounces, about the size of a deck of cards. · Eat 4 to 5 servings of nuts, seeds, and legumes (cooked dried beans, lentils, and split peas) each week. A serving is 1/3 cup of nuts, 2 tablespoons of seeds, or ½ cup of cooked beans or peas. · Limit fats and oils to 2 to 3 servings each day. A serving is 1 teaspoon of vegetable oil or 2 tablespoons of salad dressing. · Limit sweets and added sugars to 5 servings or less a week. A serving is 1 tablespoon jelly or jam, ½ cup sorbet, or 1 cup of lemonade. · Eat less than 2,300 milligrams (mg) of sodium a day. If you limit your sodium to 1,500 mg a day, you can lower your blood pressure even more. Tips for success  · Start small. Do not try to make dramatic changes to your diet all at once. You might feel that you are missing out on your favorite foods and then be more likely to not follow the plan. Make small changes, and stick with them. Once those changes become habit, add a few more changes.   · Try some of the following:  ¨ Make it a goal to eat a fruit or vegetable at every meal and at snacks. This will make it easy to get the recommended amount of fruits and vegetables each day. ¨ Try yogurt topped with fruit and nuts for a snack or healthy dessert. ¨ Add lettuce, tomato, cucumber, and onion to sandwiches. ¨ Combine a ready-made pizza crust with low-fat mozzarella cheese and lots of vegetable toppings. Try using tomatoes, squash, spinach, broccoli, carrots, cauliflower, and onions. ¨ Have a variety of cut-up vegetables with a low-fat dip as an appetizer instead of chips and dip. ¨ Sprinkle sunflower seeds or chopped almonds over salads. Or try adding chopped walnuts or almonds to cooked vegetables. ¨ Try some vegetarian meals using beans and peas. Add garbanzo or kidney beans to salads. Make burritos and tacos with mashed bowden beans or black beans. Where can you learn more? Go to http://pepe-constantino.info/. Enter F620 in the search box to learn more about \"DASH Diet: Care Instructions. \"  Current as of: December 6, 2017  Content Version: 11.7  © 9954-0529 [employer]. Care instructions adapted under license by [employer] (which disclaims liability or warranty for this information). If you have questions about a medical condition or this instruction, always ask your healthcare professional. [place] [address] any warranty or liability for your use of this information. Check BP twice weekly. Notify me if it consistently is above 140/90 or below 90/60. Bring your blood pressure log to your next office visit. Decrease salt, fats, caffeine, alcohol in your diet. Increase water, fiber. Exercise 5 times weekly for 30 minutes daily as tolerated. Continue current medications, care and subspecialty follow up. Visit eye doctor yearly to check your eyes blood pressure related changes. DASH Diet: Care Instructions  Your Care Instructions    The DASH diet is an eating plan that can help lower your blood pressure.  DASH stands for Dietary Approaches to Stop Hypertension. Hypertension is high blood pressure. The DASH diet focuses on eating foods that are high in calcium, potassium, and magnesium. These nutrients can lower blood pressure. The foods that are highest in these nutrients are fruits, vegetables, low-fat dairy products, nuts, seeds, and legumes. But taking calcium, potassium, and magnesium supplements instead of eating foods that are high in those nutrients does not have the same effect. The DASH diet also includes whole grains, fish, and poultry. The DASH diet is one of several lifestyle changes your doctor may recommend to lower your high blood pressure. Your doctor may also want you to decrease the amount of sodium in your diet. Lowering sodium while following the DASH diet can lower blood pressure even further than just the DASH diet alone. Follow-up care is a key part of your treatment and safety. Be sure to make and go to all appointments, and call your doctor if you are having problems. It's also a good idea to know your test results and keep a list of the medicines you take. How can you care for yourself at home? Following the DASH diet  · Eat 4 to 5 servings of fruit each day. A serving is 1 medium-sized piece of fruit, ½ cup chopped or canned fruit, 1/4 cup dried fruit, or 4 ounces (½ cup) of fruit juice. Choose fruit more often than fruit juice. · Eat 4 to 5 servings of vegetables each day. A serving is 1 cup of lettuce or raw leafy vegetables, ½ cup of chopped or cooked vegetables, or 4 ounces (½ cup) of vegetable juice. Choose vegetables more often than vegetable juice. · Get 2 to 3 servings of low-fat and fat-free dairy each day. A serving is 8 ounces of milk, 1 cup of yogurt, or 1 ½ ounces of cheese. · Eat 6 to 8 servings of grains each day.  A serving is 1 slice of bread, 1 ounce of dry cereal, or ½ cup of cooked rice, pasta, or cooked cereal. Try to choose whole-grain products as much as possible. · Limit lean meat, poultry, and fish to 2 servings each day. A serving is 3 ounces, about the size of a deck of cards. · Eat 4 to 5 servings of nuts, seeds, and legumes (cooked dried beans, lentils, and split peas) each week. A serving is 1/3 cup of nuts, 2 tablespoons of seeds, or ½ cup of cooked beans or peas. · Limit fats and oils to 2 to 3 servings each day. A serving is 1 teaspoon of vegetable oil or 2 tablespoons of salad dressing. · Limit sweets and added sugars to 5 servings or less a week. A serving is 1 tablespoon jelly or jam, ½ cup sorbet, or 1 cup of lemonade. · Eat less than 2,300 milligrams (mg) of sodium a day. If you limit your sodium to 1,500 mg a day, you can lower your blood pressure even more. Tips for success  · Start small. Do not try to make dramatic changes to your diet all at once. You might feel that you are missing out on your favorite foods and then be more likely to not follow the plan. Make small changes, and stick with them. Once those changes become habit, add a few more changes. · Try some of the following:  ¨ Make it a goal to eat a fruit or vegetable at every meal and at snacks. This will make it easy to get the recommended amount of fruits and vegetables each day. ¨ Try yogurt topped with fruit and nuts for a snack or healthy dessert. ¨ Add lettuce, tomato, cucumber, and onion to sandwiches. ¨ Combine a ready-made pizza crust with low-fat mozzarella cheese and lots of vegetable toppings. Try using tomatoes, squash, spinach, broccoli, carrots, cauliflower, and onions. ¨ Have a variety of cut-up vegetables with a low-fat dip as an appetizer instead of chips and dip. ¨ Sprinkle sunflower seeds or chopped almonds over salads. Or try adding chopped walnuts or almonds to cooked vegetables. ¨ Try some vegetarian meals using beans and peas. Add garbanzo or kidney beans to salads. Make burritos and tacos with mashed bowden beans or black beans.   Where can you learn more?  Go to http://pepe-constantino.info/. Enter H723 in the search box to learn more about \"DASH Diet: Care Instructions. \"  Current as of: December 6, 2017  Content Version: 11.7  © 8772-7874 [employer]. Care instructions adapted under license by [employer] (which disclaims liability or warranty for this information). If you have questions about a medical condition or this instruction, always ask your healthcare professional. [address] any warranty or liability for your use of this information.

## 2018-08-22 ENCOUNTER — OFFICE VISIT (OUTPATIENT)
Dept: NEUROLOGY | Age: 83
End: 2018-08-22

## 2018-08-22 VITALS
WEIGHT: 162 LBS | OXYGEN SATURATION: 95 % | HEIGHT: 59 IN | HEART RATE: 65 BPM | SYSTOLIC BLOOD PRESSURE: 122 MMHG | BODY MASS INDEX: 32.66 KG/M2 | DIASTOLIC BLOOD PRESSURE: 60 MMHG | RESPIRATION RATE: 16 BRPM

## 2018-08-22 DIAGNOSIS — G30.1 LATE ONSET ALZHEIMER'S DISEASE WITHOUT BEHAVIORAL DISTURBANCE (HCC): Primary | ICD-10-CM

## 2018-08-22 DIAGNOSIS — F02.80 LATE ONSET ALZHEIMER'S DISEASE WITHOUT BEHAVIORAL DISTURBANCE (HCC): Primary | ICD-10-CM

## 2018-08-22 RX ORDER — DONEPEZIL HYDROCHLORIDE 10 MG/1
10 TABLET, FILM COATED ORAL
Qty: 90 TAB | Refills: 1 | Status: SHIPPED | OUTPATIENT
Start: 2018-08-22 | End: 2019-03-10 | Stop reason: SDUPTHER

## 2018-08-22 NOTE — MR AVS SNAPSHOT
[facility] [address] [place] [address] 
[phone] Patient: Annabelle Carver MRN:  AOM:[id number] Visit Information Date & Time Provider Department Dept. Phone Encounter #  
 8/22/2018  1:20  [facility], [address] [place] [address] Neurology Clinic at [facility]  Follow-up Instructions Return in about 3 months (around 11/22/2018). Routing History Upcoming Health Maintenance Date Due Influenza Age 5 to Adult 9/1/2018* GLAUCOMA SCREENING Q2Y 6/22/2019 MEDICARE YEARLY EXAM 8/22/2019 DTaP/Tdap/Td series (2 - Td) 2/17/2027 *Topic was postponed. The date shown is not the original due date. Allergies as of 8/22/2018  Review Complete On: 8/22/2018 By: Gerri Bright LPN Severity Noted Reaction Type Reactions Penicillins High 10/22/2009    Hives Current Immunizations  Reviewed on 8/21/2018 Name Date Pneumococcal Conjugate (PCV-13) 8/17/2016 ZZZ-RETIRED (DO NOT USE) Pneumococcal Vaccine (Unspecified Type) 10/22/2009 Not reviewed this visit You Were Diagnosed With   
  
 Codes Comments Late onset Alzheimer's disease without behavioral disturbance    -  Primary ICD-10-CM: G30.1, F02.80 ICD-9-CM: 331.0, 294.10 Vitals BP Pulse Resp Height(growth percentile) Weight(growth percentile) SpO2  
 122/60 65 16 4' 11\" (1.499 m) 162 lb (73.5 kg) 95% BMI OB Status Smoking Status 32.72 kg/m2 Postmenopausal Former Smoker Vitals History BMI and BSA Data Body Mass Index Body Surface Area 32.72 kg/m 2 1.75 m 2 Preferred Pharmacy Pharmacy Name Phone [address] [facility], [address] [place]  [phone] Your Updated Medication List  
  
   
This list is accurate as of 8/22/18  1:32 PM.  Always use your most recent med list.  
  
  
  
  
 aspirin delayed-release 81 mg tablet Take 1 Tab by mouth daily. Indications: myocardial infarction prevention Calcium-Cholecalciferol (D3) 600 mg(1,500mg) -400 unit Cap Take  by mouth daily. donepezil 10 mg tablet Commonly known as:  ARICEPT Take 1 Tab by mouth daily (with breakfast). lisinopril 10 mg tablet Commonly known as:  PRINIVIL, ZESTRIL  
TAKE 1 TABLET IN THE MORNING AND TAKE 2 TABLETS IN THE EVENING FOR HYPERTENSION  
  
 mometasone 50 mcg/actuation nasal spray Commonly known as:  NASONEX  
2 Sprays by Both Nostrils route daily. Indications: ALLERGIC RHINITIS Omega-3 Fatty Acids 300 mg Cap Take 1 Cap by mouth daily. TYLENOL 325 mg tablet Generic drug:  acetaminophen Take  by mouth every four (4) hours as needed for Pain. Prescriptions Sent to Pharmacy Refills  
 donepezil (ARICEPT) 10 mg tablet 1 Sig: Take 1 Tab by mouth daily (with breakfast). Class: Normal  
 Pharmacy: [facility] [address], [facility] #: [phone] Route: Oral  
  
Follow-up Instructions Return in about 3 months (around 11/22/2018). Introducing [facility]! [employer] introduces [employer] P[employer] patient portal. Now you can access parts of your medical record, email your doctor's office, and request medication refills online. 1. In your internet browser, go to [email or web address] 2. Click on the First Time User? Click Here link in the Sign In box. You will see the New Member Sign Up page. 3. Enter your [employer] P[employer] Access Code exactly as it appears below. You will not need to use this code after youve completed the sign-up process. If you do not sign up before the expiration date, you must request a new code. · [employer] P[employer] Access Code: [email or web address] Expires: 11/19/2018  9:57 AM 
 
4. Enter the last four digits of your Social Security Number (xxxx) and Date of Birth (mm/dd/yyyy) as indicated and click Submit.  You will be taken to the next sign-up page. 5. Create a Precipio ID. This will be your Precipio login ID and cannot be changed, so think of one that is secure and easy to remember. 6. Create a Precipio password. You can change your password at any time. 7. Enter your Password Reset Question and Answer. This can be used at a later time if you forget your password. 8. Enter your e-mail address. You will receive e-mail notification when new information is available in [address] 9. Click Sign Up. You can now view and download portions of your medical record. 10. Click the Download Summary menu link to download a portable copy of your medical information. If you have questions, please visit the Frequently Asked Questions section of the Precipio website. Remember, Precipio is NOT to be used for urgent needs. For medical emergencies, dial 911. Now available from your iPhone and Android! Please provide this summary of care documentation to your next provider. Your primary care clinician is listed as Jasmin Gracia. If you have any questions after today's visit, please call [phone].

## 2018-10-05 ENCOUNTER — TELEPHONE (OUTPATIENT)
Dept: FAMILY MEDICINE CLINIC | Age: 83
End: 2018-10-05

## 2018-10-05 NOTE — TELEPHONE ENCOUNTER
Patient's daughter called into the office that it was a spot on the patient's foot and it's been bothering her, unable to really walk on it and would like to make an appointment with Jamey Keys, didn't have anything in a good time frame for the caller. Caller would like to know if the patient can be referred to a foot doctor?   P:[phone]

## 2018-10-10 NOTE — TELEPHONE ENCOUNTER
Chart reviewed. This is a new problem. Pt has an hx of PVD. She needs an appt with any available provider in our office to be evaluated and a referral placed if necessary.

## 2018-10-15 ENCOUNTER — OFFICE VISIT (OUTPATIENT)
Dept: FAMILY MEDICINE CLINIC | Age: 83
End: 2018-10-15

## 2018-10-15 VITALS
WEIGHT: 157.2 LBS | BODY MASS INDEX: 30.86 KG/M2 | OXYGEN SATURATION: 98 % | SYSTOLIC BLOOD PRESSURE: 122 MMHG | HEIGHT: 60 IN | RESPIRATION RATE: 18 BRPM | HEART RATE: 70 BPM | DIASTOLIC BLOOD PRESSURE: 80 MMHG | TEMPERATURE: 97.7 F

## 2018-10-15 DIAGNOSIS — I73.9 PVD (PERIPHERAL VASCULAR DISEASE) (HCC): ICD-10-CM

## 2018-10-15 DIAGNOSIS — F02.80 ALZHEIMER'S DISEASE OF OTHER ONSET WITHOUT BEHAVIORAL DISTURBANCE: ICD-10-CM

## 2018-10-15 DIAGNOSIS — M79.672 LEFT FOOT PAIN: Primary | ICD-10-CM

## 2018-10-15 DIAGNOSIS — R73.9 HYPERGLYCEMIA: ICD-10-CM

## 2018-10-15 DIAGNOSIS — I73.00 RAYNAUD'S PHENOMENON WITHOUT GANGRENE: ICD-10-CM

## 2018-10-15 DIAGNOSIS — G30.8 ALZHEIMER'S DISEASE OF OTHER ONSET WITHOUT BEHAVIORAL DISTURBANCE: ICD-10-CM

## 2018-10-15 DIAGNOSIS — E66.9 OBESITY, CLASS I, BMI 30-34.9: ICD-10-CM

## 2018-10-15 DIAGNOSIS — R63.4 WEIGHT LOSS: ICD-10-CM

## 2018-10-15 NOTE — PROGRESS NOTES
Matthew Butler  Identified pt with two pt identifiers(name and ). Chief Complaint Patient presents with  Foot Problem Rm 13: referral to podiatry 1. Have you been to the ER, urgent care clinic since your last visit? Hospitalized since your last visit? NO 
 
2. Have you seen or consulted any other health care providers outside of the [address] [place] [address] since your last visit? Include any pap smears or colon screening. NO Advance Care Planning In the event something were to happen to you and you were unable to speak on your behalf, do you have an Advance Directive/ Living Will in place stating your wishes? NO If yes, do we have a copy on file NO If no, would you like information YES 
 
My Chart My chart gives you direct online access to portions of the electronic medical record (EMR) where your doctor stores your health information (ie, lab results, appointment information, medications, immunizations, and more. It is free. Would you like to set up your my chart? NO 
 
[id number] Weight Metrics 10/15/2018 2018 2018 3/9/2018 2018 2017 2017 Weight 157 lb 3.2 oz 162 lb 159 lb 3.2 oz 166 lb 166 lb 6.4 oz 177 lb 1.6 oz 170 lb 11.2 oz  
BMI 30.7 kg/m2 32.72 kg/m2 31.17 kg/m2 30.35 kg/m2 30.43 kg/m2 32.38 kg/m2 31.22 kg/m2 Medication reconciliation up to date and corrected with patient at this time. Today's provider has been notified of reason for visit, vitals and flowsheets obtained on patients. Reviewed record in preparation for visit, huddled with provider and have obtained necessary documentation. Health Maintenance Due Topic  Shingrix Vaccine Age 50> (1 of 2)  Influenza Age 5 to Adult Wt Readings from Last 3 Encounters:  
10/15/18 157 lb 3.2 oz (71.3 kg) 18 162 lb (73.5 kg) 18 159 lb 3.2 oz (72.2 kg) Temp Readings from Last 3 Encounters:  
10/15/18 97.7 °F (36.5 °C) (Temporal) 08/21/18 98 °F (36.7 °C) (Oral) 02/19/18 98.4 °F (36.9 °C) (Oral) BP Readings from Last 3 Encounters:  
10/15/18 122/80  
08/22/18 122/60  
08/21/18 130/75 Pulse Readings from Last 3 Encounters:  
10/15/18 70  
08/22/18 65  
08/21/18 61 Vitals:  
 10/15/18 1623 BP: 122/80 Pulse: 70 Resp: 18 Temp: 97.7 °F (36.5 °C) TempSrc: Temporal  
SpO2: 98% Weight: 157 lb 3.2 oz (71.3 kg) Height: 5' (1.524 m) PainSc:   0 - No pain Learning Assessment: 
:  
 
Learning Assessment 7/2/2014 PRIMARY LEARNER Patient HIGHEST LEVEL OF EDUCATION - PRIMARY LEARNER  GRADUATED HIGH SCHOOL OR GED  
BARRIERS PRIMARY LEARNER NONE PRIMARY LANGUAGE ENGLISH  
LEARNER PREFERENCE PRIMARY READING  
ANSWERED BY patient RELATIONSHIP SELF Depression Screening: 
:  
 
PHQ over the last two weeks 8/21/2018 Little interest or pleasure in doing things Not at all Feeling down, depressed, irritable, or hopeless Not at all Total Score PHQ 2 0 Fall Risk Assessment: 
:  
 
Fall Risk Assessment, last 12 mths 10/15/2018 Able to walk? Yes Fall in past 12 months? No  
 
 
Abuse Screening: 
:  
 
Abuse Screening Questionnaire 8/21/2018 8/18/2017 Do you ever feel afraid of your partner? Arturo Reyes Are you in a relationship with someone who physically or mentally threatens you? Arturo Reyes Is it safe for you to go home? Y Y  
 
 
ADL Screening: 
:  
 

## 2018-10-15 NOTE — MR AVS SNAPSHOT
[address] [facility] 
 
 
 [address] [facility] 
[address] [place] [address] 
[phone] Patient: Melody Chung MRN:   Visit Information Date & Time Provider Department Dept. Phone Encounter #  
 10/15/2018  4:00 PM DO Eduardo EscobarFazal [phone] [id number] Follow-up Instructions Return in about 4 months (around 2/15/2019) for blood pressure, referral follow up. Your Appointments 2018 10:20 AM  
[facility] [address] [facility]) Appt Note: 3 month f/u memory (18 [facility]) [facility] [address]  
[phone]  
  
   
 [address] [address] Upcoming Health Maintenance Date Due Influenza Age 5 to Adult 2018 Shingrix Vaccine Age 50> (1 of 2) 3/22/2019* GLAUCOMA SCREENING Q2Y 2019 MEDICARE YEARLY EXAM 2019 DTaP/Tdap/Td series (2 - Td) 2027 *Topic was postponed. The date shown is not the original due date. Allergies as of 10/15/2018  Review Complete On: 10/15/2018 By: Esther Marcano DO Severity Noted Reaction Type Reactions Penicillins High 10/22/2009    Hives Current Immunizations  Reviewed on 10/15/2018 Name Date Pneumococcal Conjugate (PCV-13) 2016 ZZZ-RETIRED (DO NOT USE) Pneumococcal Vaccine (Unspecified Type) 10/22/2009 Reviewed by Ashley Chapin LPN on  at  4:23 PM  
You Were Diagnosed With   
  
 Codes Comments Left foot pain    -  Primary ICD-10-CM: N37.909 ICD-9-CM: 729.5 with painful corn vs other Weight loss     ICD-10-CM: R63.4 ICD-9-CM: 783.21 2# since 2018 and 20# since 2017 due to neuro med altering taste vs other PVD (peripheral vascular disease) (RUSTca 75.)     ICD-10-CM: I73.9 ICD-9-CM: 443.9 Alzheimer's disease of other onset without behavioral disturbance     ICD-10-CM: G30.8, F02.80 ICD-9-CM: 331.0, 294.10 Hyperglycemia     ICD-10-CM: R73.9 ICD-9-CM: 790.29 Obesity, Class I, BMI 30-34.9     ICD-10-CM: E66.9 ICD-9-CM: 278.00 Raynaud's phenomenon without gangrene     ICD-10-CM: I73.00 ICD-9-CM: 443.0 Vitals BP Pulse Temp Resp Height(growth percentile) 122/80 (BP 1 Location: Left arm, BP Patient Position: Sitting) 70 97.7 °F (36.5 °C) (Temporal) 18 5' (1.524 m) Weight(growth percentile) SpO2 BMI OB Status Smoking Status 157 lb 3.2 oz (71.3 kg) 98% 30.7 kg/m2 Postmenopausal Former Smoker BMI and BSA Data Body Mass Index Body Surface Area 30.7 kg/m 2 1.74 m 2 Preferred Pharmacy Pharmacy Name Phone [address], [address] [place] [address]  [phone] Your Updated Medication List  
  
   
This list is accurate as of 10/15/18  4:43 PM.  Always use your most recent med list.  
  
  
  
  
 aspirin delayed-release 81 mg tablet Take 1 Tab by mouth daily. Indications: myocardial infarction prevention Calcium-Cholecalciferol (D3) 600 mg(1,500mg) -400 unit Cap Take  by mouth daily. donepezil 10 mg tablet Commonly known as:  ARICEPT Take 1 Tab by mouth daily (with breakfast). lisinopril 10 mg tablet Commonly known as:  PRINIVIL, ZESTRIL  
TAKE 1 TABLET IN THE MORNING AND TAKE 2 TABLETS IN THE EVENING FOR HYPERTENSION  
  
 mometasone 50 mcg/actuation nasal spray Commonly known as:  NASONEX  
2 Sprays by Both Nostrils route daily. Indications: ALLERGIC RHINITIS Omega-3 Fatty Acids 300 mg Cap Take 1 Cap by mouth daily. TYLENOL 325 mg tablet Generic drug:  acetaminophen Take  by mouth every four (4) hours as needed for Pain. We Performed the Following REFERRAL TO PODIATRY [REF90 Custom] Comments:  
 L foot pain Follow-up Instructions Return in about 4 months (around 2/15/2019) for blood pressure, referral follow up. Referral Information Referral ID Referred By Referred To  
  
 [id number] Edel Vaughan, DPM   
   [address] [facility] Phone: [phone] Fax: [phone] Visits Status Start Date End Date 1 New Request 10/15/18 10/15/19 If your referral has a status of pending review or denied, additional information will be sent to support the outcome of this decision. Introducing [facility]! [facility] introduces [employer] patient portal. Now you can access parts of your medical record, email your doctor's office, and request medication refills online. 1. In your internet browser, go to [email or web address] 2. Click on the First Time User? Click Here link in the Sign In box. You will see the New Member Sign Up page. 3. Enter your Lift[employer] Access Code exactly as it appears below. You will not need to use this code after youve completed the sign-up process. If you do not sign up before the expiration date, you must request a new code. · L[employer] Access Code: [email or web address] Expires: 11/19/2018  9:57 AM 
 
4. Enter the last four digits of your Social Security Number (xxxx) and Date of Birth (mm/dd/yyyy) as indicated and click Submit. You will be taken to the next sign-up page. 5. Create a LiftDNA ID. This will be your LiftDNA login ID and cannot be changed, so think of one that is secure and easy to remember. 6. Create a LiftDNA password. You can change your password at any time. 7. Enter your Password Reset Question and Answer. This can be used at a later time if you forget your password. 8. Enter your e-mail address. You will receive e-mail notification when new information is available in [address] 9. Click Sign Up. You can now view and download portions of your medical record. 10. Click the Download Summary menu link to download a portable copy of your medical information. If you have questions, please visit the Frequently Asked Questions section of the Disrupt CK website. Remember, Dis[employer] is NOT to be used for urgent needs. For medical emergencies, dial 911. Now available from your iPhone and Android! Please provide this summary of care documentation to your next provider. Your primary care clinician is listed as Ellison Bosworth. If you have any questions after today's visit, please call [phone].

## 2018-10-15 NOTE — PROGRESS NOTES
HISTORY OF PRESENT ILLNESS Makenna Chavira is a 81 y.o. female presents with Foot Problem; Referral Request; and Referral Follow Up Agree with nurse note. Pt's daughter accompanies her today and provides some of the history. Pt with PVD, alzheimer's disease, hyperglycemia, raynaud's, and obesity presents to the office with a BP of 122/80. She uses Lisinopril 10 mg daily, tolerating well. She also uses Aspirin 81 mg daily, tolerating well. Weight is 157 lbs, down 2 lbs since 8/2018. Her daughter notes Aricept has decreased her taste and appetite. To recall, Hgb A1c was 5.4 in 2/2018. She saw neurologist, Dr. Sundeep Yan on 8/22/2018 for f/u of late onset Alzheimer's. Increased Aricept from 5 mg daily to 10 mg daily. She returns in 11/2018. Pt complains of sore spot on her R foot. No known trauma to the area. At times, it will prevent her from walking and she would like to see a podiatrist.  
 
Written by dara Castellanos, as dictated by Dr. Ayleen Ford DO. 
 
ROS Review of Systems negative except as noted above in HPI. ALLERGIES:   
Allergies Allergen Reactions  Penicillins Hives CURRENT MEDICATIONS:   
Outpatient Medications Marked as Taking for the 10/15/18 encounter (Office Visit) with Vincenzo Yang DO Medication Sig Dispense Refill  donepezil (ARICEPT) 10 mg tablet Take 1 Tab by mouth daily (with breakfast). 90 Tab 1  
 lisinopril (PRINIVIL, ZESTRIL) 10 mg tablet TAKE 1 TABLET IN THE MORNING AND TAKE 2 TABLETS IN THE EVENING FOR HYPERTENSION 270 Tab 3  
 aspirin delayed-release 81 mg tablet Take 1 Tab by mouth daily. Indications: myocardial infarction prevention 90 Tab 3  
 Omega-3 Fatty Acids 300 mg cap Take 1 Cap by mouth daily.  Calcium-Cholecalciferol, D3, 600 mg(1,500mg) -400 unit cap Take  by mouth daily. PAST MEDICAL HISTORY:   
Past Medical History:  
Diagnosis Date  Bilateral dry eyes 2017 Dr. Vince Goff  Cataract Bilaterally. Dr. Renetta Baldwin. Dr. Cee Antonio. Dr. Pratik Chilel.  Chest pain 10/2015, 2016 Dr. Kun Jiang. negative Stress Test.  
 Dementia 07/2017  
 triggered by mild LACHELLE, depression. Dr. Corine Vides. Dr. Misty Coreas  Diverticulosis 09/2008 Dr. Enedina Randhawa. Dr. Arlene Stewart.  DJD (degenerative joint disease) 09/30/05  
 cervical, worse C6-7, C7-T1. Left AC joint.  DJD (degenerative joint disease) of knee   
 bilateral  Dr. Cat Baird. Dr. Mustapha NULL (dyspnea on exertion) 10/2015 Dr. Kun Jiang.  Essential hypertension, benign 1968  Foster child  GIB (gastrointestinal bleeding) 11/17/14  
 due to internal hemorrhoids. Dr. Bhatia Pod  Heart palpitations 11/12/15  
 due to PVCs. Dr. Demetrio Panchal.  Heartburn  Hypercholesteremia  Hyperglycemia 2014 [address] [place] [address] Internal carotid artery stenosis 2007  
 bilateral.  Dr. Randall Nolasco [address] [place] [address] Internal hemorrhoids 09/2008, 11/2014 Dr. Soren Holm. Dr. Gurjit Gaona.  Knee pain Left. Dr. Godfrey Howard.  Pulmonic valve insufficiency 10/2015 Mild to Mod. Dr. Kun Jiang. EF 50-55%  PVD (peripheral vascular disease) ([facility] [address] 2007 Dr. Carlos Mckeon  Raynaud's phenomenon [id number]  Shortening, leg, congenital   
 left  Vitamin D deficiency 10/10/10 PAST SURGICAL HISTORY:   
Past Surgical History:  
Procedure Laterality Date  HX ATHERECTOMY Left 09/01/2011  
 popliteal atherectomy and angioplasty. Dr. Carlos Mcekon  HX BREAST LUMPECTOMY Right 2008  
 benign. Dr. Roly Navarro.  HX CAROTID ENDARTERECTOMY Right 01/19/2011 ICA. Dr. Carlos Mckeon  HX CATARACT REMOVAL Bilateral 02/22/2016, 10/24/2016 L then R Dr. Pratik Chilel.  HX COLONOSCOPY  11/24/14  
 due to GIB. Dr. Gurjit Gaona.  HX COLONOSCOPY  09/05/08  
 with polypectomy. benign. Dr. Enedina Randhawa. due q 10 yrs. [address] [facility] FISSURECTOMY.  HX GI  10/03/2008 PROCTOPLASTY due to rectal prolapse  HX HEMORRHOIDECTOMY  10/03/2008  
 internal and external  
 HX KNEE REPLACEMENT Left 2013  
 due to Severe OA. Dr. Kulwant Mota.  HX POLYPECTOMY  2008 rectal Dr. Brigette Simms FAMILY HISTORY:   
Family History Problem Relation Age of Onset  Heart Attack Mother 79  
 Heart Attack Sister   
     x 3 sisters  Heart Attack Brother   
     x 3 brothers SOCIAL HISTORY:   
Social History Socioeconomic History  Marital status:  Spouse name: Not on file  Number of children: Not on file  Years of education: Not on file  Highest education level: Not on file Social Needs  Financial resource strain: Not on file  Food insecurity - worry: Not on file  Food insecurity - inability: Not on file  Transportation needs - medical: Not on file  Transportation needs - non-medical: Not on file Occupational History  Not on file Tobacco Use  Smoking status: Former Smoker Packs/day: 1.00 Years: 15.00 Pack years: 15.00 Types: Cigarettes Last attempt to quit: 1968 Years since quittin.8  Smokeless tobacco: Never Used Substance and Sexual Activity  Alcohol use: No  
  Alcohol/week: 0.0 oz  Drug use: No  
 Sexual activity: No  
  Partners: Male Birth control/protection: Abstinence Other Topics Concern  Not on file Social History Narrative  Not on file IMMUNIZATIONS:   
Immunization History Administered Date(s) Administered  Pneumococcal Conjugate (PCV-13) 2016  ZZZ-RETIRED (DO NOT USE) Pneumococcal Vaccine (Unspecified Type) 10/22/2009 PHYSICAL EXAMINATION Vital Signs Visit Vitals /80 (BP 1 Location: Left arm, BP Patient Position: Sitting) Pulse 70 Temp 97.7 °F (36.5 °C) (Temporal) Resp 18 Ht 5' (1.524 m) Wt 157 lb 3.2 oz (71.3 kg) SpO2 98% BMI 30.70 kg/m² Weight Metrics 10/15/2018 2018 2018 3/9/2018 2018 2017 2/17/2017 Weight 157 lb 3.2 oz 162 lb 159 lb 3.2 oz 166 lb 166 lb 6.4 oz 177 lb 1.6 oz 170 lb 11.2 oz  
BMI 30.7 kg/m2 32.72 kg/m2 31.17 kg/m2 30.35 kg/m2 30.43 kg/m2 32.38 kg/m2 31.22 kg/m2 General appearance - Well nourished. Well appearing. Well developed. No acute distress. Obese. Head - Normocephalic. Atraumatic. Eyes - pupils equal and reactive. Extraocular eye movements intact. Sclera anicteric. Mildly injected sclera. Ears - Hearing is grossly normal bilaterally. Nose - normal and patent. No polyps noted. No erythema. No discharge. Mouth - mucous membranes with adequate moisture. Posterior pharynx normal with cobblestone appearance. No erythema, white exudate or obstruction. Neck - supple. Midline trachea. No carotid bruits noted bilaterally. No thyromegaly noted. Chest - clear to auscultation bilaterally anteriorly and posteriorly. No wheezes. No rales or rhonchi. Breath sounds are symmetrical bilaterally. Unlabored respirations. Heart - normal rate. Regular rhythm. Normal S1, S2. No murmur noted. No rubs, clicks or gallops noted. Abdomen - soft and distended. No masses or organomegaly. No rebound, rigidity or guarding. Bowel sounds normal x 4 quadrants. No tenderness noted. Neurological - awake, alert and oriented to person, place, and time and event. Cranial nerves II through XII intact. Clear speech. Muscle strength is +5/5 x 4 extremities. Sensation is intact to light touch bilaterally. Steady gait. Heme/Lymph - peripheral pulses normal x 4 extremities. No peripheral edema is noted. Warm feet. Musculoskeletal - Intact x 4 extremities. Full ROM x 4 extremities. No pain with movement. Cotton swab between 2nd and 3rd toes of L foot. Prominant bunions and corns on BL PIPs and DIPs. Eraser sized hyperpigmented patch just inferior to 4th toe on plantar surface.  Eraser sized hyperpigmented patch with central superficial ulceration at superior aspect of L heel at plantar surface. Back exam - normal range of motion. No pain on palpation of the spinous processes in the cervical, thoracic, lumbar, sacral regions. No CVA tenderness. Skin - no rashes, erythema, ecchymosis, lacerations, abrasions, suspicious moles noted Psychological -   normal behavior, dress and thought processes. Good insight. Good eye contact. Normal affect. Appropriate mood. Normal speech. DATA REVIEWED Lab Results Component Value Date/Time WBC 4.7 02/20/2018 08:25 AM  
 HGB 13.9 02/20/2018 08:25 AM  
 HCT 41.3 02/20/2018 08:25 AM  
 PLATELET 078 83/71/2142 08:25 AM  
 MCV 95 02/20/2018 08:25 AM  
 
Lab Results Component Value Date/Time Sodium 140 02/20/2018 08:25 AM  
 Potassium 4.5 02/20/2018 08:25 AM  
 Chloride 102 02/20/2018 08:25 AM  
 CO2 25 02/20/2018 08:25 AM  
 Anion gap 8 06/19/2016 06:00 PM  
 Glucose 102 (H) 02/20/2018 08:25 AM  
 BUN 13 02/20/2018 08:25 AM  
 Creatinine 0.84 02/20/2018 08:25 AM  
 BUN/Creatinine ratio 15 02/20/2018 08:25 AM  
 GFR est AA 75 02/20/2018 08:25 AM  
 GFR est non-AA 65 02/20/2018 08:25 AM  
 Calcium 9.5 02/20/2018 08:25 AM  
 Bilirubin, total 0.5 02/20/2018 08:25 AM  
 AST (SGOT) 17 02/20/2018 08:25 AM  
 Alk. phosphatase 101 02/20/2018 08:25 AM  
 Protein, total 6.6 02/20/2018 08:25 AM  
 Albumin 4.0 02/20/2018 08:25 AM  
 Globulin 3.6 06/19/2016 06:00 PM  
 A-G Ratio 1.5 02/20/2018 08:25 AM  
 ALT (SGPT) 12 02/20/2018 08:25 AM  
 
Lab Results Component Value Date/Time Cholesterol, total 216 (H) 02/20/2018 08:25 AM  
 HDL Cholesterol 74 02/20/2018 08:25 AM  
 LDL, calculated 129 (H) 02/20/2018 08:25 AM  
 VLDL, calculated 13 02/20/2018 08:25 AM  
 Triglyceride 67 02/20/2018 08:25 AM  
 CHOL/HDL Ratio 2.8 05/05/2010 08:45 AM  
 
Lab Results Component Value Date/Time Vitamin D 25-Hydroxy 24 (L) 10/08/2010 04:19 PM  
 VITAMIN D, 25-HYDROXY 41.2 02/20/2018 08:25 AM  
   
Lab Results Component Value Date/Time Hemoglobin A1c 5.4 02/20/2018 08:25 AM  
 Hemoglobin A1c (POC) 5.5 02/17/2017 09:40 AM  
 
Lab Results Component Value Date/Time TSH 2.270 02/20/2018 08:25 AM  
 
 
Lab Results Component Value Date/Time Microalb/Creat ratio (ug/mg creat.) <3.4 02/20/2018 08:25 AM  
 
 
 
ASSESSMENT and PLAN 
 
  ICD-10-CM ICD-9-CM 1. Left foot pain M79.672 729.5 REFERRAL TO PODIATRY  
 with painful corn vs other 2. Weight loss R63.4 783.21   
 2# since 08/2018 and 20# since 08/2017 due to neuro med altering taste vs other 3. PVD (peripheral vascular disease) (HCC) I73.9 443.9 REFERRAL TO PODIATRY 4. Alzheimer's disease of other onset without behavioral disturbance G30.8 331.0 F02.80 294.10   
5. Hyperglycemia R73.9 790.29 REFERRAL TO PODIATRY 6. Obesity, Class I, BMI 30-34.9 E66.9 278.00   
7. Raynaud's phenomenon without gangrene I73.00 443.0 REFERRAL TO PODIATRY Chart reviewed and updated. Continue current medications and care. Recent office visit notes from Dr. Nicole Romo reviewed. Referrals given; patient urged to keep appointments with specialists. Podiatry. Advised pt to not walk barefoot. Counseled patient on health concerns: Foot pain, PVD, hyperglycemia, alzheimer's, and raynaud's. Immunizations noted; Advise flu vaccine between the months September to December. Advised pt to discuss Shingrix vaccine with insurance company and local pharmacy. Offered empathy, support, legitimation, prayers, partnership to patient. Praised patient for progress. Follow-up Disposition: 
Return in about 4 months (around 2/15/2019) for blood pressure, referral follow up. Patient was offered a choice/choices in the treatment plan today. Patient expresses understanding of the plan and agrees with recommendations. Patient declines any additional handouts. Patient is satisfied with previous handouts received from our office Written by dara Worrell, as dictated by Dr. Kassie Worthington DO. Documentation True and Accepted by Jose Hernandes.  Orpha How.

## 2018-12-17 ENCOUNTER — OFFICE VISIT (OUTPATIENT)
Dept: NEUROLOGY | Age: 83
End: 2018-12-17

## 2018-12-17 VITALS
HEIGHT: 60 IN | HEART RATE: 81 BPM | OXYGEN SATURATION: 96 % | DIASTOLIC BLOOD PRESSURE: 72 MMHG | RESPIRATION RATE: 18 BRPM | WEIGHT: 157 LBS | BODY MASS INDEX: 30.82 KG/M2 | SYSTOLIC BLOOD PRESSURE: 158 MMHG

## 2018-12-17 DIAGNOSIS — F02.80 LATE ONSET ALZHEIMER'S DISEASE WITHOUT BEHAVIORAL DISTURBANCE (HCC): Primary | ICD-10-CM

## 2018-12-17 DIAGNOSIS — G30.1 LATE ONSET ALZHEIMER'S DISEASE WITHOUT BEHAVIORAL DISTURBANCE (HCC): Primary | ICD-10-CM

## 2018-12-17 RX ORDER — MEMANTINE HYDROCHLORIDE 5 MG/1
5 TABLET ORAL 2 TIMES DAILY
Qty: 180 TAB | Refills: 1 | Status: SHIPPED | OUTPATIENT
Start: 2018-12-17 | End: 2019-08-14 | Stop reason: SDUPTHER

## 2018-12-17 NOTE — PROGRESS NOTES
Since last visit pt feels she is doing ok. Not always eating 3 meals a day, as she feels the taste has changed. Writing things down to  Help her remember. .    Depression screen already completed.

## 2018-12-17 NOTE — PROGRESS NOTES
Chief Complaint   Patient presents with    Memory Loss       HPI    Mrs. Jessica Rodriguez is an 50-year-old woman with Alzheimer's here to follow-up. Last visit we increased donepezil to more effective 10 mg. No GI side effects. She is tolerating medicine fine. Daughter is here and thinks her appetite is getting less. Sometimes she forgets to eat lunch. She usually has the breakfast.  She also has been getting more frustrated with minor irritations. She is not getting lost.  She is still socially active. Sleeps okay. Lives alone. ADLs are intact. Review of Systems   Constitutional:        Decreased appetite   Gastrointestinal: Negative for nausea. Musculoskeletal: Negative for falls. Psychiatric/Behavioral: The patient does not have insomnia. Increased frustration   All other systems reviewed and are negative. Past Medical History:   Diagnosis Date    Bilateral dry eyes 2017    Dr. Kristine Austin    Cataract     Bilaterally. Dr. Stephen Vincent. Dr. Jarek Sosa. Dr. Kristine Austin.  Chest pain 10/2015, 2016    Dr. Ernestina Gardiner. negative Stress Test.    Dementia 07/2017    triggered by mild LACHELLE, depression. Dr. Billy Ulloa. Dr. Perez Other Diverticulosis 09/2008    Dr. Trini Cyr. Dr. Jose Enrique Moy.  DJD (degenerative joint disease) 09/30/05    cervical, worse C6-7, C7-T1. Left AC joint.  DJD (degenerative joint disease) of knee     bilateral  Dr. John Muro. Dr. Dawood NULL (dyspnea on exertion) 10/2015    Dr. Ernestina Gardiner.  Essential hypertension, benign 1968    Foster child     GIB (gastrointestinal bleeding) 11/17/14    due to internal hemorrhoids. Dr. Ayden Brooks Heart palpitations 11/12/15    due to PVCs. Dr. Dallas Minaya.  Heartburn     Hypercholesteremia     Hyperglycemia 2014    Internal carotid artery stenosis 2007    bilateral.  Dr. Lee Lose Internal hemorrhoids 09/2008, 11/2014    Dr. Laine Theodore. Dr. Bianca Mckinley.     Knee pain Left. Dr. Shannon Alejo. 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 Dr. Guru Mosqueda. EF 50-55%    PVD (peripheral vascular disease) ([facility]     Dr. Misael Ray    Raynaud's phenomenon 1968    Shortening, leg, congenital     left    Vitamin D deficiency 10/10/10     Family History   Problem Relation Age of Onset    Heart Attack Mother 79    Heart Attack Sister         x 3 sisters    Heart Attack Brother         x 3 brothers     Social History     Socioeconomic History    Marital status:      Spouse name: Not on file    Number of children: Not on file    Years of education: Not on file    Highest education level: Not on file   Social Needs    Financial resource strain: Not on file    Food insecurity - worry: Not on file    Food insecurity - inability: Not on file   [employer] needs - medical: Not on file   [employer] needs - non-medical: Not on file   Occupational History    Not on file   Tobacco Use    Smoking status: Former Smoker     Packs/day: 1.00     Years: 15.00     Pack years: 15.00     Types: Cigarettes     Last attempt to quit: 1968     Years since quittin.9    Smokeless tobacco: Never Used   Substance and Sexual Activity    Alcohol use: No     Alcohol/week: 0.0 oz    Drug use: No    Sexual activity: No     Partners: Male     Birth control/protection: Abstinence   Other Topics Concern    Not on file   Social History Narrative    Not on file     Allergies   Allergen Reactions    Penicillins Hives         Current Outpatient Medications   Medication Sig    memantine (NAMENDA) 5 mg tablet Take 1 Tab by mouth two (2) times a day.  lisinopril (PRINIVIL, ZESTRIL) 10 mg tablet TAKE 1 TABLET IN THE MORNING AND TAKE 2 TABLETS IN THE EVENING FOR HYPERTENSION    donepezil (ARICEPT) 10 mg tablet Take 1 Tab by mouth daily (with breakfast).  acetaminophen (TYLENOL) 325 mg tablet Take  by mouth every four (4) hours as needed for Pain.     aspirin delayed-release 81 mg tablet Take 1 Tab by mouth daily. Indications: myocardial infarction prevention    mometasone (NASONEX) 50 mcg/actuation nasal spray 2 Sprays by Both Nostrils route daily. Indications: ALLERGIC RHINITIS    Omega-3 Fatty Acids 300 mg cap Take 1 Cap by mouth daily.  Calcium-Cholecalciferol, D3, 600 mg(1,500mg) -400 unit cap Take  by mouth daily. No current facility-administered medications for this visit. Neurologic Exam     Mental Status   WD/WN adult in NAD, normal grooming  VSS  A&O x 3, very well dressed and groomed    PERRL, nonicteric  Face is symmetric, tongue midline  Speech is fluent and clear  No limb ataxia. No abnl movements. Moving all extemities spontaneously and symmetric  Normal gait    CVS RRR  Lungs nonlabored  Skin is warm and dry         Visit Vitals  /72   Pulse 81   Resp 18   Ht 5' (1.524 m)   Wt 71.2 kg (157 lb)   SpO2 96%   BMI 30.66 kg/m²       Assessment and Plan   Diagnoses and all orders for this visit:    1. Late onset Alzheimer's disease without behavioral disturbance    Other orders  -     memantine (NAMENDA) 5 mg tablet; Take 1 Tab by mouth two (2) times a day. 71-year-old woman with Alzheimer's dementia. She is already on therapeutic Aricept so I am going to add low-dose Namenda trial.  Goal is to get ahead of this condition as much as she can with medication. They understand that this medication does not cure the disease. Questions answered. Watch her appetite for worsening changes. She is driving short distances only to her daughters. No nighttime driving. She is not getting lost.  Family has planning  In place to keep track of her. I will see her in about 4 months.           [address] [facility], [address]  Diplomate ROSALIND

## 2019-02-15 ENCOUNTER — OFFICE VISIT (OUTPATIENT)
Dept: FAMILY MEDICINE CLINIC | Age: 84
End: 2019-02-15

## 2019-02-15 VITALS
WEIGHT: 155.2 LBS | DIASTOLIC BLOOD PRESSURE: 58 MMHG | HEART RATE: 63 BPM | SYSTOLIC BLOOD PRESSURE: 138 MMHG | HEIGHT: 60 IN | OXYGEN SATURATION: 98 % | TEMPERATURE: 97.2 F | BODY MASS INDEX: 30.47 KG/M2

## 2019-02-15 DIAGNOSIS — I10 ESSENTIAL HYPERTENSION: ICD-10-CM

## 2019-02-15 DIAGNOSIS — R63.4 WEIGHT LOSS: ICD-10-CM

## 2019-02-15 DIAGNOSIS — I35.8 AORTIC VALVE SCLEROSIS: ICD-10-CM

## 2019-02-15 DIAGNOSIS — F43.23 ADJUSTMENT DISORDER WITH MIXED ANXIETY AND DEPRESSED MOOD: ICD-10-CM

## 2019-02-15 DIAGNOSIS — G30.8 ALZHEIMER'S DISEASE OF OTHER ONSET WITHOUT BEHAVIORAL DISTURBANCE: ICD-10-CM

## 2019-02-15 DIAGNOSIS — Z98.890 H/O CAROTID ENDARTERECTOMY: ICD-10-CM

## 2019-02-15 DIAGNOSIS — Z87.891 HISTORY OF TOBACCO ABUSE: ICD-10-CM

## 2019-02-15 DIAGNOSIS — R73.9 HYPERGLYCEMIA: ICD-10-CM

## 2019-02-15 DIAGNOSIS — E78.00 HYPERCHOLESTEREMIA: Primary | ICD-10-CM

## 2019-02-15 DIAGNOSIS — E55.9 VITAMIN D DEFICIENCY: ICD-10-CM

## 2019-02-15 DIAGNOSIS — Z96.652 HISTORY OF KNEE REPLACEMENT, TOTAL, LEFT: ICD-10-CM

## 2019-02-15 DIAGNOSIS — E66.9 OBESITY, CLASS I, BMI 30-34.9: ICD-10-CM

## 2019-02-15 DIAGNOSIS — R43.2 DYSGEUSIA: ICD-10-CM

## 2019-02-15 DIAGNOSIS — F02.80 ALZHEIMER'S DISEASE OF OTHER ONSET WITHOUT BEHAVIORAL DISTURBANCE: ICD-10-CM

## 2019-02-15 NOTE — PROGRESS NOTES
Javier Mayberry  Identified pt with two pt identifiers(name and ). Chief Complaint Patient presents with  Blood Pressure Check Rm 14  
 
 
 
1. Have you been to the ER, urgent care clinic since your last visit? Hospitalized since your last visit? NO 
 
2. Have you seen or consulted any other health care providers outside of the [address] [place] [address] since your last visit? Include any pap smears or colon screening. NO Advance Care Planning In the event something were to happen to you and you were unable to speak on your behalf, do you have an Advance Directive/ Living Will in place stating your wishes? NO If yes, do we have a copy on file NO If no, would you like information YES 
 
My Chart My chart gives you direct online access to portions of the electronic medical record (EMR) where your doctor stores your health information (ie, lab results, appointment information, medications, immunizations, and more. It is free. Would you like to set up your my chart? NO 
 
[id number] Weight Metrics 2/15/2019 2018 10/15/2018 2018 2018 3/9/2018 2018 Weight 155 lb 3.2 oz 157 lb 157 lb 3.2 oz 162 lb 159 lb 3.2 oz 166 lb 166 lb 6.4 oz BMI 30.31 kg/m2 30.66 kg/m2 30.7 kg/m2 32.72 kg/m2 31.17 kg/m2 30.35 kg/m2 30.43 kg/m2 Medication reconciliation up to date and corrected with patient at this time. Today's provider has been notified of reason for visit, vitals and flowsheets obtained on patients. Reviewed record in preparation for visit, huddled with provider and have obtained necessary documentation. There are no preventive care reminders to display for this patient. Wt Readings from Last 3 Encounters:  
02/15/19 155 lb 3.2 oz (70.4 kg) 18 157 lb (71.2 kg) 10/15/18 157 lb 3.2 oz (71.3 kg) Temp Readings from Last 3 Encounters:  
02/15/19 97.2 °F (36.2 °C) (Oral) 10/15/18 97.7 °F (36.5 °C) (Temporal) 18 98 °F (36.7 °C) (Oral) BP Readings from Last 3 Encounters:  
02/15/19 156/56  
12/17/18 158/72  
10/15/18 122/80 Pulse Readings from Last 3 Encounters:  
02/15/19 63  
12/17/18 81  
10/15/18 70 Vitals:  
 02/15/19 3789 BP: 156/56 Pulse: 63 Temp: 97.2 °F (36.2 °C) TempSrc: Oral  
SpO2: 98% Weight: 155 lb 3.2 oz (70.4 kg) Height: 5' (1.524 m) PainSc:   0 - No pain Learning Assessment: 
:  
 
Learning Assessment 12/17/2018 7/2/2014 PRIMARY LEARNER Patient Patient HIGHEST LEVEL OF EDUCATION - PRIMARY LEARNER  - GRADUATED HIGH SCHOOL OR GED  
BARRIERS PRIMARY LEARNER - NONE PRIMARY LANGUAGE ENGLISH ENGLISH  
LEARNER PREFERENCE PRIMARY READING READING  
ANSWERED BY patient patient RELATIONSHIP SELF SELF Depression Screening: 
:  
 
3 most recent PHQ Screens 2/15/2019 Little interest or pleasure in doing things Not at all Feeling down, depressed, irritable, or hopeless Not at all Total Score PHQ 2 0 Fall Risk Assessment: 
:  
 
Fall Risk Assessment, last 12 mths 2/15/2019 Able to walk? Yes Fall in past 12 months? No  
 
 
Abuse Screening: 
:  
 
Abuse Screening Questionnaire 2/15/2019 8/21/2018 8/18/2017 Do you ever feel afraid of your partner? N N N Are you in a relationship with someone who physically or mentally threatens you? N N N Is it safe for you to go home? Anila Kenny  
 
 
ADL Screening: 
:  
 

## 2019-02-15 NOTE — PROGRESS NOTES
HISTORY OF PRESENT ILLNESS Wander Adams is a 81 y.o. female presents with Blood Pressure Check (Rm 14); Referral Follow Up; Weight Loss; and Labs Agree with nurse note. Her daughter accompanies her today and provides some of the history. Pt with hypercholesterolemia, h/o carotid endarterectomy, hx of L TKR, hx of tobacco abuse, vit d deficiency, aoritic valve disorder, hyperglycemia, and obesity  presents to the office with a BP of 156/56. Patient denies vision changes, headaches, dizziness, chest pain, SOB, or swelling. For BP, she uses Lisinopril 10 mg daily, tolerating well. She also uses Aspirin 81 mg daily. She saw neurologist, Dr. Miguel Larson on 12/17/2018 for f/u of late onset Alzheimer's disease. She is doing well on increased Donepezil dosage of 10 mg. Her daughter thinks her appetite is continuing to decrease and she forgets to eat lunch. She has been getting more frustrate with minor irritations. She is not getting lost, is socially active, sleeps okay, and lives alone. Added Namenda 5 mg BID. Do not drive at night. F/U 4 months. Weight is 155 lbs, down 4 lbs since 8/2018 and 11 lbs since 2/19/2018. Her tastebuds have changed since starting Donepezil but Dr. Phyllis Villegas is aware. Daughter reports she saw a podiatrist, Dr. Binta Gaona who she was referred to at last ov for L foot pain. Her daughter reports it was a plantar wart which she had removed in 12/2018. Pt with adjustment disorder with mixed anxiety and depressed mood, stable. She has felt well. No SI/SA. She sleeps well too, averaging 7-8 hours nightly. Health Maintenance Pt is due for an appointment with ophthalmologist, Dr. Libra Cisneros. Written by dara Frausto, as dictated by Dr. Daniel Tillman DO. 
ROS Review of Systems negative except as noted above in HPI. ALLERGIES:   
Allergies Allergen Reactions  Penicillins Hives CURRENT MEDICATIONS:   
 Outpatient Medications Marked as Taking for the 2/15/19 encounter (Office Visit) with Josh Vincent,  Medication Sig Dispense Refill  memantine (NAMENDA) 5 mg tablet Take 1 Tab by mouth two (2) times a day. 180 Tab 1  
 lisinopril (PRINIVIL, ZESTRIL) 10 mg tablet TAKE 1 TABLET IN THE MORNING AND TAKE 2 TABLETS IN THE EVENING FOR HYPERTENSION 270 Tab 3  
 donepezil (ARICEPT) 10 mg tablet Take 1 Tab by mouth daily (with breakfast). 90 Tab 1  
 aspirin delayed-release 81 mg tablet Take 1 Tab by mouth daily. Indications: myocardial infarction prevention 90 Tab 3  
 Omega-3 Fatty Acids 300 mg cap Take 1 Cap by mouth daily.  Calcium-Cholecalciferol, D3, 600 mg(1,500mg) -400 unit cap Take  by mouth daily. PAST MEDICAL HISTORY:   
Past Medical History:  
Diagnosis Date  Bilateral dry eyes 2017 Dr. Mathew Malave  Cataract Bilaterally. Dr. Liana Engle. Dr. Ilan Mcnulty. Dr. Mathew Malave.  Chest pain 10/2015, 2016 Dr. Tara Gruber. negative Stress Test.  
 Dementia 07/2017  
 triggered by mild LACHELLE, depression. Dr. Rene Cochran. Dr. Peyton Yi  Diverticulosis 09/2008 Dr. Valente Gutierrez. Dr. Karuna Henao.  DJD (degenerative joint disease) 09/30/05  
 cervical, worse C6-7, C7-T1. Left AC joint.  DJD (degenerative joint disease) of knee   
 bilateral  Dr. Shantell Hutchison. Dr. Gia NULL (dyspnea on exertion) 10/2015 Dr. Tara Gruber.  Essential hypertension, benign 1968  Foster child  GIB (gastrointestinal bleeding) 11/17/14  
 due to internal hemorrhoids. Dr. Emily Del Castillo  Heart palpitations 11/12/15  
 due to PVCs. Dr. Rolando Rodriguez.  Heartburn  Hypercholesteremia  Hyperglycemia 2014 [facility] Internal carotid artery stenosis 2007  
 bilateral.  Dr. Humaira Gomes [address] [place] [address] Internal hemorrhoids 09/2008, 11/2014 Dr. Jesus Khalil. Dr. Sarmad Ybarra.  Knee pain Left. Dr. Juan Spring.  Plantar wart 2018 Dr. Hunter Winn. 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 Dr. Andriy Dodd. EF 50-55%  PVD (peripheral vascular disease) ([facility] [address]  Dr. Soniya Chandler  Raynaud's phenomenon [id number]  Shortening, leg, congenital   
 left  Vitamin D deficiency 10/10/10 PAST SURGICAL HISTORY:   
Past Surgical History:  
Procedure Laterality Date  HX ATHERECTOMY Left 2011  
 popliteal atherectomy and angioplasty. Dr. Soniya Chandler  HX BREAST LUMPECTOMY Right   
 benign. Dr. Lesly Mckeon.  HX CAROTID ENDARTERECTOMY Right 2011 ICA. Dr. Soniya Chandler  HX CATARACT REMOVAL Bilateral 2016, 10/24/2016 L then R Dr. Marleni Kumari.  HX COLONOSCOPY  14  
 due to GIB. Dr. Axel Morales.  HX COLONOSCOPY  08  
 with polypectomy. benign. Dr. Bhavin Craig. due q 10 yrs. [facility] FISSURECTOMY.  HX GI  10/03/2008 PROCTOPLASTY due to rectal prolapse  HX HEMORRHOIDECTOMY  10/03/2008  
 internal and external  
 HX KNEE REPLACEMENT Left 2013  
 due to Severe OA. Dr. Teresita Amezquita.  HX POLYPECTOMY  2008 rectal Dr. Shane Means FAMILY HISTORY:   
Family History Problem Relation Age of Onset  Heart Attack Mother 79  
 Heart Attack Sister   
     x 3 sisters  Heart Attack Brother   
     x 3 brothers SOCIAL HISTORY:   
Social History Socioeconomic History  Marital status:  Spouse name: Not on file  Number of children: Not on file  Years of education: Not on file  Highest education level: Not on file Tobacco Use  Smoking status: Former Smoker Packs/day: 1.00 Years: 15.00 Pack years: 15.00 Types: Cigarettes Last attempt to quit: 1968 Years since quittin.1  Smokeless tobacco: Never Used Substance and Sexual Activity  Alcohol use: No  
  Alcohol/week: 0.0 oz  Drug use: No  
 Sexual activity: No  
  Partners: Male Birth control/protection: Abstinence IMMUNIZATIONS:   
Immunization History Administered Date(s) Administered  Pneumococcal Conjugate (PCV-13) 08/17/2016  ZZZ-RETIRED (DO NOT USE) Pneumococcal Vaccine (Unspecified Type) 10/22/2009 PHYSICAL EXAMINATION Vital Signs Visit Vitals /58 Pulse 63 Temp 97.2 °F (36.2 °C) (Oral) Ht 5' (1.524 m) Wt 155 lb 3.2 oz (70.4 kg) SpO2 98% BMI 30.31 kg/m² Weight Metrics 2/15/2019 12/17/2018 10/15/2018 8/22/2018 8/21/2018 3/9/2018 2/19/2018 Weight 155 lb 3.2 oz 157 lb 157 lb 3.2 oz 162 lb 159 lb 3.2 oz 166 lb 166 lb 6.4 oz BMI 30.31 kg/m2 30.66 kg/m2 30.7 kg/m2 32.72 kg/m2 31.17 kg/m2 30.35 kg/m2 30.43 kg/m2 General appearance - Well nourished. Well appearing. Well developed. No acute distress. Obese. Present with her daughter. Head - Normocephalic. Atraumatic. Eyes - pupils equal and reactive. Extraocular eye movements intact. Sclera anicteric. Mildly injected sclera. Ears - Hearing is grossly normal bilaterally. Nose - normal and patent. No polyps noted. No erythema. No discharge. Mouth - mucous membranes with adequate moisture. Posterior pharynx normal with cobblestone appearance. No erythema, white exudate or obstruction. Neck - supple. Midline trachea. No carotid bruits noted bilaterally. No thyromegaly noted. Chest - clear to auscultation bilaterally anteriorly and posteriorly. No wheezes. No rales or rhonchi. Breath sounds are symmetrical bilaterally. Unlabored respirations. Heart - normal rate. Regular rhythm. Normal S1, S2. No murmur noted. No rubs, clicks or gallops noted. Abdomen - soft and distended. No masses or organomegaly. No rebound, rigidity or guarding. Bowel sounds normal x 4 quadrants. No tenderness noted. Neurological - awake, alert and oriented to person, place, and time and event. Cranial nerves II through XII intact. Clear speech.   Muscle strength is +5/5 x 4 extremities. Sensation is intact to light touch bilaterally. Steady gait. Heme/Lymph - peripheral pulses normal x 4 extremities. No peripheral edema is noted. Musculoskeletal - Intact x 4 extremities. Full ROM x 4 extremities. No pain with movement. Back exam - normal range of motion. No pain on palpation of the spinous processes in the cervical, thoracic, lumbar, sacral regions. No CVA tenderness. Skin - no rashes, erythema, ecchymosis, lacerations, abrasions, suspicious moles noted Psychological -   normal behavior, dress and thought processes. Good insight. Good eye contact. Normal affect. Appropriate mood. Normal speech. DATA REVIEWED Lab Results Component Value Date/Time WBC 4.7 02/20/2018 08:25 AM  
 HGB 13.9 02/20/2018 08:25 AM  
 HCT 41.3 02/20/2018 08:25 AM  
 PLATELET 315 82/64/9001 08:25 AM  
 MCV 95 02/20/2018 08:25 AM  
 
Lab Results Component Value Date/Time Sodium 140 02/20/2018 08:25 AM  
 Potassium 4.5 02/20/2018 08:25 AM  
 Chloride 102 02/20/2018 08:25 AM  
 CO2 25 02/20/2018 08:25 AM  
 Anion gap 8 06/19/2016 06:00 PM  
 Glucose 102 (H) 02/20/2018 08:25 AM  
 BUN 13 02/20/2018 08:25 AM  
 Creatinine 0.84 02/20/2018 08:25 AM  
 BUN/Creatinine ratio 15 02/20/2018 08:25 AM  
 GFR est AA 75 02/20/2018 08:25 AM  
 GFR est non-AA 65 02/20/2018 08:25 AM  
 Calcium 9.5 02/20/2018 08:25 AM  
 Bilirubin, total 0.5 02/20/2018 08:25 AM  
 AST (SGOT) 17 02/20/2018 08:25 AM  
 Alk. phosphatase 101 02/20/2018 08:25 AM  
 Protein, total 6.6 02/20/2018 08:25 AM  
 Albumin 4.0 02/20/2018 08:25 AM  
 Globulin 3.6 06/19/2016 06:00 PM  
 A-G Ratio 1.5 02/20/2018 08:25 AM  
 ALT (SGPT) 12 02/20/2018 08:25 AM  
 
Lab Results Component Value Date/Time  Cholesterol, total 216 (H) 02/20/2018 08:25 AM  
 HDL Cholesterol 74 02/20/2018 08:25 AM  
 LDL, calculated 129 (H) 02/20/2018 08:25 AM  
 VLDL, calculated 13 02/20/2018 08:25 AM  
 Triglyceride 67 02/20/2018 08:25 AM  
 CHOL/HDL Ratio 2.8 05/05/2010 08:45 AM  
 
Lab Results Component Value Date/Time Vitamin D 25-Hydroxy 24 (L) 10/08/2010 04:19 PM  
 VITAMIN D, 25-HYDROXY 41.2 02/20/2018 08:25 AM  
   
Lab Results Component Value Date/Time Hemoglobin A1c 5.4 02/20/2018 08:25 AM  
 Hemoglobin A1c (POC) 5.5 02/17/2017 09:40 AM  
 
Lab Results Component Value Date/Time TSH 2.270 02/20/2018 08:25 AM  
 
 
Lab Results Component Value Date/Time Microalb/Creat ratio (ug/mg creat.) <3.4 02/20/2018 08:25 AM  
 
 
 
ASSESSMENT and PLAN 
 
  ICD-10-CM ICD-9-CM 1. Hypercholesteremia E78.00 272.0 2. Essential hypertension I10 401.9   
 stable 3. Hyperglycemia R73.9 790.29 HEMOGLOBIN A1C WITH EAG 4. Alzheimer's disease of other onset without behavioral disturbance G30.8 331.0 F02.80 294.10   
 stable with Namenda and Aricept 5. Vitamin D deficiency E55.9 268.9 VITAMIN D, 25 HYDROXY 6. Dysgeusia R43.2 781.1   
 vs decreased appetite due to Aricept vs Alzheimer's vs other 7. Weight loss R63.4 783.21   
 4# sinc 8/21/2018 and 11# since 2/19/2018 due to decreased appeite from medication vs other 8. H/O carotid endarterectomy Z98.890 V45.89   
9. History of tobacco abuse Z87.891 V15.82   
10. History of knee replacement, total, left Z96.652 V43.65   
11. Aortic valve sclerosis I35.8 424.1 12. Adjustment disorder with mixed anxiety and depressed mood F43.23 309.28   
 stable without meds 13. Obesity, Class I, BMI 30-34.9 E66.9 278.00 Chart reviewed and updated. Continue current medications and care. Recheck pertinent labs when fasting. Recent office visit notes from Dr. Trini Russell reviewed. Get recent office visit notes from Dr. Calles. Advised pt to sign release. Counseled patient on health concerns:  Alzheimer's, vit d deficiency, h/o carotid endarterectomy, stress, weight loss, dysgeusia, hyperglycemia, BP, cholesterol, TKR. Immunizations noted. Offered empathy, support, legitimation, prayers, partnership to patient. Praised patient for progress. Follow-up Disposition: 
Return in about 6 months (around 8/15/2019) for rosa NEWELL . Patient was offered a choice/choices in the treatment plan today. Patient expresses understanding of the plan and agrees with recommendations. Patient declines any additional handouts. Patient is satisfied with previous handouts received from our office. Written by dara Longoria, as dictated by Dr. Garland Rhodes DO. Documentation True and Accepted by Ashley Osorio.  Priti Schmitt.

## 2019-04-15 ENCOUNTER — OFFICE VISIT (OUTPATIENT)
Dept: NEUROLOGY | Age: 84
End: 2019-04-15

## 2019-04-15 VITALS
RESPIRATION RATE: 16 BRPM | WEIGHT: 152.2 LBS | BODY MASS INDEX: 29.88 KG/M2 | DIASTOLIC BLOOD PRESSURE: 78 MMHG | HEIGHT: 60 IN | OXYGEN SATURATION: 98 % | SYSTOLIC BLOOD PRESSURE: 118 MMHG | HEART RATE: 68 BPM

## 2019-04-15 DIAGNOSIS — G30.1 LATE ONSET ALZHEIMER'S DISEASE WITHOUT BEHAVIORAL DISTURBANCE (HCC): Primary | ICD-10-CM

## 2019-04-15 DIAGNOSIS — F02.80 LATE ONSET ALZHEIMER'S DISEASE WITHOUT BEHAVIORAL DISTURBANCE (HCC): Primary | ICD-10-CM

## 2019-04-15 RX ORDER — RIVASTIGMINE 4.6 MG/24H
1 PATCH, EXTENDED RELEASE TRANSDERMAL DAILY
Qty: 90 PATCH | Refills: 1 | Status: SHIPPED | OUTPATIENT
Start: 2019-04-15 | End: 2019-07-15 | Stop reason: ALTCHOICE

## 2019-04-15 NOTE — PROGRESS NOTES
Chief Complaint Patient presents with  Memory Loss HPI Peg Godinez is an 81-year-old woman here to follow-up on dementia. Neuropsychological testing confirming dementia. Last visit I added memantine twice daily but since then she has decided not to take her medications. She tells me she is aware of her medications but chooses not to. Unclear if she had some side effects from donepezil. She does not think she needs her medications either. She still does not eat very much. Flavor of food is waning. She likes to go to [employer] in the morning. ADLs are still intact such that she can do her own dressing, self hygiene, taking care of her home. Her daughters check on her often. She still drives short distances without difficulty. Review of Systems Musculoskeletal: Negative for falls. Neurological: Negative for headaches. Psychiatric/Behavioral: Positive for memory loss. Mild apathy All other systems reviewed and are negative. Past Medical History:  
Diagnosis Date  Bilateral dry eyes 2017 Dr. Dalton Calvin  Cataract Bilaterally. Dr. Ronny Cisneros. Dr. Stanislaw Sebastian. Dr. Dalton Calvin.  Chest pain 10/2015, 2016 Dr. Brodie Ramirez. negative Stress Test.  
 Dementia 07/2017  
 triggered by mild LACHELLE, depression. Dr. Janet Golden. Dr. Bird Kinds  Diverticulosis 09/2008 Dr. Bibiana Tobar. Dr. Sandra Price.  DJD (degenerative joint disease) 09/30/05  
 cervical, worse C6-7, C7-T1. Left AC joint.  DJD (degenerative joint disease) of knee   
 bilateral  Dr. Vidya James. Dr. Deborah NULL (dyspnea on exertion) 10/2015 Dr. Brodie Ramirez.  Essential hypertension, benign 1968  Foster child  GIB (gastrointestinal bleeding) 11/17/14  
 due to internal hemorrhoids. Dr. Rao Harriley  Heart palpitations 11/12/15  
 due to PVCs. Dr. Brandy London.  Heartburn  Hypercholesteremia  Hyperglycemia 2014  Internal carotid artery stenosis 2007 bilateral.  Dr. Anay Rosado [address] [place] [address] Gareth Internal hemorrhoids 2008, 2014 Dr. Mortimer Capes. Dr. Anushka Samuel.  Knee pain Left. Dr. Mariaa Lao.  Plantar wart 2018 Dr. Elizabeth Carr. 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 Dr. Martha Sarmiento. EF 50-55%  PVD (peripheral vascular disease) ([facility] [address]  Dr. Dori Kwon  Raynaud's phenomenon [id number]  Shortening, leg, congenital   
 left  Vitamin D deficiency 10/10/10 Family History Problem Relation Age of Onset  Heart Attack Mother 79  
 Heart Attack Sister   
     x 3 sisters  Heart Attack Brother   
     x 3 brothers Social History Socioeconomic History  Marital status:  Spouse name: Not on file  Number of children: Not on file  Years of education: Not on file  Highest education level: Not on file Occupational History  Not on file Social Needs  Financial resource strain: Not on file  Food insecurity:  
  Worry: Not on file Inability: Not on file  Transportation needs:  
  Medical: Not on file Non-medical: Not on file Tobacco Use  Smoking status: Former Smoker Packs/day: 1.00 Years: 15.00 Pack years: 15.00 Types: Cigarettes Last attempt to quit: 1968 Years since quittin.3  Smokeless tobacco: Never Used Substance and Sexual Activity  Alcohol use: No  
  Alcohol/week: 0.0 oz  Drug use: No  
  Types: Prescription  Sexual activity: Never Partners: Male Birth control/protection: Abstinence Lifestyle  Physical activity:  
  Days per week: Not on file Minutes per session: Not on file  Stress: Not on file Relationships  Social connections:  
  Talks on phone: Not on file Gets together: Not on file Attends Latter day service: Not on file Active member of club or organization: Not on file Attends meetings of clubs or organizations: Not on file Relationship status: Not on file  Intimate partner violence:  
  Fear of current or ex partner: Not on file Emotionally abused: Not on file Physically abused: Not on file Forced sexual activity: Not on file Other Topics Concern  Not on file Social History Narrative  Not on file Allergies Allergen Reactions  Penicillins Hives Current Outpatient Medications Medication Sig  
 rivastigmine (EXELON) 4.6 mg/24 hr patch 1 Patch by TransDERmal route daily. Indications: Mild to Moderate Alzheimer's Type Dementia  lisinopril (PRINIVIL, ZESTRIL) 10 mg tablet TAKE 1 TABLET IN THE MORNING AND TAKE 2 TABLETS IN THE EVENING FOR HYPERTENSION  
 acetaminophen (TYLENOL) 325 mg tablet Take  by mouth every four (4) hours as needed for Pain.  aspirin delayed-release 81 mg tablet Take 1 Tab by mouth daily. Indications: myocardial infarction prevention  mometasone (NASONEX) 50 mcg/actuation nasal spray 2 Sprays by Both Nostrils route daily. Indications: ALLERGIC RHINITIS  
 Omega-3 Fatty Acids 300 mg cap Take 1 Cap by mouth daily.  Calcium-Cholecalciferol, D3, 600 mg(1,500mg) -400 unit cap Take  by mouth daily.  memantine (NAMENDA) 5 mg tablet Take 1 Tab by mouth two (2) times a day. No current facility-administered medications for this visit. Neurologic Exam  
 
Mental Status WD/WN adult in NAD, normal grooming and well groomed as usual with makeup and jewelry VSS 
A&O, pleasant and talkative PERRL, nonicteric Face is symmetric, tongue midline Speech is fluent and clear No limb ataxia. No abnl movements. Moving all extemities spontaneously and symmetric Normal gait CVS RRR Lungs nonlabored Skin is warm and dry Visit Vitals /78 Pulse 68 Resp 16 Ht 5' (1.524 m) Wt 69 kg (152 lb 3.2 oz) SpO2 98% BMI 29.72 kg/m² Assessment and Plan Diagnoses and all orders for this visit: 
 
 1. Late onset Alzheimer's disease without behavioral disturbance Other orders 
-     rivastigmine (EXELON) 4.6 mg/24 hr patch; 1 Patch by TransDERmal route daily. Indications: Mild to Moderate Alzheimer's Type Dementia 60-year-old woman with dementia. I am concerned that donepezil might of been causing some side effects and that is why she is not taking it so I am going to have her start Rivastigmine transdermal patch instead. Resume Namenda at the beginning dose she already has. Continue to be physically socially engaged. Driving days are likely going to end soon and she is aware of this. Medications do not cure her condition but hopefully will delay progression is much as possible. I explained this to the patient and her family and they expressed understanding. She agrees she is going to take her medications as directed. Also I recommend she increase texture and her food. Food seems to be losing flavor now which is not unexpected. She might benefit from changing the texture of her meals and might eat more. I will see her at her next visit. [facility],  
NEUROLOGIST Diplomate ROSALIND

## 2019-04-15 NOTE — LETTER
4/15/19 Patient: nAna Ulloa YOB: 1935 Date of Visit: 4/15/2019 Nickolas Roberts, [address] [place] [address] VIA In Basket Dear Nickolas Roberts DO, Thank you for referring Ms. Reina Gruber to [facility] for evaluation. My notes for this consultation are attached. If you have questions, please do not hesitate to call me. I look forward to following your patient along with you. Sincerely, [facility], DO

## 2019-07-12 ENCOUNTER — TELEPHONE (OUTPATIENT)
Dept: NEUROLOGY | Age: 84
End: 2019-07-12

## 2019-07-12 NOTE — TELEPHONE ENCOUNTER
Pt's daughter calling needing to the nurse about pt being depressed. Having crying spells, not herself.  Please call back

## 2019-07-12 NOTE — TELEPHONE ENCOUNTER
Patient's daughter reported patient has been depressed and tearful for the past two days. Please advise.

## 2019-07-12 NOTE — TELEPHONE ENCOUNTER
Have there been any other changes? Other stressors? Sleeping okay? I do not want to start medication unless it is a really good reason to add more medicine. She may need to be seen by primary care. Will be out of the office for the next 2 weeks.

## 2019-07-12 NOTE — TELEPHONE ENCOUNTER
She really needs to see primary care. They must have acute slots potentially or go to urgent care to be checked out.

## 2019-07-15 ENCOUNTER — TELEPHONE (OUTPATIENT)
Dept: NEUROLOGY | Age: 84
End: 2019-07-15

## 2019-07-15 ENCOUNTER — OFFICE VISIT (OUTPATIENT)
Dept: FAMILY MEDICINE CLINIC | Age: 84
End: 2019-07-15

## 2019-07-15 VITALS
TEMPERATURE: 95.4 F | WEIGHT: 150.5 LBS | BODY MASS INDEX: 29.55 KG/M2 | DIASTOLIC BLOOD PRESSURE: 70 MMHG | HEART RATE: 57 BPM | SYSTOLIC BLOOD PRESSURE: 130 MMHG | RESPIRATION RATE: 22 BRPM | OXYGEN SATURATION: 98 % | HEIGHT: 60 IN

## 2019-07-15 DIAGNOSIS — F43.23 ADJUSTMENT DISORDER WITH MIXED ANXIETY AND DEPRESSED MOOD: Primary | ICD-10-CM

## 2019-07-15 DIAGNOSIS — F32.A DEPRESSION, UNSPECIFIED DEPRESSION TYPE: ICD-10-CM

## 2019-07-15 RX ORDER — CITALOPRAM 10 MG/1
20 TABLET ORAL DAILY
Qty: 30 TAB | Refills: 1 | Status: SHIPPED | OUTPATIENT
Start: 2019-07-15 | End: 2019-08-16

## 2019-07-15 RX ORDER — DONEPEZIL HYDROCHLORIDE 10 MG/1
10 TABLET, FILM COATED ORAL
COMMUNITY
End: 2019-08-14 | Stop reason: SDUPTHER

## 2019-07-15 NOTE — PROGRESS NOTES
Josh Riedel  Identified pt with two pt identifiers(name and ). Chief Complaint   Patient presents with    Anxiety    Depression     started last week, per dtr, having crying episodes, feeling like the \"house is closing her in\"       1. Have you been to the ER, urgent care clinic since your last visit? Hospitalized since your last visit? No    2. Have you seen or consulted any other health care providers outside of the [address] [place] [address] since your last visit? Include any pap smears or colon screening. No      Would you like to sign up for MyChart today, if you have not already done so? No  If not, would you like information on MyChart, and how to sign up at a later time? No      Medication reconciliation up to date and corrected with patient at this time. Today's provider has been notified of reason for visit, vitals and flowsheets obtained on patients. Reviewed record in preparation for visit, huddled with provider and have obtained necessary documentation.       Health Maintenance Due   Topic    Shingrix Vaccine Age 49> (1 of 2)    Pneumococcal 65+ years (2 of 2 - PPSV23)    GLAUCOMA SCREENING Q2Y        Wt Readings from Last 3 Encounters:   07/15/19 150 lb 8 oz (68.3 kg)   04/15/19 152 lb 3.2 oz (69 kg)   02/15/19 155 lb 3.2 oz (70.4 kg)     Temp Readings from Last 3 Encounters:   07/15/19 95.4 °F (35.2 °C) (Oral)   02/15/19 97.2 °F (36.2 °C) (Oral)   10/15/18 97.7 °F (36.5 °C) (Temporal)     BP Readings from Last 3 Encounters:   07/15/19 158/71   04/15/19 118/78   02/15/19 138/58     Pulse Readings from Last 3 Encounters:   07/15/19 (!) 57   04/15/19 68   02/15/19 63     Vitals:    07/15/19 1533   BP: 158/71   Pulse: (!) 57   Resp: 22   Temp: 95.4 °F (35.2 °C)   TempSrc: Oral   SpO2: 98%   Weight: 150 lb 8 oz (68.3 kg)   Height: 5' (1.524 m)   PainSc:   0 - No pain         Learning Assessment:  :     Learning Assessment 2018   PRIMARY LEARNER Patient Patient   HIGHEST LEVEL OF EDUCATION - PRIMARY LEARNER  - GRADUATED HIGH SCHOOL OR GED   BARRIERS PRIMARY LEARNER - NONE   PRIMARY LANGUAGE ENGLISH ENGLISH   LEARNER PREFERENCE PRIMARY READING READING   ANSWERED BY patient patient   RELATIONSHIP SELF SELF       Depression Screening:  :     3 most recent PHQ Screens 2/15/2019   Little interest or pleasure in doing things Not at all   Feeling down, depressed, irritable, or hopeless Not at all   Total Score PHQ 2 0       Fall Risk Assessment:  :     Fall Risk Assessment, last 12 mths 4/15/2019   Able to walk? Yes   Fall in past 12 months? No       Abuse Screening:  :     Abuse Screening Questionnaire 2/15/2019 8/21/2018 8/18/2017   Do you ever feel afraid of your partner? N N N   Are you in a relationship with someone who physically or mentally threatens you? N N N   Is it safe for you to go home?  Y Y Y       ADL Screening:  :     ADL Assessment 2/15/2019   Feeding yourself No Help Needed   Getting from bed to chair No Help Needed   Getting dressed No Help Needed   Bathing or showering No Help Needed   Walk across the room (includes cane/walker) No Help Needed   Using the telphone No Help Needed   Taking your medications No Help Needed   Preparing meals No Help Needed   Managing money (expenses/bills) No Help Needed   Moderately strenuous housework (laundry) No Help Needed   Shopping for personal items (toiletries/medicines) No Help Needed   Shopping for groceries No Help Needed   Driving No Help Needed   Climbing a flight of stairs No Help Needed   Getting to places beyond walking distances No Help Needed

## 2019-07-15 NOTE — TELEPHONE ENCOUNTER
Ms. Patsy Corky calling stating pt is having a lot of crying spells, depression, not herself. She stated its kind of an emergency.  Please call back

## 2019-07-15 NOTE — PROGRESS NOTES
Feels lonely. Able to drive. Doesn't like pets. Feeling more depressed lately. Sxs began end last week. Better Sunday. Taking both meds for memory. No h/o depression. Patient denies chest pain, dyspnea, unexpected weight change, unexpected pain, memory changes. Visit Vitals  /70 (BP 1 Location: Right arm, BP Patient Position: Sitting)   Pulse (!) 57   Temp 95.4 °F (35.2 °C) (Oral)   Resp 22   Ht 5' (1.524 m)   Wt 150 lb 8 oz (68.3 kg)   LMP  (LMP Unknown)   SpO2 98%   BMI 29.39 kg/m²     Patient alert and cooperative. Reviewed above. Assessment:  1. Anxiety with depression symptoms, better on Sunday when she went to [employer] and interacted with people. Plan:  1. Here with daughter, advised to try to get her out interacting with people more than once a week. 2. Will begin Celexa 10 mg, take half to one tablet. 3. Follow up with PCP, neuro in 2-3 weeks to see if benefit. 4. Follow otherwise here prn.

## 2019-07-17 NOTE — TELEPHONE ENCOUNTER
Spoke with Lida Arrington. Pt was taken to see her PCP and started on Celexa. They will see you next month.

## 2019-08-14 ENCOUNTER — OFFICE VISIT (OUTPATIENT)
Dept: NEUROLOGY | Age: 84
End: 2019-08-14

## 2019-08-14 VITALS
DIASTOLIC BLOOD PRESSURE: 65 MMHG | RESPIRATION RATE: 16 BRPM | BODY MASS INDEX: 28.27 KG/M2 | WEIGHT: 144 LBS | HEART RATE: 68 BPM | SYSTOLIC BLOOD PRESSURE: 122 MMHG | HEIGHT: 60 IN | OXYGEN SATURATION: 98 %

## 2019-08-14 DIAGNOSIS — F02.80 LATE ONSET ALZHEIMER'S DISEASE WITHOUT BEHAVIORAL DISTURBANCE (HCC): Primary | ICD-10-CM

## 2019-08-14 DIAGNOSIS — F41.9 ANXIETY AND DEPRESSION: ICD-10-CM

## 2019-08-14 DIAGNOSIS — F32.A ANXIETY AND DEPRESSION: ICD-10-CM

## 2019-08-14 DIAGNOSIS — G30.1 LATE ONSET ALZHEIMER'S DISEASE WITHOUT BEHAVIORAL DISTURBANCE (HCC): Primary | ICD-10-CM

## 2019-08-14 RX ORDER — DONEPEZIL HYDROCHLORIDE 10 MG/1
10 TABLET, FILM COATED ORAL
Qty: 90 TAB | Refills: 1 | Status: SHIPPED | OUTPATIENT
Start: 2019-08-14 | End: 2019-09-25 | Stop reason: SINTOL

## 2019-08-14 RX ORDER — MEMANTINE HYDROCHLORIDE 5 MG/1
5 TABLET ORAL 2 TIMES DAILY
Qty: 180 TAB | Refills: 1 | Status: SHIPPED | OUTPATIENT
Start: 2019-08-14 | End: 2020-02-12

## 2019-08-14 NOTE — PROGRESS NOTES
Chief Complaint   Patient presents with    Dementia       HPI    Mrs. Clary Roblero is an 81-year-old woman here to follow-up. She has dementia as indicated by neuropsychological testing. Since I saw her last there have been some new changes. In mid July she developed sudden severe anxiety with depression. This all stems from the fact that her son moved away from [place]. She saw Dr. Abdelrahman Logan and he started her on citalopram which she is only taking 10 mg once a day at this point. Family and patient thinks that this is helping a little bit. Ava Waterman does admit she feels anxious very easily. She does not like to be alone. Her daughters are here today. They tell me that they think her short-term memory is getting a little bit worse. She is taking donepezil 10 mg daily. She still drives short distances but is very active in [employer]. Daughters are planning to have her attend adult  soon and she is excited about this idea. She still doing all her own ADLs. Review of Systems   Musculoskeletal: Negative for falls. Psychiatric/Behavioral: Positive for depression and memory loss. Negative for suicidal ideas. The patient is nervous/anxious. All other systems reviewed and are negative. Past Medical History:   Diagnosis Date    Bilateral dry eyes 2017    Dr. Tejinder Maloney    Cataract     Bilaterally. Dr. Torin Romero. Dr. Marco Nava. Dr. Tejinder Maloney.  Chest pain 10/2015, 2016    Dr. Leopoldo Pace. negative Stress Test.    Dementia 07/2017    triggered by mild LACHELLE, depression. Dr. Reed Gagnon. Dr. Hsu More Diverticulosis 09/2008    Dr. Gregory Reyna. Dr. Encarnacion Drivers.  DJD (degenerative joint disease) 09/30/05    cervical, worse C6-7, C7-T1. Left AC joint.  DJD (degenerative joint disease) of knee     bilateral  Dr. Manda Taylor. Dr. Olu NULL (dyspnea on exertion) 10/2015    Dr. Leopoldo Pace.     Essential hypertension, benign 1968    Foster child     GIB (gastrointestinal bleeding) 14    due to internal hemorrhoids. Dr. Yifan Escalante Heart palpitations 11/12/15    due to PVCs. Dr. Lindsay Greenberg.  Heartburn     Hypercholesteremia     Hyperglycemia 2014    Internal carotid artery stenosis 2007    bilateral.  Dr. Alfredo Garnica Internal hemorrhoids 2008, 2014    Dr. Vicky Bee. Dr. Domenico Steinberg.  Knee pain     Left. Dr. Elgin Knowles.  Plantar wart 2018    Dr. Elma Pinto. R foot    Pulmonic valve insufficiency 10/2015    Mild to Mod. Dr. Jin Cuevas.   EF 50-55%    PVD (peripheral vascular disease) ([facility]     Dr. Mayra Sanchez    Raynaud's phenomenon 1968    Shortening, leg, congenital     left    Vitamin D deficiency 10/10/10     Family History   Problem Relation Age of Onset    Heart Attack Mother 79    Heart Attack Sister         x 3 sisters    Heart Attack Brother         x 3 brothers     Social History     Socioeconomic History    Marital status:      Spouse name: Not on file    Number of children: Not on file    Years of education: Not on file    Highest education level: Not on file   Occupational History    Not on file   Social Needs    Financial resource strain: Not on file    Food insecurity:     Worry: Not on file     Inability: Not on file    Transportation needs:     Medical: Not on file     Non-medical: Not on file   Tobacco Use    Smoking status: Former Smoker     Packs/day: 1.00     Years: 15.00     Pack years: 15.00     Types: Cigarettes     Last attempt to quit: 1968     Years since quittin.6    Smokeless tobacco: Never Used   Substance and Sexual Activity    Alcohol use: No     Alcohol/week: 0.0 standard drinks    Drug use: No     Types: Prescription    Sexual activity: Never     Partners: Male     Birth control/protection: Abstinence   Lifestyle    Physical activity:     Days per week: Not on file     Minutes per session: Not on file    Stress: Not on file   Relationships    Social connections: Talks on phone: Not on file     Gets together: Not on file     Attends Orthodox service: Not on file     Active member of club or organization: Not on file     Attends meetings of clubs or organizations: Not on file     Relationship status: Not on file    Intimate partner violence:     Fear of current or ex partner: Not on file     Emotionally abused: Not on file     Physically abused: Not on file     Forced sexual activity: Not on file   Other Topics Concern    Not on file   Social History Narrative    Not on file     Allergies   Allergen Reactions    Penicillins Hives         Current Outpatient Medications   Medication Sig    memantine (NAMENDA) 5 mg tablet Take 1 Tab by mouth two (2) times a day.  donepezil (ARICEPT) 10 mg tablet Take 1 Tab by mouth daily (with breakfast).  citalopram (CELEXA) 10 mg tablet Take 2 Tabs by mouth daily.  lisinopril (PRINIVIL, ZESTRIL) 10 mg tablet TAKE 1 TABLET IN THE MORNING AND TAKE 2 TABLETS IN THE EVENING FOR HYPERTENSION    acetaminophen (TYLENOL) 325 mg tablet Take  by mouth every four (4) hours as needed for Pain.  aspirin delayed-release 81 mg tablet Take 1 Tab by mouth daily. Indications: myocardial infarction prevention    mometasone (NASONEX) 50 mcg/actuation nasal spray 2 Sprays by Both Nostrils route daily. Indications: ALLERGIC RHINITIS    Omega-3 Fatty Acids 300 mg cap Take 1 Cap by mouth daily.  Calcium-Cholecalciferol, D3, 600 mg(1,500mg) -400 unit cap Take  by mouth daily. No current facility-administered medications for this visit. Neurologic Exam     Mental Status   WD/WN adult in NAD, normal grooming  VSS  A&O x 3, not as bright as usual but smiling. Very well groomed with make-up and jewelry. PERRL, nonicteric  Face is symmetric, tongue midline  Speech is fluent and clear  No limb ataxia. No abnl movements.   Moving all extemities spontaneously and symmetric  Normal gait    CVS RRR  Lungs nonlabored  Skin is warm and dry         Visit Vitals  /65 (BP 1 Location: Left arm, BP Patient Position: Sitting)   Pulse 68   Resp 16   Ht 5' (1.524 m)   Wt 65.3 kg (144 lb)   LMP  (LMP Unknown)   SpO2 98%   BMI 28.12 kg/m²       Assessment and Plan   Diagnoses and all orders for this visit:    1. Late onset Alzheimer's disease without behavioral disturbance    2. Anxiety and depression    Other orders  -     memantine (NAMENDA) 5 mg tablet; Take 1 Tab by mouth two (2) times a day. -     donepezil (ARICEPT) 10 mg tablet; Take 1 Tab by mouth daily (with breakfast). 81-year-old woman with dementia who may be progressing a little bit. She is having more anxiety and mood changes possibly more short-term memory loss. I am glad to know she is taking donepezil now which we will continue. Going to optimize therapy by adding low-dose Namenda. Celexa is fine to continue but I do not think I would increase it any further out of concern for interaction with donepezil. If that is subtherapeutic primary care can consider changing to a different agent such as Prozac or Zoloft if necessary. Agree with the adult  which might be very beneficial.  Watch for mood changes. Watch for worsening weight loss. I will see her at her next visit. This clinical note was dictated with an electronic dictation software that can make unintentional errors. If there are any questions, please contact me directly for clarification.       2 [facility], [facility] [address]  Diplomate ROSALIND

## 2019-08-16 ENCOUNTER — OFFICE VISIT (OUTPATIENT)
Dept: FAMILY MEDICINE CLINIC | Age: 84
End: 2019-08-16

## 2019-08-16 VITALS
SYSTOLIC BLOOD PRESSURE: 147 MMHG | DIASTOLIC BLOOD PRESSURE: 70 MMHG | RESPIRATION RATE: 18 BRPM | HEART RATE: 61 BPM | HEIGHT: 60 IN | BODY MASS INDEX: 28.35 KG/M2 | TEMPERATURE: 97.8 F | WEIGHT: 144.4 LBS | OXYGEN SATURATION: 97 %

## 2019-08-16 DIAGNOSIS — H04.123 CHRONICALLY DRY EYES, BILATERAL: ICD-10-CM

## 2019-08-16 DIAGNOSIS — Z13.39 SCREENING FOR ALCOHOLISM: ICD-10-CM

## 2019-08-16 DIAGNOSIS — G30.8 ALZHEIMER'S DISEASE OF OTHER ONSET WITHOUT BEHAVIORAL DISTURBANCE: ICD-10-CM

## 2019-08-16 DIAGNOSIS — J43.2 CENTRILOBULAR EMPHYSEMA (HCC): ICD-10-CM

## 2019-08-16 DIAGNOSIS — Z87.891 HISTORY OF TOBACCO ABUSE: ICD-10-CM

## 2019-08-16 DIAGNOSIS — Z00.00 MEDICARE ANNUAL WELLNESS VISIT, SUBSEQUENT: Primary | ICD-10-CM

## 2019-08-16 DIAGNOSIS — Z98.890 HISTORY OF CEA (CAROTID ENDARTERECTOMY): ICD-10-CM

## 2019-08-16 DIAGNOSIS — E55.9 VITAMIN D DEFICIENCY: ICD-10-CM

## 2019-08-16 DIAGNOSIS — Q72.812: ICD-10-CM

## 2019-08-16 DIAGNOSIS — E66.3 OVERWEIGHT (BMI 25.0-29.9): ICD-10-CM

## 2019-08-16 DIAGNOSIS — M79.672 LEFT FOOT PAIN: ICD-10-CM

## 2019-08-16 DIAGNOSIS — Z98.890 H/O CAROTID ENDARTERECTOMY: ICD-10-CM

## 2019-08-16 DIAGNOSIS — F02.80 ALZHEIMER'S DISEASE OF OTHER ONSET WITHOUT BEHAVIORAL DISTURBANCE: ICD-10-CM

## 2019-08-16 DIAGNOSIS — E78.00 HYPERCHOLESTEREMIA: ICD-10-CM

## 2019-08-16 DIAGNOSIS — Z13.31 SCREENING FOR DEPRESSION: ICD-10-CM

## 2019-08-16 DIAGNOSIS — R73.9 HYPERGLYCEMIA: ICD-10-CM

## 2019-08-16 DIAGNOSIS — F43.23 ADJUSTMENT DISORDER WITH MIXED ANXIETY AND DEPRESSED MOOD: ICD-10-CM

## 2019-08-16 DIAGNOSIS — I10 ESSENTIAL HYPERTENSION: ICD-10-CM

## 2019-08-16 DIAGNOSIS — R63.4 WEIGHT LOSS: ICD-10-CM

## 2019-08-16 DIAGNOSIS — I73.9 PVD (PERIPHERAL VASCULAR DISEASE) (HCC): ICD-10-CM

## 2019-08-16 RX ORDER — CITALOPRAM 10 MG/1
20 TABLET ORAL DAILY
Qty: 180 TAB | Refills: 3 | Status: CANCELLED | OUTPATIENT
Start: 2019-08-16

## 2019-08-16 RX ORDER — LISINOPRIL 10 MG/1
TABLET ORAL
Qty: 270 TAB | Refills: 3 | Status: SHIPPED | OUTPATIENT
Start: 2019-08-16 | End: 2021-01-01

## 2019-08-16 RX ORDER — CITALOPRAM 10 MG/1
TABLET ORAL
Refills: 1 | COMMUNITY
Start: 2019-08-07 | End: 2019-09-25

## 2019-08-16 RX ORDER — SERTRALINE HYDROCHLORIDE 25 MG/1
25 TABLET, FILM COATED ORAL DAILY
Qty: 30 TAB | Refills: 11 | Status: SHIPPED | OUTPATIENT
Start: 2019-08-16 | End: 2019-09-25 | Stop reason: SDUPTHER

## 2019-08-16 NOTE — PATIENT INSTRUCTIONS
Finish citalopram bottle and then switch to Zoloft 25 mg due to potential drug interation with donepezil. If 25 mg is not effective after 3 weeks increase to 50 mg.   Medicare Wellness Visit, Female     The best way to live healthy is to have a lifestyle where you eat a well-balanced diet, exercise regularly, limit alcohol use, and quit all forms of tobacco/nicotine, if applicable. Regular preventive services are another way to keep healthy. Preventive services (vaccines, screening tests, monitoring & exams) can help personalize your care plan, which helps you manage your own care. Screening tests can find health problems at the earliest stages, when they are easiest to treat. [facility] follows the current, evidence-based guidelines published by the [facility] Petr Fabiana ([facility]) when recommending preventive services for our patients. Because we follow these guidelines, sometimes recommendations change over time as research supports it. (For example, mammograms used to be recommended annually. Even though Medicare will still pay for an annual mammogram, the newer guidelines recommend a mammogram every two years for women of average risk.)  Of course, you and your doctor may decide to screen more often for some diseases, based on your risk and your health status. Preventive services for you include:  - Medicare offers their members a free annual wellness visit, which is time for you and your primary care provider to discuss and plan for your preventive service needs. Take advantage of this benefit every year!  -All adults over the age of 72 should receive the recommended pneumonia vaccines. Current USPSTF guidelines recommend a series of two vaccines for the best pneumonia protection.   -All adults should have a flu vaccine yearly and a tetanus vaccine every 10 years.  All adults age 61 and older should receive a shingles vaccine once in their lifetime.    -A bone mass density test is recommended when a woman turns 72 to screen for osteoporosis. This test is only recommended one time, as a screening. Some providers will use this same test as a disease monitoring tool if you already have osteoporosis. -All adults age 38-68 who are overweight should have a diabetes screening test once every three years.   -Other screening tests and preventive services for persons with diabetes include: an eye exam to screen for diabetic retinopathy, a kidney function test, a foot exam, and stricter control over your cholesterol.   -Cardiovascular screening for adults with routine risk involves an electrocardiogram (ECG) at intervals determined by your doctor.   -Colorectal cancer screenings should be done for adults age 54-65 with no increased risk factors for colorectal cancer. There are a number of acceptable methods of screening for this type of cancer. Each test has its own benefits and drawbacks. Discuss with your doctor what is most appropriate for you during your annual wellness visit. The different tests include: colonoscopy (considered the best screening method), a fecal occult blood test, a fecal DNA test, and sigmoidoscopy. -Breast cancer screenings are recommended every other year for women of normal risk, age 54-69.  -Cervical cancer screenings for women over age 72 are only recommended with certain risk factors.   -All adults born between [facility] should be screened once for Hepatitis C. Here is a list of your current Health Maintenance items (your personalized list of preventive services) with a due date:  Health Maintenance Due   Topic Date Due    Annual Well Visit  08/22/2019            Upper Respiratory Infection (Cold): Care Instructions  Your Care Instructions    An upper respiratory infection, or URI, is an infection of the nose, sinuses, or throat. URIs are spread by coughs, sneezes, and direct contact. The common cold is the most frequent kind of URI.  The flu and sinus infections are other kinds of URIs. Almost all URIs are caused by viruses. Antibiotics won't cure them. But you can treat most infections with home care. This may include drinking lots of fluids and taking over-the-counter pain medicine. You will probably feel better in 4 to 10 days. The doctor has checked you carefully, but problems can develop later. If you notice any problems or new symptoms, get medical treatment right away. Follow-up care is a key part of your treatment and safety. Be sure to make and go to all appointments, and call your doctor if you are having problems. It's also a good idea to know your test results and keep a list of the medicines you take. How can you care for yourself at home? · To prevent dehydration, drink plenty of fluids, enough so that your urine is light yellow or clear like water. Choose water and other caffeine-free clear liquids until you feel better. If you have kidney, heart, or liver disease and have to limit fluids, talk with your doctor before you increase the amount of fluids you drink. · Take an over-the-counter pain medicine, such as acetaminophen (Tylenol), ibuprofen (Advil, Motrin), or naproxen (Aleve). Read and follow all instructions on the label. · Before you use cough and cold medicines, check the label. These medicines may not be safe for young children or for people with certain health problems. · Be careful when taking over-the-counter cold or flu medicines and Tylenol at the same time. Many of these medicines have acetaminophen, which is Tylenol. Read the labels to make sure that you are not taking more than the recommended dose. Too much acetaminophen (Tylenol) can be harmful. · Get plenty of rest.  · Do not smoke or allow others to smoke around you. If you need help quitting, talk to your doctor about stop-smoking programs and medicines. These can increase your chances of quitting for good. When should you call for help?   Call 911 anytime you think you may need emergency care. For example, call if:    · You have severe trouble breathing.    Call your doctor now or seek immediate medical care if:    · You seem to be getting much sicker.     · You have new or worse trouble breathing.     · You have a new or higher fever.     · You have a new rash.    Watch closely for changes in your health, and be sure to contact your doctor if:    · You have a new symptom, such as a sore throat, an earache, or sinus pain.     · You cough more deeply or more often, especially if you notice more mucus or a change in the color of your mucus.     · You do not get better as expected. Where can you learn more? Go to http://pepe-constantino.info/. Enter J711 in the search box to learn more about \"Upper Respiratory Infection (Cold): Care Instructions. \"  Current as of: September 5, 2018  Content Version: 12.1  © 0436-6956 [employer]. Care instructions adapted under license by [employer] (which disclaims liability or warranty for this information). If you have questions about a medical condition or this instruction, always ask your healthcare professional. [address] any warranty or liability for your use of this information. Advised protocol for clearing congestion:  Increase fluid intake, especially water to thin mucous and boost the immune system. Avoid sugar and dairy while congested since they thicken mucous. Get plenty of rest!  Gargle 3 times daily and as needed in Listerine or warm salt water vinegar solutions (1 tsp salt, 1 tsp vinegar in 1 cup lukewarm water.)  Use OTC nasal saline spray up each nostril twice daily. Use humidifier at bedtime. Use OTC Mucinex 600 mg twice daily to loosen mucous. Use OTC Tylenol Arthritis or Ibuprofen up to 800 mg up to 3 times daily as needed for pain, fever or headaches. Avoid decongestants and Ibuprofen if you have high blood pressure!   If mucous is consistently discolored yellow or green throughout the day for more than a week, call the doctor for an evaluation.

## 2019-08-16 NOTE — PROGRESS NOTES
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 female presents with Annual Wellness Visit (Rm 14); Blood Pressure Check; Results; Referral Follow Up; Documentation ([facility]); and Cold Symptoms    Agree with nurse note. Pt is accompanied by her daughter. Pt with adjustment disorder with mixed anxiety and depressed mood, hypercholesterolemia, hypertension, h/o carotid endarterectomy, hx of tobacco abuse, vit d deficiency, PVD, hyperglycemia, congenital shortening of L LE, BL dry eyes, and L foot pain presents to the office with a BP of 147/70. Patient denies vision changes, headaches, dizziness, chest pain, SOB, or swelling. For BP, she uses Lisinopril 10 mg BID, tolerating well. She also uses Aspirin 81 mg daily. Pt with late onset Alzheimer's disease saw Dr. Tigist Thomason on 8/14/2019. She recommended the patient stay active and more social so the pt's daughter brought paperwork for the [facility] program for me to fill out. Pt takes donepezil 10 mg and Namenda 5 mg for memory daily. Pt has recently developed anxiety and depression. She saw Dr. Marivel Calvin on 7/15/2019 and he started her on citalopram 10 mg. She was having tearful moods and feeling like the walls were closing in on her, but this has resolved on citalopram. Pt reports she feels well today. Pt is down 11 lbs since 2/2019. Pt's daughter shares that she will eat a banana for breakfast and will nibble off food for each meal, but overall she is not a great eater. Pt with Pt BL CEA is followed by Dr. Michelle Rodriguez. Pt has a hx of R CEA in 2011 due to ICA. Pt's daughter shares the pt is starting to have a runny nose x yesterday. There are multiple family members that have had colds recently. The pt does not recall any sxs.  Patient denies fever, chills, ear pain, dizziness, nasal congestion, sore throat, headache, itchy or watery eyes, chest pain or tightness, SOB, wheezing, cough, GI symptoms, bladder symptoms and body aches. Health Maintenance     Subsequent MWV completed today. Pt's most recent colonoscopy was on 2014 with Dr. Liz Raymundo. Pt has hx of rectal polypectomy, hemorrhoidectomy, and GIB. Chest XR on 6/19/2016 showed no evidence of blood clot. noted some emphysema. DEXA scan on 7/22/2014 was normal.     Pt's most recent mammogram was on 4/29/2015. No evidence of ca. Written by dara Rojo, as dictated by Dr. Julianna Coyle DO.    ROS    Review of Systems negative except as noted above in HPI. ALLERGIES:    Allergies   Allergen Reactions    Penicillins Hives       CURRENT MEDICATIONS:    Outpatient Medications Marked as Taking for the 8/16/19 encounter (Office Visit) with Jolene Lewis DO   Medication Sig Dispense Refill    sertraline (ZOLOFT) 25 mg tablet Take 1 Tab by mouth daily. Indications: Anxiousness associated with Depression 30 Tab 11    lisinopril (PRINIVIL, ZESTRIL) 10 mg tablet TAKE 1 TABLET IN THE MORNING AND TAKE 2 TABLETS IN THE EVENING FOR HYPERTENSION 270 Tab 3    memantine (NAMENDA) 5 mg tablet Take 1 Tab by mouth two (2) times a day. 180 Tab 1    donepezil (ARICEPT) 10 mg tablet Take 1 Tab by mouth daily (with breakfast). 90 Tab 1    aspirin delayed-release 81 mg tablet Take 1 Tab by mouth daily. Indications: myocardial infarction prevention 90 Tab 3    Omega-3 Fatty Acids 300 mg cap Take 1 Cap by mouth daily. PAST MEDICAL HISTORY:    Past Medical History:   Diagnosis Date    Bilateral dry eyes 2017    Dr. Celeste Tubbs    Cataract     Bilaterally. Dr. Radha Marcano. Dr. Marciano Sinclair. Dr. Celeste Tubbs.  Chest pain 10/2015, 2016    Dr. North Rothman. negative Stress Test.    Dementia 07/2017    triggered by mild LACHELLE, depression. Dr. Talon Feliciano. Dr. Lainey Stephen Diverticulosis 09/2008    Dr. Radha Queen. Dr. Kenny Montesinos.  DJD (degenerative joint disease) 09/30/05    cervical, worse C6-7, C7-T1. Left AC joint.  DJD (degenerative joint disease) of knee     bilateral  Dr. Nikko Sandy. Dr. Geoff Arreola    NULL (dyspnea on exertion) 10/2015    Dr. Angeli Saucedo.  Essential hypertension, benign 1968    Foster child     GIB (gastrointestinal bleeding) 11/17/14    due to internal hemorrhoids. Dr. Johnathan Chavez Heart palpitations 11/12/15    due to PVCs. Dr. Jordan Curran.  Heartburn     Hypercholesteremia     Hyperglycemia 2014    Internal carotid artery stenosis 2007    bilateral.  Dr. Liza Boas Internal hemorrhoids 09/2008, 11/2014    Dr. Shahida Lopez. Dr. Gaby Fried.  Knee pain     Left. Dr. Raf Baig.  Plantar wart 2018    Dr. Abigail Ruelas. R foot    Pulmonic valve insufficiency 10/2015    Mild to Mod. Dr. Angeli Saucedo. EF 50-55%    PVD (peripheral vascular disease) ([facility] [address] 2007    Dr. Octavio Gonzalez    Raynaud's phenomenon 1968    Shortening, leg, congenital     left    Vitamin D deficiency 10/10/10       PAST SURGICAL HISTORY:    Past Surgical History:   Procedure Laterality Date    HX ATHERECTOMY Left 09/01/2011    popliteal atherectomy and angioplasty. Dr. Tres Dunn Right 2008    benign. Dr. Nan Campos.  HX CAROTID ENDARTERECTOMY Right 01/19/2011    ICA. Dr. Quintero Mail Bilateral 02/22/2016, 10/24/2016    L then R Dr. Villasenor Fearing.  HX COLONOSCOPY  11/24/14    due to GIB. Dr. Gaby Fried.  HX COLONOSCOPY  09/05/08    with polypectomy. benign. Dr. Mp Aguilar. due q 10 yrs.  HX GI  1957    FISSURECTOMY.  HX GI  10/03/2008    PROCTOPLASTY due to rectal prolapse    HX HEMORRHOIDECTOMY  10/03/2008    internal and external    HX KNEE REPLACEMENT Left 07/2013    due to Severe OA. Dr. Hayden Cheung.      HX POLYPECTOMY  09/05/2008    rectal Dr. Jeff Millard HISTORY:    Family History   Problem Relation Age of Onset    Heart Attack Mother 79    Heart Attack Sister         x 3 sisters    Heart Attack Brother         x 3 brothers       SOCIAL HISTORY:    Social History     Socioeconomic History    Marital status:      Spouse name: Not on file    Number of children: Not on file    Years of education: Not on file    Highest education level: Not on file   Tobacco Use    Smoking status: Former Smoker     Packs/day: 1.00     Years: 15.00     Pack years: 15.00     Types: Cigarettes     Last attempt to quit: 1968     Years since quittin.6    Smokeless tobacco: Never Used   Substance and Sexual Activity    Alcohol use: No     Alcohol/week: 0.0 standard drinks    Drug use: No     Types: Prescription    Sexual activity: Never     Partners: Male     Birth control/protection: Abstinence       IMMUNIZATIONS:    Immunization History   Administered Date(s) Administered    (RETIRED) Pneumococcal Vaccine (Unspecified Type) 10/22/2009    Pneumococcal Conjugate (PCV-13) 2016         PHYSICAL EXAMINATION    Vital Signs    Visit Vitals  /70   Pulse 61   Temp 97.8 °F (36.6 °C) (Oral)   Resp 18   Ht 5' (1.524 m)   Wt 144 lb 6.4 oz (65.5 kg)   LMP  (LMP Unknown)   SpO2 97%   BMI 28.20 kg/m²       Weight Metrics 2019 2019 7/15/2019 4/15/2019 2/15/2019 2018 10/15/2018   Weight 144 lb 6.4 oz 144 lb 150 lb 8 oz 152 lb 3.2 oz 155 lb 3.2 oz 157 lb 157 lb 3.2 oz   BMI 28.2 kg/m2 28.12 kg/m2 29.39 kg/m2 29.72 kg/m2 30.31 kg/m2 30.66 kg/m2 30.7 kg/m2       General appearance - Well nourished. Well appearing. Well developed. No acute distress. Overweight. Pleasantly demented. Present with daughter. Head - Normocephalic. Atraumatic. Non tender sinuses x 4. Eyes - pupils equal and reactive. Extraocular eye movements intact. Sclera anicteric. Mildly injected sclera. Ears - Hearing is grossly normal bilaterally. Nose - normal and patent. No polyps noted. No erythema. No discharge. Mouth - mucous membranes with adequate moisture.   Posterior pharynx normal with cobblestone appearance. No erythema, white exudate or obstruction. Neck - supple. Midline trachea. No carotid bruits noted bilaterally. No thyromegaly noted. Chest - clear to auscultation bilaterally anteriorly and posteriorly. No wheezes. No rales or rhonchi. Breath sounds are symmetrical bilaterally. Unlabored respirations. Heart - normal rate. Regular rhythm. Normal S1, S2. No murmur noted. No rubs, clicks or gallops noted. Abdomen - soft and distended. No masses or organomegaly. No rebound, rigidity or guarding. Bowel sounds normal x 4 quadrants. No tenderness noted. Neurological - awake, alert and oriented to person, place, and time and event. Cranial nerves II through XII intact. Clear speech. Muscle strength is +5/5 x 4 extremities. Sensation is intact to light touch bilaterally. Steady gait. Heme/Lymph - peripheral pulses normal x 4 extremities. No peripheral edema is noted. Musculoskeletal - Intact x 4 extremities. Full ROM x 4 extremities. No pain with movement. Back exam - normal range of motion. No pain on palpation of the spinous processes in the cervical, thoracic, lumbar, sacral regions. No CVA tenderness. Skin - no rashes, erythema, ecchymosis, lacerations, abrasions, suspicious moles noted  Psychological -   normal behavior, dress and thought processes. Good insight. Good eye contact. Normal affect. Appropriate mood. Normal speech.       DATA REVIEWED    Lab Results   Component Value Date/Time    WBC 4.7 02/20/2018 08:25 AM    HGB 13.9 02/20/2018 08:25 AM    HCT 41.3 02/20/2018 08:25 AM    PLATELET 070 46/74/1982 08:25 AM    MCV 95 02/20/2018 08:25 AM     Lab Results   Component Value Date/Time    Sodium 140 02/20/2018 08:25 AM    Potassium 4.5 02/20/2018 08:25 AM    Chloride 102 02/20/2018 08:25 AM    CO2 25 02/20/2018 08:25 AM    Anion gap 8 06/19/2016 06:00 PM    Glucose 102 (H) 02/20/2018 08:25 AM    BUN 13 02/20/2018 08:25 AM Creatinine 0.84 02/20/2018 08:25 AM    BUN/Creatinine ratio 15 02/20/2018 08:25 AM    GFR est AA 75 02/20/2018 08:25 AM    GFR est non-AA 65 02/20/2018 08:25 AM    Calcium 9.5 02/20/2018 08:25 AM    Bilirubin, total 0.5 02/20/2018 08:25 AM    AST (SGOT) 17 02/20/2018 08:25 AM    Alk. phosphatase 101 02/20/2018 08:25 AM    Protein, total 6.6 02/20/2018 08:25 AM    Albumin 4.0 02/20/2018 08:25 AM    Globulin 3.6 06/19/2016 06:00 PM    A-G Ratio 1.5 02/20/2018 08:25 AM    ALT (SGPT) 12 02/20/2018 08:25 AM     Lab Results   Component Value Date/Time    Cholesterol, total 216 (H) 02/20/2018 08:25 AM    HDL Cholesterol 74 02/20/2018 08:25 AM    LDL, calculated 129 (H) 02/20/2018 08:25 AM    VLDL, calculated 13 02/20/2018 08:25 AM    Triglyceride 67 02/20/2018 08:25 AM    CHOL/HDL Ratio 2.8 05/05/2010 08:45 AM     Lab Results   Component Value Date/Time    Vitamin D 25-Hydroxy 24 (L) 10/08/2010 04:19 PM    VITAMIN D, 25-HYDROXY 41.2 02/20/2018 08:25 AM       Lab Results   Component Value Date/Time    Hemoglobin A1c 5.4 02/20/2018 08:25 AM    Hemoglobin A1c (POC) 5.5 02/17/2017 09:40 AM     Lab Results   Component Value Date/Time    TSH 2.270 02/20/2018 08:25 AM       Lab Results   Component Value Date/Time    Microalb/Creat ratio (ug/mg creat.) <3.4 02/20/2018 08:25 AM         ASSESSMENT and PLAN      ICD-10-CM ICD-9-CM    1. Medicare annual wellness visit, subsequent Z00.00 V70.0    2. Adjustment disorder with mixed anxiety and depressed mood F43.23 309.28 sertraline (ZOLOFT) 25 mg tablet    improved with Celexa 10 mg daily   3. Alzheimer's disease of other onset without behavioral disturbance G30.8 331.0     F02.80 294.10    4. Essential hypertension I10 401.9     stable   5. Hyperglycemia R73.9 790.29    6. Hypercholesteremia E78.00 272.0    7. Vitamin D deficiency E55.9 268.9    8. Weight loss R63.4 783.21     11# since 02/2019 due to decreased food portions vs other   9.  H/O carotid endarterectomy Z98.890 V45.89 10. History of tobacco abuse Z87.891 V15.82    11. Left foot pain M79.672 729.5     resolved    12. PVD (peripheral vascular disease) ([facility]) I73.9 443.9    13. History of CEA (carotid endarterectomy) Z98.890 V45.89    14. Chronically dry eyes, bilateral H04. 123 375.15    15. Congenital shortening of left lower extremity Q72.812 755.30    16. Centrilobular emphysema (HCC) J43.2 492.8    17. Overweight (BMI 25.0-29. 9) E66.3 278.02    18. Screening for alcoholism Z13.39 V79.1 [place] ANNUAL ALCOHOL SCREEN 15 MIN   19. Screening for depression Z13.31 V79.0 DEPRESSION SCREEN ANNUAL       Discussed the patient's BMI with her. The BMI follow up plan is as follows: I have counseled this patient on diet and exercise regimens. Decrease carbohydrates (white foods, sweet foods, sweet drinks and alcohol), increase green leafy vegetables and protein (lean meats and beans) with each meal.  Avoid fried foods. Eat 3-5 small meals daily. Do not skip meals. Increase water intake. Increase physical activity to 30 minutes daily for health benefit or 60 minutes daily to prevent weight regain, as tolerated. Get 7-8 hours uninterrupted sleep nightly. Chart reviewed and updated. Continue current medications and care. Finish citalopram 10 mg bottle and then switch to Zoloft 25 mg due to potential drug interation with donepezil. If 25 mg is not effective after 3 weeks increase to 50 mg.   Prescriptions written and sent to pharmacy; medication side effects discussed. Lisinopril 10 mg, Zoloft 25 mg  Filled out paperwork for [employer] program and returned it to pt's daughter. Recheck pertinent labs today. Recent office visit notes from Dr. Joan Kaur reviewed. Counseled patient on health concerns:  Memory, stress, blood pressure, PVD vit d, hyperglycemia, cholesterol, weight. Relevant handouts given and discussed with patient. Immunizations noted.      Offered empathy, support, legitimation, prayers, partnership to patient. Praised patient for progress. They are filling out the ACP next week. They are going to contact an elder  through the [employer]. Follow-up and Dispositions    · Return in about 6 months (around 2/16/2020) for blood pressure, referral follow up, results. Patient was offered a choice/choices in the treatment plan today. Patient expresses understanding of the plan and agrees with recommendations. More than 40 mins spent face to face with patient and more than 50% of this time spent in counseling and coordinating care. Written by dara Knowles, as dictated by Dr. Sid Barrera DO. Documentation True and Accepted by Dinora Nielson. Patient Instructions     Finish citalopram bottle and then switch to Zoloft 25 mg due to potential drug interation with donepezil. If 25 mg is not effective after 3 weeks increase to 50 mg.   Medicare Wellness Visit, Female     The best way to live healthy is to have a lifestyle where you eat a well-balanced diet, exercise regularly, limit alcohol use, and quit all forms of tobacco/nicotine, if applicable. Regular preventive services are another way to keep healthy. Preventive services (vaccines, screening tests, monitoring & exams) can help personalize your care plan, which helps you manage your own care. Screening tests can find health problems at the earliest stages, when they are easiest to treat. [facility] follows the current, evidence-based guidelines published by the [place] Petr Jung (USPSTF) when recommending preventive services for our patients. Because we follow these guidelines, sometimes recommendations change over time as research supports it. (For example, mammograms used to be recommended annually.  Even though Medicare will still pay for an annual mammogram, the newer guidelines recommend a mammogram every two years for women of average risk.)  Of course, you and your doctor may decide to screen more often for some diseases, based on your risk and your health status. Preventive services for you include:  - Medicare offers their members a free annual wellness visit, which is time for you and your primary care provider to discuss and plan for your preventive service needs. Take advantage of this benefit every year!  -All adults over the age of 72 should receive the recommended pneumonia vaccines. Current USPSTF guidelines recommend a series of two vaccines for the best pneumonia protection.   -All adults should have a flu vaccine yearly and a tetanus vaccine every 10 years. All adults age 61 and older should receive a shingles vaccine once in their lifetime.    -A bone mass density test is recommended when a woman turns 65 to screen for osteoporosis. This test is only recommended one time, as a screening. Some providers will use this same test as a disease monitoring tool if you already have osteoporosis. -All adults age 38-68 who are overweight should have a diabetes screening test once every three years.   -Other screening tests and preventive services for persons with diabetes include: an eye exam to screen for diabetic retinopathy, a kidney function test, a foot exam, and stricter control over your cholesterol.   -Cardiovascular screening for adults with routine risk involves an electrocardiogram (ECG) at intervals determined by your doctor.   -Colorectal cancer screenings should be done for adults age 54-65 with no increased risk factors for colorectal cancer. There are a number of acceptable methods of screening for this type of cancer. Each test has its own benefits and drawbacks. Discuss with your doctor what is most appropriate for you during your annual wellness visit. The different tests include: colonoscopy (considered the best screening method), a fecal occult blood test, a fecal DNA test, and sigmoidoscopy.   -Breast cancer screenings are recommended every other year for women of normal risk, age 54-69.  -Cervical cancer screenings for women over age 72 are only recommended with certain risk factors.   -All adults born between [facility] should be screened once for Hepatitis C. Here is a list of your current Health Maintenance items (your personalized list of preventive services) with a due date:  Health Maintenance Due   Topic Date Due    Annual Well Visit  08/22/2019            Upper Respiratory Infection (Cold): Care Instructions  Your Care Instructions    An upper respiratory infection, or URI, is an infection of the nose, sinuses, or throat. URIs are spread by coughs, sneezes, and direct contact. The common cold is the most frequent kind of URI. The flu and sinus infections are other kinds of URIs. Almost all URIs are caused by viruses. Antibiotics won't cure them. But you can treat most infections with home care. This may include drinking lots of fluids and taking over-the-counter pain medicine. You will probably feel better in 4 to 10 days. The doctor has checked you carefully, but problems can develop later. If you notice any problems or new symptoms, get medical treatment right away. Follow-up care is a key part of your treatment and safety. Be sure to make and go to all appointments, and call your doctor if you are having problems. It's also a good idea to know your test results and keep a list of the medicines you take. How can you care for yourself at home? · To prevent dehydration, drink plenty of fluids, enough so that your urine is light yellow or clear like water. Choose water and other caffeine-free clear liquids until you feel better. If you have kidney, heart, or liver disease and have to limit fluids, talk with your doctor before you increase the amount of fluids you drink. · Take an over-the-counter pain medicine, such as acetaminophen (Tylenol), ibuprofen (Advil, Motrin), or naproxen (Aleve).  Read and follow all instructions on the label. · Before you use cough and cold medicines, check the label. These medicines may not be safe for young children or for people with certain health problems. · Be careful when taking over-the-counter cold or flu medicines and Tylenol at the same time. Many of these medicines have acetaminophen, which is Tylenol. Read the labels to make sure that you are not taking more than the recommended dose. Too much acetaminophen (Tylenol) can be harmful. · Get plenty of rest.  · Do not smoke or allow others to smoke around you. If you need help quitting, talk to your doctor about stop-smoking programs and medicines. These can increase your chances of quitting for good. When should you call for help? Call 911 anytime you think you may need emergency care. For example, call if:    · You have severe trouble breathing.    Call your doctor now or seek immediate medical care if:    · You seem to be getting much sicker.     · You have new or worse trouble breathing.     · You have a new or higher fever.     · You have a new rash.    Watch closely for changes in your health, and be sure to contact your doctor if:    · You have a new symptom, such as a sore throat, an earache, or sinus pain.     · You cough more deeply or more often, especially if you notice more mucus or a change in the color of your mucus.     · You do not get better as expected. Where can you learn more? Go to http://pepe-constantino.info/. Enter X984 in the search box to learn more about \"Upper Respiratory Infection (Cold): Care Instructions. \"  Current as of: September 5, 2018  Content Version: 12.1  © 6670-6264 [employer]. Care instructions adapted under license by [employer] (which disclaims liability or warranty for this information).  If you have questions about a medical condition or this instruction, always ask your healthcare professional. Ronald Simpler disclaims any warranty or liability for your use of this information. Advised protocol for clearing congestion:  Increase fluid intake, especially water to thin mucous and boost the immune system. Avoid sugar and dairy while congested since they thicken mucous. Get plenty of rest!  Gargle 3 times daily and as needed in Listerine or warm salt water vinegar solutions (1 tsp salt, 1 tsp vinegar in 1 cup lukewarm water.)  Use OTC nasal saline spray up each nostril twice daily. Use humidifier at bedtime. Use OTC Mucinex 600 mg twice daily to loosen mucous. Use OTC Tylenol Arthritis or Ibuprofen up to 800 mg up to 3 times daily as needed for pain, fever or headaches. Avoid decongestants and Ibuprofen if you have high blood pressure! If mucous is consistently discolored yellow or green throughout the day for more than a week, call the doctor for an evaluation.

## 2019-08-16 NOTE — PROGRESS NOTES
This is the Subsequent Medicare Annual Wellness Exam, performed 12 months or more after the Initial AWV or the last Subsequent AWV    I have reviewed the patient's medical history in detail and updated the computerized patient record. History     Past Medical History:   Diagnosis Date    Bilateral dry eyes 2017    Dr. Shannan Moreno    Cataract     Bilaterally. Dr. Td Diaz. Dr. Yeyo Posadas. Dr. Shannan Moreno.  Chest pain 10/2015, 2016    Dr. Lucy Jackson. negative Stress Test.    Dementia 07/2017    triggered by mild LACHELLE, depression. Dr. Kalee Sharpe. Dr. Courtney Dial Diverticulosis 09/2008    Dr. Coretta Giron. Dr. Sofia Collazo.  DJD (degenerative joint disease) 09/30/05    cervical, worse C6-7, C7-T1. Left AC joint.  DJD (degenerative joint disease) of knee     bilateral  Dr. Shelley Hollingsworth. Dr. Efren SHARPE (dyspnea on exertion) 10/2015    Dr. Lucy Jackson.  Essential hypertension, benign 1968    Foster child     GIB (gastrointestinal bleeding) 11/17/14    due to internal hemorrhoids. Dr. Rosemarie Winchester Heart palpitations 11/12/15    due to PVCs. Dr. Frank Loera.  Heartburn     Hypercholesteremia     Hyperglycemia 2014    Internal carotid artery stenosis 2007    bilateral.  Dr. Patrica Flores Internal hemorrhoids 09/2008, 11/2014    Dr. Justus Mccartney. Dr. Jose Luis Goff.  Knee pain     Left. Dr. Gabi Barber.  Plantar wart 2018    Dr. Shital Mark. R foot    Pulmonic valve insufficiency 10/2015    Mild to Mod. Dr. Lucy Jackson. EF 50-55%    PVD (peripheral vascular disease) ([facility] [address] 2007    Dr. Diego Cordova    Raynaud's phenomenon 1968    Shortening, leg, congenital     left    Vitamin D deficiency 10/10/10      Past Surgical History:   Procedure Laterality Date    HX ATHERECTOMY Left 09/01/2011    popliteal atherectomy and angioplasty. Dr. Heather Morris Right 2008    benign. Dr. Jay Reyes.  HX CAROTID ENDARTERECTOMY Right 01/19/2011    ICA. Dr. Gilmore Pacific Bilateral 02/22/2016, 10/24/2016    L then R Dr. Brandee Sanchez.  HX COLONOSCOPY  11/24/14    due to GIB. Dr. Quinn Clarke.  HX COLONOSCOPY  09/05/08    with polypectomy. benign. Dr. Poppy Concepcion. due q 10 yrs.  HX GI  1957    FISSURECTOMY.  HX GI  10/03/2008    PROCTOPLASTY due to rectal prolapse    HX HEMORRHOIDECTOMY  10/03/2008    internal and external    HX KNEE REPLACEMENT Left 07/2013    due to Severe OA. Dr. Dave Kolb.  HX POLYPECTOMY  09/05/2008    rectal Dr. Lopez Edouard     Current Outpatient Medications   Medication Sig Dispense Refill    sertraline (ZOLOFT) 25 mg tablet Take 1 Tab by mouth daily. Indications: Anxiousness associated with Depression 30 Tab 11    memantine (NAMENDA) 5 mg tablet Take 1 Tab by mouth two (2) times a day. 180 Tab 1    donepezil (ARICEPT) 10 mg tablet Take 1 Tab by mouth daily (with breakfast). 90 Tab 1    lisinopril (PRINIVIL, ZESTRIL) 10 mg tablet TAKE 1 TABLET IN THE MORNING AND TAKE 2 TABLETS IN THE EVENING FOR HYPERTENSION 270 Tab 3    aspirin delayed-release 81 mg tablet Take 1 Tab by mouth daily. Indications: myocardial infarction prevention 90 Tab 3    Omega-3 Fatty Acids 300 mg cap Take 1 Cap by mouth daily.  acetaminophen (TYLENOL) 325 mg tablet Take  by mouth every four (4) hours as needed for Pain.  mometasone (NASONEX) 50 mcg/actuation nasal spray 2 Sprays by Both Nostrils route daily. Indications: ALLERGIC RHINITIS 1 Container 5    Calcium-Cholecalciferol, D3, 600 mg(1,500mg) -400 unit cap Take  by mouth daily.        Allergies   Allergen Reactions    Penicillins Hives     Family History   Problem Relation Age of Onset    Heart Attack Mother 79    Heart Attack Sister         x 3 sisters    Heart Attack Brother         x 3 brothers     Social History     Tobacco Use    Smoking status: Former Smoker     Packs/day: 1.00     Years: 15.00     Pack years: 15.00     Types: Cigarettes     Last attempt to quit: 1968     Years since quittin.6    Smokeless tobacco: Never Used   Substance Use Topics    Alcohol use: No     Alcohol/week: 0.0 standard drinks     Patient Active Problem List   Diagnosis Code    Internal carotid artery stenosis I65.29    PVD (peripheral vascular disease) ([facility] [address] I73.9    Raynaud's phenomenon I73.00    GERD (gastroesophageal reflux disease) K21.9    Diverticulosis K57.90    Internal hemorrhoids K64.8    Hypercholesteremia E78.00    Obesity, Class I, BMI 30-34.9 E66.9    Essential hypertension I10    Adjustment disorder with mixed anxiety and depressed mood F43.23    Pulmonic valve insufficiency I37.1    Heart palpitations R00.2    Shortening, leg, congenital Q72.819    Aortic valve sclerosis I35.8    Hyperglycemia R73.9    Vitamin D deficiency E55.9    History of tobacco abuse Z87.891    History of GI bleed Z87.19    History of CEA (carotid endarterectomy) Z98.890    Advance care planning Z71.89    Dementia without behavioral disturbance F03.90    History of knee replacement, total, left Z96.652    Chronically dry eyes, bilateral H04.123    H/O carotid endarterectomy Z98.890    Family history of heart attack Z82.49    Cortical senile cataract H25.019    Tear film insufficiency H04.129       Depression Risk Factor Screening:     3 most recent PHQ Screens 2019   Little interest or pleasure in doing things Not at all   Feeling down, depressed, irritable, or hopeless Not at all   Total Score PHQ 2 0   Trouble falling or staying asleep, or sleeping too much Not at all   Feeling tired or having little energy Not at all   Poor appetite, weight loss, or overeating Not at all   Feeling bad about yourself - or that you are a failure or have let yourself or your family down Not at all   Trouble concentrating on things such as school, work, reading, or watching TV Not at all   Moving or speaking so slowly that other people could have noticed; or the opposite being so fidgety that others notice Not at all   Thoughts of being better off dead, or hurting yourself in some way Not at all   PHQ 9 Score 0   How difficult have these problems made it for you to do your work, take care of your home and get along with others Not difficult at all     Alcohol Risk Factor Screening: You do not drink alcohol or very rarely. Functional Ability and Level of Safety:   Pt ambulates safely without assistance. Hearing Loss  Hearing is good. Activities of Daily Living  The home contains: grab bars  Patient needs help with:  transportation, shopping and managing medications    Fall Risk  Fall Risk Assessment, last 12 mths 8/16/2019   Able to walk? Yes   Fall in past 12 months? No       Abuse Screen  Patient is not abused    Cognitive Screening   Evaluation of Cognitive Function:  Has your family/caregiver stated any concerns about your memory: yes. Pt sees neuro, Dr. Crystal Pierson for Alzheimer's. AAOx3, year is 19 or 81    Patient Care Team   Patient Care Team:  Shon Moreira DO as PCP - [address], Sam Alonso MD (Vascular Surgery)  Alli Arizmendi MD (Cardiology)  Lianne Nelson MD (Ophthalmology)  Dajuan Tovar MD (Gastroenterology)  Harlan Koyanagi, MD (Orthopedic Surgery)  Sami Pantoja DO (Neurology)  Matt García DPM (Podiatry)    Assessment/Plan   Education and counseling provided:  Are appropriate based on today's review and evaluation  End-of-Life planning (with patient's consent)  Pneumococcal Vaccine  Influenza Vaccine  Screening Mammography  Screening Pap and pelvic (covered once every 2 years)  Colorectal cancer screening tests  Cardiovascular screening blood test  Bone mass measurement (DEXA)  Screening for glaucoma  Diabetes screening test    Diagnoses and all orders for this visit:    1. Medicare annual wellness visit, subsequent    2.  Adjustment disorder with mixed anxiety and depressed mood  Comments:  improved with Celexa 10 mg daily  Orders:  -     sertraline (ZOLOFT) 25 mg tablet; Take 1 Tab by mouth daily. Indications: Anxiousness associated with Depression    3. Alzheimer's disease of other onset without behavioral disturbance    4. Essential hypertension  Comments:  stable    5. Hyperglycemia    6. Hypercholesteremia    7. Vitamin D deficiency    8. Weight loss  Comments:  11# since 02/2019 due to decreased food portions vs other    9. H/O carotid endarterectomy    10. History of tobacco abuse    11. Left foot pain  Comments:  resolved     12. PVD (peripheral vascular disease) ([facility] [address]    13. History of CEA (carotid endarterectomy)    14. Chronically dry eyes, bilateral    15. Congenital shortening of left lower extremity    16. Centrilobular emphysema ([address]    17. Overweight (BMI 25.0-29.9)    18. Screening for alcoholism  -     AL ANNUAL ALCOHOL SCREEN 15 MIN    19.  Screening for depression  -     DEPRESSION SCREEN ANNUAL    Other orders  -     lisinopril (PRINIVIL, ZESTRIL) 10 mg tablet; TAKE 1 TABLET IN THE MORNING AND TAKE 2 TABLETS IN THE EVENING FOR HYPERTENSION        Health Maintenance Due   Topic Date Due    MEDICARE YEARLY EXAM  08/22/2019

## 2019-08-16 NOTE — PROGRESS NOTES
Octavia Ehsan  Identified pt with two pt identifiers(name and ). Chief Complaint   Patient presents with    Annual Wellness Visit     Rm 14    Blood Pressure Check    Results         1. Have you been to the ER, urgent care clinic since your last visit? Hospitalized since your last visit? NO    2. Have you seen or consulted any other health care providers outside of the [address] [place] [address] since your last visit? Include any pap smears or colon screening. NO          [unfilled]        Weight Metrics 2019 2019 7/15/2019 4/15/2019 2/15/2019 2018 10/15/2018   Weight 144 lb 6.4 oz 144 lb 150 lb 8 oz 152 lb 3.2 oz 155 lb 3.2 oz 157 lb 157 lb 3.2 oz   BMI 28.2 kg/m2 28.12 kg/m2 29.39 kg/m2 29.72 kg/m2 30.31 kg/m2 30.66 kg/m2 30.7 kg/m2         Medication reconciliation up to date and correct with patient at this time. My Chart     My chart gives you direct online access to portions of the electronic medical record (EMR) where your doctor stores your health information (ie, lab results, appointment information, medications, immunizations, and more. It is free. Would you like to set up your my chart? NO      Advance Care Planning    In the event something were to happen to you and you were unable to speak on your behalf, do you have an Advance Directive/ Living Will in place stating your wishes? NO    If yes, do we have a copy on file NO    If no, would you like information YES      ====Sp[facility]er Saba Invitation====    Patient was invited to [address] on this date and given the information folder for review. Recommended appointment with Spenser Saba facilitator for ACP conversation regarding advance directives. [x] Yes  [] No  Referral sent to [facility] Choices team member or Coordinator for follow-up    [] Yes  [x] No  Patient scheduled an appointment.        Site of Referral: [facility]      Today's provider has been notified of reason for visit, vitals and flowsheets obtained on patients. Reviewed record in preparation for visit, huddled with provider and have obtained necessary documentation.       Health Maintenance Due   Topic    Pneumococcal 65+ years (2 of 2 - PPSV23)    MEDICARE YEARLY EXAM        Wt Readings from Last 3 Encounters:   08/16/19 144 lb 6.4 oz (65.5 kg)   08/14/19 144 lb (65.3 kg)   07/15/19 150 lb 8 oz (68.3 kg)     Temp Readings from Last 3 Encounters:   08/16/19 97.8 °F (36.6 °C) (Oral)   07/15/19 95.4 °F (35.2 °C) (Oral)   02/15/19 97.2 °F (36.2 °C) (Oral)     BP Readings from Last 3 Encounters:   08/16/19 147/70   08/14/19 122/65   07/15/19 130/70     Pulse Readings from Last 3 Encounters:   08/16/19 61   08/14/19 68   07/15/19 (!) 57     Vitals:    08/16/19 1000   BP: 147/70   Pulse: 61   Resp: 18   Temp: 97.8 °F (36.6 °C)   TempSrc: Oral   SpO2: 97%   Weight: 144 lb 6.4 oz (65.5 kg)   Height: 5' (1.524 m)   PainSc:   0 - No pain         Learning Assessment:  :     Learning Assessment 12/17/2018 7/2/2014   PRIMARY LEARNER Patient Patient   HIGHEST LEVEL OF EDUCATION - PRIMARY LEARNER  - GRADUATED HIGH SCHOOL OR GED   BARRIERS PRIMARY LEARNER - NONE   PRIMARY LANGUAGE ENGLISH ENGLISH   LEARNER PREFERENCE PRIMARY READING READING   ANSWERED BY patient patient   RELATIONSHIP SELF SELF       Depression Screening:  :     3 most recent PHQ Screens 8/16/2019   Little interest or pleasure in doing things Not at all   Feeling down, depressed, irritable, or hopeless Not at all   Total Score PHQ 2 0   Trouble falling or staying asleep, or sleeping too much Not at all   Feeling tired or having little energy Not at all   Poor appetite, weight loss, or overeating Not at all   Feeling bad about yourself - or that you are a failure or have let yourself or your family down Not at all   Trouble concentrating on things such as school, work, reading, or watching TV Not at all   Moving or speaking so slowly that other people could have noticed; or the opposite being so fidgety that others notice Not at all   Thoughts of being better off dead, or hurting yourself in some way Not at all   PHQ 9 Score 0   How difficult have these problems made it for you to do your work, take care of your home and get along with others Not difficult at all       Fall Risk Assessment:  :     Fall Risk Assessment, last 12 mths 8/16/2019   Able to walk? Yes   Fall in past 12 months? No       Abuse Screening:  :     Abuse Screening Questionnaire 8/16/2019 2/15/2019 8/21/2018 8/18/2017   Do you ever feel afraid of your partner? N N N N   Are you in a relationship with someone who physically or mentally threatens you? N N N N   Is it safe for you to go home?  Y Y Y Y       ADL Screening:  :     ADL Assessment 8/16/2019   Feeding yourself No Help Needed   Getting from bed to chair No Help Needed   Getting dressed No Help Needed   Bathing or showering No Help Needed   Walk across the room (includes cane/walker) No Help Needed   Using the telphone No Help Needed   Taking your medications No Help Needed   Preparing meals No Help Needed   Managing money (expenses/bills) No Help Needed   Moderately strenuous housework (laundry) No Help Needed   Shopping for personal items (toiletries/medicines) No Help Needed   Shopping for groceries No Help Needed   Driving No Help Needed   Climbing a flight of stairs No Help Needed   Getting to places beyond walking distances No Help Needed

## 2019-08-29 ENCOUNTER — TELEPHONE (OUTPATIENT)
Dept: FAMILY MEDICINE CLINIC | Age: 84
End: 2019-08-29

## 2019-08-29 DIAGNOSIS — R73.9 HYPERGLYCEMIA: ICD-10-CM

## 2019-08-29 DIAGNOSIS — R63.4 WEIGHT LOSS: ICD-10-CM

## 2019-08-29 DIAGNOSIS — E55.9 VITAMIN D DEFICIENCY: ICD-10-CM

## 2019-08-29 DIAGNOSIS — I10 ESSENTIAL HYPERTENSION: Primary | ICD-10-CM

## 2019-08-29 DIAGNOSIS — F02.80 ALZHEIMER'S DISEASE OF OTHER ONSET WITHOUT BEHAVIORAL DISTURBANCE: ICD-10-CM

## 2019-08-29 DIAGNOSIS — G30.8 ALZHEIMER'S DISEASE OF OTHER ONSET WITHOUT BEHAVIORAL DISTURBANCE: ICD-10-CM

## 2019-08-29 DIAGNOSIS — E78.00 HYPERCHOLESTEREMIA: ICD-10-CM

## 2019-09-03 ENCOUNTER — TELEPHONE (OUTPATIENT)
Dept: FAMILY MEDICINE CLINIC | Age: 84
End: 2019-09-03

## 2019-09-03 NOTE — TELEPHONE ENCOUNTER
----- Message from Dayo Barry sent at 9/3/2019 11:10 AM EDT -----  Regarding: Dr. Akash Monroe General Message/Vendor Calls    Caller's first and last name: Matthew Rene (daughter)      Reason for call: Regarding a requested letter from the Dr concerning  her mothers Alfredo Bang.        Call back required yes/no and why: Yes       Best contact number(s): [phone]      Details to clarify the request:      Dayo Barry

## 2019-09-04 ENCOUNTER — CLINICAL SUPPORT (OUTPATIENT)
Dept: FAMILY MEDICINE CLINIC | Age: 84
End: 2019-09-04

## 2019-09-04 VITALS
BODY MASS INDEX: 28.27 KG/M2 | TEMPERATURE: 98.2 F | HEIGHT: 60 IN | OXYGEN SATURATION: 98 % | DIASTOLIC BLOOD PRESSURE: 44 MMHG | WEIGHT: 144 LBS | HEART RATE: 68 BPM | SYSTOLIC BLOOD PRESSURE: 122 MMHG

## 2019-09-04 DIAGNOSIS — Z11.1 ENCOUNTER FOR PPD TEST: Primary | ICD-10-CM

## 2019-09-04 NOTE — LETTER
9/6/2019 4:53 PM 
 
Ms. Víctor De Luna [address] [facility] [address] Ms. Mone Gill, Your PPD is as follows: PPD Reading Note PPD read and results entered in [address]. Result: 0.0 mm induration. Interpretation: Negative If test not read within 48-72 hours of initial placement, patient advised to repeat in other arm 1-3 weeks after this test. 
Allergic reaction: no 
 
 
Sincerely, 
 
 
Osito Saha, DO

## 2019-09-04 NOTE — LETTER
9/4/2019 10:19 AM 
 
Ms. Keisha Anders [address] [address] PPD Placement note Keisha Anders, 81 y.o. female is here today for placement of PPD test 
P:  PPD placed on 9/4/2019. Patient advised to return for reading within 48-72 hours. Tuberculin skin test applied to left ventral forearm. Explained how to read the test, measuring induration not just erythema; she will come into office if test appears positive. Patient to return on Friday 9/6/19 Sincerely, 
 
 
Jessica Murphy, DO

## 2019-09-04 NOTE — PROGRESS NOTES
PPD Placement note  Salty Poole, 81 y.o. female is here today for placement of PPD test  Reason for PPD test: Adult center  Pt taken PPD test before: yes  Verified in allergy area and with patient that they are not allergic to the products PPD is made of (Phenol or Tween). No: N/A  Is patient taking any oral or IV steroid medication now or have they taken it in the last month? no  Has the patient ever received the BCG vaccine?: no  Has the patient been in recent contact with anyone known or suspected of having active TB disease?: no       Date of exposure (if applicable): N/A       Name of person they were exposed to (if applicable): None  Patient's Country of origin?: [place]  O: Alert and oriented in NAD. P:  PPD placed on 9/4/2019. Patient advised to return for reading within 48-72 hours.

## 2019-09-05 ENCOUNTER — TELEPHONE (OUTPATIENT)
Dept: FAMILY MEDICINE CLINIC | Age: 84
End: 2019-09-05

## 2019-09-05 NOTE — TELEPHONE ENCOUNTER
Verified two paint identifiers, name and . Patient c/o nausea, light headed, dizzy, abdominal pain, jittery, chills  and overall not feeling well.  Patients daughter Poncho ventura, verified on hippa, advised to go to ED for eval.& Tx.

## 2019-09-06 LAB
MM INDURATION POC: 0 MM (ref 0–5)
PPD POC: NEGATIVE NEGATIVE

## 2019-09-06 NOTE — TELEPHONE ENCOUNTER
Spoke to patient daughter Yogi Rivera ( [facility]) verified. MsReal Nolan Fierro foot verified patient with two patient identifiers. Ms. Kristin Carlos was invited per Dr. Patty Jones to join [facility] to get an updated on mothers progress, Dr. Patty Jones advised her to make an appointment with her so that she can discuss her case face to face. Ms. Kristin Carlos agreed and stated that she will make an appointment soon. At this time she has no other questions or concerns.

## 2019-09-06 NOTE — TELEPHONE ENCOUNTER
Please clarify which daughter and confirm that the daughter is on the patient's HIPPA. If she is on HIPPA, please inform her that all her mother's medical information including our office notes can be easily and readily accessible through her [employer] Account. I recommend that they activate this if is is not activated so they can follow her care throughout our [facility] system of providers. I also invited this daughter to attend the patient's next medical appointment so I can be available to answer any questions she might have. Is Mrs. Bushra Del Rosario feeling ok? If not, please allow a nurse to triage the call since I am out of the office today. Thanks.

## 2019-09-09 NOTE — TELEPHONE ENCOUNTER
----- Message from Erika Luna sent at 9/5/2019 11:48 AM EDT -----  Regarding: Dr. Leena Andres Message/Vendor Calls    Caller's first and last name: Feliciano Dandy       Reason for call: Pt's daughter is following up on a letter with patients medical condition in order to file a claim that is needed. Callback required yes/no and why: YES       Best contact number(s): [phone]      Details to clarify the request: A message was left with no response.        Erika Luna

## 2019-09-11 ENCOUNTER — OFFICE VISIT (OUTPATIENT)
Dept: FAMILY MEDICINE CLINIC | Age: 84
End: 2019-09-11

## 2019-09-11 VITALS
BODY MASS INDEX: 27.66 KG/M2 | SYSTOLIC BLOOD PRESSURE: 145 MMHG | HEIGHT: 60 IN | OXYGEN SATURATION: 98 % | DIASTOLIC BLOOD PRESSURE: 77 MMHG | TEMPERATURE: 97.4 F | RESPIRATION RATE: 18 BRPM | HEART RATE: 59 BPM | WEIGHT: 140.9 LBS

## 2019-09-11 DIAGNOSIS — R35.0 URINE FREQUENCY: Primary | ICD-10-CM

## 2019-09-11 DIAGNOSIS — R19.7 DIARRHEA, UNSPECIFIED TYPE: ICD-10-CM

## 2019-09-11 LAB
BILIRUB UR QL STRIP: NEGATIVE
GLUCOSE UR-MCNC: NEGATIVE MG/DL
KETONES P FAST UR STRIP-MCNC: NEGATIVE MG/DL
PH UR STRIP: 6 [PH] (ref 4.6–8)
PROT UR QL STRIP: NEGATIVE
SP GR UR STRIP: 1.01 (ref 1–1.03)
UA UROBILINOGEN AMB POC: NORMAL (ref 0.2–1)
URINALYSIS CLARITY POC: CLEAR
URINALYSIS COLOR POC: YELLOW
URINE BLOOD POC: NORMAL
URINE LEUKOCYTES POC: NEGATIVE
URINE NITRITES POC: NEGATIVE

## 2019-09-11 RX ORDER — ONDANSETRON 4 MG/1
TABLET, ORALLY DISINTEGRATING ORAL
Refills: 0 | COMMUNITY
Start: 2019-09-05 | End: 2019-09-25

## 2019-09-11 RX ORDER — CEPHALEXIN 500 MG/1
CAPSULE ORAL
Refills: 0 | COMMUNITY
Start: 2019-09-05 | End: 2019-09-25

## 2019-09-11 NOTE — PROGRESS NOTES
HPI:  81 y.o.  presents for possible UTI. She is accompanied by her daughter, Joy Meckel. She lives with her son. She called last week, see phone note 9/05/2019, for nausea, dizzy, abdominal pain. She went to [facility] ER last week, checked blood work and UA and was told she had UTI, treated with antibiotic Keflex and zofran prn. She was feeling better with the treatment. Daughter received a phone call to schedule F/U with PCP after ER visit, so she brought her in. ER records, urine C&S not available. She has 2 days left on the antibiotic. Daughter noticed she had loose stools this week, about twice daily. No abdominal pain. Has decreased appetite, but at her baseline. No skipping meals. She drinks water, about 5 glasses daily. Did have some tomato sauce last night. No dysuria, or hematuria. No bloody stools. Patient Active Problem List    Diagnosis    Chronically dry eyes, bilateral    H/O carotid endarterectomy     Right, 2011, stable      Family history of heart attack    Dementia without behavioral disturbance     Mild      History of knee replacement, total, left    Advance care planning    History of tobacco abuse    History of GI bleed    History of CEA (carotid endarterectomy)     Right in 2011      Cortical senile cataract    Tear film insufficiency    Shortening, leg, congenital    Aortic valve sclerosis    Hyperglycemia    Heart palpitations     due to PVCs. Dr. Efraín Thompson.  Pulmonic valve insufficiency     Mild to Mod. Dr. Benito Watkins.   EF 50-55%      Essential hypertension    Adjustment disorder with mixed anxiety and depressed mood     due to multiple family deaths in a year, improving      Obesity, Class I, BMI 30-34.9    Vitamin D deficiency    Hypercholesteremia    Internal carotid artery stenosis     bilateral.  Dr. David Valera PVD (peripheral vascular disease) [facility])     Dr. Gibson Benitez Raynaud's phenomenon    GERD (gastroesophageal reflux disease)  Diverticulosis     Dr. Nakita Almonte Internal hemorrhoids     Dr. Gabrielle Tello           Past Medical History:   Diagnosis Date    Bilateral dry eyes     Dr. Cece Joseph    Cataract     Bilaterally. Dr. Larry Burgess. Dr. Emanuel Gee. Dr. Cece Joseph.  Chest pain 10/2015,     Dr. Milana Goltz. negative Stress Test.    Dementia 2017    triggered by mild LACHELLE, depression. Dr. Dorothy Zepeda. Dr. Glenn Anthony Diverticulosis 2008    Dr. Romi Chauhan. Dr. Johnathan Masterson.  DJD (degenerative joint disease) 05    cervical, worse C6-7, C7-T1. Left AC joint.  DJD (degenerative joint disease) of knee     bilateral  Dr. Sj Dudley. Dr. Melva NULL (dyspnea on exertion) 10/2015    Dr. Milana Goltz.  Essential hypertension, benign     Foster child     GIB (gastrointestinal bleeding) 14    due to internal hemorrhoids. Dr. Travis Doctor Heart palpitations 11/12/15    due to PVCs. Dr. Tenisha Rodriguez.  Heartburn     Hypercholesteremia     Hyperglycemia 2014    Internal carotid artery stenosis 2007    bilateral.  Dr. Oscar Rodriguez Internal hemorrhoids 2008, 2014    Dr. Gabrielle Tello. Dr. Jae Gamino.  Knee pain     Left. Dr. Rubén Goff.  Plantar wart     Dr. Andrew Barry. R foot    Pulmonic valve insufficiency 10/2015    Mild to Mod. Dr. Milana Goltz.   EF 50-55%    PVD (peripheral vascular disease) ([facility] [address]     Dr. Veleta Fabry    Raynaud's phenomenon 1968    Shortening, leg, congenital     left    Vitamin D deficiency 10/10/10       Social History     Tobacco Use    Smoking status: Former Smoker     Packs/day: 1.00     Years: 15.00     Pack years: 15.00     Types: Cigarettes     Last attempt to quit: 1968     Years since quittin.7    Smokeless tobacco: Never Used   Substance Use Topics    Alcohol use: No     Alcohol/week: 0.0 standard drinks    Drug use: No     Types: Prescription       Outpatient Medications Marked as Taking for the 9/11/19 encounter (Office Visit) with Arti Fraser PA-C   Medication Sig Dispense Refill    cephALEXin (KEFLEX) 500 mg capsule TAKE 1 CAPSULE BY MOUTH TWICE A DAY FOR 7 DAYS  0    lisinopril (PRINIVIL, ZESTRIL) 10 mg tablet TAKE 1 TABLET IN THE MORNING AND TAKE 2 TABLETS IN THE EVENING FOR HYPERTENSION 270 Tab 3    citalopram (CELEXA) 10 mg tablet TAKE 2 TABLETS BY MOUTH ONCE DAILY  1    memantine (NAMENDA) 5 mg tablet Take 1 Tab by mouth two (2) times a day. 180 Tab 1    donepezil (ARICEPT) 10 mg tablet Take 1 Tab by mouth daily (with breakfast). 90 Tab 1    aspirin delayed-release 81 mg tablet Take 1 Tab by mouth daily. Indications: myocardial infarction prevention 90 Tab 3    Calcium-Cholecalciferol, D3, 600 mg(1,500mg) -400 unit cap Take  by mouth daily. Allergies   Allergen Reactions    Penicillins Hives       ROS:  ROS negative except as per HPI. PE:  Visit Vitals  /77   Pulse (!) 59   Temp 97.4 °F (36.3 °C) (Oral)   Resp 18   Ht 5' (1.524 m)   Wt 140 lb 14.4 oz (63.9 kg)   LMP  (LMP Unknown)   SpO2 98%   BMI 27.52 kg/m²     Gen: alert, oriented, no acute distress  Head: normocephalic, atraumatic  Eyes: pupils equal round reactive to light, sclera clear, conjunctiva clear  Oral: moist mucus membranes, no oral lesions, no pharyngeal inflammation or exudate  Neck: supple  Resp: no increase work of breathing, lungs clear to ausculation bilaterally, no wheezing, rales or rhonchi  CV: S1, S2 normal.  No murmurs, rubs, or gallops. Abd: soft, not tender, not distended. Normal bowel sounds.    Skin: no lesion or rash  Extremities: no cyanosis or edema    Results for orders placed or performed in visit on 09/11/19   AMB POC URINALYSIS DIP STICK MANUAL W/O MICRO   Result Value Ref Range    Color (UA POC) Yellow     Clarity (UA POC) Clear     Glucose (UA POC) Negative Negative    Bilirubin (UA POC) Negative Negative    Ketones (UA POC) Negative Negative    Specific gravity (UA POC) 1.010 1.001 - 1.035    Blood (UA POC) Trace Negative    pH (UA POC) 6.0 4.6 - 8.0    Protein (UA POC) Negative Negative    Urobilinogen (UA POC) 0.2 mg/dL 0.2 - 1    Nitrites (UA POC) Negative Negative    Leukocyte esterase (UA POC) Negative Negative       Assessment/Plan:    1. Urine frequency  -on Keflex from [facility] ER for UTI, urine clear today and she seems to be improving    - AMB POC URINALYSIS DIP STICK MANUAL W/O MICRO    2. Diarrhea, unspecified type  -Noted by daughter this week, on Keflex for UTI, culture results not available so will request, has 2 days left    -conservative care discussed below and f/u for Cdiff testing if not improving    -Eat a serving of Plain [place] yogurt with 1/2 banana mixed in once a day. Take Florastor probiotic, 1 capsule twice a day for 5 days. If stool comes more formed, may reduce to once daily for a total of 10 doses. [place] diet for 2 days, no greasy or fried foods, no creams or acidic sauces such as tomato/spaghetti sauce. If diarrhea is not improving over the next week, then return for further evaluation. Health Maintenance reviewed - updated. Orders Placed This Encounter    AMB POC URINALYSIS DIP STICK MANUAL W/O MICRO       There are no discontinued medications. Current Outpatient Medications   Medication Sig Dispense Refill    cephALEXin (KEFLEX) 500 mg capsule TAKE 1 CAPSULE BY MOUTH TWICE A DAY FOR 7 DAYS  0    lisinopril (PRINIVIL, ZESTRIL) 10 mg tablet TAKE 1 TABLET IN THE MORNING AND TAKE 2 TABLETS IN THE EVENING FOR HYPERTENSION 270 Tab 3    citalopram (CELEXA) 10 mg tablet TAKE 2 TABLETS BY MOUTH ONCE DAILY  1    memantine (NAMENDA) 5 mg tablet Take 1 Tab by mouth two (2) times a day. 180 Tab 1    donepezil (ARICEPT) 10 mg tablet Take 1 Tab by mouth daily (with breakfast). 90 Tab 1    aspirin delayed-release 81 mg tablet Take 1 Tab by mouth daily.  Indications: myocardial infarction prevention 90 Tab 3    Calcium-Cholecalciferol, D3, 600 mg(1,500mg) -400 unit cap Take  by mouth daily.  ondansetron (ZOFRAN ODT) 4 mg disintegrating tablet TAKE 1 TABLET BY MOUTH EVERY 4 TO 6 HOURS AS NEEDED FOR NAUSEA AND VOMITING  0    sertraline (ZOLOFT) 25 mg tablet Take 1 Tab by mouth daily. Indications: Anxiousness associated with Depression 30 Tab 11    acetaminophen (TYLENOL) 325 mg tablet Take  by mouth every four (4) hours as needed for Pain.  mometasone (NASONEX) 50 mcg/actuation nasal spray 2 Sprays by Both Nostrils route daily. Indications: ALLERGIC RHINITIS 1 Container 5    Omega-3 Fatty Acids 300 mg cap Take 1 Cap by mouth daily. Verbal and written instructions (see AVS) provided. Patient expresses understanding of diagnosis and treatment plan.       Follow up 1 week if not improving

## 2019-09-11 NOTE — PROGRESS NOTES
Beto Mauricio  Identified pt with two pt identifiers(name and ). Chief Complaint   Patient presents with    UTI     Rm 9          1. Have you been to the ER, urgent care clinic since your last visit? Hospitalized since your last visit? YES urgent care on 2019    2. Have you seen or consulted any other health care providers outside of the [address] [place] [address] since your last visit? Include any pap smears or colon screening. NO            Weight Metrics 2019 2019 2019 2019 7/15/2019 4/15/2019 2/15/2019   Weight 140 lb 14.4 oz 144 lb 144 lb 6.4 oz 144 lb 150 lb 8 oz 152 lb 3.2 oz 155 lb 3.2 oz   BMI 27.52 kg/m2 28.12 kg/m2 28.2 kg/m2 28.12 kg/m2 29.39 kg/m2 29.72 kg/m2 30.31 kg/m2         Medication reconciliation up to date and correct with patient at this time. My Chart     My chart gives you direct online access to portions of the electronic medical record (EMR) where your doctor stores your health information (ie, lab results, appointment information, medications, immunizations, and more. It is free. Would you like to set up your my chart? no      Advance Care Planning    In the event something were to happen to you and you were unable to speak on your behalf, do you have an Advance Directive/ Living Will in place stating your wishes? NO    If yes, do we have a copy on file NO    If no, would you like information YES      ====Spenser Saba Invitation====    Patient was invited to [address] on this date and given the information folder for review. Recommended appointment with Spenser Saba facilitator for ACP conversation regarding advance directives. [x] Yes  [] No  Referral sent to [facility] Choices team member or Coordinator for follow-up    [] Yes  [x] No  Patient scheduled an appointment. Site of Referral: [facility]      Today's provider has been notified of reason for visit, vitals and flowsheets obtained on patients.      Reviewed record in preparation for visit, huddled with provider and have obtained necessary documentation. There are no preventive care reminders to display for this patient.     Wt Readings from Last 3 Encounters:   09/11/19 140 lb 14.4 oz (63.9 kg)   09/04/19 144 lb (65.3 kg)   08/16/19 144 lb 6.4 oz (65.5 kg)     Temp Readings from Last 3 Encounters:   09/11/19 97.4 °F (36.3 °C) (Oral)   09/04/19 98.2 °F (36.8 °C) (Oral)   08/16/19 97.8 °F (36.6 °C) (Oral)     BP Readings from Last 3 Encounters:   09/11/19 145/77   09/04/19 122/44   08/16/19 147/70     Pulse Readings from Last 3 Encounters:   09/11/19 (!) 59   09/04/19 68   08/16/19 61     Vitals:    09/11/19 1538   BP: 145/77   Pulse: (!) 59   Resp: 18   Temp: 97.4 °F (36.3 °C)   TempSrc: Oral   SpO2: 98%   Weight: 140 lb 14.4 oz (63.9 kg)   Height: 5' (1.524 m)   PainSc:   0 - No pain         Learning Assessment:  :     Learning Assessment 12/17/2018 7/2/2014   PRIMARY LEARNER Patient Patient   HIGHEST LEVEL OF EDUCATION - PRIMARY LEARNER  - GRADUATED HIGH SCHOOL OR GED   BARRIERS PRIMARY LEARNER - NONE   PRIMARY LANGUAGE ENGLISH ENGLISH   LEARNER PREFERENCE PRIMARY READING READING   ANSWERED BY patient patient   RELATIONSHIP SELF SELF       Depression Screening:  :     3 most recent PHQ Screens 8/16/2019   Little interest or pleasure in doing things Not at all   Feeling down, depressed, irritable, or hopeless Not at all   Total Score PHQ 2 0   Trouble falling or staying asleep, or sleeping too much Not at all   Feeling tired or having little energy Not at all   Poor appetite, weight loss, or overeating Not at all   Feeling bad about yourself - or that you are a failure or have let yourself or your family down Not at all   Trouble concentrating on things such as school, work, reading, or watching TV Not at all   Moving or speaking so slowly that other people could have noticed; or the opposite being so fidgety that others notice Not at all   Thoughts of being better off dead, or hurting yourself in some way Not at all   PHQ 9 Score 0   How difficult have these problems made it for you to do your work, take care of your home and get along with others Not difficult at all       Fall Risk Assessment:  :     Fall Risk Assessment, last 12 mths 8/16/2019   Able to walk? Yes   Fall in past 12 months? No       Abuse Screening:  :     Abuse Screening Questionnaire 8/16/2019 2/15/2019 8/21/2018 8/18/2017   Do you ever feel afraid of your partner? N N N N   Are you in a relationship with someone who physically or mentally threatens you? N N N N   Is it safe for you to go home?  Y Y Y Y       ADL Screening:  :     ADL Assessment 8/16/2019   Feeding yourself No Help Needed   Getting from bed to chair No Help Needed   Getting dressed No Help Needed   Bathing or showering No Help Needed   Walk across the room (includes cane/walker) No Help Needed   Using the telphone No Help Needed   Taking your medications No Help Needed   Preparing meals No Help Needed   Managing money (expenses/bills) No Help Needed   Moderately strenuous housework (laundry) No Help Needed   Shopping for personal items (toiletries/medicines) No Help Needed   Shopping for groceries No Help Needed   Driving No Help Needed   Climbing a flight of stairs No Help Needed   Getting to places beyond walking distances No Help Needed

## 2019-09-11 NOTE — PATIENT INSTRUCTIONS
Eat a serving of Plain [place] yogurt with 1/2 banana mixed in once a day. Take Florastor probiotic, 1 capsule twice a day for 5 days. If stool comes more formed, may reduce to once daily for a total of 10 doses. [place] diet for 2 days, no greasy or fried foods, no creams or acidic sauces such as tomato/spaghetti sauce. If diarrhea is not improving over the next week, then return for further evaluation.

## 2019-09-14 LAB
25(OH)D3+25(OH)D2 SERPL-MCNC: 42.9 NG/ML (ref 30–100)
ALBUMIN SERPL-MCNC: 4.2 G/DL (ref 3.5–4.7)
ALBUMIN/CREAT UR: 4 MG/G CREAT (ref 0–30)
ALBUMIN/GLOB SERPL: 1.9 {RATIO} (ref 1.2–2.2)
ALP SERPL-CCNC: 95 IU/L (ref 39–117)
ALT SERPL-CCNC: 11 IU/L (ref 0–32)
APPEARANCE UR: CLEAR
AST SERPL-CCNC: 17 IU/L (ref 0–40)
BILIRUB SERPL-MCNC: 0.6 MG/DL (ref 0–1.2)
BILIRUB UR QL STRIP: NEGATIVE
BUN SERPL-MCNC: 12 MG/DL (ref 8–27)
BUN/CREAT SERPL: 14 (ref 12–28)
CALCIUM SERPL-MCNC: 9.4 MG/DL (ref 8.7–10.3)
CHLORIDE SERPL-SCNC: 102 MMOL/L (ref 96–106)
CHOLEST SERPL-MCNC: 203 MG/DL (ref 100–199)
CO2 SERPL-SCNC: 24 MMOL/L (ref 20–29)
COLOR UR: YELLOW
CREAT SERPL-MCNC: 0.88 MG/DL (ref 0.57–1)
CREAT UR-MCNC: 142.1 MG/DL
EST. AVERAGE GLUCOSE BLD GHB EST-MCNC: 111 MG/DL
FOLATE SERPL-MCNC: 10.2 NG/ML
GLOBULIN SER CALC-MCNC: 2.2 G/DL (ref 1.5–4.5)
GLUCOSE SERPL-MCNC: 87 MG/DL (ref 65–99)
GLUCOSE UR QL: NEGATIVE
HBA1C MFR BLD: 5.5 % (ref 4.8–5.6)
HDLC SERPL-MCNC: 80 MG/DL
HGB UR QL STRIP: NEGATIVE
INTERPRETATION, 910389: NORMAL
KETONES UR QL STRIP: ABNORMAL
LDLC SERPL CALC-MCNC: 112 MG/DL (ref 0–99)
LEUKOCYTE ESTERASE UR QL STRIP: NEGATIVE
MICRO URNS: ABNORMAL
MICROALBUMIN UR-MCNC: 5.7 UG/ML
NITRITE UR QL STRIP: NEGATIVE
PH UR STRIP: 5.5 [PH] (ref 5–7.5)
POTASSIUM SERPL-SCNC: 4.1 MMOL/L (ref 3.5–5.2)
PROT SERPL-MCNC: 6.4 G/DL (ref 6–8.5)
PROT UR QL STRIP: NEGATIVE
SODIUM SERPL-SCNC: 142 MMOL/L (ref 134–144)
SP GR UR: 1.02 (ref 1–1.03)
TRIGL SERPL-MCNC: 55 MG/DL (ref 0–149)
TSH SERPL DL<=0.005 MIU/L-ACNC: 1.47 UIU/ML (ref 0.45–4.5)
UROBILINOGEN UR STRIP-MCNC: 0.2 MG/DL (ref 0.2–1)
VIT B12 SERPL-MCNC: 435 PG/ML (ref 232–1245)
VLDLC SERPL CALC-MCNC: 11 MG/DL (ref 5–40)

## 2019-09-17 ENCOUNTER — TELEPHONE (OUTPATIENT)
Dept: NEUROLOGY | Age: 84
End: 2019-09-17

## 2019-09-17 NOTE — TELEPHONE ENCOUNTER
----- Message from Tiki Gonzalez sent at 9/17/2019 11:31 AM EDT -----  Regarding: Dr. Sudheer Ortiz Message/Vendor Calls    Caller's first and last name: Farzad Goss (pt's daughter)      Reason for call: Recommendations on anxiety medication      Callback required yes/no and why: yes      Best contact number(s): [phone]      Details to clarify the request:       Tiki Gonzalez

## 2019-09-18 NOTE — TELEPHONE ENCOUNTER
Seems she is having what sounds like panic attacks. She is only on the 25mg for Zoloft from her pcp. I advised daughter she may need to go back to her pcp for re-evaluation unless you want to take over management of this med.

## 2019-09-21 ENCOUNTER — HOSPITAL ENCOUNTER (EMERGENCY)
Age: 84
Discharge: HOME | End: 2019-09-21
Attending: EMERGENCY MEDICINE
Payer: MEDICARE

## 2019-09-21 ENCOUNTER — APPOINTMENT (OUTPATIENT)
Dept: GENERAL RADIOLOGY | Age: 84
End: 2019-09-21
Attending: EMERGENCY MEDICINE
Payer: MEDICARE

## 2019-09-21 VITALS
OXYGEN SATURATION: 97 % | HEART RATE: 58 BPM | DIASTOLIC BLOOD PRESSURE: 62 MMHG | TEMPERATURE: 98.4 F | RESPIRATION RATE: 19 BRPM | SYSTOLIC BLOOD PRESSURE: 150 MMHG

## 2019-09-21 DIAGNOSIS — F41.8 ANXIETY ASSOCIATED WITH DEPRESSION: ICD-10-CM

## 2019-09-21 DIAGNOSIS — R07.89 ATYPICAL CHEST PAIN: Primary | ICD-10-CM

## 2019-09-21 LAB
ANION GAP SERPL CALC-SCNC: 6 MMOL/L (ref 5–15)
BASOPHILS # BLD: 0 K/UL (ref 0–0.1)
BASOPHILS NFR BLD: 1 % (ref 0–1)
BUN SERPL-MCNC: 16 MG/DL (ref 6–20)
BUN/CREAT SERPL: 15 (ref 12–20)
CALCIUM SERPL-MCNC: 9.4 MG/DL (ref 8.5–10.1)
CHLORIDE SERPL-SCNC: 106 MMOL/L (ref 97–108)
CO2 SERPL-SCNC: 27 MMOL/L (ref 21–32)
COMMENT, HOLDF: NORMAL
CREAT SERPL-MCNC: 1.05 MG/DL (ref 0.55–1.02)
DIFFERENTIAL METHOD BLD: ABNORMAL
EOSINOPHIL # BLD: 0.1 K/UL (ref 0–0.4)
EOSINOPHIL NFR BLD: 1 % (ref 0–7)
ERYTHROCYTE [DISTWIDTH] IN BLOOD BY AUTOMATED COUNT: 14.7 % (ref 11.5–14.5)
GLUCOSE SERPL-MCNC: 94 MG/DL (ref 65–100)
HCT VFR BLD AUTO: 42.6 % (ref 35–47)
HGB BLD-MCNC: 14 G/DL (ref 11.5–16)
IMM GRANULOCYTES # BLD AUTO: 0 K/UL (ref 0–0.04)
IMM GRANULOCYTES NFR BLD AUTO: 0 % (ref 0–0.5)
LYMPHOCYTES # BLD: 1.8 K/UL (ref 0.8–3.5)
LYMPHOCYTES NFR BLD: 27 % (ref 12–49)
MCH RBC QN AUTO: 32.3 PG (ref 26–34)
MCHC RBC AUTO-ENTMCNC: 32.9 G/DL (ref 30–36.5)
MCV RBC AUTO: 98.4 FL (ref 80–99)
MONOCYTES # BLD: 0.3 K/UL (ref 0–1)
MONOCYTES NFR BLD: 5 % (ref 5–13)
NEUTS SEG # BLD: 4.4 K/UL (ref 1.8–8)
NEUTS SEG NFR BLD: 66 % (ref 32–75)
NRBC # BLD: 0 K/UL (ref 0–0.01)
NRBC BLD-RTO: 0 PER 100 WBC
PLATELET # BLD AUTO: 252 K/UL (ref 150–400)
PMV BLD AUTO: 9.5 FL (ref 8.9–12.9)
POTASSIUM SERPL-SCNC: 4.1 MMOL/L (ref 3.5–5.1)
RBC # BLD AUTO: 4.33 M/UL (ref 3.8–5.2)
SAMPLES BEING HELD,HOLD: NORMAL
SODIUM SERPL-SCNC: 139 MMOL/L (ref 136–145)
TROPONIN I SERPL-MCNC: <0.05 NG/ML
WBC # BLD AUTO: 6.6 K/UL (ref 3.6–11)

## 2019-09-21 PROCEDURE — 80048 BASIC METABOLIC PNL TOTAL CA: CPT

## 2019-09-21 PROCEDURE — 74011250637 HC RX REV CODE- 250/637: Performed by: EMERGENCY MEDICINE

## 2019-09-21 PROCEDURE — 85025 COMPLETE CBC W/AUTO DIFF WBC: CPT

## 2019-09-21 PROCEDURE — 71046 X-RAY EXAM CHEST 2 VIEWS: CPT

## 2019-09-21 PROCEDURE — 99285 EMERGENCY DEPT VISIT HI MDM: CPT

## 2019-09-21 PROCEDURE — 84484 ASSAY OF TROPONIN QUANT: CPT

## 2019-09-21 PROCEDURE — 93005 ELECTROCARDIOGRAM TRACING: CPT

## 2019-09-21 RX ORDER — HYDROXYZINE 25 MG/1
25 TABLET, FILM COATED ORAL
Qty: 12 TAB | Refills: 0 | Status: SHIPPED | OUTPATIENT
Start: 2019-09-21 | End: 2019-09-22

## 2019-09-21 RX ORDER — HYDROXYZINE 50 MG/1
50 TABLET, FILM COATED ORAL
Status: COMPLETED | OUTPATIENT
Start: 2019-09-21 | End: 2019-09-21

## 2019-09-21 RX ADMIN — HYDROXYZINE HYDROCHLORIDE 50 MG: 50 TABLET, FILM COATED ORAL at 16:44

## 2019-09-21 NOTE — ED PROVIDER NOTES
81 y.o. female with past medical history significant for diverticulosis, internal hemorrhoids, heartburn, HTN, hypercholesteremia, DJD, raynaud's phenomenon, PVD, internal carotid artery stenosis, cataract, knee pain, pulmonic valve insufficiency, NULL, hyperglycemia, and dementia who presents from home via personal vehicle with chief complaint of anxiety. Pt presents in the ED after waking up at 7:30 AM with notable anxiety and SOB. Pt also reports that her \"heart feels funny. \" Pt's daughter states that pt has been c/o chest tightness, SOB, and anxiety. Per daughter, pt is frequently \"hot and cold. \" Family reports that the pt was seen yesterday at [facility] ED and diagnosed with pulmonary fibroids. Pt denies nausea, abdominal pain, diaphoresis, and fever. There are no other acute medical concerns at this time. Social hx: former smoker. PCP: DO Philomena Hubbard Chart Review: Pt was seen at [facility] ED yesterday for worsening anxiety. Pt was discharged home. Pt has been seeing her PCP for her worsening anxiety. Pt is followed by Dr. Demetrio Nguyen and has a negative cardiac stress test in 2016. Pt is on Zoloft and Celexa for anxiety and depression. Full history, physical exam, and ROS unable to be obtained due to:  dementia. Note written by dara Larry, as dictated by Dania Braswell MD 3:05 PM 
 
 
The history is provided by the patient, a relative and medical records. History limited by: dementia. No  was used. Past Medical History:  
Diagnosis Date  Bilateral dry eyes 2017 Dr. Lindi Drew  Cataract Bilaterally. Dr. Severiano Gold. Dr. Aroldo Fitzpatrick. Dr. Lindi Drew.  Chest pain 10/2015, 2016 Dr. Demetrio Nguyen. negative Stress Test.  
 Dementia 07/2017  
 triggered by mild LACHELLE, depression. Dr. Marcin Holland. Dr. Pato Galeano  Diverticulosis 09/2008 Dr. Brian Abdi. Dr. Yaron Call.  DJD (degenerative joint disease) 09/30/05 cervical, worse C6-7, C7-T1. Left AC joint.  DJD (degenerative joint disease) of knee   
 bilateral  Dr. Reaves Eye. Dr. Vivi NULL (dyspnea on exertion) 10/2015 Dr. Burt Castillo.  Essential hypertension, benign 1968  Foster child  GIB (gastrointestinal bleeding) 11/17/14  
 due to internal hemorrhoids. Dr. Keita Hollow  Heart palpitations 11/12/15  
 due to PVCs. Dr. Pratik Peralta.  Heartburn  Hypercholesteremia  Hyperglycemia 2014 [address] [place] [address] Internal carotid artery stenosis 2007  
 bilateral.  Dr. Omid Barroso [address] [place] [address] Internal hemorrhoids 09/2008, 11/2014 Dr. Troy Raymond. Dr. James Corral.  Knee pain Left. Dr. Yelitza Giordano.  Plantar wart 2018 Dr. Humberto Carranza. 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 Dr. Burt Castillo. EF 50-55%  PVD (peripheral vascular disease) ([address] 2007 Dr. Apolinar Anderson  Raynaud's phenomenon [id number]  Shortening, leg, congenital   
 left  Vitamin D deficiency 10/10/10 Past Surgical History:  
Procedure Laterality Date  HX ATHERECTOMY Left 09/01/2011  
 popliteal atherectomy and angioplasty. Dr. Apolinar Anderson  HX BREAST LUMPECTOMY Right 2008  
 benign. Dr. Jazmin Alcantar.  HX CAROTID ENDARTERECTOMY Right 01/19/2011 ICA. Dr. Apolinar Anderson  HX CATARACT REMOVAL Bilateral 02/22/2016, 10/24/2016 L then R Dr. Elizabeth Iverson.  HX COLONOSCOPY  11/24/14  
 due to GIB. Dr. Cyn Meyer.  HX COLONOSCOPY  09/05/08  
 with polypectomy. benign. Dr. Jerrod Chahal. due q 10 yrs. [facility] FISSURECTOMY.  HX GI  10/03/2008 PROCTOPLASTY due to rectal prolapse  HX HEMORRHOIDECTOMY  10/03/2008  
 internal and external  
 HX KNEE REPLACEMENT Left 07/2013  
 due to Severe OA. Dr. Cj Mccurdy.  HX POLYPECTOMY  09/05/2008 rectal Dr. Troy Raymond Family History:  
Problem Relation Age of Onset  Heart Attack Mother 79  
 Heart Attack Sister   
     x 3 sisters  Heart Attack Brother   
     x 3 brothers Social History Socioeconomic History  Marital status:  Spouse name: Not on file  Number of children: Not on file  Years of education: Not on file  Highest education level: Not on file Occupational History  Not on file Social Needs  Financial resource strain: Not on file  Food insecurity:  
  Worry: Not on file Inability: Not on file  Transportation needs:  
  Medical: Not on file Non-medical: Not on file Tobacco Use  Smoking status: Former Smoker Packs/day: 1.00 Years: 15.00 Pack years: 15.00 Types: Cigarettes Last attempt to quit: 1968 Years since quittin.7  Smokeless tobacco: Never Used Substance and Sexual Activity  Alcohol use: No  
  Alcohol/week: 0.0 standard drinks  Drug use: No  
  Types: Prescription  Sexual activity: Never Partners: Male Birth control/protection: Abstinence Lifestyle  Physical activity:  
  Days per week: Not on file Minutes per session: Not on file  Stress: Not on file Relationships  Social connections:  
  Talks on phone: Not on file Gets together: Not on file Attends Gnosticist service: Not on file Active member of club or organization: Not on file Attends meetings of clubs or organizations: Not on file Relationship status: Not on file  Intimate partner violence:  
  Fear of current or ex partner: Not on file Emotionally abused: Not on file Physically abused: Not on file Forced sexual activity: Not on file Other Topics Concern  Not on file Social History Narrative  Not on file ALLERGIES: Penicillins Review of Systems Constitutional: Positive for chills. Negative for diaphoresis and fever. Respiratory: Positive for shortness of breath. Cardiovascular: Positive for palpitations (\"heart feels funny\"). Negative for chest pain. Gastrointestinal: Negative for abdominal pain, constipation, diarrhea and vomiting. Neurological: Negative for dizziness and light-headedness. Psychiatric/Behavioral: The patient is nervous/anxious. All other systems reviewed and are negative. Vitals:  
 09/21/19 1452 09/21/19 1507 09/21/19 1510 09/21/19 1511 BP:  161/81 161/61 Pulse: 61 (!) 57  (!) 59 Resp:  18  12 Temp:  98.4 °F (36.9 °C) SpO2: 99% 99%  96% Physical Exam  
Constitutional: She is oriented to person, place, and time. She appears well-developed and well-nourished. HENT:  
Head: Normocephalic and atraumatic. Eyes: Pupils are equal, round, and reactive to light. Neck: Normal range of motion. Neck supple. Cardiovascular: Normal rate and regular rhythm. Pulmonary/Chest: Effort normal.  
Crackles at the right base. Abdominal: Soft. She exhibits no distension. There is no tenderness. Neurological: She is alert and oriented to person, place, and time. Skin: Skin is warm and dry. Capillary refill takes less than 2 seconds. Psychiatric: She has a normal mood and affect. Her behavior is normal.  
Nursing note and vitals reviewed. Note written by dara Saleem, as dictated by Tod Hull MD 3:05 PM 
 
MDM Number of Diagnoses or Management Options Anxiety associated with depression:  
Atypical chest pain:  
Diagnosis management comments: The patient is resting comfortably and feels better, is alert and in no distress. The repeat examination is unremarkable and benign.  The electrocardiogram shows no signs of acute ischemia and the history, exam, diagnostic testing and current condition do not suggest that this patient is having an acute myocardial infarction, significant arrhythmia, unstable angina, esophageal perforation, pulmonary embolism, aortic dissection, pneumothorax, severe pneumonia, sepsis or other significant pathology that would warrant further testing, continued ED treatment, admission, or cardiology or other specialist consultation at this point. The vital signs have been stable. The patient's condition is stable and appropriate for discharge. The patient will pursue further outpatient evaluation with the primary care physician, other designated physician or cardiologist. The patient and/or caregivers have expressed a clear and thorough understanding and agree to follow up as instructed. Procedures ED EKG interpretation: 
Rhythm: sinus bradycardia; and regular .  Rate (approx.): 58; Axis: normal; ST/T wave: normal.  
Note written by dara Nguyen, as dictated by Gilford Acres, MD 3:02 PM

## 2019-09-21 NOTE — ED NOTES
Patient anxious to leave. RN expressed that we are waiting on troponin test, will call lab to check on status of lab.

## 2019-09-21 NOTE — ED TRIAGE NOTES
Patient reports several days of not feeling right in her chest. She is also feeling very tired and not right. She went to [facility] EMERGENC[facility] ER yesterday.

## 2019-09-22 LAB
ATRIAL RATE: 58 BPM
CALCULATED P AXIS, ECG09: 34 DEGREES
CALCULATED R AXIS, ECG10: -26 DEGREES
CALCULATED T AXIS, ECG11: 53 DEGREES
DIAGNOSIS, 93000: NORMAL
P-R INTERVAL, ECG05: 160 MS
Q-T INTERVAL, ECG07: 424 MS
QRS DURATION, ECG06: 92 MS
QTC CALCULATION (BEZET), ECG08: 416 MS
VENTRICULAR RATE, ECG03: 58 BPM

## 2019-09-22 RX ORDER — HYDROXYZINE 25 MG/1
25 TABLET, FILM COATED ORAL
Qty: 12 TAB | Refills: 0 | Status: SHIPPED | OUTPATIENT
Start: 2019-09-22 | End: 2019-09-25 | Stop reason: SINTOL

## 2019-09-22 NOTE — PROGRESS NOTES
Daughter called for Rx to be sent to [facility] instead of [facility]. Rx e-scribed.  
DEAN Crum 
8:31 AM

## 2019-09-23 ENCOUNTER — TELEPHONE (OUTPATIENT)
Dept: NEUROLOGY | Age: 84
End: 2019-09-23

## 2019-09-23 ENCOUNTER — HOSPITAL ENCOUNTER (EMERGENCY)
Age: 84
Discharge: HOME | End: 2019-09-23
Attending: STUDENT IN AN ORGANIZED HEALTH CARE EDUCATION/TRAINING PROGRAM
Payer: MEDICARE

## 2019-09-23 ENCOUNTER — APPOINTMENT (OUTPATIENT)
Dept: CT IMAGING | Age: 84
End: 2019-09-23
Attending: STUDENT IN AN ORGANIZED HEALTH CARE EDUCATION/TRAINING PROGRAM
Payer: MEDICARE

## 2019-09-23 VITALS
HEIGHT: 62 IN | BODY MASS INDEX: 25.76 KG/M2 | OXYGEN SATURATION: 95 % | HEART RATE: 52 BPM | RESPIRATION RATE: 20 BRPM | TEMPERATURE: 98 F | SYSTOLIC BLOOD PRESSURE: 130 MMHG | WEIGHT: 140 LBS | DIASTOLIC BLOOD PRESSURE: 57 MMHG

## 2019-09-23 DIAGNOSIS — R07.9 CHEST PAIN, UNSPECIFIED TYPE: ICD-10-CM

## 2019-09-23 DIAGNOSIS — G47.9 SLEEP DISORDER: Primary | ICD-10-CM

## 2019-09-23 DIAGNOSIS — R10.84 ABDOMINAL PAIN, GENERALIZED: ICD-10-CM

## 2019-09-23 DIAGNOSIS — F41.1 ANXIETY STATE: ICD-10-CM

## 2019-09-23 LAB
ALBUMIN SERPL-MCNC: 3.4 G/DL (ref 3.5–5)
ALBUMIN/GLOB SERPL: 1 {RATIO} (ref 1.1–2.2)
ALP SERPL-CCNC: 107 U/L (ref 45–117)
ALT SERPL-CCNC: 17 U/L (ref 12–78)
ANION GAP SERPL CALC-SCNC: 4 MMOL/L (ref 5–15)
APPEARANCE UR: CLEAR
AST SERPL-CCNC: 11 U/L (ref 15–37)
ATRIAL RATE: 57 BPM
BASOPHILS # BLD: 0 K/UL (ref 0–0.1)
BASOPHILS NFR BLD: 0 % (ref 0–1)
BILIRUB SERPL-MCNC: 0.5 MG/DL (ref 0.2–1)
BILIRUB UR QL: NEGATIVE
BUN SERPL-MCNC: 18 MG/DL (ref 6–20)
BUN/CREAT SERPL: 17 (ref 12–20)
CALCIUM SERPL-MCNC: 9.2 MG/DL (ref 8.5–10.1)
CALCULATED P AXIS, ECG09: 34 DEGREES
CALCULATED R AXIS, ECG10: -26 DEGREES
CALCULATED T AXIS, ECG11: 47 DEGREES
CHLORIDE SERPL-SCNC: 107 MMOL/L (ref 97–108)
CO2 SERPL-SCNC: 27 MMOL/L (ref 21–32)
COLOR UR: NORMAL
COMMENT, HOLDF: NORMAL
CREAT SERPL-MCNC: 1.08 MG/DL (ref 0.55–1.02)
DIAGNOSIS, 93000: NORMAL
DIFFERENTIAL METHOD BLD: ABNORMAL
EOSINOPHIL # BLD: 0.1 K/UL (ref 0–0.4)
EOSINOPHIL NFR BLD: 1 % (ref 0–7)
ERYTHROCYTE [DISTWIDTH] IN BLOOD BY AUTOMATED COUNT: 15 % (ref 11.5–14.5)
GLOBULIN SER CALC-MCNC: 3.5 G/DL (ref 2–4)
GLUCOSE SERPL-MCNC: 84 MG/DL (ref 65–100)
GLUCOSE UR STRIP.AUTO-MCNC: NEGATIVE MG/DL
HCT VFR BLD AUTO: 42.7 % (ref 35–47)
HGB BLD-MCNC: 13.9 G/DL (ref 11.5–16)
HGB UR QL STRIP: NEGATIVE
IMM GRANULOCYTES # BLD AUTO: 0 K/UL (ref 0–0.04)
IMM GRANULOCYTES NFR BLD AUTO: 0 % (ref 0–0.5)
KETONES UR QL STRIP.AUTO: NEGATIVE MG/DL
LEUKOCYTE ESTERASE UR QL STRIP.AUTO: NEGATIVE
LYMPHOCYTES # BLD: 2.2 K/UL (ref 0.8–3.5)
LYMPHOCYTES NFR BLD: 34 % (ref 12–49)
MCH RBC QN AUTO: 32 PG (ref 26–34)
MCHC RBC AUTO-ENTMCNC: 32.6 G/DL (ref 30–36.5)
MCV RBC AUTO: 98.4 FL (ref 80–99)
MONOCYTES # BLD: 0.3 K/UL (ref 0–1)
MONOCYTES NFR BLD: 5 % (ref 5–13)
NEUTS SEG # BLD: 3.7 K/UL (ref 1.8–8)
NEUTS SEG NFR BLD: 60 % (ref 32–75)
NITRITE UR QL STRIP.AUTO: NEGATIVE
NRBC # BLD: 0 K/UL (ref 0–0.01)
NRBC BLD-RTO: 0 PER 100 WBC
P-R INTERVAL, ECG05: 160 MS
PH UR STRIP: 7 [PH] (ref 5–8)
PLATELET # BLD AUTO: 257 K/UL (ref 150–400)
PMV BLD AUTO: 9.1 FL (ref 8.9–12.9)
POTASSIUM SERPL-SCNC: 4.2 MMOL/L (ref 3.5–5.1)
PROT SERPL-MCNC: 6.9 G/DL (ref 6.4–8.2)
PROT UR STRIP-MCNC: NEGATIVE MG/DL
Q-T INTERVAL, ECG07: 438 MS
QRS DURATION, ECG06: 94 MS
QTC CALCULATION (BEZET), ECG08: 426 MS
RBC # BLD AUTO: 4.34 M/UL (ref 3.8–5.2)
SAMPLES BEING HELD,HOLD: NORMAL
SODIUM SERPL-SCNC: 138 MMOL/L (ref 136–145)
SP GR UR REFRACTOMETRY: <1.005 (ref 1–1.03)
TROPONIN I SERPL-MCNC: <0.05 NG/ML
TSH SERPL DL<=0.05 MIU/L-ACNC: 1.44 UIU/ML (ref 0.36–3.74)
UROBILINOGEN UR QL STRIP.AUTO: 0.2 EU/DL (ref 0.2–1)
VENTRICULAR RATE, ECG03: 57 BPM
WBC # BLD AUTO: 6.3 K/UL (ref 3.6–11)

## 2019-09-23 PROCEDURE — 74176 CT ABD & PELVIS W/O CONTRAST: CPT

## 2019-09-23 PROCEDURE — 84484 ASSAY OF TROPONIN QUANT: CPT

## 2019-09-23 PROCEDURE — 36415 COLL VENOUS BLD VENIPUNCTURE: CPT

## 2019-09-23 PROCEDURE — 84443 ASSAY THYROID STIM HORMONE: CPT

## 2019-09-23 PROCEDURE — 85025 COMPLETE CBC W/AUTO DIFF WBC: CPT

## 2019-09-23 PROCEDURE — 93005 ELECTROCARDIOGRAM TRACING: CPT

## 2019-09-23 PROCEDURE — 70450 CT HEAD/BRAIN W/O DYE: CPT

## 2019-09-23 PROCEDURE — 81003 URINALYSIS AUTO W/O SCOPE: CPT

## 2019-09-23 PROCEDURE — 99285 EMERGENCY DEPT VISIT HI MDM: CPT

## 2019-09-23 PROCEDURE — 80053 COMPREHEN METABOLIC PANEL: CPT

## 2019-09-23 RX ORDER — UREA 10 %
3 LOTION (ML) TOPICAL
Qty: 30 TAB | Refills: 0 | Status: SHIPPED | OUTPATIENT
Start: 2019-09-23 | End: 2021-01-01

## 2019-09-23 NOTE — ED PROVIDER NOTES
81 y.o. female with past medical history significant for diverticulosis, internal hemorrhoids, heartburn, essential HTN, hypercholesteremia, DJD, raynaud's phenomenon, PVD, internal carotid artery stenosis, left leg shortening, cataracts, vitamin D deficiency, knee pain, foster child, GIB, heart palpitations, pulmonic valve insufficiency, chest pain, NULL, hyperglycemia, bilateral dry eyes, dementia, and Plantar wart who presents from home via personal vehicle with chief complaint of anxiety. Patient states she has the general feeling of \"sickness\" and has associated chest pain, SOB, headaches, diarrhea, and anxiety. Patient's daughters state the patient was given Atarax 2 days ago when she visited the ED. Daughter's report the patient now suffers from sleep disturbances, saying the patient cannot fall asleep at night and has trouble staying awake in the day. Daughter's are worried the patient is not getting enough sleep which is causing all these other symptoms and the malaise feeling. Daughter's also note recently they consulted a neurologist to discuss the patient's Donepezil medication for her dementia, and was told to have the patient stop taking it. Patient affirms sleep disturbance, SOB, chest pain, diarrhea, anxiety, abdominal pain, chills, and shakiness. There are no other acute medical concerns at this time. Social hx: Former smoker PCP: Alexey Coreas DO Note written by Valerie Lau, as dictated by Mary Ge DO 3:53 PM  
 
 
 
 
The history is provided by the patient and a relative. No  was used. Past Medical History:  
Diagnosis Date  Bilateral dry eyes 2017 Dr. Parisa Templeton  Cataract Bilaterally. Dr. Remy Comment. Dr. Moore Arlee. Dr. Parisa Templeton.  Chest pain 10/2015, 2016 Dr. Kristian Jerry. negative Stress Test.  
 Dementia 07/2017  
 triggered by mild LACHELLE, depression. Dr. Amie Arriaza. Dr. Cece Orellana  Diverticulosis 09/2008 Dr. Rosario Fang. Dr. Dameon Mace.  DJD (degenerative joint disease) 09/30/05  
 cervical, worse C6-7, C7-T1. Left AC joint.  DJD (degenerative joint disease) of knee   
 bilateral  Dr. Randy Lam. Dr. Martha NULL (dyspnea on exertion) 10/2015 Dr. Silke Luis.  Essential hypertension, benign 1968  Foster child  GIB (gastrointestinal bleeding) 11/17/14  
 due to internal hemorrhoids. Dr. Praveen Gupta  Heart palpitations 11/12/15  
 due to PVCs. Dr. Mara Cervantes.  Heartburn  Hypercholesteremia  Hyperglycemia 2014 Raji Deshpande Internal carotid artery stenosis 2007  
 bilateral.  Dr. Cathy Deshpande Internal hemorrhoids 09/2008, 11/2014 Dr. Nato López. Dr. Wily Dela Cruz.  Knee pain Left. Dr. oMnica Ramachandran.  Plantar wart 2018 Dr. Berhane King. 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 Dr. Silke Luis. EF 50-55%  PVD (peripheral vascular disease) ([facility] [address] 2007 Dr. Paris April  Raynaud's phenomenon [id number]  Shortening, leg, congenital   
 left  Vitamin D deficiency 10/10/10 Past Surgical History:  
Procedure Laterality Date  HX ATHERECTOMY Left 09/01/2011  
 popliteal atherectomy and angioplasty. Dr. Paris April  HX BREAST LUMPECTOMY Right 2008  
 benign. Dr. Kitty Landers.  HX CAROTID ENDARTERECTOMY Right 01/19/2011 ICA. Dr. Paris April  HX CATARACT REMOVAL Bilateral 02/22/2016, 10/24/2016 L then R Dr. Peyton Cool.  HX COLONOSCOPY  11/24/14  
 due to GIB. Dr. Wily Dela Cruz.  HX COLONOSCOPY  09/05/08  
 with polypectomy. benign. Dr. Rosario Fang. due q 10 yrs. [facility] FISSURECTOMY.  HX GI  10/03/2008 PROCTOPLASTY due to rectal prolapse  HX HEMORRHOIDECTOMY  10/03/2008  
 internal and external  
 HX KNEE REPLACEMENT Left 07/2013  
 due to Severe OA. Dr. Osmar Almeida.  HX POLYPECTOMY  09/05/2008 rectal Dr. Nato López Family History: Problem Relation Age of Onset  Heart Attack Mother 79  
 Heart Attack Sister   
     x 3 sisters  Heart Attack Brother   
     x 3 brothers Social History Socioeconomic History  Marital status:  Spouse name: Not on file  Number of children: Not on file  Years of education: Not on file  Highest education level: Not on file Occupational History  Not on file Social Needs  Financial resource strain: Not on file  Food insecurity:  
  Worry: Not on file Inability: Not on file  Transportation needs:  
  Medical: Not on file Non-medical: Not on file Tobacco Use  Smoking status: Former Smoker Packs/day: 1.00 Years: 15.00 Pack years: 15.00 Types: Cigarettes Last attempt to quit: 1968 Years since quittin.7  Smokeless tobacco: Never Used Substance and Sexual Activity  Alcohol use: No  
  Alcohol/week: 0.0 standard drinks  Drug use: No  
  Types: Prescription  Sexual activity: Never Partners: Male Birth control/protection: Abstinence Lifestyle  Physical activity:  
  Days per week: Not on file Minutes per session: Not on file  Stress: Not on file Relationships  Social connections:  
  Talks on phone: Not on file Gets together: Not on file Attends [employer] service: Not on file Active member of club or organization: Not on file Attends meetings of clubs or organizations: Not on file Relationship status: Not on file  Intimate partner violence:  
  Fear of current or ex partner: Not on file Emotionally abused: Not on file Physically abused: Not on file Forced sexual activity: Not on file Other Topics Concern  Not on file Social History Narrative  Not on file ALLERGIES: Penicillins Review of Systems Constitutional: Positive for activity change, chills and fatigue. Negative for fever. HENT: Negative for congestion and rhinorrhea. Eyes: Negative for redness and visual disturbance. Respiratory: Positive for shortness of breath. Negative for cough. Cardiovascular: Positive for chest pain. Negative for leg swelling. Gastrointestinal: Positive for abdominal pain and diarrhea. Negative for nausea and vomiting. Genitourinary: Negative for dysuria, flank pain, frequency, hematuria and urgency. Musculoskeletal: Negative for arthralgias, back pain, myalgias and neck pain. Skin: Negative for rash and wound. Allergic/Immunologic: Negative for immunocompromised state. Neurological: Positive for tremors and headaches. Negative for dizziness. Psychiatric/Behavioral: Positive for sleep disturbance. The patient is nervous/anxious. Vitals:  
 09/23/19 1320 09/23/19 1427 09/23/19 1509 BP:  128/72 Pulse: (!) 57 62 (!) 52 Resp:  20 Temp:  97.8 °F (36.6 °C) SpO2: 98% 98% 100% Weight:  63.5 kg (140 lb) Height:  5' 2\" (1.575 m) Physical Exam  
Constitutional: She is oriented to person, place, and time. She appears well-developed and well-nourished. No distress. HENT:  
Head: Normocephalic. Mouth/Throat: Oropharynx is clear and moist. No oropharyngeal exudate. Eyes: Pupils are equal, round, and reactive to light. EOM are normal. Right eye exhibits no discharge. Left eye exhibits no discharge. Neck: Normal range of motion. Neck supple. Cardiovascular: Normal rate, regular rhythm, normal heart sounds and intact distal pulses. Exam reveals no gallop and no friction rub. No murmur heard. Pulmonary/Chest: Effort normal and breath sounds normal. No stridor. No respiratory distress. She has no wheezes. She has no rales. Abdominal: Soft. Bowel sounds are normal. She exhibits no distension. There is tenderness (mild diffuse). There is no rebound and no guarding. Musculoskeletal: Normal range of motion. She exhibits no edema or deformity. Neurological: She is alert and oriented to person, place, and time. No facial asymmetry Moves all 4 ext Sensation intact Skin: Skin is warm and dry. Capillary refill takes less than 2 seconds. No rash noted. She is not diaphoretic. Psychiatric: She has a normal mood and affect. Her behavior is normal.  
Nursing note and vitals reviewed. Note written by Valerie Young, as dictated by Iraida Arndt DO 3:53 PM  
 
 
EKG Interpretation:  
ED Physician interpretation Sinus ariel 57, normal axis/intervals, no acute ischemic changes Labs Reviewed: CBC: no significant leukocytosis or anemia CMP: normal renal function, no significant electrolyte abnormality, glucose normal, LFT within normal limits Trop: normal 
UA: not c/w UTI 
TSH normal 
 
Imaging Reviewed:  
CT head neg CT abd pelvis neg--noted to have ILD 
 
 
 
 
MDM: 
81 y.o. female here with her 2 daughters with multiple concerns including chest pain, sob, abd pain, diarrhea, anxiety, and difficulty with sleep. The chest discomfort and sob seems to be ongoing and family was told she has ILD which may be contributing. On exam pt in no acute distress, EKG without acute ischemia, trop neg. Not currently having pain, doesn't seem to be exertional but just random. Has been going on for quite some time now. The abdominal pain has also been going on for quite a bit--overall benign exam, labs unremarkable and CT negative for acute process. On re-eval and discussion with patient and family it seems the sleeping issue is what bothers them the most and is contributing to her feeling poorly. She has known dementia and I explained how it sounds her sleep cycle has flipped--she naps all day and doesn't sleep at night. She is on 2 dementia medications as well as started on Atarax recently for anxiety.  Neuro recommended she stop the dementia meds and I recommended she stop the Atarax as it seems to have worsened things. They are already planning to see her PCP and neurology this coming week. I gave the patient an rx for Melatonin to help with sleep. Advised to return if any worsening symptoms. Clinical Impression: ICD-10-CM ICD-9-CM 1. Sleep disorder G47.9 780.50 2. Chest pain, unspecified type R07.9 786.50   
3. Anxiety state F41.1 300.00   
4. Abdominal pain, generalized R10.84 789.07 Disposition: [facility] Donny Cristina, DO

## 2019-09-23 NOTE — TELEPHONE ENCOUNTER
I talked to pts daughter María Wong today. Pt has been in the ER 3 times in the past week or so. Twice at [address] [place] [address] and once at [facility]. Pt is on Donepezil, and Memantine. She is having SOB, Chest pain, loose stools, not sleep and not eating. [facility] told them pt is having some type of lung problem but no at [address] [place] [address] has said that. Pt having increase anxiety. States she feels like her head is about to explode. ER gave her Hydroxyzine 25 mg and it makes her lethargic. For now, the family will discuss the Hydroxyzine with the ER doc, but they would like your input regarding the memory meds. For now they are going to hold both the memantine and donepezil until I call them back tomorrow. Daughter says she looked up the donepezil and pt is having almost every side effect listed.

## 2019-09-23 NOTE — ED NOTES
Family states that patient is complaining of shortness of breath. Patient checked and pulse ox is 100%. Charge nurse notified.

## 2019-09-23 NOTE — ED TRIAGE NOTES
Triage Note: Patient is coming in with chest pain, headache and abdominal pain x 2 weeks. Patient was seen here on Saturday and discharged home but the pain continues.

## 2019-09-23 NOTE — DISCHARGE INSTRUCTIONS
START THE MELATONIN  STOP THE ATARAX AND DEMENTIA MEDICATIONS AS SUGGESTED BY YOUR NEUROLOGIST  FOLLOW UP WITH NEUROLOGY  RETURN IF WORSENING SYMPTOMS SUCH AS PERSISTENT CHEST DISCOMFORT, TROUBLE BREATHING OR WORSENING ABDOMINAL PAIN

## 2019-09-23 NOTE — TELEPHONE ENCOUNTER
Pt taking Donepezil but she is having side effects from medication. Pt has gone to the ER twice this weekend because of her anxiety. Pt's daughter said she isn't sleeping well either.  Please call back

## 2019-09-24 NOTE — TELEPHONE ENCOUNTER
Spoke with pts daughter. They will stop the Aricept for now and continue the the [address] They will follow up in  a couple of weeks to let us know how she is doing.

## 2019-09-24 NOTE — TELEPHONE ENCOUNTER
Patient had her on that dose of Aricept since August of last year. Yes it can cause loose stools but unlikely shortness of breath and chest pain. If there is concerned she can certainly stop it for now and see how she does. If we have any cancellations we can try to squeeze her in but we are at max capacity at the moment.

## 2019-09-25 ENCOUNTER — TELEPHONE (OUTPATIENT)
Dept: FAMILY MEDICINE CLINIC | Age: 84
End: 2019-09-25

## 2019-09-25 ENCOUNTER — OFFICE VISIT (OUTPATIENT)
Dept: FAMILY MEDICINE CLINIC | Age: 84
End: 2019-09-25

## 2019-09-25 VITALS
BODY MASS INDEX: 23.58 KG/M2 | TEMPERATURE: 95.4 F | WEIGHT: 138.1 LBS | DIASTOLIC BLOOD PRESSURE: 62 MMHG | RESPIRATION RATE: 18 BRPM | HEIGHT: 64 IN | HEART RATE: 59 BPM | OXYGEN SATURATION: 97 % | SYSTOLIC BLOOD PRESSURE: 129 MMHG

## 2019-09-25 DIAGNOSIS — R19.7 DIARRHEA, UNSPECIFIED TYPE: ICD-10-CM

## 2019-09-25 DIAGNOSIS — F02.80 ALZHEIMER'S DISEASE OF OTHER ONSET WITHOUT BEHAVIORAL DISTURBANCE: ICD-10-CM

## 2019-09-25 DIAGNOSIS — R91.8 ABNORMALITY OF LUNG ON CHEST X-RAY: ICD-10-CM

## 2019-09-25 DIAGNOSIS — R06.02 SHORTNESS OF BREATH: Primary | ICD-10-CM

## 2019-09-25 DIAGNOSIS — F43.23 ADJUSTMENT DISORDER WITH MIXED ANXIETY AND DEPRESSED MOOD: Primary | ICD-10-CM

## 2019-09-25 DIAGNOSIS — R06.02 SHORTNESS OF BREATH: ICD-10-CM

## 2019-09-25 DIAGNOSIS — G47.9 SLEEP DISTURBANCE: ICD-10-CM

## 2019-09-25 DIAGNOSIS — G30.8 ALZHEIMER'S DISEASE OF OTHER ONSET WITHOUT BEHAVIORAL DISTURBANCE: ICD-10-CM

## 2019-09-25 RX ORDER — SERTRALINE HYDROCHLORIDE 25 MG/1
25 TABLET, FILM COATED ORAL DAILY
Qty: 90 TAB | Refills: 0 | Status: SHIPPED | OUTPATIENT
Start: 2019-09-25 | End: 2020-02-12

## 2019-09-25 NOTE — PROGRESS NOTES
HPI:  81 y.o.  presents for follow up appointment regarding anxiety. She is accompanied by her daughter, Ricki Tanner, today. Saw Dr. Xuan Posadas 8/16/2019, was prescribed [id number] mg daily. Daughter does not know if she ever started the sertraline. Dr. Rosemarie Francis office note states she had responded well to celexa in the past, which was started by Dr. Concepcion Barnes 7/15/2019. Daughter does not know when or why citalopram was stopped. Both citalopram and sertraline are listed on her medication list, but daughter Heather Mention states she is not taking either one. Office note from Dr. Debbie Wadsworth 8/14/2019 states, \"Celexa is fine to continue but I do not think I would increase it any further out of concern for interaction with donepezil. If that is subtherapeutic primary care can consider changing to a different agent such as Prozac or Zoloft if necessary. \"    She has had 3 visits to the ER recently, 2 at [facility] (9/21/2019 and 9/23/2019) and 1 at [facility] (9/20/2019). Notes from [facility] for 9/20/2019 are not available,, other than discharge instructions the daughter shows me. Seen for diagnosis of shortness of breath and shoulder pain. Advised to f/u with ortho. Patient was told she may have ILD based on CXR and referred to pulmonary Dr. Israel Braswell for \"chronic hypersensitivity pneumonitis\". They have not made appt with pulmonary yet. As excerpted from [address] provider notes 9/21/2019: Anxiety associated with depression:   Atypical chest pain:   Diagnosis management comments: The patient is resting comfortably and feels better, is alert and in no distress. The repeat examination is unremarkable and benign.  The electrocardiogram shows no signs of acute ischemia and the history, exam, diagnostic testing and current condition do not suggest that this patient is having an acute myocardial infarction, significant arrhythmia, unstable angina, esophageal perforation, pulmonary embolism, aortic dissection, pneumothorax, severe pneumonia, sepsis or other significant pathology that would warrant further testing, continued ED treatment, admission, or cardiology or other specialist consultation at this point. The vital signs have been stable. The patient's condition is stable and appropriate for discharge. The patient will pursue further outpatient evaluation with the primary care physician, other designated physician or cardiologist. The patient and/or caregivers have expressed a clear and thorough understanding and agree to follow up as instructed. She was treated with Atarax 50 mg po in the ER at this 9/21/2019 visit, and discharged on Atarax 25 mg TID prn. As excerpted from [facility] provider notes 9/23/2019:  81 y.o. female here with her 2 daughters with multiple concerns including chest pain, sob, abd pain, diarrhea, anxiety, and difficulty with sleep. The chest discomfort and sob seems to be ongoing and family was told she has ILD which may be contributing. On exam pt in no acute distress, EKG without acute ischemia, trop neg. Not currently having pain, doesn't seem to be exertional but just random. Has been going on for quite some time now. The abdominal pain has also been going on for quite a bit--overall benign exam, labs unremarkable and CT negative for acute process. On re-eval and discussion with patient and family it seems the sleeping issue is what bothers them the most and is contributing to her feeling poorly. She has known dementia and I explained how it sounds her sleep cycle has flipped--she naps all day and doesn't sleep at night. She is on 2 dementia medications as well as started on Atarax recently for anxiety. Neuro recommended she stop the dementia meds and I recommended she stop the Atarax as it seems to have worsened things. They are already planning to see her PCP and neurology this coming week. I gave the patient an rx for Melatonin to help with sleep. Advised to return if any worsening symptoms. \"    Patient stopped taking donepezil on 9/23/2019, her other daughter who is not here today thought pt was having \"every side effect listed\", see phone note with Dr. Amara Jeronimo. Of note, she has been on donepezil for over a year    Pt states she feels less anxious since stopping the donepezil 2 days ago. Notes from the daughter who is not here, is that pt is now sleeping for 4 hours at night with melatonin from the ER (without melatonin she was only sleeping 3 hours) wakes up anxious for about an hour. Hydroxyzine 25 mg was previously tried but made her too lethargic, family did not try a lower dose. She is only napping for 15 min in the day according to the other daughter. She was living alone up until 2 months ago. Her son was at the house most nights, but she was alone during the day, and now her son recently moved away, which has caused much anxiety for her. She has lost 17 lbs since 2/2019, and there was concern that she may not have been eating meals when alone during the day. For the last 2 months, a family member is with her 24 hours now. Sometimes that is a family member staying the night at her house, other nights she may sleep over at a family member's house. Her anxiety seems to have increased during this time. Through speaking with family members, it sounds like she has been getting up at night and wondering around \"fiddling\" in the house for some time, even prior to 24/7 coverage, but perhaps now it is more obvious to the family. Andreia Morales notices she gets NULL on short distances. Kimberprimitivoconstance Moraels can not comment on patient's diarrhea. Patient does not state her stools have changed since stopping the donepezil. 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 Daughter Andreia Morales had to cancel a trip on August 21, 2019, since she needed to be present to care for her mother who needs 24/7 care now. Her living arrangements have changed, and that has made her very anxious.   If left alone, she at times may leave the stove on or not put food away properly as confirmed by Huan Melton. Patient Active Problem List    Diagnosis    Chronically dry eyes, bilateral    H/O carotid endarterectomy     Right, 2011, stable      Family history of heart attack    Dementia without behavioral disturbance     Mild      History of knee replacement, total, left    Advance care planning    History of tobacco abuse    History of GI bleed    History of CEA (carotid endarterectomy)     Right in 2011      Cortical senile cataract    Tear film insufficiency    Shortening, leg, congenital    Aortic valve sclerosis    Hyperglycemia    Heart palpitations     due to PVCs. Dr. Mathew Payne.  Pulmonic valve insufficiency     Mild to Mod. Dr. Aura Chinchilla. EF 50-55%      Essential hypertension    Adjustment disorder with mixed anxiety and depressed mood     due to multiple family deaths in a year, improving      Obesity, Class I, BMI 30-34.9    Vitamin D deficiency    Hypercholesteremia    Internal carotid artery stenosis     bilateral.  Dr. Jackie Arreola PVD (peripheral vascular disease) [facility])     Dr. Jayson Soares      Raynaud's phenomenon    GERD (gastroesophageal reflux disease)    Diverticulosis     Dr. Katherine Busch Internal hemorrhoids     Dr. Rosalba Mcdonald           Past Medical History:   Diagnosis Date    Bilateral dry eyes 2017    Dr. Clari Ramirez    Cataract     Bilaterally. Dr. Issa Schneider. Dr. Marquita Lundborg. Dr. Clari Ramirez.  Chest pain 10/2015, 2016    Dr. Aura Chinchilla. negative Stress Test.    Dementia 07/2017    triggered by mild LACHELLE, depression. Dr. Jam Sullivan. Dr. Jason Erickson Diverticulosis 09/2008    Dr. Tabby Jeong. Dr. Ant Gonzales.  DJD (degenerative joint disease) 09/30/05    cervical, worse C6-7, C7-T1. Left AC joint.  DJD (degenerative joint disease) of knee     bilateral  Dr. Saravanan Rosales.   Dr. Carol Schwab  DOE (dyspnea on exertion) 10/2015    Dr. Maria Luz Krause.  Essential hypertension, benign     Foster child     GIB (gastrointestinal bleeding) 14    due to internal hemorrhoids. Dr. Roel Barth Heart palpitations 11/12/15    due to PVCs. Dr. Silvia Goodman.  Heartburn     Hypercholesteremia     Hyperglycemia     Internal carotid artery stenosis 2007    bilateral.  Dr. Pierre Hickman Internal hemorrhoids 2008, 2014    Dr. Rhea Devi. Dr. Josefina Hanson.  Knee pain     Left. Dr. Shelley Anna.  Plantar wart 2018    Dr. Lindsay Cox. R foot    Pulmonic valve insufficiency 10/2015    Mild to Mod. Dr. Maria Luz Krause. EF 50-55%    PVD (peripheral vascular disease) ([address]     Dr. Patricia Martin    Raynaud's phenomenon 1968    Shortening, leg, congenital     left    Vitamin D deficiency 10/10/10       Social History     Tobacco Use    Smoking status: Former Smoker     Packs/day: 1.00     Years: 15.00     Pack years: 15.00     Types: Cigarettes     Last attempt to quit: 1968     Years since quittin.7    Smokeless tobacco: Never Used   Substance Use Topics    Alcohol use: No     Alcohol/week: 0.0 standard drinks    Drug use: No     Types: Prescription       Outpatient Medications Marked as Taking for the 19 encounter (Office Visit) with Arti Fraser PA-C   Medication Sig Dispense Refill    melatonin 1 mg tablet Take 3 Tabs by mouth nightly. 30 Tab 0    lisinopril (PRINIVIL, ZESTRIL) 10 mg tablet TAKE 1 TABLET IN THE MORNING AND TAKE 2 TABLETS IN THE EVENING FOR HYPERTENSION 270 Tab 3    memantine (NAMENDA) 5 mg tablet Take 1 Tab by mouth two (2) times a day. 180 Tab 1    aspirin delayed-release 81 mg tablet Take 1 Tab by mouth daily. Indications: myocardial infarction prevention 90 Tab 3    Calcium-Cholecalciferol, D3, 600 mg(1,500mg) -400 unit cap Take  by mouth daily. Allergies   Allergen Reactions    Penicillins Hives       ROS:  ROS negative except as per HPI.       PE:  Visit Vitals  /62 (BP 1 Location: Left arm, BP Patient Position: Sitting)   Pulse (!) 59   Temp 95.4 °F (35.2 °C) (Oral)   Resp 18   Ht 5' 3.78\" (1.62 m)   Wt 138 lb 1.6 oz (62.6 kg)   LMP  (LMP Unknown)   SpO2 97%   BMI 23.87 kg/m²     Gen: alert, cooperative, no acute distress, well groomed, daughter Shelly Thomas at bedside  Head: normocephalic, atraumatic  Eyes: pupils equal round reactive to light, sclera clear, conjunctiva clear  Resp: no increase work of breathing    Neuro: movement in all extremities  Skin: no lesion or rash  Extremities: no cyanosis or edema    No results found for this visit on 09/25/19. Assessment/Plan:      ICD-10-CM ICD-9-CM    1. Adjustment disorder with mixed anxiety and depressed mood - citalopram 10 mg started in July by Dr. Yusra Alarcon, f/u with Dr. Jammie Kim in August reports it was working well, but she was prescribed sertraline 25 mg instead. Daughter states she does not know if she ever took the sertraline, but she is not taking either medicine now. I am not sure why Dr. Jammie Kim switched her, unless she saw Dr. Cyndi Tyler note that citalopram could interact with donepezil and sertraline was preferred. Although pt is not taking donepezil for 2 days now, I will avoid resuming citalopram in the event Dr Leopoldo Pratt would like to resume donepezil. I will start sertraline 25 mg daily and have her f/u with Dr. Jammie Kmi in 4 weeks. She sates she atarax made her drowsy. She was given 50 mg in the ER, and discharged on 25 mg TID. Perhaps she could use hydroxyzine 10 mg dose QHS? Will let Dr. Jammie Kim review at next visit. See #3 regarding sleep. Increase in anxiety related to her son moving. She now has different routine. There was concern over weight loss and appropriate eating during the day. She is not sleeping well at night. I wrote letter for daughter Dinorah , agreeing with her need to cancel trip in August to help care for her mother.   F43.23 309.28 sertraline (ZOLOFT) 25 mg tablet   2. Alzheimer's disease of other onset without behavioral disturbance - stopped donepezil 2 days ago, currently taking memantine, followed by Dr. Merlinda Gross G30.8 331.0     F02.80 294.10    3. Sleep disturbance - see above regarding atarax. She is currently taking melatonin qhs, which I have advised her to continue. Family is with patient every night now, where previously it was mainly her son. Her daughters may just be coming more aware of her sleep cycle. It sounds as if her sleep-wake cycle is getting reversed, and this may have pre-dated the increased anxiety that started 2 months ago; however, the anxiety may be worsening it. Of note, she now has new sleeping arrangements, some nights sleeping in a family member's house. This is a new routine in somewhat \"unfamiliar\" surroundings, so I have brainstormed ideas with Emily Stock today about creating a routine, and making the bedroom she sleeps in more familiar to her (knick-knacks from home, pictures, personal sheets/comforter etc). These new arrangements may also be increasing her anxiety. G47.9 780.50    4. Diarrhea, unspecified type - exact history of symptoms is uncertain, recently on Keflex for urinary symptoms (culture was negative, at [facility] ER), I will call her back to have stool testing for Cdiff R19.7 787.91 sertraline (ZOLOFT) 25 mg tablet         5. Shortness of breath - noted on exertions, cardiac markers negative in the ER, CXR show fibrosis, [facility]ER referred her to pulmonary for evaluation of ILD, I encouraged daughter to schedule appt with pulmonary for further evaluation. R06.02 786.05        I had received [facility] paperwork for another family member, Redd Weller, her grand-daughter, who needed time off to assist with appointments for Ms John Forde. I asked Ms Marina Delong about a grand-daughter needing to assist with appointments, and Emily Stock was not sure who that would be since she had multiple grand-daughters.      I asked Ms Emily Stock if she had medical power of  for Ms Raheel Keys, and she said she and her sisters did and we should have a copy on file. On chart review after they left, I did not see a medical power of , but only  Permission to disclose PHI consent forms signed, which included Kathryn Homans and Deb Masterson. Since Ms Paradise Sarabia in on Ms Toni Arambula to disclose PHI forms, Ms Paradise Sarabia will need to make an individual appt for her forms to be filled out. Health Maintenance reviewed - deferred. Medications Discontinued During This Encounter   Medication Reason    donepezil (ARICEPT) 10 mg tablet Side Effects    hydrOXYzine HCl (ATARAX) 25 mg tablet Side Effects    acetaminophen (TYLENOL) 325 mg tablet Not A Current Medication    cephALEXin (KEFLEX) 500 mg capsule Not A Current Medication    citalopram (CELEXA) 10 mg tablet Not A Current Medication    ondansetron (ZOFRAN ODT) 4 mg disintegrating tablet Not A Current Medication    sertraline (ZOLOFT) 25 mg tablet Reorder       Current Outpatient Medications   Medication Sig Dispense Refill    sertraline (ZOLOFT) 25 mg tablet Take 1 Tab by mouth daily. Indications: Anxiousness associated with Depression 90 Tab 0    melatonin 1 mg tablet Take 3 Tabs by mouth nightly. 30 Tab 0    lisinopril (PRINIVIL, ZESTRIL) 10 mg tablet TAKE 1 TABLET IN THE MORNING AND TAKE 2 TABLETS IN THE EVENING FOR HYPERTENSION 270 Tab 3    memantine (NAMENDA) 5 mg tablet Take 1 Tab by mouth two (2) times a day. 180 Tab 1    aspirin delayed-release 81 mg tablet Take 1 Tab by mouth daily. Indications: myocardial infarction prevention 90 Tab 3    Calcium-Cholecalciferol, D3, 600 mg(1,500mg) -400 unit cap Take  by mouth daily.  mometasone (NASONEX) 50 mcg/actuation nasal spray 2 Sprays by Both Nostrils route daily. Indications: ALLERGIC RHINITIS 1 Container 5    Omega-3 Fatty Acids 300 mg cap Take 1 Cap by mouth daily. Verbal and written instructions (see AVS) provided. Patient expresses understanding of diagnosis and treatment plan. Follow-up and Dispositions    · Return in about 4 weeks (around 10/23/2019) for with Dr Jenn Trinh f/jamarcus fowler.

## 2019-09-25 NOTE — PROGRESS NOTES
Sathya Flores  Identified pt with two pt identifiers(name and ). Chief Complaint   Patient presents with    Anxiety     Room 9    ED Follow-up     3 x since Friday       1. Have you been to the ER, urgent care clinic since your last visit? Y   Hospitalized since your last visit? N    2. Have you seen or consulted any other health care providers outside of the [address] [place] [address] since your last visit? Include any pap smears or colon screening. N      Advance Care Planning    In the event something were to happen to you and you were unable to speak on your behalf, do you have an Advance Directive/ Living Will in place stating your wishes? NO    If yes, do we have a copy on file NO    If no, would you like information NO    Medication reconciliation up to date and corrected with patient at this time. Today's provider has been notified of reason for visit, vitals and flowsheets obtained on patients. Reviewed record in preparation for visit, huddled with provider and have obtained necessary documentation.       Health Maintenance Due   Topic    Pneumococcal 65+ years (2 of 2 - PPSV23)       Wt Readings from Last 3 Encounters:   19 138 lb 1.6 oz (62.6 kg)   19 140 lb (63.5 kg)   19 140 lb 14.4 oz (63.9 kg)     Temp Readings from Last 3 Encounters:   19 95.4 °F (35.2 °C) (Oral)   19 98 °F (36.7 °C)   19 98.4 °F (36.9 °C)     BP Readings from Last 3 Encounters:   19 129/62   19 130/57   19 150/62     Pulse Readings from Last 3 Encounters:   19 (!) 59   19 (!) 52   19 (!) 58     Vitals:    19 1643   BP: 129/62   Pulse: (!) 59   Resp: 18   Temp: 95.4 °F (35.2 °C)   TempSrc: Oral   SpO2: 97%   Weight: 138 lb 1.6 oz (62.6 kg)   Height: 5' 3.78\" (1.62 m)   PainSc:   3   PainLoc: Head         Learning Assessment:  :     Learning Assessment 2018   PRIMARY LEARNER Patient Patient Patient   HIGHEST LEVEL OF EDUCATION - PRIMARY LEARNER  DID NOT GRADUATE HIGH SCHOOL - GRADUATED HIGH SCHOOL OR GED   BARRIERS PRIMARY LEARNER - - NONE   CO-LEARNER CAREGIVER No - -   PRIMARY LANGUAGE ENGLISH ENGLISH ENGLISH   LEARNER PREFERENCE PRIMARY DEMONSTRATION READING READING   ANSWERED BY patient patient patient   RELATIONSHIP SELF SELF SELF       Depression Screening:  :     3 most recent PHQ Screens 9/25/2019   Little interest or pleasure in doing things Nearly every day   Feeling down, depressed, irritable, or hopeless Nearly every day   Total Score PHQ 2 6   Trouble falling or staying asleep, or sleeping too much Nearly every day   Feeling tired or having little energy Nearly every day   Poor appetite, weight loss, or overeating Nearly every day   Feeling bad about yourself - or that you are a failure or have let yourself or your family down Not at all   Trouble concentrating on things such as school, work, reading, or watching TV Nearly every day   Moving or speaking so slowly that other people could have noticed; or the opposite being so fidgety that others notice Not at all   Thoughts of being better off dead, or hurting yourself in some way Nearly every day   PHQ 9 Score 21   How difficult have these problems made it for you to do your work, take care of your home and get along with others -       No flowsheet data found. Fall Risk Assessment:  :     Fall Risk Assessment, last 12 mths 9/25/2019   Able to walk? Yes   Fall in past 12 months? No       Abuse Screening:  :     Abuse Screening Questionnaire 8/16/2019 2/15/2019 8/21/2018 8/18/2017   Do you ever feel afraid of your partner? N N N N   Are you in a relationship with someone who physically or mentally threatens you? N N N N   Is it safe for you to go home?  Y Y Y Y       ADL Screening:  :     ADL Assessment 8/16/2019   Feeding yourself No Help Needed   Getting from bed to chair No Help Needed   Getting dressed No Help Needed   Bathing or showering No Help Needed Walk across the room (includes cane/walker) No Help Needed   Using the telphone No Help Needed   Taking your medications No Help Needed   Preparing meals No Help Needed   Managing money (expenses/bills) No Help Needed   Moderately strenuous housework (laundry) No Help Needed   Shopping for personal items (toiletries/medicines) No Help Needed   Shopping for groceries No Help Needed   Driving No Help Needed   Climbing a flight of stairs No Help Needed   Getting to places beyond walking distances No Help Needed           BMI:  Weight Metrics 9/25/2019 9/23/2019 9/11/2019 9/4/2019 8/16/2019 8/14/2019 7/15/2019   Weight 138 lb 1.6 oz 140 lb 140 lb 14.4 oz 144 lb 144 lb 6.4 oz 144 lb 150 lb 8 oz   BMI 23.87 kg/m2 25.61 kg/m2 27.52 kg/m2 28.12 kg/m2 28.2 kg/m2 28.12 kg/m2 29.39 kg/m2           Medication reconciliation up to date and corrected with patient at this time.

## 2019-09-25 NOTE — LETTER
9/25/2019 5:43 PM 
 
Ms. Octavia Moser [address] [facility] [address] Dear Ms Wilder Parker, 
 
I understand that your daughter, Ms Joshua Molina, recently had to cancel travel plans in August 2019. I agree that due to the progression of your multiple complex medical problems, and since you now require that someone be with you 24 hours a day, 7 days a week, that it was necessary for your daughter, Ms. Joshua Molina, who is  involved in your daily care, to cancel her travel plans at that time. Sincerely, Arti Tejeda PA-C

## 2019-09-26 NOTE — TELEPHONE ENCOUNTER
Patient yonatan notified about container.  Per  Daughter she thinks her mother does need the referral.

## 2019-09-26 NOTE — TELEPHONE ENCOUNTER
Dr Nabila Rodriguez - after our conversation regarding Ms Simpson's visit today, I have decided to have family  container for C. Diff testing. Alexsander Palacios or Carlos Mackenzie - can you call Ms Donna Zabala family member Sariah Hernández was at the office visit today), and let them know since she was recently on antibiotic for UTI and is having some symptoms of diarrhea, I want to test her for an antibiotic-associated diarrhea infection called C. Diff. This requires testing a stool sample. Can you pre-arrange with Wanda Richmond what collection container family will need to come  for her and does Wanda Richmond give them collection instructions? Also, on Kimberly's desk I put some papers, I forgot to return [address] discharge instructions to Ms Iesha Olguin, it has pulmonary doctor referral information.  Does her insurance require a referral?

## 2019-09-27 NOTE — TELEPHONE ENCOUNTER
Notify daughter I put in referral to pulmonary Dr Chyrl Runner, with [facility]. This is a partner of the one they were referred to by Cathleen Munoz. But a female physician we refer to a lot. Please give her contact info. Once appt made, she will need to call back with appt date, time, physician, location, in order for the insurance part of the referral to be processed. Thanks!

## 2019-09-27 NOTE — TELEPHONE ENCOUNTER
Writer verified hippa with daughter. Daughter notified that referral put in for mother to [facility]. Address and phone number given.

## 2019-10-07 ENCOUNTER — OFFICE VISIT (OUTPATIENT)
Dept: URGENT CARE | Age: 84
End: 2019-10-07

## 2019-10-07 ENCOUNTER — OFFICE VISIT (OUTPATIENT)
Dept: FAMILY MEDICINE CLINIC | Age: 84
End: 2019-10-07

## 2019-10-07 VITALS
BODY MASS INDEX: 24.45 KG/M2 | SYSTOLIC BLOOD PRESSURE: 136 MMHG | WEIGHT: 138 LBS | OXYGEN SATURATION: 97 % | DIASTOLIC BLOOD PRESSURE: 69 MMHG | HEART RATE: 61 BPM | HEIGHT: 63 IN | TEMPERATURE: 98.2 F | RESPIRATION RATE: 18 BRPM

## 2019-10-07 VITALS
SYSTOLIC BLOOD PRESSURE: 127 MMHG | WEIGHT: 136.2 LBS | DIASTOLIC BLOOD PRESSURE: 67 MMHG | TEMPERATURE: 98 F | HEART RATE: 74 BPM | OXYGEN SATURATION: 98 % | BODY MASS INDEX: 24.13 KG/M2 | RESPIRATION RATE: 18 BRPM | HEIGHT: 63 IN

## 2019-10-07 DIAGNOSIS — G30.8 ALZHEIMER'S DISEASE OF OTHER ONSET WITHOUT BEHAVIORAL DISTURBANCE: ICD-10-CM

## 2019-10-07 DIAGNOSIS — R06.00 DYSPNEA, UNSPECIFIED TYPE: ICD-10-CM

## 2019-10-07 DIAGNOSIS — K57.90 DIVERTICULOSIS: ICD-10-CM

## 2019-10-07 DIAGNOSIS — R10.10 PAIN OF UPPER ABDOMEN: Primary | ICD-10-CM

## 2019-10-07 DIAGNOSIS — F41.1: ICD-10-CM

## 2019-10-07 DIAGNOSIS — R10.30 LOWER ABDOMINAL PAIN: ICD-10-CM

## 2019-10-07 DIAGNOSIS — F02.80 ALZHEIMER'S DISEASE OF OTHER ONSET WITHOUT BEHAVIORAL DISTURBANCE: ICD-10-CM

## 2019-10-07 DIAGNOSIS — G47.9 SLEEP DISTURBANCE: ICD-10-CM

## 2019-10-07 DIAGNOSIS — F43.23 ADJUSTMENT DISORDER WITH MIXED ANXIETY AND DEPRESSED MOOD: Primary | ICD-10-CM

## 2019-10-07 DIAGNOSIS — I73.9 PVD (PERIPHERAL VASCULAR DISEASE) (HCC): ICD-10-CM

## 2019-10-07 DIAGNOSIS — R19.7 DIARRHEA, UNSPECIFIED TYPE: ICD-10-CM

## 2019-10-07 DIAGNOSIS — I10 ESSENTIAL HYPERTENSION: ICD-10-CM

## 2019-10-07 LAB
BILIRUB UR QL STRIP: NEGATIVE
GLUCOSE UR-MCNC: NEGATIVE MG/DL
KETONES P FAST UR STRIP-MCNC: NEGATIVE MG/DL
PH UR STRIP: 5.5 [PH] (ref 4.6–8)
PROT UR QL STRIP: NEGATIVE
SP GR UR STRIP: 1.02 (ref 1–1.03)
UA UROBILINOGEN AMB POC: NORMAL (ref 0.2–1)
URINALYSIS CLARITY POC: NORMAL
URINALYSIS COLOR POC: NORMAL
URINE BLOOD POC: NEGATIVE
URINE LEUKOCYTES POC: NEGATIVE
URINE NITRITES POC: NEGATIVE

## 2019-10-07 RX ORDER — QUETIAPINE FUMARATE 25 MG/1
25 TABLET, FILM COATED ORAL 2 TIMES DAILY
Qty: 30 TAB | Refills: 1 | Status: SHIPPED | OUTPATIENT
Start: 2019-10-07 | End: 2020-02-07

## 2019-10-07 NOTE — Clinical Note
Kal Spencer. This dear lady has stopped her Aricept due to diarrhea. She continues taking Namenda. Anxiety has worsened. We are trying Seroquel for this. Would you mind seeing her before February if possible due to her multiple ER visits and progressing demented stated please? Thank you. Sabrina Carlisle

## 2019-10-07 NOTE — PROGRESS NOTES
HISTORY OF PRESENT ILLNESS  Quentin Ceja is a 81 y.o. female presents with Medication Evaluation (Rm 14); Abdominal Pain; Diarrhea; ED Follow-up; and Anxiety    Agree with nurse note. Pt's daughter Raudel Domingo accompanies the pt to the appointment and provides part of the hx. Pt with hypertension, diverticulosis, and PVD presents to the office with a BP of 127/67. Pt with adjustment disorder with mixed anxiety and depressed mood. DEAN Horn started her on Zoloft 25 mg on 9/25/2019. Daughter noticed the pt was short of breath, shaking, and heart racing after leaving a restaurant yesterday evening. Pt moved back home 2 weeks ago and is under 24 hour surveillance by the family members. Daughter notes the patient is afraid of being left alone and that the sxs could have been anxiety related. Pt was started on hydroxyzine at one of her ED visits but she stopped due to grogginess. Pt with Alzheimer's disease. The pt's family contacted Dr. Kelly Hodgson office about side effects they thought were related to Aricept. Pt was experiencing SOB, chest pain, loose stools, difficulty sleeping, and decreased appetite. Dr. Genevieve Yun told the pt it was okay to stop Aricept but continue Namenda until the pt could get in for an appointment. Loose stools have improved off Aricept. Pt complains of intermittent lower abdominal pain. She was checked for a UTI earlier today at Urgent Care and daughter reports the urinalysis was negative. The patient denies fever, chills, blood in the urine, pain with urination, back pain or other associated symptoms. Written by Madelyn Cowden, scribe, as dictated by Dr. Adriana Winn DO.    ROS    Review of Systems negative except as noted above in HPI.     ALLERGIES:    Allergies   Allergen Reactions    Penicillins Hives       CURRENT MEDICATIONS:    Outpatient Medications Marked as Taking for the 10/7/19 encounter (Office Visit) with Winston Lund DO   Medication Sig Dispense Refill    QUEtiapine (SEROQUEL) 25 mg tablet Take 1 Tab by mouth two (2) times a day. Indications: Repeated Episodes of Anxiety 30 Tab 1    sertraline (ZOLOFT) 25 mg tablet Take 1 Tab by mouth daily. Indications: Anxiousness associated with Depression 90 Tab 0    melatonin 1 mg tablet Take 3 Tabs by mouth nightly. 30 Tab 0    lisinopril (PRINIVIL, ZESTRIL) 10 mg tablet TAKE 1 TABLET IN THE MORNING AND TAKE 2 TABLETS IN THE EVENING FOR HYPERTENSION 270 Tab 3    memantine (NAMENDA) 5 mg tablet Take 1 Tab by mouth two (2) times a day. 180 Tab 1    aspirin delayed-release 81 mg tablet Take 1 Tab by mouth daily. Indications: myocardial infarction prevention 90 Tab 3    Omega-3 Fatty Acids 300 mg cap Take 1 Cap by mouth daily.  Calcium-Cholecalciferol, D3, 600 mg(1,500mg) -400 unit cap Take  by mouth daily. PAST MEDICAL HISTORY:    Past Medical History:   Diagnosis Date    Bilateral dry eyes 2017    Dr. Francesco Ortega    Cataract     Bilaterally. Dr. Luis A Varghese. Dr. Marleen Torres. Dr. Francesco Ortega.  Chest pain 10/2015, 2016    Dr. Madeline Staples. negative Stress Test.    Dementia ([address] 07/2017    triggered by mild LACHELLE, depression. Dr. Luis Angel Dumont. Dr. Fiona Samuels Diverticulosis 09/2008    Dr. Patsy Clark. Dr. Jae Wong.  DJD (degenerative joint disease) 09/30/05    cervical, worse C6-7, C7-T1. Left AC joint.  DJD (degenerative joint disease) of knee     bilateral  Dr. Jose M Webber. Dr. Kassandra NULL (dyspnea on exertion) 10/2015    Dr. Madeline Staples.  Essential hypertension, benign 1968    Foster child     GIB (gastrointestinal bleeding) 11/17/14    due to internal hemorrhoids. Dr. Belen Wadsworth Heart palpitations 11/12/15    due to PVCs. Dr. Mayelin Fernandez.  Heartburn     Hypercholesteremia     Hyperglycemia 2014    Internal carotid artery stenosis 2007    bilateral.  Dr. Lora Harrison Internal hemorrhoids 09/2008, 11/2014    Dr. Presley Knox. Dr. Tad Spatz.     Knee pain     Left. Dr. April Meier.  Plantar wart 2018    Dr. Boyd Rm. R foot    Pulmonic valve insufficiency 10/2015    Mild to Mod. Dr. Cayla Mehta. EF 50-55%    PVD (peripheral vascular disease) ([facility] [address]     Dr. Liu Lebron    Raynaud's phenomenon     Shortening, leg, congenital     left    Vitamin D deficiency 10/10/10       PAST SURGICAL HISTORY:    Past Surgical History:   Procedure Laterality Date    HX ATHERECTOMY Left 2011    popliteal atherectomy and angioplasty. Dr. Sania Gutierrez Right     benign. Dr. Cipriano Mcclendon.  HX CAROTID ENDARTERECTOMY Right 2011    ICA. Dr. Eduardo Wade Bilateral 2016, 10/24/2016    L then R Dr. Dariel Kang.  HX COLONOSCOPY  14    due to GIB. Dr. Jeanette Simons.  HX COLONOSCOPY  08    with polypectomy. benign. Dr. Rafael Thomas. due q 10 yrs.  HX GI      FISSURECTOMY.  HX GI  10/03/2008    PROCTOPLASTY due to rectal prolapse    HX HEMORRHOIDECTOMY  10/03/2008    internal and external    HX KNEE REPLACEMENT Left 2013    due to Severe OA. Dr. Alexsander Vazquez.      HX POLYPECTOMY  2008    rectal Dr. Ester Hooker:    Family History   Problem Relation Age of Onset    Heart Attack Mother 79    Heart Attack Sister         x 3 sisters    Heart Attack Brother         x 3 brothers       SOCIAL HISTORY:    Social History     Socioeconomic History    Marital status:      Spouse name: Not on file    Number of children: Not on file    Years of education: Not on file    Highest education level: Not on file   Tobacco Use    Smoking status: Former Smoker     Packs/day: 1.00     Years: 15.00     Pack years: 15.00     Types: Cigarettes     Last attempt to quit: 1968     Years since quittin.8    Smokeless tobacco: Never Used   Substance and Sexual Activity    Alcohol use: No     Alcohol/week: 0.0 standard drinks  Drug use: No     Types: Prescription    Sexual activity: Never     Partners: Male     Birth control/protection: Abstinence       IMMUNIZATIONS:    Immunization History   Administered Date(s) Administered    (RETIRED) Pneumococcal Vaccine (Unspecified Type) 10/22/2009    Pneumococcal Conjugate (PCV-13) 08/17/2016         PHYSICAL EXAMINATION    Vital Signs    Visit Vitals  /67   Pulse 74   Temp 98 °F (36.7 °C)   Resp 18   Ht 5' 3\" (1.6 m)   Wt 136 lb 3.2 oz (61.8 kg)   LMP  (LMP Unknown)   SpO2 98%   BMI 24.13 kg/m²       Weight Metrics 10/7/2019 10/7/2019 9/25/2019 9/23/2019 9/11/2019 9/4/2019 8/16/2019   Weight 136 lb 3.2 oz 138 lb 138 lb 1.6 oz 140 lb 140 lb 14.4 oz 144 lb 144 lb 6.4 oz   BMI 24.13 kg/m2 24.45 kg/m2 23.87 kg/m2 25.61 kg/m2 27.52 kg/m2 28.12 kg/m2 28.2 kg/m2       General appearance - Well nourished. Well appearing. Well developed. No acute distress. Head - Normocephalic. Atraumatic. Eyes - pupils equal and reactive. Extraocular eye movements intact. Sclera anicteric. Mildly injected sclera. Ears - Hearing is grossly normal bilaterally. Nose - normal and patent. No polyps noted. No erythema. No discharge. Mouth - mucous membranes with adequate moisture. Posterior pharynx normal with cobblestone appearance. No erythema, white exudate or obstruction. Neck - supple. Midline trachea. No carotid bruits noted bilaterally. No thyromegaly noted. Chest - clear to auscultation bilaterally anteriorly and posteriorly. No wheezes. No rales or rhonchi. Breath sounds are symmetrical bilaterally. Unlabored respirations. Heart - normal rate. Regular rhythm. Normal S1, S2. No murmur noted. No rubs, clicks or gallops noted. Abdomen - soft and distended. No masses or organomegaly. No rebound, rigidity or guarding. Bowel sounds normal x 4 quadrants. No tenderness noted. Neurological - awake, alert and oriented to person, place, and time and event.   Cranial nerves II through XII intact. Clear speech. Muscle strength is +5/5 x 4 extremities. Sensation is intact to light touch bilaterally. Steady gait. Heme/Lymph - peripheral pulses normal x 4 extremities. No peripheral edema is noted. Musculoskeletal - Intact x 4 extremities. Full ROM x 4 extremities. No pain with movement. Back exam - normal range of motion. No pain on palpation of the spinous processes in the cervical, thoracic, lumbar, sacral regions. No CVA tenderness. Skin - no rashes, erythema, ecchymosis, lacerations, abrasions, suspicious moles noted  Psychological -   normal behavior, dress and thought processes. Good insight. Good eye contact. Normal affect. Appropriate mood. Normal speech. DATA REVIEWED    Lab Results   Component Value Date/Time    WBC 6.3 09/23/2019 01:52 PM    HGB 13.9 09/23/2019 01:52 PM    HCT 42.7 09/23/2019 01:52 PM    PLATELET 508 69/56/4943 01:52 PM    MCV 98.4 09/23/2019 01:52 PM     Lab Results   Component Value Date/Time    Sodium 138 09/23/2019 01:52 PM    Potassium 4.2 09/23/2019 01:52 PM    Chloride 107 09/23/2019 01:52 PM    CO2 27 09/23/2019 01:52 PM    Anion gap 4 (L) 09/23/2019 01:52 PM    Glucose 84 09/23/2019 01:52 PM    BUN 18 09/23/2019 01:52 PM    Creatinine 1.08 (H) 09/23/2019 01:52 PM    BUN/Creatinine ratio 17 09/23/2019 01:52 PM    GFR est AA 59 (L) 09/23/2019 01:52 PM    GFR est non-AA 48 (L) 09/23/2019 01:52 PM    Calcium 9.2 09/23/2019 01:52 PM    Bilirubin, total 0.5 09/23/2019 01:52 PM    AST (SGOT) 11 (L) 09/23/2019 01:52 PM    Alk.  phosphatase 107 09/23/2019 01:52 PM    Protein, total 6.9 09/23/2019 01:52 PM    Albumin 3.4 (L) 09/23/2019 01:52 PM    Globulin 3.5 09/23/2019 01:52 PM    A-G Ratio 1.0 (L) 09/23/2019 01:52 PM    ALT (SGPT) 17 09/23/2019 01:52 PM     Lab Results   Component Value Date/Time    Cholesterol, total 203 (H) 09/13/2019 08:33 AM    HDL Cholesterol 80 09/13/2019 08:33 AM    LDL, calculated 112 (H) 09/13/2019 08:33 AM VLDL, calculated 11 09/13/2019 08:33 AM    Triglyceride 55 09/13/2019 08:33 AM    CHOL/HDL Ratio 2.8 05/05/2010 08:45 AM     Lab Results   Component Value Date/Time    Vitamin D 25-Hydroxy 24 (L) 10/08/2010 04:19 PM    VITAMIN D, 25-HYDROXY 42.9 09/13/2019 08:33 AM       Lab Results   Component Value Date/Time    Hemoglobin A1c 5.5 09/13/2019 08:33 AM    Hemoglobin A1c (POC) 5.5 02/17/2017 09:40 AM     Lab Results   Component Value Date/Time    TSH 1.44 09/23/2019 01:52 PM       Lab Results   Component Value Date/Time    Microalb/Creat ratio (ug/mg creat.) 4.0 09/13/2019 08:33 AM         ASSESSMENT and PLAN      ICD-10-CM ICD-9-CM    1. Adjustment disorder with mixed anxiety and depressed mood F43.23 309.28 QUEtiapine (SEROQUEL) 25 mg tablet    unchanged on Zoloft 25 mg x 1 month   2. Alzheimer's disease of other onset without behavioral disturbance ([facility] [address] G30.8 331.0     F02.80 294.10     rapidly progressing   3. Lower abdominal pain R10.30 789.09 URINALYSIS W/ RFLX MICROSCOPIC      CULTURE, URINE   4. Sleep disturbance G47.9 780.50 QUEtiapine (SEROQUEL) 25 mg tablet   5. Essential hypertension I10 401.9     stable   6. Diverticulosis K57.90 562.10     stable   7. Diarrhea, unspecified type R19.7 787.91     due to antibiotic vs diverticulosis vs viral vs Aricept vs other, resolved since off Aricept   8. PVD (peripheral vascular disease) ([facility]) I73.9 443.9        Chart reviewed and updated. Continue current medications and care. Due to pt's age and transient confusion, avoid antihistamines. Continue with Zoloft and start Seroquel 25 mg 1/2 tab BID to help with sleep and anxiety. Increase to 1 pill BID prn. Prescriptions written and sent to pharmacy; medication side effects discussed. Seroquel 25 mg. Recheck pertinent labs today. Counseled patient on health concerns:  Memory, lower abdominal pain, diarrhea, blood pressure, stress. Relevant handouts given and discussed with patient. Immunizations noted. Offered empathy, support, legitimation, prayers, partnership to patient. Praised patient for progress. Follow-up and Dispositions    · Return in about 1 month (around 11/7/2019) for results, med refills. Patient was offered a choice/choices in the treatment plan today. Patient expresses understanding of the plan and agrees with recommendations. More than 30 mins spent face to face with patient and more than 50% of this time spent in counseling and coordinating care. Written by dara Cyr, as dictated by Dr. David Cabrera DO. Documentation True and Accepted by Shashank Ace. Laura Callahan. Patient Instructions   Due to patient's age and transient confusion, avoid antihistamines. Continue with Zoloft and start Seroquel 25 mg 1/2 tab twice a day to help with sleep and anxiety. Increase to 1 pill twice a day as needed if she seems to need a higher dosage.

## 2019-10-07 NOTE — PATIENT INSTRUCTIONS
Due to patient's age and transient confusion, avoid antihistamines. Continue with Zoloft and start Seroquel 25 mg 1/2 tab twice a day to help with sleep and anxiety. Increase to 1 pill twice a day as needed if she seems to need a higher dosage.

## 2019-10-07 NOTE — PROGRESS NOTES
Austin Minor  Identified pt with two pt identifiers(name and ). Chief Complaint   Patient presents with    Medication Refill     Rm 14         1. Have you been to the ER, urgent care clinic since your last visit? Hospitalized since your last visit? YES [facility] today 10/07/19    2. Have you seen or consulted any other health care providers outside of the [address] [place] [address] since your last visit? Include any pap smears or colon screening. NO            Weight Metrics 10/7/2019 10/7/2019 2019 2019 2019 2019 2019   Weight 136 lb 3.2 oz 138 lb 138 lb 1.6 oz 140 lb 140 lb 14.4 oz 144 lb 144 lb 6.4 oz   BMI 24.13 kg/m2 24.45 kg/m2 23.87 kg/m2 25.61 kg/m2 27.52 kg/m2 28.12 kg/m2 28.2 kg/m2         Medication reconciliation up to date and correct with patient at this time. My Chart     My chart gives you direct online access to portions of the electronic medical record (EMR) where your doctor stores your health information (ie, lab results, appointment information, medications, immunizations, and more. It is free. Would you like to set up your my chart? NO      Advance Care Planning    In the event something were to happen to you and you were unable to speak on your behalf, do you have an Advance Directive/ Living Will in place stating your wishes? NO    If yes, do we have a copy on file NO    If no, would you like information YES      ====Spenser Saba Invitation====    Patient was invited to [facility] on this date and given the information folder for review. Recommended appointment with Spenser Saba facilitator for ACP conversation regarding advance directives. [x] Yes  [] No  Referral sent to [facility] Choices team member or Coordinator for follow-up    [] Yes  [x] No  Patient scheduled an appointment. Site of Referral: [facility]      Today's provider has been notified of reason for visit, vitals and flowsheets obtained on patients.      Reviewed record in preparation for visit, huddled with provider and have obtained necessary documentation.       Health Maintenance Due   Topic    Pneumococcal 65+ years (2 of 2 - PPSV23)    GLAUCOMA SCREENING Q2Y     Influenza Age 5 to Adult        Wt Readings from Last 3 Encounters:   10/07/19 136 lb 3.2 oz (61.8 kg)   10/07/19 138 lb (62.6 kg)   09/25/19 138 lb 1.6 oz (62.6 kg)     Temp Readings from Last 3 Encounters:   10/07/19 98 °F (36.7 °C)   10/07/19 98.2 °F (36.8 °C)   09/25/19 95.4 °F (35.2 °C) (Oral)     BP Readings from Last 3 Encounters:   10/07/19 127/67   10/07/19 136/69   09/25/19 129/62     Pulse Readings from Last 3 Encounters:   10/07/19 74   10/07/19 61   09/25/19 (!) 59     Vitals:    10/07/19 1632   BP: 127/67   Pulse: 74   Resp: 18   Temp: 98 °F (36.7 °C)   SpO2: 98%   Weight: 136 lb 3.2 oz (61.8 kg)   Height: 5' 3\" (1.6 m)   PainSc:   0 - No pain         Learning Assessment:  :     Learning Assessment 9/25/2019 12/17/2018 7/2/2014   PRIMARY LEARNER Patient Patient Patient   HIGHEST LEVEL OF EDUCATION - PRIMARY LEARNER  DID NOT GRADUATE HIGH SCHOOL - GRADUATED HIGH SCHOOL OR GED   BARRIERS PRIMARY LEARNER - - NONE   CO-LEARNER CAREGIVER No - -   PRIMARY LANGUAGE ENGLISH ENGLISH ENGLISH   LEARNER PREFERENCE PRIMARY DEMONSTRATION READING READING   ANSWERED BY patient patient patient   RELATIONSHIP SELF SELF SELF       Depression Screening:  :     3 most recent PHQ Screens 9/25/2019   Little interest or pleasure in doing things Nearly every day   Feeling down, depressed, irritable, or hopeless Nearly every day   Total Score PHQ 2 6   Trouble falling or staying asleep, or sleeping too much Nearly every day   Feeling tired or having little energy Nearly every day   Poor appetite, weight loss, or overeating Nearly every day   Feeling bad about yourself - or that you are a failure or have let yourself or your family down Not at all   Trouble concentrating on things such as school, work, reading, or watching TV Nearly every day   Moving or speaking so slowly that other people could have noticed; or the opposite being so fidgety that others notice Not at all   Thoughts of being better off dead, or hurting yourself in some way Nearly every day   PHQ 9 Score 21   How difficult have these problems made it for you to do your work, take care of your home and get along with others -       Fall Risk Assessment:  :     Fall Risk Assessment, last 12 mths 9/25/2019   Able to walk? Yes   Fall in past 12 months? No       Abuse Screening:  :     Abuse Screening Questionnaire 8/16/2019 2/15/2019 8/21/2018 8/18/2017   Do you ever feel afraid of your partner? N N N N   Are you in a relationship with someone who physically or mentally threatens you? N N N N   Is it safe for you to go home?  Y Y Y Y       ADL Screening:  :     ADL Assessment 8/16/2019   Feeding yourself No Help Needed   Getting from bed to chair No Help Needed   Getting dressed No Help Needed   Bathing or showering No Help Needed   Walk across the room (includes cane/walker) No Help Needed   Using the telphone No Help Needed   Taking your medications No Help Needed   Preparing meals No Help Needed   Managing money (expenses/bills) No Help Needed   Moderately strenuous housework (laundry) No Help Needed   Shopping for personal items (toiletries/medicines) No Help Needed   Shopping for groceries No Help Needed   Driving No Help Needed   Climbing a flight of stairs No Help Needed   Getting to places beyond walking distances No Help Needed

## 2019-10-07 NOTE — PROGRESS NOTES
Shortness of Breath   The history is provided by the caregiver and relative (daughter). This is a new problem. The problem occurs intermittently. The current episode started 2 days ago. The problem has not changed since onset. Associated symptoms include abdominal pain. Pertinent negatives include no fever, no headaches, no cough, no hemoptysis, no wheezing, no chest pain, no syncope, no vomiting and no leg pain. She has tried nothing for the symptoms. Associated medical issues do not include asthma or COPD. Abdominal Pain    The history is provided by the relative. This is a new problem. The current episode started 2 days ago. The problem occurs daily. The problem has not changed since onset. The pain is associated with an unknown factor. The pain is mild. Pertinent negatives include no fever, no diarrhea, no nausea, no vomiting, no constipation, no headaches and no chest pain. Nothing worsens the pain. The pain is relieved by nothing. Past Medical History:   Diagnosis Date    Bilateral dry eyes 2017    Dr. Qiana Luis    Cataract     Bilaterally. Dr. Sheldon Hoffman. Dr. Hemanth Fu. Dr. Qiana Luis.  Chest pain 10/2015, 2016    Dr. Naga Lepe. negative Stress Test.    Dementia ([facility] [address] 07/2017    triggered by mild LACHELLE, depression. Dr. Reinier Arechiga. Dr. Davidson Smart Diverticulosis 09/2008    Dr. Aminah Mancilla. Dr. Keith Francisco.  DJD (degenerative joint disease) 09/30/05    cervical, worse C6-7, C7-T1. Left AC joint.  DJD (degenerative joint disease) of knee     bilateral  Dr. Tip Gaston. Dr. Annia NULL (dyspnea on exertion) 10/2015    Dr. Naga Lepe.  Essential hypertension, benign 1968    Foster child     GIB (gastrointestinal bleeding) 11/17/14    due to internal hemorrhoids. Dr. Garcia Standard Heart palpitations 11/12/15    due to PVCs. Dr. Marion Gan.     Heartburn     Hypercholesteremia     Hyperglycemia 2014    Internal carotid artery stenosis 2007    bilateral. Dr. Oksana Cruz Internal hemorrhoids 09/2008, 11/2014    Dr. Edgar De La Torre. Dr. Marlo Valentine.  Knee pain     Left. Dr. Fabienne Pérez.  Plantar wart 2018    Dr. Khang Montoya. R foot    Pulmonic valve insufficiency 10/2015    Mild to Mod. Dr. Jania Landry. EF 50-55%    PVD (peripheral vascular disease) ([facility] [address] 2007    Dr. Kate Mccord    Raynaud's phenomenon 1968    Shortening, leg, congenital     left    Vitamin D deficiency 10/10/10        Past Surgical History:   Procedure Laterality Date    HX ATHERECTOMY Left 09/01/2011    popliteal atherectomy and angioplasty. Dr. El Mckeon Right 2008    benign. Dr. Julia Nicholson.  HX CAROTID ENDARTERECTOMY Right 01/19/2011    ICA. Dr. Neo Pinto Bilateral 02/22/2016, 10/24/2016    L then R Dr. Get Healy.  HX COLONOSCOPY  11/24/14    due to GIB. Dr. Marlo Valentine.  HX COLONOSCOPY  09/05/08    with polypectomy. benign. Dr. Treva Alanis. due q 10 yrs.  HX GI  1957    FISSURECTOMY.  HX GI  10/03/2008    PROCTOPLASTY due to rectal prolapse    HX HEMORRHOIDECTOMY  10/03/2008    internal and external    HX KNEE REPLACEMENT Left 07/2013    due to Severe OA. Dr. Steve Rojas.      HX POLYPECTOMY  09/05/2008    rectal Dr. Edgar De La Torre         Family History   Problem Relation Age of Onset    Heart Attack Mother 79    Heart Attack Sister         x 3 sisters    Heart Attack Brother         x 3 brothers        Social History     Socioeconomic History    Marital status:      Spouse name: Not on file    Number of children: Not on file    Years of education: Not on file    Highest education level: Not on file   Occupational History    Not on file   Social Needs    Financial resource strain: Not on file    Food insecurity:     Worry: Not on file     Inability: Not on file    Transportation needs:     Medical: Not on file     Non-medical: Not on file   Tobacco Use    Smoking status: Former Smoker     Packs/day: 1.00     Years: 15.00     Pack years: 15.00     Types: Cigarettes     Last attempt to quit: 1968     Years since quittin.8    Smokeless tobacco: Never Used   Substance and Sexual Activity    Alcohol use: No     Alcohol/week: 0.0 standard drinks    Drug use: No     Types: Prescription    Sexual activity: Never     Partners: Male     Birth control/protection: Abstinence   Lifestyle    Physical activity:     Days per week: Not on file     Minutes per session: Not on file    Stress: Not on file   Relationships    Social connections:     Talks on phone: Not on file     Gets together: Not on file     Attends Scientologist service: Not on file     Active member of club or organization: Not on file     Attends meetings of clubs or organizations: Not on file     Relationship status: Not on file    Intimate partner violence:     Fear of current or ex partner: Not on file     Emotionally abused: Not on file     Physically abused: Not on file     Forced sexual activity: Not on file   Other Topics Concern    Not on file   Social History Narrative    Not on file                ALLERGIES: Penicillins    Review of Systems   Constitutional: Negative for fever. Respiratory: Positive for shortness of breath. Negative for cough, hemoptysis and wheezing. Cardiovascular: Negative for chest pain and syncope. Gastrointestinal: Positive for abdominal pain. Negative for constipation, diarrhea, nausea and vomiting. Neurological: Negative for headaches. Psychiatric/Behavioral: Positive for sleep disturbance. The patient is nervous/anxious. All other systems reviewed and are negative. Vitals:    10/07/19 1349   BP: 136/69   Pulse: 61   Resp: 18   Temp: 98.2 °F (36.8 °C)   SpO2: 97%   Weight: 138 lb (62.6 kg)   Height: 5' 3\" (1.6 m)       Physical Exam   Constitutional: No distress.    HENT:   Right Ear: Tympanic membrane and ear canal normal.   Left Ear: Tympanic membrane and ear canal normal.   Nose: Nose normal.   Mouth/Throat: No oropharyngeal exudate, posterior oropharyngeal edema or posterior oropharyngeal erythema. Eyes: Conjunctivae are normal. Right eye exhibits no discharge. Left eye exhibits no discharge. No scleral icterus. Neck: Neck supple. Cardiovascular: Normal rate, normal heart sounds and intact distal pulses. No murmur heard. Pulmonary/Chest: Effort normal and breath sounds normal. No respiratory distress. She has no wheezes. She has no rales. Abdominal: Soft. Bowel sounds are normal. She exhibits no distension and no mass. There is no tenderness. There is no rebound and no guarding. Lymphadenopathy:     She has no cervical adenopathy. Skin: No rash noted. Nursing note and vitals reviewed. MDM    Procedures             ICD-10-CM ICD-9-CM    1. Pain of upper abdomen R10.10 789.09 AMB POC URINALYSIS DIP STICK AUTO W/O MICRO   2. Neurotic anxiety state F41.1 300.00    3. Dyspnea, unspecified type R06.00 786.09        Advised to follow with PCP for further treatment of anxiety  If sxs worsen go to ED  No orders of the defined types were placed in this encounter. Results for orders placed or performed in visit on 10/07/19   AMB POC URINALYSIS DIP STICK AUTO W/O MICRO   Result Value Ref Range    Color (UA POC)      Clarity (UA POC)      Glucose (UA POC) Negative Negative    Bilirubin (UA POC) Negative Negative    Ketones (UA POC) Negative Negative    Specific gravity (UA POC) 1.025 1.001 - 1.035    Blood (UA POC) Negative Negative    pH (UA POC) 5.5 4.6 - 8.0    Protein (UA POC) Negative Negative    Urobilinogen (UA POC) 1 mg/dL 0.2 - 1    Nitrites (UA POC) Negative Negative    Leukocyte esterase (UA POC) Negative Negative     The patients condition was discussed with the patient and they understand. The patient is to follow up with primary care doctor. If signs and symptoms become worse the pt is to go to the ER.  The patient is to take medications as prescribed.

## 2019-10-10 LAB
APPEARANCE UR: CLEAR
BILIRUB UR QL STRIP: NEGATIVE
COLOR UR: YELLOW
GLUCOSE UR QL: ABNORMAL
HGB UR QL STRIP: NEGATIVE
KETONES UR QL STRIP: NEGATIVE
LEUKOCYTE ESTERASE UR QL STRIP: NEGATIVE
MICRO URNS: ABNORMAL
NITRITE UR QL STRIP: NEGATIVE
PH UR STRIP: 5.5 [PH] (ref 5–7.5)
PROT UR QL STRIP: NEGATIVE
SP GR UR: 1.02 (ref 1–1.03)
UROBILINOGEN UR STRIP-MCNC: 0.2 MG/DL (ref 0.2–1)

## 2019-10-11 LAB — BACTERIA UR CULT: NORMAL

## 2020-01-01 ENCOUNTER — OFFICE VISIT (OUTPATIENT)
Dept: NEUROLOGY | Facility: CLINIC | Age: 85
End: 2020-01-01
Payer: MEDICARE

## 2020-01-01 ENCOUNTER — VIRTUAL VISIT (OUTPATIENT)
Dept: FAMILY MEDICINE CLINIC | Age: 85
End: 2020-01-01

## 2020-01-01 VITALS
BODY MASS INDEX: 22.54 KG/M2 | SYSTOLIC BLOOD PRESSURE: 98 MMHG | HEIGHT: 61 IN | DIASTOLIC BLOOD PRESSURE: 60 MMHG | OXYGEN SATURATION: 99 % | RESPIRATION RATE: 16 BRPM | WEIGHT: 119.4 LBS | HEART RATE: 66 BPM

## 2020-01-01 DIAGNOSIS — F02.80 LATE ONSET ALZHEIMER'S DISEASE WITHOUT BEHAVIORAL DISTURBANCE (HCC): Primary | ICD-10-CM

## 2020-01-01 DIAGNOSIS — G30.8 ALZHEIMER'S DISEASE OF OTHER ONSET WITHOUT BEHAVIORAL DISTURBANCE: ICD-10-CM

## 2020-01-01 DIAGNOSIS — F02.80 ALZHEIMER'S DISEASE OF OTHER ONSET WITHOUT BEHAVIORAL DISTURBANCE: ICD-10-CM

## 2020-01-01 DIAGNOSIS — G30.1 LATE ONSET ALZHEIMER'S DISEASE WITHOUT BEHAVIORAL DISTURBANCE (HCC): Primary | ICD-10-CM

## 2020-01-01 PROCEDURE — 1090F PRES/ABSN URINE INCON ASSESS: CPT | Performed by: NURSE PRACTITIONER

## 2020-01-01 PROCEDURE — G8754 DIAS BP LESS 90: HCPCS | Performed by: NURSE PRACTITIONER

## 2020-01-01 PROCEDURE — G8399 PT W/DXA RESULTS DOCUMENT: HCPCS | Performed by: NURSE PRACTITIONER

## 2020-01-01 PROCEDURE — 1100F PTFALLS ASSESS-DOCD GE2>/YR: CPT | Performed by: NURSE PRACTITIONER

## 2020-01-01 PROCEDURE — G8536 NO DOC ELDER MAL SCRN: HCPCS | Performed by: NURSE PRACTITIONER

## 2020-01-01 PROCEDURE — G8752 SYS BP LESS 140: HCPCS | Performed by: NURSE PRACTITIONER

## 2020-01-01 PROCEDURE — G9717 DOC PT DX DEP/BP F/U NT REQ: HCPCS | Performed by: NURSE PRACTITIONER

## 2020-01-01 PROCEDURE — G8427 DOCREV CUR MEDS BY ELIG CLIN: HCPCS | Performed by: NURSE PRACTITIONER

## 2020-01-01 PROCEDURE — 99213 OFFICE O/P EST LOW 20 MIN: CPT | Performed by: NURSE PRACTITIONER

## 2020-01-01 PROCEDURE — G8420 CALC BMI NORM PARAMETERS: HCPCS | Performed by: NURSE PRACTITIONER

## 2020-01-01 PROCEDURE — 3288F FALL RISK ASSESSMENT DOCD: CPT | Performed by: NURSE PRACTITIONER

## 2020-02-07 ENCOUNTER — OFFICE VISIT (OUTPATIENT)
Dept: URGENT CARE | Age: 85
End: 2020-02-07

## 2020-02-07 VITALS
RESPIRATION RATE: 18 BRPM | OXYGEN SATURATION: 97 % | DIASTOLIC BLOOD PRESSURE: 60 MMHG | WEIGHT: 130 LBS | TEMPERATURE: 97.8 F | BODY MASS INDEX: 23.92 KG/M2 | SYSTOLIC BLOOD PRESSURE: 130 MMHG | HEART RATE: 70 BPM | HEIGHT: 62 IN

## 2020-02-07 DIAGNOSIS — H00.014 HORDEOLUM EXTERNUM OF LEFT UPPER EYELID: Primary | ICD-10-CM

## 2020-02-07 RX ORDER — ERYTHROMYCIN 5 MG/G
OINTMENT OPHTHALMIC
Qty: 3.5 G | Refills: 0 | Status: SHIPPED | OUTPATIENT
Start: 2020-02-07 | End: 2021-01-01

## 2020-02-07 NOTE — PROGRESS NOTES
Here for left upper lid swelling  Onset 3 days ago  Has gotten bigger  Tender to touch  Otherwise denies fevers, chills, discharge, eye redness, blurred vision, headaches or facial rash. hasnt tried meds. Feels well with good energy            Past Medical History:   Diagnosis Date    Bilateral dry eyes 2017    Dr. Lesia eHmphill    Cataract     Bilaterally. Dr. Cathleen Chaparro. Dr. Rosanne Witt. Dr. Lesia Hemphill.  Chest pain 10/2015, 2016    Dr. Franklyn Edgar. negative Stress Test.    Dementia ([facility] [address] 07/2017    triggered by mild LACHELLE, depression. Dr. Saundra Bach. Dr. Martha Small Diverticulosis 09/2008    Dr. Crispin Austin. Dr. Seth Gillette.  DJD (degenerative joint disease) 09/30/05    cervical, worse C6-7, C7-T1. Left AC joint.  DJD (degenerative joint disease) of knee     bilateral  Dr. Darci Riedel. Dr. Ginger NULL (dyspnea on exertion) 10/2015    Dr. Franklyn Edgar.  Essential hypertension, benign 1968    Foster child     GIB (gastrointestinal bleeding) 11/17/14    due to internal hemorrhoids. Dr. Stew Mendoza Heart palpitations 11/12/15    due to PVCs. Dr. Chetna Odell.  Heartburn     Hypercholesteremia     Hyperglycemia 2014    Internal carotid artery stenosis 2007    bilateral.  Dr. Krishan Mancilla Internal hemorrhoids 09/2008, 11/2014    Dr. Cervantes Courser. Dr. Pema Rodriges.  Knee pain     Left. Dr. Gretchen Larkin.  Plantar wart 2018    Dr. Colby Enciso. R foot    Pulmonic valve insufficiency 10/2015    Mild to Mod. Dr. Franklyn Edgar. EF 50-55%    PVD (peripheral vascular disease) ([facility] [address] 2007    Dr. Joyner Organ    Raynaud's phenomenon 1968    Shortening, leg, congenital     left    Vitamin D deficiency 10/10/10        Past Surgical History:   Procedure Laterality Date    HX ATHERECTOMY Left 09/01/2011    popliteal atherectomy and angioplasty. Dr. Oz De Los Santos Right 2008    benign. Dr. Flaherty Rom.     HX CAROTID ENDARTERECTOMY Right 01/19/2011 ICA.  Dr. Anibal Saldaña Bilateral 2016, 10/24/2016    L then R Dr. Joy Ontiveros.  HX COLONOSCOPY  14    due to GIB. Dr. Yoana Fonseca.  HX COLONOSCOPY  08    with polypectomy. benign. Dr. Laurie Aparicio. due q 10 yrs.  HX GI  89    FISSURECTOMY.  HX GI  10/03/2008    PROCTOPLASTY due to rectal prolapse    HX HEMORRHOIDECTOMY  10/03/2008    internal and external    HX KNEE REPLACEMENT Left 2013    due to Severe OA. Dr. Tayo Prince.      HX POLYPECTOMY  2008    rectal Dr. Earl Lopez         Family History   Problem Relation Age of Onset    Heart Attack Mother 79    Heart Attack Sister         x 3 sisters    Heart Attack Brother         x 3 brothers        Social History     Socioeconomic History    Marital status:      Spouse name: Not on file    Number of children: Not on file    Years of education: Not on file    Highest education level: Not on file   Occupational History    Not on file   Social Needs    Financial resource strain: Not on file    Food insecurity:     Worry: Not on file     Inability: Not on file    Transportation needs:     Medical: Not on file     Non-medical: Not on file   Tobacco Use    Smoking status: Former Smoker     Packs/day: 1.00     Years: 15.00     Pack years: 15.00     Types: Cigarettes     Last attempt to quit: 1968     Years since quittin.1    Smokeless tobacco: Never Used   Substance and Sexual Activity    Alcohol use: No     Alcohol/week: 0.0 standard drinks    Drug use: No     Types: Prescription    Sexual activity: Never     Partners: Male     Birth control/protection: Abstinence   Lifestyle    Physical activity:     Days per week: Not on file     Minutes per session: Not on file    Stress: Not on file   Relationships    Social connections:     Talks on phone: Not on file     Gets together: Not on file     Attends Nondenominational service: Not on file     Active member of club or organization: Not on file     Attends meetings of clubs or organizations: Not on file     Relationship status: Not on file    Intimate partner violence:     Fear of current or ex partner: Not on file     Emotionally abused: Not on file     Physically abused: Not on file     Forced sexual activity: Not on file   Other Topics Concern    Not on file   Social History Narrative    Not on file                ALLERGIES: Penicillins    Review of Systems   Skin: Negative for rash. Neurological: Negative for dizziness. All other systems reviewed and are negative. Vitals:    02/07/20 1232   BP: 130/60   Pulse: 70   Resp: 18   Temp: 97.8 °F (36.6 °C)   SpO2: 97%   Weight: 130 lb (59 kg)   Height: 5' 2\" (1.575 m)       Physical Exam  Constitutional:       Appearance: Normal appearance. HENT:      Head: Atraumatic. Nose: No congestion. Mouth/Throat:      Mouth: Mucous membranes are moist.   Eyes:      Extraocular Movements: Extraocular movements intact. Conjunctiva/sclera: Conjunctivae normal.      Pupils: Pupils are equal, round, and reactive to light. Neurological:      Mental Status: She is alert. MDM     Differential Diagnosis; Clinical Impression; Plan:       CLINICAL IMPRESSION:  (H00.014) Hordeolum externum of left upper eyelid  (primary encounter diagnosis)     Orders Placed This Encounter      erythromycin (ILOTYCIN) ophthalmic ointment          Sig: Apply 0.5 inch ribbon inside left lower lid twice daily for 7 days. May massage excess ointment into lash line. Dispense:  3.5 g          Refill:  0      Plan:  Stye  Erythromycin ointment   Warm moist compresses  See eye specialist if not improving over next 1 week  Immediate follow up for new, worsening or changes       We have reviewed concerning signs/symptoms, normal vs abnormal progression of medical condition and when to seek immediate medical attention.   You should see your PCP for updates on your routine health maintenance.              Procedures

## 2020-02-07 NOTE — PATIENT INSTRUCTIONS
Follow  up with eye specialist if not improving over next 7 - 10 days. Seek evaluation immediately for significant new, worsening or changes     Styes and Chalazia: Care Instructions  Your Care Instructions    Styes and chalazia (say \"Shana-\") are both conditions that can cause swelling of the eyelid. A stye is an infection in the root of an eyelash. The infection causes a tender red lump on the edge of the eyelid. The infection can spread until the whole eyelid becomes red and inflamed. Styes usually break open, and a tiny amount of pus drains. They usually clear up on their own in about a week, but they sometimes need treatment with antibiotics. A chalazion is a lump or cyst in the eyelid (chalazion is singular; chalazia is plural). It is caused by swelling and inflammation of deep oil glands inside the eyelid. Chalazia are usually not infected. They can take a few months to heal.  If a chalazion becomes more swollen and painful or does not go away, you may need to have it drained by your doctor. Follow-up care is a key part of your treatment and safety. Be sure to make and go to all appointments, and call your doctor if you are having problems. It's also a good idea to know your test results and keep a list of the medicines you take. How can you care for yourself at home? · Do not rub your eyes. Do not squeeze or try to open a stye or chalazion. · To help a stye or chalazion heal faster:  ? Put a warm, moist compress on your eye for 5 to 10 minutes, 3 to 6 times a day. Heat often brings a stye to a point where it drains on its own. Keep in mind that warm compresses will often increase swelling a little at first.  ? Do not use hot water or heat a wet cloth in a microwave oven. The compress may get too hot and can burn the eyelid. · Always wash your hands before and after you use a compress or touch your eyes.   · If the doctor gave you antibiotic drops or ointment, use the medicine exactly as directed. Use the medicine for as long as instructed, even if your eye starts to feel better. · To put in eyedrops or ointment:  ? Tilt your head back, and pull your lower eyelid down with one finger. ? Drop or squirt the medicine inside the lower lid. ? Close your eye for 30 to 60 seconds to let the drops or ointment move around. ? Do not touch the ointment or dropper tip to your eyelashes or any other surface. · Do not wear eye makeup or contact lenses until the stye or chalazion heals. · Do not share towels, pillows, or washcloths while you have a stye. When should you call for help? Call your doctor now or seek immediate medical care if:    · You have pain in your eye.     · You have a change in vision or loss of vision.     · Redness and swelling get much worse.    Watch closely for changes in your health, and be sure to contact your doctor if:    · Your stye does not get better in 1 week.     · Your chalazion does not start to get better after several weeks. Where can you learn more? Go to http://pepe-constantino.info/. Enter K284 in the search box to learn more about \"Styes and Chalazia: Care Instructions. \"  Current as of: May 5, 2019  Content Version: 12.2  © 0009-5502 [employer]. Care instructions adapted under license by [employer] (which disclaims liability or warranty for this information). If you have questions about a medical condition or this instruction, always ask your healthcare professional. [place] [address] any warranty or liability for your use of this information.

## 2020-02-12 ENCOUNTER — OFFICE VISIT (OUTPATIENT)
Dept: NEUROLOGY | Age: 85
End: 2020-02-12

## 2020-02-12 VITALS
HEART RATE: 58 BPM | HEIGHT: 62 IN | OXYGEN SATURATION: 95 % | DIASTOLIC BLOOD PRESSURE: 62 MMHG | RESPIRATION RATE: 18 BRPM | BODY MASS INDEX: 24.11 KG/M2 | SYSTOLIC BLOOD PRESSURE: 118 MMHG | WEIGHT: 131 LBS

## 2020-02-12 DIAGNOSIS — F02.80 LATE ONSET ALZHEIMER'S DISEASE WITHOUT BEHAVIORAL DISTURBANCE (HCC): Primary | ICD-10-CM

## 2020-02-12 DIAGNOSIS — G30.1 LATE ONSET ALZHEIMER'S DISEASE WITHOUT BEHAVIORAL DISTURBANCE (HCC): Primary | ICD-10-CM

## 2020-02-12 RX ORDER — RIVASTIGMINE 4.6 MG/24H
1 PATCH, EXTENDED RELEASE TRANSDERMAL DAILY
Qty: 90 PATCH | Refills: 2 | Status: SHIPPED | OUTPATIENT
Start: 2020-02-12 | End: 2020-01-01 | Stop reason: SINTOL

## 2020-02-12 NOTE — PROGRESS NOTES
Chief Complaint Patient presents with  Dementia HPI Mrs. Cheng Rinaldi is an 59-year-old woman with dementia likely Alzheimer's. Objective findings on neuropsychological testing. Since her last visit she did not tolerate Aricept or Namenda and discontinued both. She also discontinued Zoloft. She was having a lot of side effects manifesting as GI disturbances, anxiety, mood changes. All of which have now subsided. Daughters are present and do think her memory is getting a little bit worse as far as short-term. Minimally driving at this point. Not doing much exercise. Still doing her own ADLs such as toileting, dressing, feeding. Bkgd: 
Mrs. Cheng Rinaldi is an 59-year-old woman here to follow-up. She has dementia as indicated by neuropsychological testing. Since I saw her last there have been some new changes. In mid July she developed sudden severe anxiety with depression. This all stems from the fact that her son moved away from [facility]. She saw Dr. Lamar Fox and he started her on citalopram which she is only taking 10 mg once a day at this point. Family and patient thinks that this is helping a little bit. Rossy Moura does admit she feels anxious very easily. She does not like to be alone. Her daughters are here today. They tell me that they think her short-term memory is getting a little bit worse. She is taking donepezil 10 mg daily. She still drives short distances but is very active in [place]. Daughters are planning to have her attend adult  soon and she is excited about this idea. She still doing all her own ADLs. Review of Systems Unable to perform ROS: Dementia Psychiatric/Behavioral: Positive for memory loss. Past Medical History:  
Diagnosis Date  Bilateral dry eyes 2017 Dr. Arland Ahumada  Cataract Bilaterally. Dr. Beau Gerber. Dr. Rodrick Calvo. Dr. Arland Ahumada.  Chest pain 10/2015, 2016 Dr. Peterson Oconnor. negative Stress Test.  
 Dementia ([facility] [address] 07/2017 triggered by mild LACHELLE, depression. Dr. Kitty Jimenez. Dr. Luis Minors  Diverticulosis 09/2008 Dr. Jai Flood. Dr. Dheeraj Koehler.  DJD (degenerative joint disease) 09/30/05  
 cervical, worse C6-7, C7-T1. Left AC joint.  DJD (degenerative joint disease) of knee   
 bilateral  Dr. Sameera Yan. Dr. Vanessa NULL (dyspnea on exertion) 10/2015 Dr. Harpal Colunga.  Essential hypertension, benign 1968  Foster child  GIB (gastrointestinal bleeding) 11/17/14  
 due to internal hemorrhoids. Dr. Nathan Ugalde  Heart palpitations 11/12/15  
 due to PVCs.  [employer].  Heartburn  Hypercholesteremia  Hyperglycemia 2014 [facility] Internal carotid artery stenosis 2007  
 bilateral.  Dr. Saleem Titus [facility] Internal hemorrhoids 09/2008, 11/2014 Dr. Earl Lopez. Dr. Cande Lagos.  Knee pain Left. Dr. Abdelrahman Fitzgerald.  Plantar wart 2018 Dr. Yosef Mock. 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 Dr. Harpal Colunga. EF 50-55%  PVD (peripheral vascular disease) ([facility] [address] 2007 Dr. Mayur Weems  Raynaud's phenomenon [id number]  Shortening, leg, congenital   
 left  Vitamin D deficiency 10/10/10 Family History Problem Relation Age of Onset  Heart Attack Mother 79  
 Heart Attack Sister   
     x 3 sisters  Heart Attack Brother   
     x 3 brothers Social History Socioeconomic History  Marital status:  Spouse name: Not on file  Number of children: Not on file  Years of education: Not on file  Highest education level: Not on file Occupational History  Not on file Social Needs  Financial resource strain: Not on file  Food insecurity:  
  Worry: Not on file Inability: Not on file  Transportation needs:  
  Medical: Not on file Non-medical: Not on file Tobacco Use  Smoking status: Former Smoker Packs/day: 1.00 Years: 15.00 Pack years: 15.00 Types: Cigarettes Last attempt to quit: 1968 Years since quittin.1  Smokeless tobacco: Never Used Substance and Sexual Activity  Alcohol use: No  
  Alcohol/week: 0.0 standard drinks  Drug use: No  
  Types: Prescription  Sexual activity: Not Currently Partners: Male Birth control/protection: Abstinence Lifestyle  Physical activity:  
  Days per week: Not on file Minutes per session: Not on file  Stress: Not on file Relationships  Social connections:  
  Talks on phone: Not on file Gets together: Not on file Attends Yazdanism service: Not on file Active member of club or organization: Not on file Attends meetings of clubs or organizations: Not on file Relationship status: Not on file  Intimate partner violence:  
  Fear of current or ex partner: Not on file Emotionally abused: Not on file Physically abused: Not on file Forced sexual activity: Not on file Other Topics Concern  Not on file Social History Narrative  Not on file Allergies Allergen Reactions  Penicillins Hives Current Outpatient Medications Medication Sig  
 rivastigmine (EXELON) 4.6 mg/24 hr patch 1 Patch by TransDERmal route daily.  erythromycin (ILOTYCIN) ophthalmic ointment Apply 0.5 inch ribbon inside left lower lid twice daily for 7 days. May massage excess ointment into lash line.  melatonin 1 mg tablet Take 3 Tabs by mouth nightly.  lisinopril (PRINIVIL, ZESTRIL) 10 mg tablet TAKE 1 TABLET IN THE MORNING AND TAKE 2 TABLETS IN THE EVENING FOR HYPERTENSION  aspirin delayed-release 81 mg tablet Take 1 Tab by mouth daily. Indications: myocardial infarction prevention  Omega-3 Fatty Acids 300 mg cap Take 1 Cap by mouth daily.  Calcium-Cholecalciferol, D3, 600 mg(1,500mg) -400 unit cap Take  by mouth daily.  mometasone (NASONEX) 50 mcg/actuation nasal spray 2 Sprays by Both Nostrils route daily. Indications: ALLERGIC RHINITIS No current facility-administered medications for this visit. Neurologic Exam  
 
Mental Status WD/WN adult in NAD, normal grooming VSS 
A&O, pleasant, not as talkative. Looks to her daughter's to answer questions. PERRL, nonicteric Face is symmetric, tongue midline Speech is clear No limb ataxia. No abnl movements. Moving all extemities spontaneously and symmetric Normal gait CVS RRR Lungs nonlabored Skin is warm and dry Visit Vitals /62 Pulse (!) 58 Resp 18 Ht 5' 2\" (1.575 m) Wt 59.4 kg (131 lb) LMP  (LMP Unknown) SpO2 95% BMI 23.96 kg/m² Assessment and Plan Diagnoses and all orders for this visit: 1. Late onset Alzheimer's disease without behavioral disturbance ([facility] [address] Other orders 
-     rivastigmine (EXELON) 4.6 mg/24 hr patch; 1 Patch by TransDERmal route daily. 72-year-old woman with Alzheimer's that may be progressing slightly. Mood seems a little bit down today. Slower to answer questions. I would like to get ahead of this condition is much as we can so we will try a low dose challenge with the Exelon patch. I discussed the medication with her and her family. Watch for any side effects. Defer any psychotropics. I would like her to start exercising more, consider Silver sneakers. More Mormonism activities. I will see her at her next visit. Recommend no driving. I reviewed and decided to continue the current medications. This clinical note was dictated with an electronic dictation software that can make unintentional errors. If there are any questions, please contact me directly for clarification. 2 [facility], DO 
NEUROLOGIST Diplomate ROSALIND

## 2020-03-05 ENCOUNTER — TELEPHONE (OUTPATIENT)
Dept: NEUROLOGY | Age: 85
End: 2020-03-05

## 2020-03-05 NOTE — TELEPHONE ENCOUNTER
Pt's daughter calling because the medication patch is too expensive. She is wondering if there is anything else that can be prescribed.  Please call back

## 2020-03-16 NOTE — TELEPHONE ENCOUNTER
Unfortunately that is the next alternative when patients cannot take the pills. This medication is available in generic patches so hopefully that might be an option.

## 2020-03-19 RX ORDER — DONEPEZIL HYDROCHLORIDE 5 MG/1
TABLET, FILM COATED ORAL
Qty: 30 TAB | Refills: 2 | Status: SHIPPED | OUTPATIENT
Start: 2020-03-19 | End: 2020-01-01 | Stop reason: SINTOL

## 2020-03-20 NOTE — TELEPHONE ENCOUNTER
----- Message from Leopoldo Aguilar sent at 3/20/2020 12:39 PM EDT -----  Regarding: Dr. Estephanie Pak / Lynne Lock: [phone]    Caller (if not patient): Lizet Costa  Relationship of caller (if not patient): Daughter  Best contact number(s): [phone]  Name of medication and dosage if known: Setraline - 25 mg  Is patient out of this medication (yes/no): requesting subsequent refill  Pharmacy name: [address] [place] [address] listed in chart? (yes/no): Unknown   Pharmacy phone number: [phone]  Date of last visit: October 07, 2019  Details to clarify the request: Ms. Jade Nunez also requesting a callback from the nurse to discuss Mother's possible UTI.

## 2020-03-29 RX ORDER — SERTRALINE HYDROCHLORIDE 25 MG/1
TABLET, FILM COATED ORAL
Qty: 90 TAB | Refills: 1 | Status: SHIPPED | OUTPATIENT
Start: 2020-03-29 | End: 2021-01-01 | Stop reason: DRUGHIGH

## 2020-04-02 ENCOUNTER — TELEPHONE (OUTPATIENT)
Dept: NEUROLOGY | Age: 85
End: 2020-04-02

## 2020-04-02 NOTE — TELEPHONE ENCOUNTER
Called daughter. She says her mom is \"having those spells again\" now that she restarted the memantine.     Advised to stop it, go to ER if they feel something is not right, and I will send this message and call again in the am.

## 2020-04-03 NOTE — TELEPHONE ENCOUNTER
I advised patient's daughter to hold off on these meds for now. She expressed understanding and agreed to this plan.

## 2020-04-14 ENCOUNTER — TELEPHONE (OUTPATIENT)
Dept: FAMILY MEDICINE CLINIC | Age: 85
End: 2020-04-14

## 2020-04-14 NOTE — TELEPHONE ENCOUNTER
----- Message from Radames Dinh sent at 4/14/2020  3:23 PM EDT -----  Regarding: Dr. Sarah Pablo / Israel Foote, Daughter ([phone]), believes pt has a UTI and would like something called into [facility] ([address] [place] [address] -  [phone])    Fax: unknown

## 2020-04-15 NOTE — TELEPHONE ENCOUNTER
Contacted Pt's daughter to get more information on possible UTI. Pt's daughter states her mom is having: Personality changes, Passing Out, and complaining of right side hurting. Has been having symptoms for over a week now. Would like to know what should she do?

## 2020-04-16 NOTE — TELEPHONE ENCOUNTER
Called pt. Daughter, Luca Kearney, answered the phone. Pt is demented. Sees Dr. Tyshawn Mackay who started pt on Nemantine for dementia a month ago. That was causing pt to pass out. Family notified neuro. Per chart review, they were advised by neuro to stop the medication and go to ER. They never went because pt got better and refuses to leave her home. Pt has increased confusion, R sided pain. Family is concerned about UTI. I informed them we have no way of checking urine at a virtual visit and this sounds like it could be a kidney infection. Pt needs to be evaluated face to face. Recommended UC visit tonight. Informed daughter that we are trying to avoid a hospitalization for urosepsis. Daughter Luca Kearney agrees. Advised them to wear face masks in public due to CDC Covid recommendation.

## 2020-08-04 NOTE — PROGRESS NOTES
Falguni Terrell is a 81 y.o. female Chief Complaint Patient presents with  ED Follow-up  Dizziness 1. Have you been to the ER, urgent care clinic since your last visit? Hospitalized since your last visit? Yes When: Seen at [facility] for Syncope on 7/30/2020 
 
2. Have you seen or consulted any other health care providers outside of the [address] [place] [address] since your last visit? Include any pap smears or colon screening. No  
 
 
 
Pain Scale: 0/10 Pain Location: Patient did not verbalize pain at this time, rated 0/10

## 2020-08-04 NOTE — PROGRESS NOTES
Per pt's daughter Veronika Robles, who responded to a phone call after 2 text invitations, pt does not wish to be seen today.

## 2020-09-03 NOTE — PROGRESS NOTES
Date:  20 Name:  Garrett Guidry 
:  1935 MRN:  [id number] PCP:  Cleveland Lei DO Chief Complaint Patient presents with  Follow-up  
  patient daughter states the patient is off lisinoril due to her passing out. mostly all her medications was discontinued due to side effects.  Dementia  
  patient daughter states her mother dementia is slowly progressing, her short term is not too good and she forgets quickly. the patient daghter is unsure of which medication caused the patient seizures but she stated it may be between the aricept or memantine. no other concerns at this time. HISTORY OF PRESENT ILLNESS:Follow up visit for dementia   Since her last visit she did not tolerate Aricept, Exelon and Namenda. She is no longer taking anymemory medications. Side effects inclusded GI disturbances, anxiety, mood changes. All of which have now subsided. Daughters are present and do think her memory is getting a little bit worse as far as short-term. Minimally driving at this point. Not doing much exercise. Still doing her own ADLs such as toileting, dressing, feeding. On zoloft 25mg which is working well. No new concerns ROS Current Outpatient Medications Medication Sig  sertraline (ZOLOFT) 25 mg tablet TAKE 1 TAB BY MOUTH DAILY. INDICATIONS: ANXIOUSNESS ASSOCIATED WITH DEPRESSION  
 Calcium-Cholecalciferol, D3, 600 mg(1,500mg) -400 unit cap Take  by mouth daily.  erythromycin (ILOTYCIN) ophthalmic ointment Apply 0.5 inch ribbon inside left lower lid twice daily for 7 days. May massage excess ointment into lash line.  melatonin 1 mg tablet Take 3 Tabs by mouth nightly.  lisinopril (PRINIVIL, ZESTRIL) 10 mg tablet TAKE 1 TABLET IN THE MORNING AND TAKE 2 TABLETS IN THE EVENING FOR HYPERTENSION  aspirin delayed-release 81 mg tablet Take 1 Tab by mouth daily. Indications: myocardial infarction prevention  mometasone (NASONEX) 50 mcg/actuation nasal spray 2 Sprays by Both Nostrils route daily. Indications: ALLERGIC RHINITIS  
 Omega-3 Fatty Acids 300 mg cap Take 1 Cap by mouth daily. No current facility-administered medications for this visit. Allergies Allergen Reactions  Penicillins Hives Past Medical History:  
Diagnosis Date  Bilateral dry eyes 2017 Dr. Gi Samuels  Cataract Bilaterally. Dr. Sheila Francisco. Dr. Muna Flaherty. Dr. Gi Samuels.  Chest pain 10/2015, 2016 Dr. Madhu Romero. negative Stress Test.  
 Dementia ([facility] [address] 07/2017  
 triggered by mild LACHELLE, depression. Dr. Laya Haas. Dr. Fabiana Poon  Diverticulosis 09/2008 Dr. Binta Vora. Dr. Chinmay Ayala.  DJD (degenerative joint disease) 09/30/05  
 cervical, worse C6-7, C7-T1. Left AC joint.  DJD (degenerative joint disease) of knee   
 bilateral  Dr. Reyes Phillips. Dr. Mckenna NULL (dyspnea on exertion) 10/2015 Dr. Madhu Romero.  Essential hypertension, benign 1968  Foster child  GIB (gastrointestinal bleeding) 11/17/14  
 due to internal hemorrhoids. Dr. Johnie Olivera  Heart palpitations 11/12/15  
 due to PVCs. Dr. Teresa Phillips.  Heartburn  Hypercholesteremia  Hyperglycemia 2014 [facility] Internal carotid artery stenosis 2007  
 bilateral.  Dr. Kennedy [facility] Internal hemorrhoids 09/2008, 11/2014 Dr. Sagar Miranda. Dr. Jerad Torres.  Knee pain Left. Dr. Keon Isabel.  Plantar wart 2018 Dr. Francis Jamison. 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 Dr. Madhu Romero. EF 50-55%  PVD (peripheral vascular disease) ([facility] [address] 2007 Dr. Dona Martinez  Raynaud's phenomenon [id number]  Shortening, leg, congenital   
 left  Vitamin D deficiency 10/10/10 Past Surgical History:  
Procedure Laterality Date  HX ATHERECTOMY Left 09/01/2011  
 popliteal atherectomy and angioplasty. Dr. Dona Martinez  HX BREAST LUMPECTOMY Right 2008 benign. Dr. Ashley Skelton.  HX CAROTID ENDARTERECTOMY Right 2011 ICA. Dr. Baylee Mosley  HX CATARACT REMOVAL Bilateral 2016, 10/24/2016 L then R Dr. Mechelle Medina.  HX COLONOSCOPY  14  
 due to GIB. Dr. Corie Rocha.  HX COLONOSCOPY  08  
 with polypectomy. benign. Dr. Gilbert Me. due q 10 yrs. [facility] FISSURECTOMY.  HX GI  10/03/2008 PROCTOPLASTY due to rectal prolapse  HX HEMORRHOIDECTOMY  10/03/2008  
 internal and external  
 HX KNEE REPLACEMENT Left 2013  
 due to Severe OA. Dr. Bettina Paez.  HX POLYPECTOMY  2008 rectal Dr. Emperatriz Graham Social History Socioeconomic History  Marital status:  Spouse name: Not on file  Number of children: Not on file  Years of education: Not on file  Highest education level: Not on file Occupational History  Not on file Social Needs  Financial resource strain: Not on file  Food insecurity Worry: Not on file Inability: Not on file  Transportation needs Medical: Not on file Non-medical: Not on file Tobacco Use  Smoking status: Former Smoker Packs/day: 1.00 Years: 15.00 Pack years: 15.00 Types: Cigarettes Last attempt to quit: 1968 Years since quittin.7  Smokeless tobacco: Never Used Substance and Sexual Activity  Alcohol use: No  
  Alcohol/week: 0.0 standard drinks  Drug use: No  
  Types: Prescription  Sexual activity: Not Currently Partners: Male Birth control/protection: Abstinence Lifestyle  Physical activity Days per week: Not on file Minutes per session: Not on file  Stress: Not on file Relationships  Social connections Talks on phone: Not on file Gets together: Not on file Attends Sabianism service: Not on file Active member of club or organization: Not on file Attends meetings of clubs or organizations: Not on file Relationship status: Not on file  Intimate partner violence Fear of current or ex partner: Not on file Emotionally abused: Not on file Physically abused: Not on file Forced sexual activity: Not on file Other Topics Concern  Not on file Social History Narrative  Not on file Family History Problem Relation Age of Onset  Heart Attack Mother 79  
 Heart Attack Sister   
     x 3 sisters  Heart Attack Brother   
     x 3 brothers PHYSICAL EXAMINATION:   
Visit Vitals BP 98/60 (BP 1 Location: Left arm, BP Patient Position: Sitting) Pulse 66 Resp 16 Ht 5' 1\" (1.549 m) Wt 54.2 kg (119 lb 6.4 oz) SpO2 99% BMI 22.56 kg/m² General: Well developed, well nourished. Patient in no apparent distress Head: Normocephalic, atraumatic, anicteric sclera Lungs:  Clear to auscultation bilaterally, No wheezes or rubs Cardiac: Regular rate and rhythm with no murmurs. Abd: Bowel sounds were audible. No tenderness on palpation Ext: No pedal edema Skin: No overt signs of rash Neurological Exam: 
Mental Status: Alert and oriented to person place and time Speech: Fluent no aphasia or dysarthria. Cranial Nerves:   Intact visual fields. Facial sensation is normal. Facial movement is symmetric. Palate is midline. Normal sternocleidomastoid strength. Tongue is midline. Hearing is intact bilaterally. Eyes: PERRL, EOM's full, no nystagmus, no ptosis. Motor:  Full and symmetric strength of upper and lower proximal and distal muscles. Normal bulk and tone. Reflexes:   Deep tendon reflexes 2+/4 and symmetrical.  Plantar response is downgoing b/l. Sensory:   Symmetrically intact  with no perceived deficits modalities involving small or large fibers. Gait:  Gait is balanced and fluid with normal arm swing. Tremor:   No tremor noted. Cerebellar:  Finger to nose and heel over shin to knee was demonstrated competently. Neurovascular: No carotid bruits. ASSESSMENT AND PLAN 
  ICD-10-CM ICD-9-CM 1. Late onset Alzheimer's disease without behavioral disturbance ([facility])  G30.1 331.0 F02.80 294.10 1. Late onset Alzheimer's disease without behavioral disturbance ([facility] [address] -Advised to stay mentally, physically, and socially active as all of these are important in preventing cognitive to call - Discussed the progressive nature of her dementia, that the goal of therapy is not to reverse changes in her memory but to slow the  rate of decline 
 -follow up in 6 months This note will not be viewable in [address]  
Manuel Jain NP

## 2020-09-03 NOTE — PROGRESS NOTES
patient daughter states the patient is off lisinoril due to her passing out. mostly all her medications was discontinued due to side effects. patient daughter states her mother dementia is slowly progressing, her short term is not too good and she forgets quickly. the patient yonatan is unsure of which medication caused the patient seizures but she stated it may be between the aricept or memantine. no other concerns at this time. . 
Chief Complaint Patient presents with  Follow-up  
  patient daughter states the patient is off lisinoril due to her passing out. mostly all her medications was discontinued due to side effects.  Dementia  
  patient daughter states her mother dementia is slowly progressing, her short term is not too good and she forgets quickly. the patient yonatan is unsure of which medication caused the patient seizures but she stated it may be between the aricept or memantine. no other concerns at this time. . 
Visit Vitals BP 98/60 (BP 1 Location: Left arm, BP Patient Position: Sitting) Pulse 66 Resp 16 Ht 5' 1\" (1.549 m) Wt 119 lb 6.4 oz (54.2 kg) SpO2 99% BMI 22.56 kg/m²

## 2021-01-01 ENCOUNTER — APPOINTMENT (OUTPATIENT)
Dept: NON INVASIVE DIAGNOSTICS | Age: 86
DRG: 196 | End: 2021-01-01
Attending: INTERNAL MEDICINE
Payer: MEDICARE

## 2021-01-01 ENCOUNTER — TELEPHONE (OUTPATIENT)
Dept: INTERNAL MEDICINE CLINIC | Age: 86
End: 2021-01-01

## 2021-01-01 ENCOUNTER — OFFICE VISIT (OUTPATIENT)
Dept: INTERNAL MEDICINE CLINIC | Age: 86
End: 2021-01-01
Payer: MEDICARE

## 2021-01-01 ENCOUNTER — HOSPITAL ENCOUNTER (INPATIENT)
Age: 86
LOS: 4 days | Discharge: HOSPICE | DRG: 196 | End: 2021-05-19
Attending: EMERGENCY MEDICINE | Admitting: INTERNAL MEDICINE
Payer: MEDICARE

## 2021-01-01 ENCOUNTER — HOSPICE ADMISSION (OUTPATIENT)
Dept: HOSPICE | Facility: HOSPICE | Age: 86
End: 2021-01-01
Payer: MEDICARE

## 2021-01-01 ENCOUNTER — TELEPHONE (OUTPATIENT)
Dept: FAMILY MEDICINE CLINIC | Age: 86
End: 2021-01-01

## 2021-01-01 ENCOUNTER — HOSPITAL ENCOUNTER (INPATIENT)
Age: 86
LOS: 1 days | DRG: 951 | End: 2021-05-20
Attending: FAMILY MEDICINE | Admitting: FAMILY MEDICINE
Payer: OTHER MISCELLANEOUS

## 2021-01-01 ENCOUNTER — APPOINTMENT (OUTPATIENT)
Dept: GENERAL RADIOLOGY | Age: 86
DRG: 196 | End: 2021-01-01
Attending: EMERGENCY MEDICINE
Payer: MEDICARE

## 2021-01-01 ENCOUNTER — APPOINTMENT (OUTPATIENT)
Dept: CT IMAGING | Age: 86
DRG: 196 | End: 2021-01-01
Attending: INTERNAL MEDICINE
Payer: MEDICARE

## 2021-01-01 VITALS
HEIGHT: 61 IN | DIASTOLIC BLOOD PRESSURE: 62 MMHG | OXYGEN SATURATION: 97 % | SYSTOLIC BLOOD PRESSURE: 130 MMHG | RESPIRATION RATE: 16 BRPM | TEMPERATURE: 97.9 F | HEART RATE: 70 BPM | WEIGHT: 118 LBS | BODY MASS INDEX: 22.28 KG/M2

## 2021-01-01 VITALS
DIASTOLIC BLOOD PRESSURE: 49 MMHG | BODY MASS INDEX: 21.52 KG/M2 | WEIGHT: 113.98 LBS | RESPIRATION RATE: 26 BRPM | HEART RATE: 107 BPM | OXYGEN SATURATION: 88 % | HEIGHT: 61 IN | SYSTOLIC BLOOD PRESSURE: 109 MMHG | TEMPERATURE: 98.1 F

## 2021-01-01 VITALS
DIASTOLIC BLOOD PRESSURE: 44 MMHG | OXYGEN SATURATION: 83 % | TEMPERATURE: 97.4 F | RESPIRATION RATE: 23 BRPM | SYSTOLIC BLOOD PRESSURE: 65 MMHG | HEART RATE: 119 BPM

## 2021-01-01 DIAGNOSIS — R09.02 HYPOXIA: Primary | ICD-10-CM

## 2021-01-01 DIAGNOSIS — R52 GENERALIZED PAIN: ICD-10-CM

## 2021-01-01 DIAGNOSIS — R06.03 ACUTE RESPIRATORY DISTRESS: ICD-10-CM

## 2021-01-01 DIAGNOSIS — F02.80 ALZHEIMER'S DISEASE OF OTHER ONSET WITHOUT BEHAVIORAL DISTURBANCE: Primary | ICD-10-CM

## 2021-01-01 DIAGNOSIS — J84.10 PULMONARY FIBROSIS (HCC): ICD-10-CM

## 2021-01-01 DIAGNOSIS — R06.4 LABORED BREATHING: ICD-10-CM

## 2021-01-01 DIAGNOSIS — R41.89 UNRESPONSIVENESS: ICD-10-CM

## 2021-01-01 DIAGNOSIS — R53.1 WEAKNESS: Primary | ICD-10-CM

## 2021-01-01 DIAGNOSIS — F41.9 ANXIETY: ICD-10-CM

## 2021-01-01 DIAGNOSIS — G30.8 ALZHEIMER'S DISEASE OF OTHER ONSET WITHOUT BEHAVIORAL DISTURBANCE: Primary | ICD-10-CM

## 2021-01-01 DIAGNOSIS — R06.89 ABNORMAL BREATH SOUNDS: ICD-10-CM

## 2021-01-01 DIAGNOSIS — Z51.5 HOSPICE CARE: ICD-10-CM

## 2021-01-01 DIAGNOSIS — R26.89 BALANCE PROBLEM: ICD-10-CM

## 2021-01-01 DIAGNOSIS — R45.1 RESTLESSNESS: ICD-10-CM

## 2021-01-01 DIAGNOSIS — N39.0 URINARY TRACT INFECTION WITHOUT HEMATURIA, SITE UNSPECIFIED: ICD-10-CM

## 2021-01-01 DIAGNOSIS — I50.9 ACUTE CONGESTIVE HEART FAILURE, UNSPECIFIED HEART FAILURE TYPE (HCC): ICD-10-CM

## 2021-01-01 LAB
ALBUMIN SERPL-MCNC: 2.1 G/DL (ref 3.5–5)
ALBUMIN SERPL-MCNC: 2.3 G/DL (ref 3.5–5)
ALBUMIN SERPL-MCNC: 2.4 G/DL (ref 3.5–5)
ALBUMIN/GLOB SERPL: 0.5 {RATIO} (ref 1.1–2.2)
ALBUMIN/GLOB SERPL: 0.5 {RATIO} (ref 1.1–2.2)
ALBUMIN/GLOB SERPL: 0.6 {RATIO} (ref 1.1–2.2)
ALP SERPL-CCNC: 120 U/L (ref 45–117)
ALP SERPL-CCNC: 122 U/L (ref 45–117)
ALP SERPL-CCNC: 136 U/L (ref 45–117)
ALT SERPL-CCNC: 24 U/L (ref 12–78)
ALT SERPL-CCNC: 26 U/L (ref 12–78)
ALT SERPL-CCNC: 26 U/L (ref 12–78)
ANION GAP SERPL CALC-SCNC: 12 MMOL/L (ref 5–15)
ANION GAP SERPL CALC-SCNC: 13 MMOL/L (ref 5–15)
ANION GAP SERPL CALC-SCNC: 5 MMOL/L
ANION GAP SERPL CALC-SCNC: 8 MMOL/L (ref 5–15)
ANION GAP SERPL CALC-SCNC: 9 MMOL/L (ref 5–15)
APTT PPP: 27.9 SEC (ref 22.1–31)
APTT PPP: 29.6 SEC (ref 22.1–31)
ARTERIAL PATENCY WRIST A: ABNORMAL
AST SERPL-CCNC: 36 U/L (ref 15–37)
AST SERPL-CCNC: 36 U/L (ref 15–37)
AST SERPL-CCNC: 38 U/L (ref 15–37)
ATRIAL RATE: 78 BPM
BACTERIA UR CULT: NORMAL
BACTERIA,BACTU: ABNORMAL
BASE DEFICIT BLDA-SCNC: 0.6 MMOL/L
BASOPHILS # BLD: 0 K/UL (ref 0–0.1)
BASOPHILS NFR BLD: 0 % (ref 0–1)
BASOPHILS NFR BLD: 0 % (ref 0–1)
BASOPHILS NFR BLD: 1 % (ref 0–1)
BDY SITE: ABNORMAL
BILIRUB SERPL-MCNC: 0.7 MG/DL (ref 0.2–1)
BILIRUB SERPL-MCNC: 1 MG/DL (ref 0.2–1)
BILIRUB SERPL-MCNC: 1.1 MG/DL (ref 0.2–1)
BILIRUB UR QL: NEGATIVE
BNP SERPL-MCNC: ABNORMAL PG/ML
BUDDING YEAST,BYSTU: ABNORMAL
BUN SERPL-MCNC: 16 MG/DL (ref 6–20)
BUN SERPL-MCNC: 18 MG/DL (ref 6–20)
BUN SERPL-MCNC: 20 MG/DL (ref 7–17)
BUN SERPL-MCNC: 21 MG/DL (ref 6–20)
BUN SERPL-MCNC: 24 MG/DL (ref 6–20)
BUN/CREAT SERPL: 29 (ref 12–20)
BUN/CREAT SERPL: 29 (ref 12–20)
BUN/CREAT SERPL: 30 (ref 12–20)
BUN/CREAT SERPL: 45 (ref 12–20)
CALCIUM SERPL-MCNC: 8.4 MG/DL (ref 8.5–10.1)
CALCIUM SERPL-MCNC: 8.6 MG/DL (ref 8.5–10.1)
CALCIUM SERPL-MCNC: 8.7 MG/DL (ref 8.5–10.1)
CALCIUM SERPL-MCNC: 8.8 MG/DL (ref 8.5–10.1)
CALCIUM SERPL-MCNC: 9.4 MG/DL (ref 8.4–10.2)
CALCULATED P AXIS, ECG09: 31 DEGREES
CALCULATED R AXIS, ECG10: -40 DEGREES
CALCULATED T AXIS, ECG11: -29 DEGREES
CHLORIDE SERPL-SCNC: 102 MMOL/L (ref 98–107)
CHLORIDE SERPL-SCNC: 95 MMOL/L (ref 97–108)
CHLORIDE SERPL-SCNC: 96 MMOL/L (ref 97–108)
CHLORIDE SERPL-SCNC: 97 MMOL/L (ref 97–108)
CHLORIDE SERPL-SCNC: 97 MMOL/L (ref 97–108)
CLARITY: CLEAR
CO2 SERPL-SCNC: 22 MMOL/L (ref 21–32)
CO2 SERPL-SCNC: 23 MMOL/L (ref 21–32)
CO2 SERPL-SCNC: 25 MMOL/L (ref 21–32)
CO2 SERPL-SCNC: 26 MMOL/L (ref 21–32)
CO2 SERPL-SCNC: 33 MMOL/L (ref 22–32)
COLOR UR: ABNORMAL
COMMENT, HOLDF: NORMAL
COMMENT, HOLDF: NORMAL
CREAT SERPL-MCNC: 0.53 MG/DL (ref 0.55–1.02)
CREAT SERPL-MCNC: 0.56 MG/DL (ref 0.55–1.02)
CREAT SERPL-MCNC: 0.62 MG/DL (ref 0.55–1.02)
CREAT SERPL-MCNC: 0.69 MG/DL (ref 0.55–1.02)
CREAT SERPL-MCNC: 0.8 MG/DL (ref 0.7–1.2)
DIAGNOSIS, 93000: NORMAL
DIFFERENTIAL METHOD BLD: ABNORMAL
ECHO AO ROOT DIAM: 3.24 CM
ECHO AV AREA PEAK VELOCITY: 0.74 CM2
ECHO AV AREA VTI: 0.89 CM2
ECHO AV AREA/BSA PEAK VELOCITY: 0.5 CM2/M2
ECHO AV AREA/BSA VTI: 0.6 CM2/M2
ECHO AV MEAN GRADIENT: 13.58 MMHG
ECHO AV PEAK GRADIENT: 25.54 MMHG
ECHO AV PEAK VELOCITY: 252.68 CM/S
ECHO AV VTI: 47.04 CM
ECHO EST RA PRESSURE: 5 MMHG
ECHO LA AREA 4C: 19.14 CM2
ECHO LA MAJOR AXIS: 3.87 CM
ECHO LA MINOR AXIS: 2.66 CM
ECHO LA VOL 2C: 45.36 ML (ref 22–52)
ECHO LA VOL 4C: 50.02 ML (ref 22–52)
ECHO LA VOL BP: 52.15 ML (ref 22–52)
ECHO LA VOL/BSA BIPLANE: 35.87 ML/M2 (ref 16–28)
ECHO LA VOLUME INDEX A2C: 31.2 ML/M2 (ref 16–28)
ECHO LA VOLUME INDEX A4C: 34.4 ML/M2 (ref 16–28)
ECHO LV EDV A2C: 49.95 ML
ECHO LV EDV A4C: 60.36 ML
ECHO LV EDV BP: 55.09 ML (ref 56–104)
ECHO LV EDV INDEX A4C: 41.5 ML/M2
ECHO LV EDV INDEX BP: 37.9 ML/M2
ECHO LV EDV NDEX A2C: 34.4 ML/M2
ECHO LV EJECTION FRACTION A2C: 30 PERCENT
ECHO LV EJECTION FRACTION A4C: 61 PERCENT
ECHO LV EJECTION FRACTION BIPLANE: 47.5 PERCENT (ref 55–100)
ECHO LV ESV A2C: 35.13 ML
ECHO LV ESV A4C: 23.74 ML
ECHO LV ESV BP: 28.91 ML (ref 19–49)
ECHO LV ESV INDEX A2C: 24.2 ML/M2
ECHO LV ESV INDEX A4C: 16.3 ML/M2
ECHO LV ESV INDEX BP: 19.9 ML/M2
ECHO LV INTERNAL DIMENSION DIASTOLIC: 4.75 CM (ref 3.9–5.3)
ECHO LV INTERNAL DIMENSION SYSTOLIC: 3.48 CM
ECHO LV IVSD: 0.93 CM (ref 0.6–0.9)
ECHO LV MASS 2D: 153.9 G (ref 67–162)
ECHO LV MASS INDEX 2D: 105.9 G/M2 (ref 43–95)
ECHO LV POSTERIOR WALL DIASTOLIC: 0.95 CM (ref 0.6–0.9)
ECHO LVOT DIAM: 1.6 CM
ECHO LVOT PEAK GRADIENT: 3.43 MMHG
ECHO LVOT PEAK VELOCITY: 92.64 CM/S
ECHO LVOT SV: 41.8 ML
ECHO LVOT VTI: 20.77 CM
ECHO MV A VELOCITY: 72.59 CM/S
ECHO MV E DECELERATION TIME (DT): 231.63 MS
ECHO MV E VELOCITY: 38.04 CM/S
ECHO MV E/A RATIO: 0.52
ECHO PV PEAK INSTANTANEOUS GRADIENT SYSTOLIC: 1.35 MMHG
ECHO RIGHT VENTRICULAR SYSTOLIC PRESSURE (RVSP): 41.23 MMHG
ECHO TV REGURGITANT MAX VELOCITY: 300.95 CM/S
ECHO TV REGURGITANT PEAK GRADIENT: 36.23 MMHG
EOSINOPHIL # BLD: 0.2 K/UL (ref 0–0.4)
EOSINOPHIL NFR BLD: 2 % (ref 0–7)
EOSINOPHIL NFR BLD: 2 % (ref 0–7)
EOSINOPHIL NFR BLD: 3 % (ref 0–7)
ERYTHROCYTE [DISTWIDTH] IN BLOOD BY AUTOMATED COUNT: 17.2 % (ref 11.5–14.5)
ERYTHROCYTE [DISTWIDTH] IN BLOOD BY AUTOMATED COUNT: 17.2 % (ref 11.5–14.5)
ERYTHROCYTE [DISTWIDTH] IN BLOOD BY AUTOMATED COUNT: 17.4 % (ref 11.5–14.5)
GAS FLOW.O2 O2 DELIVERY SYS: 15 L/MIN
GLOBULIN SER CALC-MCNC: 4.3 G/DL (ref 2–4)
GLOBULIN SER CALC-MCNC: 4.4 G/DL (ref 2–4)
GLOBULIN SER CALC-MCNC: 4.5 G/DL (ref 2–4)
GLUCOSE 24H UR-MRATE: NEGATIVE G/(24.H)
GLUCOSE SERPL-MCNC: 74 MG/DL (ref 65–100)
GLUCOSE SERPL-MCNC: 78 MG/DL (ref 65–100)
GLUCOSE SERPL-MCNC: 79 MG/DL (ref 65–100)
GLUCOSE SERPL-MCNC: 85 MG/DL (ref 65–105)
GLUCOSE SERPL-MCNC: 97 MG/DL (ref 65–100)
HCO3 BLDA-SCNC: 22 MMOL/L (ref 22–26)
HCT VFR BLD AUTO: 34.1 % (ref 35–47)
HCT VFR BLD AUTO: 35.8 % (ref 35–47)
HCT VFR BLD AUTO: 36.2 % (ref 35–47)
HGB BLD-MCNC: 11.2 G/DL (ref 11.5–16)
HGB BLD-MCNC: 11.7 G/DL (ref 11.5–16)
HGB BLD-MCNC: 11.8 G/DL (ref 11.5–16)
HGB UR QL STRIP: ABNORMAL
IMM GRANULOCYTES # BLD AUTO: 0 K/UL (ref 0–0.04)
IMM GRANULOCYTES # BLD AUTO: 0.1 K/UL (ref 0–0.04)
IMM GRANULOCYTES # BLD AUTO: 0.1 K/UL (ref 0–0.04)
IMM GRANULOCYTES NFR BLD AUTO: 1 % (ref 0–0.5)
KETONES UR QL STRIP.AUTO: NEGATIVE
LA VOL DISK BP: 48.33 ML (ref 22–52)
LEUKOCYTE ESTERASE: ABNORMAL
LYMPHOCYTES # BLD: 0.8 K/UL (ref 0.8–3.5)
LYMPHOCYTES # BLD: 1 K/UL (ref 0.8–3.5)
LYMPHOCYTES # BLD: 1.2 K/UL (ref 0.8–3.5)
LYMPHOCYTES NFR BLD: 12 % (ref 12–49)
LYMPHOCYTES NFR BLD: 12 % (ref 12–49)
LYMPHOCYTES NFR BLD: 15 % (ref 12–49)
MAGNESIUM SERPL-MCNC: 1.9 MG/DL (ref 1.6–2.4)
MAGNESIUM SERPL-MCNC: 1.9 MG/DL (ref 1.6–2.4)
MCH RBC QN AUTO: 30.2 PG (ref 26–34)
MCH RBC QN AUTO: 30.6 PG (ref 26–34)
MCH RBC QN AUTO: 30.6 PG (ref 26–34)
MCHC RBC AUTO-ENTMCNC: 32.6 G/DL (ref 30–36.5)
MCHC RBC AUTO-ENTMCNC: 32.7 G/DL (ref 30–36.5)
MCHC RBC AUTO-ENTMCNC: 32.8 G/DL (ref 30–36.5)
MCV RBC AUTO: 92.5 FL (ref 80–99)
MCV RBC AUTO: 93.2 FL (ref 80–99)
MCV RBC AUTO: 93.8 FL (ref 80–99)
MONOCYTES # BLD: 0.4 K/UL (ref 0–1)
MONOCYTES # BLD: 0.6 K/UL (ref 0–1)
MONOCYTES # BLD: 0.6 K/UL (ref 0–1)
MONOCYTES NFR BLD: 6 % (ref 5–13)
MONOCYTES NFR BLD: 7 % (ref 5–13)
MONOCYTES NFR BLD: 7 % (ref 5–13)
NEUTS SEG # BLD: 5.7 K/UL (ref 1.8–8)
NEUTS SEG # BLD: 6.1 K/UL (ref 1.8–8)
NEUTS SEG # BLD: 6.5 K/UL (ref 1.8–8)
NEUTS SEG NFR BLD: 74 % (ref 32–75)
NEUTS SEG NFR BLD: 78 % (ref 32–75)
NEUTS SEG NFR BLD: 78 % (ref 32–75)
NITRITE UR QL STRIP.AUTO: NEGATIVE
NRBC # BLD: 0 K/UL (ref 0–0.01)
NRBC BLD-RTO: 0 PER 100 WBC
P-R INTERVAL, ECG05: 134 MS
PCO2 BLDA: 32 MMHG (ref 35–45)
PH BLDA: 7.46 [PH] (ref 7.35–7.45)
PH UR STRIP: 6 [PH] (ref 5–7)
PHOSPHATE SERPL-MCNC: 2.2 MG/DL (ref 2.6–4.7)
PHOSPHATE SERPL-MCNC: 3.6 MG/DL (ref 2.6–4.7)
PLATELET # BLD AUTO: 356 K/UL (ref 150–400)
PLATELET # BLD AUTO: 370 K/UL (ref 150–400)
PLATELET # BLD AUTO: 423 K/UL (ref 150–400)
PMV BLD AUTO: 9.1 FL (ref 8.9–12.9)
PMV BLD AUTO: 9.2 FL (ref 8.9–12.9)
PMV BLD AUTO: 9.4 FL (ref 8.9–12.9)
PO2 BLDA: 77 MMHG (ref 80–100)
POTASSIUM SERPL-SCNC: 2.8 MMOL/L (ref 3.5–5.1)
POTASSIUM SERPL-SCNC: 3.2 MMOL/L (ref 3.5–5.1)
POTASSIUM SERPL-SCNC: 3.5 MMOL/L (ref 3.5–5.1)
POTASSIUM SERPL-SCNC: 4 MMOL/L (ref 3.5–5.1)
POTASSIUM SERPL-SCNC: 4.4 MMOL/L (ref 3.6–5)
PROT SERPL-MCNC: 6.6 G/DL (ref 6.4–8.2)
PROT SERPL-MCNC: 6.7 G/DL (ref 6.4–8.2)
PROT SERPL-MCNC: 6.7 G/DL (ref 6.4–8.2)
PROT UR STRIP-MCNC: NEGATIVE MG/DL
Q-T INTERVAL, ECG07: 408 MS
QRS DURATION, ECG06: 90 MS
QTC CALCULATION (BEZET), ECG08: 467 MS
RBC # BLD AUTO: 3.66 M/UL (ref 3.8–5.2)
RBC # BLD AUTO: 3.86 M/UL (ref 3.8–5.2)
RBC # BLD AUTO: 3.87 M/UL (ref 3.8–5.2)
RBC #/AREA URNS HPF: ABNORMAL #/HPF
SAMPLES BEING HELD,HOLD: NORMAL
SAMPLES BEING HELD,HOLD: NORMAL
SAO2 % BLD: 96 % (ref 92–97)
SAO2% DEVICE SAO2% SENSOR NAME: ABNORMAL
SODIUM SERPL-SCNC: 129 MMOL/L (ref 136–145)
SODIUM SERPL-SCNC: 131 MMOL/L (ref 136–145)
SODIUM SERPL-SCNC: 131 MMOL/L (ref 136–145)
SODIUM SERPL-SCNC: 132 MMOL/L (ref 136–145)
SODIUM SERPL-SCNC: 140 MMOL/L (ref 137–145)
SP GR UR REFRACTOMETRY: 1.02 (ref 1–1.03)
SPECIMEN SITE: ABNORMAL
THERAPEUTIC RANGE,PTTT: NORMAL SECS (ref 58–77)
THERAPEUTIC RANGE,PTTT: NORMAL SECS (ref 58–77)
TROPONIN I SERPL-MCNC: 0.05 NG/ML
TROPONIN I SERPL-MCNC: 0.07 NG/ML
TROPONIN I SERPL-MCNC: 0.09 NG/ML
TSH SERPL DL<=0.05 MIU/L-ACNC: 0.88 UIU/ML (ref 0.36–3.74)
UROBILINOGEN UR QL STRIP.AUTO: NEGATIVE
VENTRICULAR RATE, ECG03: 79 BPM
WBC # BLD AUTO: 7.2 K/UL (ref 3.6–11)
WBC # BLD AUTO: 8.2 K/UL (ref 3.6–11)
WBC # BLD AUTO: 8.4 K/UL (ref 3.6–11)
WBC URNS QL MICRO: ABNORMAL #/HPF

## 2021-01-01 PROCEDURE — G8754 DIAS BP LESS 90: HCPCS | Performed by: NURSE PRACTITIONER

## 2021-01-01 PROCEDURE — 3336500001 HSPC ELECTION

## 2021-01-01 PROCEDURE — 99222 1ST HOSP IP/OBS MODERATE 55: CPT | Performed by: SPECIALIST

## 2021-01-01 PROCEDURE — 74011250636 HC RX REV CODE- 250/636: Performed by: INTERNAL MEDICINE

## 2021-01-01 PROCEDURE — 99285 EMERGENCY DEPT VISIT HI MDM: CPT

## 2021-01-01 PROCEDURE — P9047 ALBUMIN (HUMAN), 25%, 50ML: HCPCS | Performed by: INTERNAL MEDICINE

## 2021-01-01 PROCEDURE — 36415 COLL VENOUS BLD VENIPUNCTURE: CPT

## 2021-01-01 PROCEDURE — 84443 ASSAY THYROID STIM HORMONE: CPT

## 2021-01-01 PROCEDURE — 74011250636 HC RX REV CODE- 250/636: Performed by: EMERGENCY MEDICINE

## 2021-01-01 PROCEDURE — 99233 SBSQ HOSP IP/OBS HIGH 50: CPT | Performed by: SPECIALIST

## 2021-01-01 PROCEDURE — 93005 ELECTROCARDIOGRAM TRACING: CPT

## 2021-01-01 PROCEDURE — 77010033711 HC HIGH FLOW OXYGEN

## 2021-01-01 PROCEDURE — 81001 URINALYSIS AUTO W/SCOPE: CPT | Performed by: NURSE PRACTITIONER

## 2021-01-01 PROCEDURE — 74011250637 HC RX REV CODE- 250/637: Performed by: INTERNAL MEDICINE

## 2021-01-01 PROCEDURE — 83735 ASSAY OF MAGNESIUM: CPT

## 2021-01-01 PROCEDURE — 74011250636 HC RX REV CODE- 250/636: Performed by: NURSE PRACTITIONER

## 2021-01-01 PROCEDURE — 80053 COMPREHEN METABOLIC PANEL: CPT

## 2021-01-01 PROCEDURE — 96374 THER/PROPH/DIAG INJ IV PUSH: CPT

## 2021-01-01 PROCEDURE — 1100F PTFALLS ASSESS-DOCD GE2>/YR: CPT | Performed by: NURSE PRACTITIONER

## 2021-01-01 PROCEDURE — 80048 BASIC METABOLIC PNL TOTAL CA: CPT | Performed by: NURSE PRACTITIONER

## 2021-01-01 PROCEDURE — 80048 BASIC METABOLIC PNL TOTAL CA: CPT

## 2021-01-01 PROCEDURE — 84484 ASSAY OF TROPONIN QUANT: CPT

## 2021-01-01 PROCEDURE — 74011250636 HC RX REV CODE- 250/636: Performed by: FAMILY MEDICINE

## 2021-01-01 PROCEDURE — G8399 PT W/DXA RESULTS DOCUMENT: HCPCS | Performed by: NURSE PRACTITIONER

## 2021-01-01 PROCEDURE — 36600 WITHDRAWAL OF ARTERIAL BLOOD: CPT

## 2021-01-01 PROCEDURE — 99214 OFFICE O/P EST MOD 30 MIN: CPT | Performed by: NURSE PRACTITIONER

## 2021-01-01 PROCEDURE — 74011250636 HC RX REV CODE- 250/636: Performed by: SPECIALIST

## 2021-01-01 PROCEDURE — 85730 THROMBOPLASTIN TIME PARTIAL: CPT

## 2021-01-01 PROCEDURE — G8427 DOCREV CUR MEDS BY ELIG CLIN: HCPCS | Performed by: NURSE PRACTITIONER

## 2021-01-01 PROCEDURE — 65660000001 HC RM ICU INTERMED STEPDOWN

## 2021-01-01 PROCEDURE — 94762 N-INVAS EAR/PLS OXIMTRY CONT: CPT

## 2021-01-01 PROCEDURE — 84100 ASSAY OF PHOSPHORUS: CPT

## 2021-01-01 PROCEDURE — 85025 COMPLETE CBC W/AUTO DIFF WBC: CPT

## 2021-01-01 PROCEDURE — 71045 X-RAY EXAM CHEST 1 VIEW: CPT

## 2021-01-01 PROCEDURE — 93306 TTE W/DOPPLER COMPLETE: CPT

## 2021-01-01 PROCEDURE — 93306 TTE W/DOPPLER COMPLETE: CPT | Performed by: SPECIALIST

## 2021-01-01 PROCEDURE — 99222 1ST HOSP IP/OBS MODERATE 55: CPT | Performed by: NURSE PRACTITIONER

## 2021-01-01 PROCEDURE — 65270000029 HC RM PRIVATE

## 2021-01-01 PROCEDURE — 1090F PRES/ABSN URINE INCON ASSESS: CPT | Performed by: NURSE PRACTITIONER

## 2021-01-01 PROCEDURE — G8420 CALC BMI NORM PARAMETERS: HCPCS | Performed by: NURSE PRACTITIONER

## 2021-01-01 PROCEDURE — 94760 N-INVAS EAR/PLS OXIMETRY 1: CPT

## 2021-01-01 PROCEDURE — 74011000636 HC RX REV CODE- 636: Performed by: RADIOLOGY

## 2021-01-01 PROCEDURE — G9717 DOC PT DX DEP/BP F/U NT REQ: HCPCS | Performed by: NURSE PRACTITIONER

## 2021-01-01 PROCEDURE — 99223 1ST HOSP IP/OBS HIGH 75: CPT | Performed by: FAMILY MEDICINE

## 2021-01-01 PROCEDURE — G8536 NO DOC ELDER MAL SCRN: HCPCS | Performed by: NURSE PRACTITIONER

## 2021-01-01 PROCEDURE — 82803 BLOOD GASES ANY COMBINATION: CPT

## 2021-01-01 PROCEDURE — 0656 HSPC GENERAL INPATIENT

## 2021-01-01 PROCEDURE — 3288F FALL RISK ASSESSMENT DOCD: CPT | Performed by: NURSE PRACTITIONER

## 2021-01-01 PROCEDURE — G8752 SYS BP LESS 140: HCPCS | Performed by: NURSE PRACTITIONER

## 2021-01-01 PROCEDURE — 71275 CT ANGIOGRAPHY CHEST: CPT

## 2021-01-01 PROCEDURE — 83880 ASSAY OF NATRIURETIC PEPTIDE: CPT

## 2021-01-01 PROCEDURE — 65660000000 HC RM CCU STEPDOWN

## 2021-01-01 RX ORDER — KETOROLAC TROMETHAMINE 30 MG/ML
30 INJECTION, SOLUTION INTRAMUSCULAR; INTRAVENOUS
Status: DISCONTINUED | OUTPATIENT
Start: 2021-01-01 | End: 2021-01-01 | Stop reason: HOSPADM

## 2021-01-01 RX ORDER — SODIUM CHLORIDE 0.9 % (FLUSH) 0.9 %
5-40 SYRINGE (ML) INJECTION AS NEEDED
Status: DISCONTINUED | OUTPATIENT
Start: 2021-01-01 | End: 2021-01-01 | Stop reason: HOSPADM

## 2021-01-01 RX ORDER — PROMETHAZINE HYDROCHLORIDE 25 MG/1
12.5 TABLET ORAL
Status: DISCONTINUED | OUTPATIENT
Start: 2021-01-01 | End: 2021-01-01 | Stop reason: HOSPADM

## 2021-01-01 RX ORDER — HEPARIN SODIUM 10000 [USP'U]/100ML
12-25 INJECTION, SOLUTION INTRAVENOUS
Status: DISCONTINUED | OUTPATIENT
Start: 2021-01-01 | End: 2021-01-01

## 2021-01-01 RX ORDER — METOPROLOL TARTRATE 25 MG/1
25 TABLET, FILM COATED ORAL 2 TIMES DAILY
COMMUNITY
End: 2021-01-01

## 2021-01-01 RX ORDER — LANOLIN ALCOHOL/MO/W.PET/CERES
3 CREAM (GRAM) TOPICAL
Status: DISCONTINUED | OUTPATIENT
Start: 2021-01-01 | End: 2021-01-01 | Stop reason: HOSPADM

## 2021-01-01 RX ORDER — POTASSIUM CHLORIDE 7.45 MG/ML
10 INJECTION INTRAVENOUS ONCE
Status: COMPLETED | OUTPATIENT
Start: 2021-01-01 | End: 2021-01-01

## 2021-01-01 RX ORDER — MORPHINE SULFATE 2 MG/ML
2 INJECTION, SOLUTION INTRAMUSCULAR; INTRAVENOUS
Status: DISCONTINUED | OUTPATIENT
Start: 2021-01-01 | End: 2021-01-01 | Stop reason: SDUPTHER

## 2021-01-01 RX ORDER — LORAZEPAM 2 MG/ML
0.26 INJECTION INTRAMUSCULAR
Status: DISCONTINUED | OUTPATIENT
Start: 2021-01-01 | End: 2021-01-01

## 2021-01-01 RX ORDER — ACETAMINOPHEN 325 MG/1
650 TABLET ORAL
Status: DISCONTINUED | OUTPATIENT
Start: 2021-01-01 | End: 2021-01-01 | Stop reason: HOSPADM

## 2021-01-01 RX ORDER — MORPHINE SULFATE 2 MG/ML
2 INJECTION, SOLUTION INTRAMUSCULAR; INTRAVENOUS
Status: DISCONTINUED | OUTPATIENT
Start: 2021-01-01 | End: 2021-01-01

## 2021-01-01 RX ORDER — ASPIRIN 81 MG/1
81 TABLET ORAL DAILY
Status: DISCONTINUED | OUTPATIENT
Start: 2021-01-01 | End: 2021-01-01 | Stop reason: HOSPADM

## 2021-01-01 RX ORDER — FACIAL-BODY WIPES
10 EACH TOPICAL DAILY PRN
Status: DISCONTINUED | OUTPATIENT
Start: 2021-01-01 | End: 2021-01-01 | Stop reason: HOSPADM

## 2021-01-01 RX ORDER — CLOPIDOGREL BISULFATE 75 MG/1
75 TABLET ORAL
COMMUNITY
End: 2021-01-01

## 2021-01-01 RX ORDER — LISINOPRIL 10 MG/1
10 TABLET ORAL DAILY
Status: DISCONTINUED | OUTPATIENT
Start: 2021-01-01 | End: 2021-01-01 | Stop reason: HOSPADM

## 2021-01-01 RX ORDER — FUROSEMIDE 10 MG/ML
20 INJECTION INTRAMUSCULAR; INTRAVENOUS DAILY
Status: DISCONTINUED | OUTPATIENT
Start: 2021-01-01 | End: 2021-01-01

## 2021-01-01 RX ORDER — MORPHINE SULFATE 2 MG/ML
4 INJECTION, SOLUTION INTRAMUSCULAR; INTRAVENOUS
Status: DISCONTINUED | OUTPATIENT
Start: 2021-01-01 | End: 2021-01-01 | Stop reason: HOSPADM

## 2021-01-01 RX ORDER — SODIUM CHLORIDE 0.9 % (FLUSH) 0.9 %
5-40 SYRINGE (ML) INJECTION EVERY 8 HOURS
Status: DISCONTINUED | OUTPATIENT
Start: 2021-01-01 | End: 2021-01-01 | Stop reason: HOSPADM

## 2021-01-01 RX ORDER — SERTRALINE HYDROCHLORIDE 25 MG/1
25 TABLET, FILM COATED ORAL DAILY
Qty: 60 TAB | Refills: 2 | Status: SHIPPED | OUTPATIENT
Start: 2021-01-01 | End: 2021-01-01 | Stop reason: SDUPTHER

## 2021-01-01 RX ORDER — ACETAMINOPHEN 650 MG/1
650 SUPPOSITORY RECTAL
Status: DISCONTINUED | OUTPATIENT
Start: 2021-01-01 | End: 2021-01-01 | Stop reason: HOSPADM

## 2021-01-01 RX ORDER — FUROSEMIDE 10 MG/ML
20 INJECTION INTRAMUSCULAR; INTRAVENOUS 2 TIMES DAILY
Status: DISCONTINUED | OUTPATIENT
Start: 2021-01-01 | End: 2021-01-01

## 2021-01-01 RX ORDER — ATORVASTATIN CALCIUM 80 MG/1
80 TABLET, FILM COATED ORAL DAILY
COMMUNITY
End: 2021-01-01

## 2021-01-01 RX ORDER — MORPHINE SULFATE 4 MG/ML
4 INJECTION, SOLUTION INTRAMUSCULAR; INTRAVENOUS
Status: DISCONTINUED | OUTPATIENT
Start: 2021-01-01 | End: 2021-01-01 | Stop reason: SDUPTHER

## 2021-01-01 RX ORDER — CLOPIDOGREL BISULFATE 75 MG/1
75 TABLET ORAL DAILY
Status: DISCONTINUED | OUTPATIENT
Start: 2021-01-01 | End: 2021-01-01 | Stop reason: HOSPADM

## 2021-01-01 RX ORDER — LORAZEPAM 2 MG/ML
0.5 INJECTION INTRAMUSCULAR
Status: DISCONTINUED | OUTPATIENT
Start: 2021-01-01 | End: 2021-01-01 | Stop reason: HOSPADM

## 2021-01-01 RX ORDER — LORAZEPAM 2 MG/ML
1 INJECTION INTRAMUSCULAR
Status: COMPLETED | OUTPATIENT
Start: 2021-01-01 | End: 2021-01-01

## 2021-01-01 RX ORDER — ENOXAPARIN SODIUM 100 MG/ML
40 INJECTION SUBCUTANEOUS EVERY 24 HOURS
Status: DISCONTINUED | OUTPATIENT
Start: 2021-01-01 | End: 2021-01-01 | Stop reason: HOSPADM

## 2021-01-01 RX ORDER — POLYETHYLENE GLYCOL 3350 17 G/17G
17 POWDER, FOR SOLUTION ORAL DAILY PRN
Status: DISCONTINUED | OUTPATIENT
Start: 2021-01-01 | End: 2021-01-01 | Stop reason: HOSPADM

## 2021-01-01 RX ORDER — POTASSIUM CHLORIDE 14.9 MG/ML
10 INJECTION INTRAVENOUS
Status: DISCONTINUED | OUTPATIENT
Start: 2021-01-01 | End: 2021-01-01

## 2021-01-01 RX ORDER — LORAZEPAM 2 MG/ML
1 INJECTION INTRAMUSCULAR
Status: DISCONTINUED | OUTPATIENT
Start: 2021-01-01 | End: 2021-01-01 | Stop reason: HOSPADM

## 2021-01-01 RX ORDER — FUROSEMIDE 10 MG/ML
20 INJECTION INTRAMUSCULAR; INTRAVENOUS
Status: COMPLETED | OUTPATIENT
Start: 2021-01-01 | End: 2021-01-01

## 2021-01-01 RX ORDER — FUROSEMIDE 20 MG/1
20 TABLET ORAL DAILY
COMMUNITY
End: 2021-01-01

## 2021-01-01 RX ORDER — ATORVASTATIN CALCIUM 40 MG/1
80 TABLET, FILM COATED ORAL DAILY
Status: DISCONTINUED | OUTPATIENT
Start: 2021-01-01 | End: 2021-01-01 | Stop reason: HOSPADM

## 2021-01-01 RX ORDER — IPRATROPIUM BROMIDE AND ALBUTEROL SULFATE 2.5; .5 MG/3ML; MG/3ML
3 SOLUTION RESPIRATORY (INHALATION)
Status: DISCONTINUED | OUTPATIENT
Start: 2021-01-01 | End: 2021-01-01 | Stop reason: HOSPADM

## 2021-01-01 RX ORDER — MORPHINE SULFATE 2 MG/ML
2 INJECTION, SOLUTION INTRAMUSCULAR; INTRAVENOUS
Status: DISCONTINUED | OUTPATIENT
Start: 2021-01-01 | End: 2021-01-01 | Stop reason: HOSPADM

## 2021-01-01 RX ORDER — ALBUMIN HUMAN 250 G/1000ML
12.5 SOLUTION INTRAVENOUS EVERY 6 HOURS
Status: COMPLETED | OUTPATIENT
Start: 2021-01-01 | End: 2021-01-01

## 2021-01-01 RX ORDER — METOPROLOL TARTRATE 25 MG/1
25 TABLET, FILM COATED ORAL 2 TIMES DAILY
Status: DISCONTINUED | OUTPATIENT
Start: 2021-01-01 | End: 2021-01-01 | Stop reason: HOSPADM

## 2021-01-01 RX ORDER — MORPHINE SULFATE 4 MG/ML
INJECTION, SOLUTION INTRAMUSCULAR; INTRAVENOUS
Status: CANCELLED | OUTPATIENT
Start: 2021-01-01

## 2021-01-01 RX ORDER — SERTRALINE HYDROCHLORIDE 25 MG/1
25 TABLET, FILM COATED ORAL 2 TIMES DAILY
Qty: 60 TAB | Refills: 2 | Status: SHIPPED | OUTPATIENT
Start: 2021-01-01 | End: 2021-01-01

## 2021-01-01 RX ORDER — ONDANSETRON 2 MG/ML
4 INJECTION INTRAMUSCULAR; INTRAVENOUS
Status: DISCONTINUED | OUTPATIENT
Start: 2021-01-01 | End: 2021-01-01 | Stop reason: HOSPADM

## 2021-01-01 RX ORDER — GLYCOPYRROLATE 0.2 MG/ML
0.2 INJECTION INTRAMUSCULAR; INTRAVENOUS
Status: DISCONTINUED | OUTPATIENT
Start: 2021-01-01 | End: 2021-01-01 | Stop reason: HOSPADM

## 2021-01-01 RX ORDER — SERTRALINE HYDROCHLORIDE 50 MG/1
25 TABLET, FILM COATED ORAL DAILY
Status: DISCONTINUED | OUTPATIENT
Start: 2021-01-01 | End: 2021-01-01 | Stop reason: HOSPADM

## 2021-01-01 RX ORDER — MORPHINE SULFATE 4 MG/ML
4 INJECTION, SOLUTION INTRAMUSCULAR; INTRAVENOUS
Status: DISPENSED | OUTPATIENT
Start: 2021-01-01 | End: 2021-01-01

## 2021-01-01 RX ORDER — HEPARIN SODIUM 5000 [USP'U]/ML
60 INJECTION, SOLUTION INTRAVENOUS; SUBCUTANEOUS ONCE
Status: COMPLETED | OUTPATIENT
Start: 2021-01-01 | End: 2021-01-01

## 2021-01-01 RX ADMIN — ASPIRIN 81 MG: 81 TABLET, COATED ORAL at 09:18

## 2021-01-01 RX ADMIN — ALBUMIN (HUMAN) 12.5 G: 0.25 INJECTION, SOLUTION INTRAVENOUS at 13:09

## 2021-01-01 RX ADMIN — LORAZEPAM 1 MG: 2 INJECTION INTRAMUSCULAR; INTRAVENOUS at 11:58

## 2021-01-01 RX ADMIN — METOPROLOL TARTRATE 25 MG: 25 TABLET, FILM COATED ORAL at 18:35

## 2021-01-01 RX ADMIN — MORPHINE SULFATE 2 MG: 2 INJECTION, SOLUTION INTRAMUSCULAR; INTRAVENOUS at 11:58

## 2021-01-01 RX ADMIN — MORPHINE SULFATE 2 MG: 2 INJECTION, SOLUTION INTRAMUSCULAR; INTRAVENOUS at 20:42

## 2021-01-01 RX ADMIN — METOPROLOL TARTRATE 25 MG: 25 TABLET, FILM COATED ORAL at 10:00

## 2021-01-01 RX ADMIN — LORAZEPAM 1 MG: 2 INJECTION INTRAMUSCULAR; INTRAVENOUS at 17:53

## 2021-01-01 RX ADMIN — MORPHINE SULFATE 2 MG: 2 INJECTION, SOLUTION INTRAMUSCULAR; INTRAVENOUS at 07:32

## 2021-01-01 RX ADMIN — POTASSIUM BICARBONATE 40 MEQ: 782 TABLET, EFFERVESCENT ORAL at 13:59

## 2021-01-01 RX ADMIN — LISINOPRIL 10 MG: 10 TABLET ORAL at 09:19

## 2021-01-01 RX ADMIN — SERTRALINE 25 MG: 50 TABLET, FILM COATED ORAL at 08:36

## 2021-01-01 RX ADMIN — ALBUMIN (HUMAN) 12.5 G: 0.25 INJECTION, SOLUTION INTRAVENOUS at 05:46

## 2021-01-01 RX ADMIN — LORAZEPAM 0.26 MG: 2 INJECTION INTRAMUSCULAR; INTRAVENOUS at 00:31

## 2021-01-01 RX ADMIN — LORAZEPAM 1 MG: 2 INJECTION INTRAMUSCULAR; INTRAVENOUS at 12:39

## 2021-01-01 RX ADMIN — HEPARIN SODIUM 3200 UNITS: 5000 INJECTION INTRAVENOUS; SUBCUTANEOUS at 04:13

## 2021-01-01 RX ADMIN — ATORVASTATIN CALCIUM 80 MG: 40 TABLET, FILM COATED ORAL at 09:59

## 2021-01-01 RX ADMIN — ENOXAPARIN SODIUM 40 MG: 40 INJECTION SUBCUTANEOUS at 10:00

## 2021-01-01 RX ADMIN — HEPARIN SODIUM 12 UNITS/KG/HR: 10000 INJECTION, SOLUTION INTRAVENOUS at 07:43

## 2021-01-01 RX ADMIN — IOPAMIDOL 70 ML: 755 INJECTION, SOLUTION INTRAVENOUS at 02:40

## 2021-01-01 RX ADMIN — SODIUM CHLORIDE 250 ML: 9 INJECTION, SOLUTION INTRAVENOUS at 19:43

## 2021-01-01 RX ADMIN — POTASSIUM CHLORIDE 10 MEQ: 7.46 INJECTION, SOLUTION INTRAVENOUS at 03:34

## 2021-01-01 RX ADMIN — ASPIRIN 81 MG: 81 TABLET, COATED ORAL at 08:36

## 2021-01-01 RX ADMIN — FUROSEMIDE 20 MG: 10 INJECTION, SOLUTION INTRAMUSCULAR; INTRAVENOUS at 22:41

## 2021-01-01 RX ADMIN — FUROSEMIDE 20 MG: 10 INJECTION, SOLUTION INTRAMUSCULAR; INTRAVENOUS at 18:35

## 2021-01-01 RX ADMIN — Medication 3 MG: at 03:33

## 2021-01-01 RX ADMIN — MORPHINE SULFATE 2 MG: 2 INJECTION, SOLUTION INTRAMUSCULAR; INTRAVENOUS at 12:39

## 2021-01-01 RX ADMIN — MORPHINE SULFATE 4 MG: 2 INJECTION, SOLUTION INTRAMUSCULAR; INTRAVENOUS at 23:51

## 2021-01-01 RX ADMIN — ATORVASTATIN CALCIUM 80 MG: 40 TABLET, FILM COATED ORAL at 08:36

## 2021-01-01 RX ADMIN — MORPHINE SULFATE 2 MG: 2 INJECTION, SOLUTION INTRAMUSCULAR; INTRAVENOUS at 13:09

## 2021-01-01 RX ADMIN — ENOXAPARIN SODIUM 40 MG: 40 INJECTION SUBCUTANEOUS at 11:34

## 2021-01-01 RX ADMIN — Medication 3 MG: at 21:04

## 2021-01-01 RX ADMIN — LORAZEPAM 0.26 MG: 2 INJECTION INTRAMUSCULAR; INTRAVENOUS at 06:05

## 2021-01-01 RX ADMIN — LORAZEPAM 1 MG: 2 INJECTION INTRAMUSCULAR; INTRAVENOUS at 11:40

## 2021-01-01 RX ADMIN — POTASSIUM CHLORIDE 10 MEQ: 7.46 INJECTION, SOLUTION INTRAVENOUS at 05:59

## 2021-01-01 RX ADMIN — Medication 10 ML: at 05:47

## 2021-01-01 RX ADMIN — Medication 10 ML: at 21:04

## 2021-01-01 RX ADMIN — METOPROLOL TARTRATE 25 MG: 25 TABLET, FILM COATED ORAL at 09:18

## 2021-01-01 RX ADMIN — CLOPIDOGREL BISULFATE 75 MG: 75 TABLET ORAL at 10:00

## 2021-01-01 RX ADMIN — LORAZEPAM 0.5 MG: 2 INJECTION INTRAMUSCULAR; INTRAVENOUS at 10:24

## 2021-01-01 RX ADMIN — MORPHINE SULFATE 4 MG: 2 INJECTION, SOLUTION INTRAMUSCULAR; INTRAVENOUS at 21:41

## 2021-01-01 RX ADMIN — HEPARIN SODIUM 12 UNITS/KG/HR: 10000 INJECTION, SOLUTION INTRAVENOUS at 03:55

## 2021-01-01 RX ADMIN — FUROSEMIDE 20 MG: 10 INJECTION, SOLUTION INTRAMUSCULAR; INTRAVENOUS at 09:20

## 2021-01-01 RX ADMIN — SERTRALINE 25 MG: 50 TABLET, FILM COATED ORAL at 09:19

## 2021-01-01 RX ADMIN — LORAZEPAM 1 MG: 2 INJECTION INTRAMUSCULAR; INTRAVENOUS at 21:40

## 2021-01-01 RX ADMIN — MORPHINE SULFATE 4 MG: 4 INJECTION INTRAVENOUS at 17:53

## 2021-01-01 RX ADMIN — ALBUMIN (HUMAN) 12.5 G: 0.25 INJECTION, SOLUTION INTRAVENOUS at 23:10

## 2021-01-01 RX ADMIN — LORAZEPAM 1 MG: 2 INJECTION INTRAMUSCULAR; INTRAVENOUS at 13:09

## 2021-01-01 RX ADMIN — FUROSEMIDE 20 MG: 10 INJECTION, SOLUTION INTRAMUSCULAR; INTRAVENOUS at 10:00

## 2021-01-01 RX ADMIN — CLOPIDOGREL BISULFATE 75 MG: 75 TABLET ORAL at 09:19

## 2021-01-01 RX ADMIN — ASPIRIN 81 MG: 81 TABLET, COATED ORAL at 09:59

## 2021-01-01 RX ADMIN — Medication 10 ML: at 06:58

## 2021-01-01 RX ADMIN — SERTRALINE 25 MG: 50 TABLET, FILM COATED ORAL at 10:00

## 2021-01-01 RX ADMIN — Medication 3 MG: at 23:12

## 2021-01-01 RX ADMIN — CLOPIDOGREL BISULFATE 75 MG: 75 TABLET ORAL at 08:36

## 2021-01-01 RX ADMIN — Medication 10 ML: at 14:05

## 2021-01-01 RX ADMIN — MORPHINE SULFATE 2 MG: 2 INJECTION, SOLUTION INTRAMUSCULAR; INTRAVENOUS at 15:35

## 2021-01-01 RX ADMIN — LORAZEPAM 1 MG: 2 INJECTION INTRAMUSCULAR; INTRAVENOUS at 15:35

## 2021-01-01 RX ADMIN — MORPHINE SULFATE 2 MG: 2 INJECTION, SOLUTION INTRAMUSCULAR; INTRAVENOUS at 11:40

## 2021-01-01 RX ADMIN — ALBUMIN (HUMAN) 12.5 G: 0.25 INJECTION, SOLUTION INTRAVENOUS at 18:35

## 2021-01-01 RX ADMIN — POTASSIUM BICARBONATE 40 MEQ: 782 TABLET, EFFERVESCENT ORAL at 11:34

## 2021-01-01 RX ADMIN — MORPHINE SULFATE 2 MG: 2 INJECTION, SOLUTION INTRAMUSCULAR; INTRAVENOUS at 11:25

## 2021-01-01 RX ADMIN — LISINOPRIL 10 MG: 10 TABLET ORAL at 10:00

## 2021-01-01 RX ADMIN — ATORVASTATIN CALCIUM 80 MG: 40 TABLET, FILM COATED ORAL at 09:19

## 2021-01-01 RX ADMIN — LORAZEPAM 0.5 MG: 2 INJECTION INTRAMUSCULAR; INTRAVENOUS at 06:57

## 2021-01-01 RX ADMIN — LORAZEPAM 0.5 MG: 2 INJECTION INTRAMUSCULAR; INTRAVENOUS at 22:24

## 2021-01-01 RX ADMIN — LORAZEPAM 1 MG: 2 INJECTION INTRAMUSCULAR; INTRAVENOUS at 23:52

## 2021-01-01 RX ADMIN — ENOXAPARIN SODIUM 40 MG: 40 INJECTION SUBCUTANEOUS at 08:36

## 2021-02-04 PROBLEM — S09.90XA INJURY OF HEAD: Status: ACTIVE | Noted: 2021-01-01
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
## 2021-02-04 PROBLEM — R55 VASOVAGAL EPISODE: Status: ACTIVE | Noted: 2021-01-01

## 2021-02-04 PROBLEM — M25.519 SHOULDER PAIN: Status: ACTIVE | Noted: 2021-01-01

## 2021-02-04 PROBLEM — N39.0 URINARY TRACT INFECTION: Status: ACTIVE | Noted: 2021-01-01

## 2021-02-04 PROBLEM — J67.9 EXTRINSIC ALLERGIC ALVEOLITIS (HCC): Status: ACTIVE | Noted: 2021-01-01

## 2021-02-04 PROBLEM — I95.1 ORTHOSTATIC HYPOTENSION: Status: ACTIVE | Noted: 2021-01-01

## 2021-02-04 NOTE — PROGRESS NOTES
Lindsey Day is a 81 y.o. female    Chief Complaint   Patient presents with   [facility]     new patient       Visit Vitals  /62 (BP 1 Location: Left upper arm, BP Patient Position: Sitting, BP Cuff Size: Small adult)   Pulse 70   Temp 97.9 °F (36.6 °C)   Resp 16   Ht 5' 1\" (1.549 m)   Wt 118 lb (53.5 kg)   LMP  (LMP Unknown)   SpO2 97%   BMI 22.30 kg/m²           1. Have you been to the ER, urgent care clinic since your last visit? Hospitalized since your last visit? No     2. Have you seen or consulted any other health care providers outside of the [address] [place] [address] since your last visit? Include any pap smears or colon screening.  No

## 2021-02-04 NOTE — PROGRESS NOTES
Establish Care (new patient)       HPI:     Eduardo Dennison is a 81y.o. year old female who is here to establish as a new patient, and to discuss current medical concerns. The patient is accompanied by both her daughters today for her evaluation. The patient has a longstanding history of cognitive decline and has been diagnosed with Alzheimer's without behavioral disturbance. She is being seen by primary care and neurology for management of this. Daughter states the patient was previously on Aricept and Namenda, but started having seizures as a result. Eventually, they had to discontinue the medication, and her seizures stopped. Since that time, the patient has been on Zoloft 25 mg nightly for issues with anxiety and some depressive features. They believe the medication has been somewhat helpful, but are concerned because the patient continues to have intermittent anxiety throughout the day, often when left alone for very short periods of time, and states that the patient is only content when somebody is present 24/7. They also state the patient is having worsening short-term memory loss, which they believe have exacerbated her anxiety somewhat. In addition, the patient has had some orthostasis in the past they believe, and subsequently discontinued her lisinopril. Since that time, those episodes have been less frequent, but she has still had some falls without significant injury. They state she fell yesterday, and \"chipped a tooth. \"      The patient lives at home with her son who is usually there full-time, but periodically leaves the home and the patient unattended. There have been some incidences where the patient has used the stove, and not turned it off. Her daughters are concerned about this. They would also like lab work and urine checked today if possible.          Visit Vitals  /62 (BP 1 Location: Left upper arm, BP Patient Position: Sitting, BP Cuff Size: Small adult)   Pulse 70 Temp 97.9 °F (36.6 °C)   Resp 16   Ht 5' 1\" (1.549 m)   Wt 118 lb (53.5 kg)   LMP  (LMP Unknown)   SpO2 97%   BMI 22.30 kg/m²       Past Medical History:   Diagnosis Date    Bilateral dry eyes 2017    Dr. Juan Moeller    Cataract     Bilaterally. Dr. Crys Yan. Dr. Charles Bolden. Dr. Juan Moeller.  Chest pain 10/2015, 2016    Dr. Lucia Duncan. negative Stress Test.    Dementia ([facility] 07/2017    triggered by mild LACHELLE, depression. Dr. Kush Jaime. Dr. Rahda Adams Diverticulosis 09/2008    Dr. Moises Poole. Dr. Suzanna Rouse.  DJD (degenerative joint disease) 09/30/05    cervical, worse C6-7, C7-T1. Left AC joint.  DJD (degenerative joint disease) of knee     bilateral  Dr. Maritza Soulier. Dr. Neal NULL (dyspnea on exertion) 10/2015    Dr. Lucia Duncan.  Essential hypertension, benign 1968    Foster child     GIB (gastrointestinal bleeding) 11/17/14    due to internal hemorrhoids. Dr. Annie Lopez Heart palpitations 11/12/15    due to PVCs. Dr. Yolanda Sharp.  Heartburn     Hypercholesteremia     Hyperglycemia 2014    Internal carotid artery stenosis 2007    bilateral.  Dr. Oleksandr Moreno Internal hemorrhoids 09/2008, 11/2014    Dr. Jona Javier. Dr. Katie Norton.  Knee pain     Left. Dr. Evelyn Escobar.  Plantar wart 2018    Dr. Noni Mayo. R foot    Pulmonic valve insufficiency 10/2015    Mild to Mod. Dr. Lucia Duncan. EF 50-55%    [facility]peripheral vascular [facility]) [facility] 2007    Dr. Nadeem Benjamin    Raynaud's phenomenon 1968    Shortening, leg, congenital     left    Vitamin D deficiency 10/10/10       Past Surgical History:   Procedure Laterality Date    HX ATHERECTOMY Left 09/01/2011    popliteal atherectomy and angioplasty. Dr. Wilmer Rosen Right 2008    benign. Dr. Jose Enrique Barrow.  HX CAROTID ENDARTERECTOMY Right 01/19/2011    ICA. Dr. Constance Rutherford Bilateral 02/22/2016, 10/24/2016    L then R Dr. Juan Moeller.     HX COLONOSCOPY  11/24/14    due to GIB. Dr. Clemente Noriega.  HX COLONOSCOPY  09/05/08    with polypectomy. benign. Dr. Sascha Lemus. due q 10 yrs.  HX GI  1957    FISSURECTOMY.  HX GI  10/03/2008    PROCTOPLASTY due to rectal prolapse    HX HEMORRHOIDECTOMY  10/03/2008    internal and external    HX KNEE REPLACEMENT Left 07/2013    due to Severe OA. Dr. Dickie Goldmann.  HX POLYPECTOMY  09/05/2008    rectal Dr. Hernan Kim       Prior to Admission medications    Medication Sig Start Date End Date Taking? Authorizing Provider   sertraline (ZOLOFT) 25 mg tablet TAKE 1 TAB BY MOUTH DAILY. INDICATIONS: ANXIOUSNESS ASSOCIATED WITH DEPRESSION 3/29/20  Yes Dontae Mendoza, DO   melatonin 1 mg tablet Take 3 Tabs by mouth nightly. 9/23/19  Yes Donny Cristina, DO   Calcium-Cholecalciferol, D3, 600 mg(1,500mg) -400 unit cap Take  by mouth daily. Yes Provider, Historical   erythromycin (ILOTYCIN) ophthalmic ointment Apply 0.5 inch ribbon inside left lower lid twice daily for 7 days. May massage excess ointment into lash line. 2/7/20   Grady Diaz, MARIBELL   lisinopril (PRINIVIL, ZESTRIL) 10 mg tablet TAKE 1 TABLET IN THE MORNING AND TAKE 2 TABLETS IN THE EVENING FOR HYPERTENSION 8/16/19   Cherylene Lips R, DO   aspirin delayed-release 81 mg tablet Take 1 Tab by mouth daily. Indications: myocardial infarction prevention 8/18/17   Cherylene Lips R, DO   mometasone (NASONEX) 50 mcg/actuation nasal spray 2 Sprays by Both Nostrils route daily. Indications: ALLERGIC RHINITIS 8/17/16   Cherylene Lips R, DO   Omega-3 Fatty Acids 300 mg cap Take 1 Cap by mouth daily.     Provider, Historical        Allergies   Allergen Reactions    Penicillins Hives        Social History     Socioeconomic History    Marital status:      Spouse name: Not on file    Number of children: Not on file    Years of education: Not on file    Highest education level: Not on file   Occupational History  Not on file   Social Needs    Financial resource strain: Not on file    Food insecurity     Worry: Not on file     Inability: Not on file    Transportation needs     Medical: Not on file     Non-medical: Not on file   Tobacco Use    Smoking status: Former Smoker     Packs/day: 1.00     Years: 15.00     Pack years: 15.00     Types: Cigarettes     Quit date: 1968     Years since quittin.1    Smokeless tobacco: Never Used   Substance and Sexual Activity    Alcohol use: No     Alcohol/week: 0.0 standard drinks    Drug use: No     Types: Prescription    Sexual activity: Not Currently     Partners: Male     Birth control/protection: Abstinence   Lifestyle    Physical activity     Days per week: Not on file     Minutes per session: Not on file    Stress: Not on file   Relationships    Social connections     Talks on phone: Not on file     Gets together: Not on file     Attends Protestant service: Not on file     Active member of club or organization: Not on file     Attends meetings of clubs or organizations: Not on file     Relationship status: Not on file    Intimate partner violence     Fear of current or ex partner: Not on file     Emotionally abused: Not on file     Physically abused: Not on file     Forced sexual activity: Not on file   Other Topics Concern    Not on file   Social History Narrative    Not on file        ROS:     Constitutional: She denies fevers, weight loss, night sweats. Respiratory: No cough, wheezing, or shortness of breath. Cardiovascular: Denies chest pain, palpitations, unexplained indigestion or syncope episodes. Genitourinary:  She denies increased frequency, urgency, dysuria or nocturia. Extremities: No acute or chronic joint pain. Neurological:  No numbness, tingling, burning paresthesias or loss of motor strength. Skin:  No recent rashes or unexplained bruising. Psychiatric/Behavioral: See HPI.     Hematologic: No easy bruising or bleeding gums  Lymphatic: No lymph node enlargement or night sweats  Endocrine: No increased urination, increased hunger, or increased thirst. No rapid weight change and no night sweats, hot/cold intolerance. Physical Examination:     Vitals:    02/04/21 1514   BP: 130/62   Pulse: 70   Resp: 16   Temp: 97.9 °F (36.6 °C)   SpO2: 97%   Weight: 118 lb (53.5 kg)   Height: 5' 1\" (1.549 m)   PainSc:   0 - No pain        General appearance - Alert, well appearing, and in no distress  Mental status - Alert, oriented to person and self. Able to follow commands. Neck - Supple without thyromegaly or bruit. Lungs -diffuse crackles, prominent to right side of thorax, without wheezes or stridor. Cardiac- Normal rate, regular rhythm with 3/6 systolic murmur. PMI not displaced. No rub or gallop noted. Extremities -  No clubbing cyanosis or edema  Lymphatics - No palpable lymphadenopathy. Skin - No rashes or unusual bruising noted. Neurological - Cranial nerves II-XII grossly intact. Motor strength 3/5. Station and gait guarded. Psychiatric -patient speech has normal rate and rhythm and responds to questions appropriately. The patient's affect is appropriate and mood is appropriate. She is pleasant and cooperative. Assessment/Plan:       ICD-10-CM ICD-9-CM    1. Alzheimer's disease of other onset without behavioral disturbance ([facility]  W31.0 770.7 METABOLIC PANEL, BASIC    A92.20 294.10 URINALYSIS W/MICROSCOPIC      sertraline (ZOLOFT) 25 mg tablet   2. Anxiety  V94.5 082.28 METABOLIC PANEL, BASIC      sertraline (ZOLOFT) 25 mg tablet   3. Abnormal breath sounds  R06.89 786.7 CANCELED: XR CHEST PA LAT   4. Pulmonary fibrosis ([facility]  J84.10 515      1: We will do baseline labs to include basic metabolic panel and urinalysis today. 2: I will increase the patient's Zoloft to 25 mg twice daily. Risks, benefits, and options discussed. 3: Family to work on safety of patient at all times if possible.   May consider using walker for mobility to reduce risk of falls. 4: Patient to follow-up with me in approximately 4 weeks, or sooner as needed. Patient/family state understanding and agree with plan. Orders Placed This Encounter    METABOLIC PANEL, BASIC ([facility])    URINALYSIS W/MICROSCOPIC ([facility])        I have reviewed the patient's medical history in detail and updated the computerized patient record. We had a prolonged discussion about these complex clinical issues and went over the various important aspects to consider. All questions were answered. Advised her to call back or return to office if symptoms do not improve, change in nature, or persist.    She was given an after visit summary or informed of [facility] which includes patient instructions, diagnoses, current medications, & vitals. She expressed understanding with the diagnosis and plan. Signed,   Adilene Johns.  Evie Dean, MSN APRN FNP-BC

## 2021-02-05 NOTE — PROGRESS NOTES
Electrolytes are okay overall, and kidney functions look okay. However, patient shows to be mildly dehydrated. I would suggest increasing water intake a bit more. There are some contaminants in the urine that are likely just normal vaginal magdalena. We have sent this out for culture and sensitivity. No infection at this time.

## 2021-05-05 NOTE — TELEPHONE ENCOUNTER
Patient requesting refill of sertraline 25 mg twice daily. Prescription sent to pharmacy as requested.

## 2021-05-11 NOTE — TELEPHONE ENCOUNTER
Maxine Moreno is calling from [facility], she is a coordinator there and would like to acquire an order for a wheelchair for Ms. Shannon Madison due ongoing weakness in her legs. Fax order to [phone] addressed to clinical intake with an office from next visit on 05/24/21.

## 2021-05-15 PROBLEM — J96.21 ACUTE ON CHRONIC RESPIRATORY FAILURE WITH HYPOXIA (HCC): Status: ACTIVE | Noted: 2021-01-01

## 2021-05-16 NOTE — PROGRESS NOTES
Critical Care Documentation Name: Amilcar Jackson YOB: 1935 MRN: [id number] Admission Date: 5/15/2021  8:12 PM 
 
Date of service: 5/16/2021 Active Diagnoses: 
 
Hospital Problems  Date Reviewed: 5/16/2021 Codes Class Noted POA * (Principal) Acute on chronic respiratory failure with hypoxia (HCC) ICD-10-CM: J96.21 
ICD-9-CM: 518.84, 799.02  5/15/2021 Yes Critical Illness Complaint: Hypotension Clinical Presentation: 
Rapid response called 1919 for hypotension, 76/44, confirmed on repeat check. Patient is generally asymptomatic, denies chest pain, nausea, vomiting, diaphoresis, syncope. She is baseline dyspneic, RR 28 on HFNC - no change in this compared to earlier in day. Admitted with CHF exacerbation, acute on chronic respiratory failure. Evaluated by cardiology, suspected pulmonary edema with underlying pulmonary fibrosis. Patient is being diuresed. Evening BP medications have been held. Physical Exam 
Vitals signs and nursing note reviewed. Constitutional:   
   General: She is not in acute distress. Appearance: She is not ill-appearing or diaphoretic. HENT:  
   Head: Normocephalic and atraumatic. Cardiovascular:  
   Rate and Rhythm: Normal rate and regular rhythm. Pulses: Normal pulses. Heart sounds: Normal heart sounds. No murmur. Pulmonary:  
   Breath sounds: No stridor. No wheezing or rhonchi. Comments: Increased work of breathing, tachypnea Abdominal:  
   General: Bowel sounds are normal.  
   Palpations: Abdomen is soft. Skin: 
   General: Skin is warm and dry. Capillary Refill: Capillary refill takes 2 to 3 seconds. Neurological:  
   Mental Status: She is alert and oriented to person, place, and time. Data Reviewed: All diagnostic labs and studies have been reviewed. Medications/Therapies Administered: Anxiolytics: [  ] yes [  ] no Antiarrhythmics: [  ] yes [  ] no Antihypertensives: [  ] yes [  ] no IVFs: [  ] yes [  ] no Blood Transfusion: [  ] yes [  ] no Imaging Ordered and Reviewed:  
[  ] CXR   
[  ] CT Scan   
[  ] MRI   
[  ] Ultrasound Prognosis / Plan of Care Discussed With: 
[ x ] Patient [  ] Family Members / Surrogate Decision-makers (patient unable / incompetent to give history and/or make treatment decisions, and such discussion is medically necessary) [ x ] Nursing Staff [  ] Specialist Physicians Assessment and Plan: Hypotension, asymptomatic - Blood pressure 76/44, no acute complaint at this time. Baseline BP around 100/50 
- Will give a gentle fluid bolus, concern for worsening overload. - Suspect may be secondary to IV diuresis, will check lab panel for alternative sources: CBC, CMP, Mag, Phos, Coags. - Further management per response to IVF and lab results Critical Care Attestation: This patient is unstable and critically ill. Due to a high probability of clinically significant, life threatening deterioration, the patient required my highest level of preparedness to intervene emergently and I personally spent this critical care time directly and personally managing the patient, and was immediately available to the patient. This critical care time included obtaining a history; examining the patient; pulse oximetry; ordering and review of labs/studies; arranging urgent treatment with development of a management plan; evaluation of patient's response to treatment; frequent reassessment; and, discussions with other providers and/or family. This critical care time was performed to assess and manage the high probability of imminent, life-threatening deterioration that could result in multi-organ failure and death. Time Spent:  
 
I personally spent 16 minutes in providing critical care time. It was exclusive of separately billable procedures, treating other patients, and teaching time. Heather Mosqueda NP 
5/16/2021 
7:30 PM

## 2021-05-16 NOTE — ED TRIAGE NOTES
EMS reports they were called for SOB. EMS reports the pt is normally on 2L and upon their arrival; the pt was bumped up to 6L with an O2 sat of 70%. EMS reports they placed the pt on a nonrebreather at 15L and her O2 sat went to 15L. EMS reports the pt has a Hx of pulmonary fibrosis.

## 2021-05-16 NOTE — ROUTINE PROCESS
TRANSFER - OUT REPORT: 
 
Verbal report given to Emeka Van RN(name) on Nomi Degree  being transferred to (unit) for routine progression of care Report consisted of patients Situation, Background, Assessment and  
Recommendations(SBAR). Information from the following report(s) SBAR, ED Summary, [facility] and Recent Results was reviewed with the receiving nurse. Lines:  
Peripheral IV 05/15/21 Right Antecubital (Active) Site Assessment Clean, dry, & intact 05/15/21 2336 Alcohol Cap Used No 05/15/21 2336 Opportunity for questions and clarification was provided. Patient transported with: 
 Monitor Registered Nurse RT 
 
 
 
 

never had flu vaccine

## 2021-05-16 NOTE — CONSULTS
Kathie [facility] DIVISION of CARDIOLOGY CONSULT NOTE Kvng Olson M.D., .A.C.C. 
[phone] NAME: Juan Carlos Bass :  1935 MRN:  [id number] Date/Time:  :13 AM 
 
 
________________________________________________________________________ HPI   
71-year-old woman with a past medical history significant for pulmonary fibrosis, on 2 L of oxygen at home around the clock; Alzheimer's dementia; dyslipidemia; hypertension; coronary artery disease, status post stent placement, was in her usual state of health until the day of her presentation at the emergency room when the patient developed sudden worsening of her shortness of breath The patient has undergone pci in  at [facility] for MI( no records available at this time) She has h/o CEA in  Assessment/Plan 1. Shortness of breath: I suspect the shortness of breath is related to a combination of her pulmonary fibrosis on which there is superimposed pulmonary edema. Continue oxygen as per primary team. 
 
IV diuresis at this time. Proceed with echocardiogram.  I suspect the patient may have some reduction in her ejection fraction given also recent history of myocardial infarction. For now if possible continue with lisinopril. Consider changing metoprolol to carvedilol depending on results of echocardiogram. 
 
 
2.  CAD: Status post PCI of unknown vessel at [facility] for myocardial infarction in 2021. Continue aspirin Plavix and Lipitor for now. Troponin elevation is minimal and \"flat\". Her electrocardiogram reveals. Normal sinus rhythm PVCs and inferior and anterior T wave abnormality suggestive of ischemia but there is no recent EKG to compare with. Records from [facility] not available at this time. Nonetheless I do not think this is an acute ischemic event but more likely an episode of decompensated heart failure with underlying pulmonary fibrosis.  
 
Patient is on IV heparin at this time as per primary team and she will course be continued on aspirin and Plavix. I do suspect though that we may be able to stop the IV heparin. No pulmonary embolism noted by chest CT. 3.  Pulmonary pulmonary fibrosis: Continue supportive therapy. As per primary team. 
 
4.  Alzheimer dementia: Continue supportive therapy as well. 5.  Hyperlipidemia: Continue Lipitor. ICD-10-CM ICD-9-CM 1. Hypoxia  R09.02 799.02   
2. Acute respiratory distress  R06.03 518.82   
3. Pulmonary fibrosis ([address]  J84.10 515   
4. Acute congestive heart failure, unspecified heart failure type (HCC)  I50.9 428.0 CARDIAC STUDIES 
 
 
   
 
 
  
 
 
  
 
 
EKG: normal sinus rhythm, AT and IT and pvcs. IMAGING 
 
 
CT Results (most recent): 
Results from Hospital Encounter encounter on 09/23/19 CT ABD PELV WO CONT Narrative EXAM: CT ABD PELV WO CONT INDICATION: Abdominal pain for 2 weeks. Normal white blood cell count. Normal 
liver enzymes. COMPARISON: None. CONTRAST:  None. TECHNIQUE:  
Thin axial images were obtained through the abdomen and pelvis. Coronal and 
sagittal reconstructions were generated. Oral contrast was not administered. CT 
dose reduction was achieved through use of a standardized protocol tailored for 
this examination and automatic exposure control for dose modulation. Adaptive statistical iterative reconstruction (ASIR) was utilized. The absence of intravenous contrast material reduces the sensitivity for 
evaluation of the solid parenchymal organs of the abdomen. FINDINGS:  
LUNG BASES: Mild chronic interstitial lung disease. Minimal pulmonary fibrosis. INCIDENTALLY IMAGED HEART AND MEDIASTINUM: Unremarkable. LIVER: No mass or biliary dilatation. GALLBLADDER: Unremarkable. SPLEEN: Normal size. PANCREAS: No mass or ductal dilatation. ADRENALS: Unremarkable. KIDNEYS/URETERS: No nephrolithiasis or hydronephrosis.  Renal vascular calcifications. STOMACH: Nondistended. SMALL BOWEL: No dilatation or wall thickening. COLON: Diverticulosis. No inflammation or obstruction. APPENDIX: Unremarkable. PERITONEUM: No ascites or pneumoperitoneum. RETROPERITONEUM: No lymphadenopathy or aortic aneurysm. REPRODUCTIVE ORGANS: Uterus is not enlarged. URINARY BLADDER: No mass or calculus. BONES: No aggressive bone lesion. Lumbar spine degenerative disc disease. ADDITIONAL COMMENTS: Mild atrophy. Impression IMPRESSION: 
 
1. No acute process on CT. 2. Diverticulosis of the colon. No diverticulitis. 3. Mild chronic interstitial lung disease and minimal pulmonary fibrosis. 4. No nephrolithiasis or hydronephrosis. XR Results (most recent): 
Results from [facility] Encounter encounter on 05/15/21 XR CHEST PORT Narrative Clinical indication: Shortness of breath. Portable AP semiupright view of the chest obtained, comparison September 21, 2019. Diffuse acute interstitial edema which shallow inspiration heart size is 
stable, Impression  impression: Diffuse interstitial edema. MRI Results (most recent): No results found for this or any previous visit. Subjective: CHIEF COMPLAINT:  \"sob\" Pertinent items are noted in HPI. HISTORY OF PRESENT ILLNESS:    
Radha Mcqueen is a 81 y.o. female whom presents with complaint noted above. We were asked to evaluation for the above problems. Janice Peters Past Medical History:  
Diagnosis Date  Bilateral dry eyes 2017 Dr. Sergio Fried  Cataract Bilaterally. Dr. Wolf Garcia. Dr. Sudheer Alvarado. Dr. Sergio Fried.  Chest pain 10/2015, 2016 Dr. Mary Kay Napoles. negative Stress Test.  
 Dementia ([facility] [address] 07/2017  
 triggered by mild LACHELLE, depression. Dr. Cutler See. Dr. Jose Tovar  Diverticulosis 09/2008 Dr. Carlton Vázquez. Dr. Alecia Rai.  DJD (degenerative joint disease) 09/30/05  
 cervical, worse C6-7, C7-T1. Left AC joint.   
 DJD (degenerative joint disease) of knee   
 bilateral  Dr. Delaney Bloodgood. Dr. Sara Salguero  NULL (dyspnea on exertion) 10/2015 Dr. Shannon Platt.  Essential hypertension, benign   Foster child  GIB (gastrointestinal bleeding) 14  
 due to internal hemorrhoids. Dr. Brynn Salter  Heart palpitations 11/12/15  
 due to PVCs. Dr. Preet Bloom.  Heartburn  Hypercholesteremia  Hyperglycemia  Demetrio Sinclair Internal carotid artery stenosis 2007  
 bilateral.  Dr. Stiven Sinclair Internal hemorrhoids 2008, 2014 Dr. Leslie Cornelius. Dr. Vlad Monroy.  Knee pain Left. Dr. Fidelia Pace.  Plantar wart 2018 Dr. Ted Hairston. 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 Dr. Shannon Platt. EF 50-55%  PVD (peripheral vascular disease) ([facility] [address]  Dr. Rc Banks  Raynaud's phenomenon [id number]  Shortening, leg, congenital   
 left  Vitamin D deficiency 10/10/10 Past Surgical History:  
Procedure Laterality Date  HX ATHERECTOMY Left 2011  
 popliteal atherectomy and angioplasty. Dr. Rc Banks  HX BREAST LUMPECTOMY Right   
 benign. Dr. Trae Mancilla.  HX CAROTID ENDARTERECTOMY Right 2011 ICA. Dr. Rc Banks  HX CATARACT REMOVAL Bilateral 2016, 10/24/2016 L then R Dr. Axel Gupta.  HX COLONOSCOPY  14  
 due to GIB. Dr. Vlad Monroy.  HX COLONOSCOPY  08  
 with polypectomy. benign. Dr. Gabriele Suarez. due q 10 yrs. [facility] FISSURECTOMY.  HX GI  10/03/2008 PROCTOPLASTY due to rectal prolapse  HX HEMORRHOIDECTOMY  10/03/2008  
 internal and external  
 HX KNEE REPLACEMENT Left 2013  
 due to Severe OA. Dr. Rolando Figueroa.  HX POLYPECTOMY  2008 rectal Dr. Leslie Cornelius Social History Tobacco Use  Smoking status: Former Smoker Packs/day: 1.00 Years: 15.00 Pack years: 15.00 Types: Cigarettes Quit date: 1968 Years since quittin.4  Smokeless tobacco: Never Used Substance Use Topics  Alcohol use: No  
  Alcohol/week: 0.0 standard drinks Family History Problem Relation Age of Onset  Heart Attack Mother 79  
 Heart Attack Sister   
     x 3 sisters  Heart Attack Brother   
     x 3 brothers Allergies Allergen Reactions  Penicillins Hives Current Facility-Administered Medications Medication Dose Route Frequency  aspirin delayed-release tablet 81 mg  81 mg Oral DAILY  atorvastatin (LIPITOR) tablet 80 mg  80 mg Oral DAILY  clopidogreL (PLAVIX) tablet 75 mg  75 mg Oral DAILY  furosemide (LASIX) injection 20 mg  20 mg IntraVENous DAILY  lisinopriL (PRINIVIL, ZESTRIL) tablet 10 mg  10 mg Oral DAILY  melatonin tablet 3 mg  3 mg Oral QHS  metoprolol tartrate (LOPRESSOR) tablet 25 mg  25 mg Oral BID  sertraline (ZOLOFT) tablet 25 mg  25 mg Oral DAILY  sodium chloride (NS) flush 5-40 mL  5-40 mL IntraVENous Q8H  
 sodium chloride (NS) flush 5-40 mL  5-40 mL IntraVENous PRN  
 acetaminophen (TYLENOL) tablet 650 mg  650 mg Oral Q6H PRN Or  
 acetaminophen (TYLENOL) suppository 650 mg  650 mg Rectal Q6H PRN  polyethylene glycol (MIRALAX) packet 17 g  17 g Oral DAILY PRN  promethazine (PHENERGAN) tablet 12.5 mg  12.5 mg Oral Q6H PRN Or  
 ondansetron (ZOFRAN) injection 4 mg  4 mg IntraVENous Q6H PRN  
 heparin 25,000 units in D5W 250 ml infusion  12-25 Units/kg/hr IntraVENous TITRATE Prior to Admission medications Medication Sig Start Date End Date Taking? Authorizing Provider  
atorvastatin (LIPITOR) 80 mg tablet Take 80 mg by mouth daily. Yes Yuridia, MD Carol  
metoprolol tartrate (LOPRESSOR) 25 mg tablet Take 25 mg by mouth two (2) times a day. Yes Yuridia, MD Carol  
furosemide (LASIX) 20 mg tablet Take 20 mg by mouth daily. Yes Carol hSi MD  
clopidogreL (PLAVIX) 75 mg tab Take 75 mg by mouth.    Yes Carol Shi MD  
melatonin 1 mg tablet Take 3 Tabs by mouth nightly. 9/23/19  Yes Donny Cristina, DO  
aspirin delayed-release 81 mg tablet Take 1 Tab by mouth daily. Indications: myocardial infarction prevention 8/18/17  Yes Dontae Zamoar DO  
sertraline (ZOLOFT) 25 mg tablet Take 1 Tab by mouth two (2) times a day. Indications: anxiousness associated with depression 5/5/21   Emily LÓPEZ NP  
erythromycin (ILOTYCIN) ophthalmic ointment Apply 0.5 inch ribbon inside left lower lid twice daily for 7 days. May massage excess ointment into lash line. 2/7/20   Michele Harris NP  
lisinopril (PRINIVIL, ZESTRIL) 10 mg tablet TAKE 1 TABLET IN THE MORNING AND TAKE 2 TABLETS IN THE EVENING FOR HYPERTENSION 8/16/19   Jess BRIAN, DO  
mometasone (NASONEX) 50 mcg/actuation nasal spray 2 Sprays by Both Nostrils route daily. Indications: ALLERGIC RHINITIS 8/17/16   Jess BRIAN DO Omega-3 Fatty Acids 300 mg cap Take 1 Cap by mouth daily. Provider, Historical  
Calcium-Cholecalciferol, D3, 600 mg(1,500mg) -400 unit cap Take  by mouth daily. Provider, Historical  
 
 
 
 
 
  
Objective: VITALS:   
 
Patient Vitals for the past 12 hrs: 
 Temp Pulse Resp BP SpO2  
05/16/21 0237 97.3 °F (36.3 °C) 91 23 (!) 105/44 92 % 05/16/21 0225     94 % 05/16/21 0200  77 23 100/80 94 % 05/16/21 0134 97.5 °F (36.4 °C) 81 19 (!) 109/58 95 % 05/15/21 2354     93 % 05/15/21 2300  83 30 110/68 (!) 88 % 05/15/21 2230  78 22 (!) 100/59 90 % 05/15/21 2200  82 (!) 35 (!) 107/54 94 % 05/15/21 2130  78 (!) 39 109/63 90 % 05/15/21 2100  78 (!) 39 104/60 (!) 89 % 05/15/21 2009 97.3 °F (36.3 °C) 83 25 106/70 91 % Visit Vitals BP (!) 105/44 (BP 1 Location: Right upper arm) Pulse 91 Temp 97.3 °F (36.3 °C) Resp 23 Wt 115 lb 11.2 oz (52.5 kg) LMP  (LMP Unknown) SpO2 92% BMI 21.86 kg/m² Extended / Orthostatic Vitals:  
   
 
Wt Readings from Last 3 Encounters:  
05/16/21 115 lb 11.2 oz (52.5 kg) 02/04/21 118 lb (53.5 kg) 09/03/20 119 lb 6.4 oz (54.2 kg) No intake or output data in the 24 hours ending 05/16/21 0713 Neck: no JVD Heart: regular rate and rhythm Lungs: diminished breath sounds R apex, R base, L apex, L base Abdomen: soft, non-tender. Bowel sounds normal. No masses,  no organomegaly Extremities: no edema LAB DATA REVIEWED:   
 
All Cardiac Markers in the last 24 hours:   
Lab Results Component Value Date/Time TROIQ 0.07 (H) 05/16/2021 03:07 AM  
 TROIQ 0.09 (H) 05/15/2021 08:31 PM  
 BNPNT 10,435 (H) 05/15/2021 08:31 PM  
 
 
 
Recent Results (from the past 24 hour(s)) EKG, 12 LEAD, INITIAL Collection Time: 05/15/21  8:19 PM  
Result Value Ref Range Ventricular Rate 79 BPM  
 Atrial Rate 78 BPM  
 P-R Interval 134 ms QRS Duration 90 ms Q-T Interval 408 ms QTC Calculation (Bezet) 467 ms Calculated P Axis 31 degrees Calculated R Axis -40 degrees Calculated T Axis -29 degrees Diagnosis Undetermined rhythm Left axis deviation T wave abnormality, consider anterior ischemia When compared with ECG of 23-SEP-2019 13:47, 
Current undetermined rhythm precludes rhythm comparison, needs review T wave inversion now evident in Inferior leads T wave inversion now evident in Anterior leads METABOLIC PANEL, COMPREHENSIVE Collection Time: 05/15/21  8:31 PM  
Result Value Ref Range Sodium 131 (L) 136 - 145 mmol/L Potassium 3.2 (L) 3.5 - 5.1 mmol/L Chloride 97 97 - 108 mmol/L  
 CO2 22 21 - 32 mmol/L Anion gap 12 5 - 15 mmol/L Glucose 79 65 - 100 mg/dL BUN 18 6 - 20 MG/DL Creatinine 0.62 0.55 - 1.02 MG/DL  
 BUN/Creatinine ratio 29 (H) 12 - 20 GFR est AA >60 >60 ml/min/1.73m2 GFR est non-AA >60 >60 ml/min/1.73m2 Calcium 8.8 8.5 - 10.1 MG/DL Bilirubin, total 1.0 0.2 - 1.0 MG/DL  
 ALT (SGPT) 26 12 - 78 U/L  
 AST (SGOT) 38 (H) 15 - 37 U/L Alk. phosphatase 120 (H) 45 - 117 U/L  Protein, total 6.7 6.4 - 8.2 g/dL Albumin 2.4 (L) 3.5 - 5.0 g/dL Globulin 4.3 (H) 2.0 - 4.0 g/dL A-G Ratio 0.6 (L) 1.1 - 2.2    
CBC WITH AUTOMATED DIFF Collection Time: 05/15/21  8:31 PM  
Result Value Ref Range WBC 8.2 3.6 - 11.0 K/uL  
 RBC 3.66 (L) 3.80 - 5.20 M/uL  
 HGB 11.2 (L) 11.5 - 16.0 g/dL HCT 34.1 (L) 35.0 - 47.0 % MCV 93.2 80.0 - 99.0 FL  
 MCH 30.6 26.0 - 34.0 PG  
 MCHC 32.8 30.0 - 36.5 g/dL  
 RDW 17.2 (H) 11.5 - 14.5 % PLATELET 006 645 - 476 K/uL MPV 9.4 8.9 - 12.9 FL  
 NRBC 0.0 0  WBC ABSOLUTE NRBC 0.00 0.00 - 0.01 K/uL NEUTROPHILS 74 32 - 75 % LYMPHOCYTES 15 12 - 49 % MONOCYTES 7 5 - 13 % EOSINOPHILS 2 0 - 7 % BASOPHILS 1 0 - 1 % IMMATURE GRANULOCYTES 1 (H) 0.0 - 0.5 % ABS. NEUTROPHILS 6.1 1.8 - 8.0 K/UL  
 ABS. LYMPHOCYTES 1.2 0.8 - 3.5 K/UL  
 ABS. MONOCYTES 0.6 0.0 - 1.0 K/UL  
 ABS. EOSINOPHILS 0.2 0.0 - 0.4 K/UL  
 ABS. BASOPHILS 0.0 0.0 - 0.1 K/UL  
 ABS. IMM. GRANS. 0.1 (H) 0.00 - 0.04 K/UL  
 DF AUTOMATED    
SAMPLES BEING HELD Collection Time: 05/15/21  8:31 PM  
Result Value Ref Range SAMPLES BEING HELD 1red, 1blu, 1bc (1 silver) COMMENT Add-on orders for these samples will be processed based on acceptable specimen integrity and analyte stability, which may vary by analyte. TROPONIN I Collection Time: 05/15/21  8:31 PM  
Result Value Ref Range Troponin-I, Qt. 0.09 (H) <0.05 ng/mL NT-PRO BNP Collection Time: 05/15/21  8:31 PM  
Result Value Ref Range NT pro-BNP 10,435 (H) <450 PG/ML  
BLOOD GAS, ARTERIAL Collection Time: 05/15/21  9:29 PM  
Result Value Ref Range pH 7.46 (H) 7.35 - 7.45    
 PCO2 32 (L) 35 - 45 mmHg PO2 77 (L) 80 - 100 mmHg O2 SAT 96 92 - 97 % BICARBONATE 22 22 - 26 mmol/L  
 BASE DEFICIT 0.6 mmol/L  
 O2 METHOD NONREBREATHER MASK    
 O2 FLOW RATE 15.00 L/min Sample source ARTERIAL    
 SITE RIGHT BRACHIAL ZO'S TEST NOT APPLICABLE METABOLIC PANEL, COMPREHENSIVE  
 Collection Time: 05/16/21  3:07 AM  
Result Value Ref Range Sodium 132 (L) 136 - 145 mmol/L Potassium 2.8 (L) 3.5 - 5.1 mmol/L Chloride 96 (L) 97 - 108 mmol/L  
 CO2 23 21 - 32 mmol/L Anion gap 13 5 - 15 mmol/L Glucose 74 65 - 100 mg/dL BUN 16 6 - 20 MG/DL Creatinine 0.56 0.55 - 1.02 MG/DL  
 BUN/Creatinine ratio 29 (H) 12 - 20 GFR est AA >60 >60 ml/min/1.73m2 GFR est non-AA >60 >60 ml/min/1.73m2 Calcium 8.7 8.5 - 10.1 MG/DL Bilirubin, total 1.1 (H) 0.2 - 1.0 MG/DL  
 ALT (SGPT) 26 12 - 78 U/L  
 AST (SGOT) 36 15 - 37 U/L Alk. phosphatase 122 (H) 45 - 117 U/L Protein, total 6.7 6.4 - 8.2 g/dL Albumin 2.3 (L) 3.5 - 5.0 g/dL Globulin 4.4 (H) 2.0 - 4.0 g/dL A-G Ratio 0.5 (L) 1.1 - 2.2    
CBC WITH AUTOMATED DIFF Collection Time: 05/16/21  3:07 AM  
Result Value Ref Range WBC 7.2 3.6 - 11.0 K/uL  
 RBC 3.86 3.80 - 5.20 M/uL  
 HGB 11.8 11.5 - 16.0 g/dL HCT 36.2 35.0 - 47.0 % MCV 93.8 80.0 - 99.0 FL  
 MCH 30.6 26.0 - 34.0 PG  
 MCHC 32.6 30.0 - 36.5 g/dL  
 RDW 17.2 (H) 11.5 - 14.5 % PLATELET 309 568 - 565 K/uL MPV 9.1 8.9 - 12.9 FL  
 NRBC 0.0 0  WBC ABSOLUTE NRBC 0.00 0.00 - 0.01 K/uL NEUTROPHILS 78 (H) 32 - 75 % LYMPHOCYTES 12 12 - 49 % MONOCYTES 6 5 - 13 % EOSINOPHILS 3 0 - 7 % BASOPHILS 0 0 - 1 % IMMATURE GRANULOCYTES 1 (H) 0.0 - 0.5 % ABS. NEUTROPHILS 5.7 1.8 - 8.0 K/UL  
 ABS. LYMPHOCYTES 0.8 0.8 - 3.5 K/UL  
 ABS. MONOCYTES 0.4 0.0 - 1.0 K/UL  
 ABS. EOSINOPHILS 0.2 0.0 - 0.4 K/UL  
 ABS. BASOPHILS 0.0 0.0 - 0.1 K/UL  
 ABS. IMM. GRANS. 0.0 0.00 - 0.04 K/UL  
 DF AUTOMATED    
TSH 3RD GENERATION Collection Time: 05/16/21  3:07 AM  
Result Value Ref Range TSH 0.88 0.36 - 3.74 uIU/mL MAGNESIUM Collection Time: 05/16/21  3:07 AM  
Result Value Ref Range Magnesium 1.9 1.6 - 2.4 mg/dL PHOSPHORUS Collection Time: 05/16/21  3:07 AM  
Result Value Ref Range  Phosphorus 3.6 2.6 - 4.7 MG/DL  
TROPONIN I Collection Time: 05/16/21  3:07 AM  
Result Value Ref Range Troponin-I, Qt. 0.07 (H) <0.05 ng/mL PTT Collection Time: 05/16/21  3:07 AM  
Result Value Ref Range aPTT 27.9 22.1 - 31.0 sec  
 aPTT, therapeutic range     58.0 - 77.0 SECS Lab Results Component Value Date/Time Cholesterol, total 203 (H) 09/13/2019 08:33 AM  
 HDL Cholesterol 80 09/13/2019 08:33 AM  
 LDL, calculated 112 (H) 09/13/2019 08:33 AM  
 VLDL, calculated 11 09/13/2019 08:33 AM  
 Triglyceride 55 09/13/2019 08:33 AM  
 CHOL/HDL Ratio 2.8 05/05/2010 08:45 AM  
 
 
 
 
 
Procedures: see electronic medical records for all procedures/Xrays and details which were not copied into this note but were reviewed prior to creation of Plan. Please note that this dictation was completed with [employer], the [employer] voice recognition software. Quite often unanticipated grammatical, syntax, homophones, and other interpretive errors are inadvertently transcribed by the computer software. Please disregard these errors. Please excuse any errors that have escaped final proofreading. 
 
 
________________________________________________________________________ Care Plan discussed with: 
Patient x Family RN Care Manager Consultant/Specialist:    
________________________ 
 
______________________________________________ Signed By: Luda Addison MD   
 May 16, 2021

## 2021-05-16 NOTE — ED NOTES
Pt had a moment of anxiety after her daughters left the room. Pt pulled off her mask and O2 saturation dropped to 76%. Pt sat up and non rebreather placed back on pts face. Pt O2 sat is now 88%. Attending notified and an order for high flow was obtained.

## 2021-05-16 NOTE — ED PROVIDER NOTES
HPI  
 
Please note that this dictation was completed with [employer], the computer voice recognition software. Quite often unanticipated grammatical, syntax, homophones, and other interpretive errors are inadvertently transcribed by the computer software. Please disregard these errors. Please excuse any errors that have escaped final proofreading. 69-year-old female with a history of recent STEMI in April at [facility]. Requiring stent, dementia, diverticulosis, DJD, pulmonary fibrosis on 2 L of oxygen at home, hypercholesterolemia, hyperglycemia and many others arrives with hypoxia worsening today compared to baseline. Patient increased her oxygen to 6 L per her pulmonologist team today. She continued to have increasing labored breathing today. History not obtainable from the patient due to dementia. Patient cannot recall why she is in the emergency department. The daughter provides the history. Patient is on Plavix. Dnr/dni per daughter at bedside. Last cardiology: [facility]. Previous to that was Dr. Celestino Terry. Past Medical History:  
Diagnosis Date  Bilateral dry eyes 2017 Dr. Amilcar Nino  Cataract Bilaterally. Dr. Edi Roblero. Dr. Severo Sensor. Dr. Amilcar Nino.  Chest pain 10/2015, 2016 Dr. Celestino Terry. negative Stress Test.  
 Dementia ([address] 07/2017  
 triggered by mild LACHELLE, depression. Dr. Nazia Gardner. Dr. Catarino Zhang  Diverticulosis 09/2008 Dr. Levar Crowe. Dr. Denzel Atwood.  DJD (degenerative joint disease) 09/30/05  
 cervical, worse C6-7, C7-T1. Left AC joint.  DJD (degenerative joint disease) of knee   
 bilateral  Dr. Cyn Thakur. Dr. Ana NULL (dyspnea on exertion) 10/2015 Dr. Celestino Terry.  Essential hypertension, benign 1968  Foster child  GIB (gastrointestinal bleeding) 11/17/14  
 due to internal hemorrhoids. Dr. Judd Chan  Heart palpitations 11/12/15  
 due to PVCs. Dr. Donna Childers.   
 Heartburn  Hypercholesteremia  Hyperglycemia 2014 Sonya Winter Internal carotid artery stenosis 2007  
 bilateral.  Dr. Goldsmith Shoulder Sonya Winter Internal hemorrhoids 09/2008, 11/2014 Dr. Promise Nice. Dr. Tamera Galindo.  Knee pain Left. Dr. Yaa Borrego.  Plantar wart 2018 Dr. Wicho Parnell. 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 Dr. Marni Crump. EF 50-55%  PVD (peripheral vascular disease) ([address] 2007 Dr. Chisholm Parents  Raynaud's phenomenon [id number]  Shortening, leg, congenital   
 left  Vitamin D deficiency 10/10/10 Past Surgical History:  
Procedure Laterality Date  HX ATHERECTOMY Left 09/01/2011  
 popliteal atherectomy and angioplasty. Dr. Chisholm Parents  HX BREAST LUMPECTOMY Right 2008  
 benign. Dr. Montse Herrera.  HX CAROTID ENDARTERECTOMY Right 01/19/2011 ICA. Dr. Chisholm Parents  HX CATARACT REMOVAL Bilateral 02/22/2016, 10/24/2016 L then R Dr. Lorella Libman.  HX COLONOSCOPY  11/24/14  
 due to GIB. Dr. Tamera Galindo.  HX COLONOSCOPY  09/05/08  
 with polypectomy. benign. Dr. Zhang Walker. due q 10 yrs. [address] [facility] FISSURECTOMY.  HX GI  10/03/2008 PROCTOPLASTY due to rectal prolapse  HX HEMORRHOIDECTOMY  10/03/2008  
 internal and external  
 HX KNEE REPLACEMENT Left 07/2013  
 due to Severe OA. Dr. Rea hWite.  HX POLYPECTOMY  09/05/2008 rectal Dr. Promise Nice Family History:  
Problem Relation Age of Onset  Heart Attack Mother 79  
 Heart Attack Sister   
     x 3 sisters  Heart Attack Brother   
     x 3 brothers Social History Socioeconomic History  Marital status:  Spouse name: Not on file  Number of children: Not on file  Years of education: Not on file  Highest education level: Not on file Occupational History  Not on file Social Needs  Financial resource strain: Not on file  Food insecurity Worry: Not on file Inability: Not on file  Transportation needs Medical: Not on file Non-medical: Not on file Tobacco Use  Smoking status: Former Smoker Packs/day: 1.00 Years: 15.00 Pack years: 15.00 Types: Cigarettes Quit date: 1968 Years since quittin.4  Smokeless tobacco: Never Used Substance and Sexual Activity  Alcohol use: No  
  Alcohol/week: 0.0 standard drinks  Drug use: No  
  Types: Prescription  Sexual activity: Not Currently Partners: Male Birth control/protection: Abstinence Lifestyle  Physical activity Days per week: Not on file Minutes per session: Not on file  Stress: Not on file Relationships  Social connections Talks on phone: Not on file Gets together: Not on file Attends Christian service: Not on file Active member of club or organization: Not on file Attends meetings of clubs or organizations: Not on file Relationship status: Not on file  Intimate partner violence Fear of current or ex partner: Not on file Emotionally abused: Not on file Physically abused: Not on file Forced sexual activity: Not on file Other Topics Concern  Not on file Social History Narrative  Not on file ALLERGIES: Penicillins Review of Systems Unable to perform ROS: Dementia Vitals:  
 05/15/21 2009 BP: 106/70 Pulse: 83 Resp: 25 Temp: 97.3 °F (36.3 °C) SpO2: 91% Physical Exam 
Vitals signs and nursing note reviewed. Constitutional:   
   General: She is in acute distress. Appearance: Normal appearance. She is well-developed. She is ill-appearing. HENT:  
   Head: Normocephalic and atraumatic. Mouth/Throat:  
   Mouth: Mucous membranes are moist.  
   Pharynx: No oropharyngeal exudate. Eyes:  
   General: No scleral icterus. Right eye: No discharge. Left eye: No discharge.   
   Conjunctiva/sclera: Conjunctivae normal.  
   Pupils: Pupils are equal, round, and reactive to light. Neck: Musculoskeletal: Normal range of motion and neck supple. Cardiovascular:  
   Rate and Rhythm: Normal rate and regular rhythm. Heart sounds: Normal heart sounds. No murmur. No friction rub. No gallop. Pulmonary:  
   Effort: Respiratory distress (tachypnea) present. Breath sounds: No wheezing. Comments: Mild crackles at bases Abdominal:  
   General: Bowel sounds are normal. There is no distension. Palpations: Abdomen is soft. Tenderness: There is no abdominal tenderness. There is no guarding. Musculoskeletal: Normal range of motion. General: No tenderness. Lymphadenopathy:  
   Cervical: No cervical adenopathy. Skin: 
   General: Skin is warm and dry. Coloration: Skin is not pale. Findings: No rash. Neurological:  
   Mental Status: She is alert. Comments: Eyes open. Moves both arms and legs. Oriented to hospital and self not time. Normal speech. No facial droop. [facility] 
ED Course as of May 15 2337 Sat May 15, 2021  
2225 Elevated and cxr with edema. Ordered IV lasix. NT pro-BNP(!): 10,435 [RG]  
  
ED Course User Index [RG] Daniela Rodriges MD  
  
81-year-old female with a history of pulmonary fibrosis and recent STEMI amongst other complicated medical problems here with increasing FiO2 requirements. Differential diagnosis is broad including MI, heart failure, pulmonary fibrosis, PE and others. Check labs, chest x-ray. Will consult cardiology given the new EKG findings in the setting of recent STEMI and stent placement. Procedures ED EKG interpretation: 
Rhythm: normal sinus rhythm; and regular . Rate (approx.): 79; Axis: left axis deviation; P wave: normal; QRS interval: normal ; ST/T wave: T wave inverted inferiorly and v3. Some artifact in lateral v4-v6. New twi compared to  9/23/2019. This EKG was interpreted by Anabel Castellon MD,ED Provider.  
 
Perfect Serve Consult for Admission 10:26 PM 
 
ED Room Number: WN24/24 Patient Name and age:  Amy Morris 81 y.o.  female Working Diagnosis: 1. Hypoxia 2. Acute respiratory distress 3. Pulmonary fibrosis [address] [address] 4. Acute congestive heart failure, unspecified heart failure type ([facility] [address] COVID-19 Suspicion:  NO 
Sepsis present:  no  Reassessment needed: no 
Code Status:  Do Not Resuscitate Readmission: no 
Isolation Requirements:  no 
Recommended Level of Care:  step down Department:[facility] Adult ED - [phone] Other:  Recent stemi at [facility]. Now with CHF. On NRB. Pt is DNR/DNI. Cardiology aware. Gave IV lasix. [address] D/w Dr. Zenaida Webber, cardiology. Reviewed hx, exam and results. He agrees with diuresis. Will consult in AM.  
 
11:35 PM 
RN informed me pt desat to upper 80's on NRB. Will try high flow NC. Pt unlikely to tolerate bipap given her significant dementia. Updated hospitalist Acacia. I have spent 40 minutes of critical care time involved in lab review, consultations with specialist, family decision-making, and documentation. During this entire length of time I was immediately available to the patient. Critical Care: The reason for providing this level of medical care for this critically ill patient was due a critical illness that impaired one or more vital organ systems such that there was a high probability of imminent or life threatening deterioration in the patients condition. This care involved high complexity decision making to assess, manipulate, and support vital system functions, to treat this degreee vital organ system failure and to prevent further life threatening deterioration of the patients condition. Recent Results (from the past 24 hour(s)) METABOLIC PANEL, COMPREHENSIVE Collection Time: 05/15/21  8:31 PM  
Result Value Ref Range Sodium 131 (L) 136 - 145 mmol/L Potassium 3.2 (L) 3.5 - 5.1 mmol/L  Chloride 97 97 - 108 mmol/L  
 CO2 22 21 - 32 mmol/L Anion gap 12 5 - 15 mmol/L Glucose 79 65 - 100 mg/dL BUN 18 6 - 20 MG/DL Creatinine 0.62 0.55 - 1.02 MG/DL  
 BUN/Creatinine ratio 29 (H) 12 - 20 GFR est AA >60 >60 ml/min/1.73m2 GFR est non-AA >60 >60 ml/min/1.73m2 Calcium 8.8 8.5 - 10.1 MG/DL Bilirubin, total 1.0 0.2 - 1.0 MG/DL  
 ALT (SGPT) 26 12 - 78 U/L  
 AST (SGOT) 38 (H) 15 - 37 U/L Alk. phosphatase 120 (H) 45 - 117 U/L Protein, total 6.7 6.4 - 8.2 g/dL Albumin 2.4 (L) 3.5 - 5.0 g/dL Globulin 4.3 (H) 2.0 - 4.0 g/dL A-G Ratio 0.6 (L) 1.1 - 2.2    
CBC WITH AUTOMATED DIFF Collection Time: 05/15/21  8:31 PM  
Result Value Ref Range WBC 8.2 3.6 - 11.0 K/uL  
 RBC 3.66 (L) 3.80 - 5.20 M/uL  
 HGB 11.2 (L) 11.5 - 16.0 g/dL HCT 34.1 (L) 35.0 - 47.0 % MCV 93.2 80.0 - 99.0 FL  
 MCH 30.6 26.0 - 34.0 PG  
 MCHC 32.8 30.0 - 36.5 g/dL  
 RDW 17.2 (H) 11.5 - 14.5 % PLATELET 015 414 - 232 K/uL MPV 9.4 8.9 - 12.9 FL  
 NRBC 0.0 0  WBC ABSOLUTE NRBC 0.00 0.00 - 0.01 K/uL NEUTROPHILS 74 32 - 75 % LYMPHOCYTES 15 12 - 49 % MONOCYTES 7 5 - 13 % EOSINOPHILS 2 0 - 7 % BASOPHILS 1 0 - 1 % IMMATURE GRANULOCYTES 1 (H) 0.0 - 0.5 % ABS. NEUTROPHILS 6.1 1.8 - 8.0 K/UL  
 ABS. LYMPHOCYTES 1.2 0.8 - 3.5 K/UL  
 ABS. MONOCYTES 0.6 0.0 - 1.0 K/UL  
 ABS. EOSINOPHILS 0.2 0.0 - 0.4 K/UL  
 ABS. BASOPHILS 0.0 0.0 - 0.1 K/UL  
 ABS. IMM. GRANS. 0.1 (H) 0.00 - 0.04 K/UL  
 DF AUTOMATED    
SAMPLES BEING HELD Collection Time: 05/15/21  8:31 PM  
Result Value Ref Range SAMPLES BEING HELD 1red, 1blu, 1bc (1 silver) COMMENT Add-on orders for these samples will be processed based on acceptable specimen integrity and analyte stability, which may vary by analyte. TROPONIN I Collection Time: 05/15/21  8:31 PM  
Result Value Ref Range Troponin-I, Qt. 0.09 (H) <0.05 ng/mL NT-PRO BNP Collection Time: 05/15/21  8:31 PM  
Result Value Ref Range  NT pro-BNP 10,435 (H) <450 PG/ML BLOOD GAS, ARTERIAL Collection Time: 05/15/21  9:29 PM  
Result Value Ref Range pH 7.46 (H) 7.35 - 7.45    
 PCO2 32 (L) 35 - 45 mmHg PO2 77 (L) 80 - 100 mmHg O2 SAT 96 92 - 97 % BICARBONATE 22 22 - 26 mmol/L  
 BASE DEFICIT 0.6 mmol/L  
 O2 METHOD NONREBREATHER MASK    
 O2 FLOW RATE 15.00 L/min Sample source ARTERIAL    
 SITE RIGHT BRACHIAL ZO'S TEST NOT APPLICABLE Xr Chest [address] Result Date: 5/15/2021 Clinical indication: Shortness of breath. Portable AP semiupright view of the chest obtained, comparison September 21, 2019. Diffuse acute interstitial edema which shallow inspiration heart size is stable,  
 
 impression: Diffuse interstitial edema.

## 2021-05-16 NOTE — H&P
[address] HISTORY AND PHYSICAL Name:  Adriane Alanis 
MR#:  [id number] :  1935 ACCOUNT #:  [de-identified] ADMIT DATE:  05/15/2021 The patient was seen, evaluated and admitted by me on 05/15/2021. PRIMARY CARE PHYSICIAN:  Aimee Lino. MARTHA Schmitt 
 
SOURCE OF INFORMATION:  The patient who is not a good historian because of her dementia, the patient's daughter who was present at the bedside and review of ED and old electronic medical records. CHIEF COMPLAINT:  Shortness of breath. HISTORY OF PRESENT ILLNESS:  This is an 52-year-old woman with a past medical history significant for pulmonary fibrosis, on 2 L of oxygen at home around the clock; Alzheimer's dementia; dyslipidemia; hypertension; coronary artery disease, status post stent placement, was in her usual state of health until the day of her presentation at the emergency room when the patient developed sudden worsening of her shortness of breath. The EMS was called. When the EMS arrived at the scene, the patient was in significant respiratory distress. The patient's oxygen level was increased to 6 L and even with the 6 L, the patient's oxygen saturation was 70%. She was then placed on nonrebreathing mask and brought to the emergency room for further evaluation. The patient is not able to provide a good history because of her dementia. According to her daughter, the patient was admitted to [facility] last month, diagnosed with myocardial infarction, underwent cardiac catheterization with stent placement. There was no associated fever, rigors, or chills. The patient is fully vaccinated for COVID-19 virus infection. She completed the two doses of Moderna vaccine about a month ago. When the patient arrived at the emergency room, the chest x-ray was suggestive of vascular congestion. The patient also has elevated BNP level.   The patient received Lasix and was referred to the hospitalist service for evaluation for admission. Troponin was also elevated on arrival at the emergency room. She was last admitted to the [address] [place] [address] from 01/19/2011 to 01/21/2011. The patient was admitted to the Vascular Surgery Service and underwent right carotid endarterectomy secondary to stenosis. PAST MEDICAL HISTORY:  Pulmonary fibrosis, on 2 L of oxygen at home; Alzheimer's dementia; dyslipidemia; hypertension; coronary artery disease, status post stent placement. ALLERGIES:  THE PATIENT IS ALLERGIC TO PENICILLIN. MEDICATIONS:  Aspirin 81 mg daily, Lipitor 80 mg daily, Plavix 75 mg daily, Lasix 20 mg daily, lisinopril 10 mg daily, melatonin 3 mg daily at bedtime, Lopressor 25 mg twice daily. FAMILY HISTORY:  This was reviewed. Her mother, sister and brother had heart disease. PAST SURGICAL HISTORY:  This is significant for recent cardiac stent placement, left knee replacement, right carotid endarterectomy, bilateral cataract extraction. SOCIAL HISTORY:  The patient is a former smoker, quit tobacco abuse in 01/1968. No history of alcohol abuse. REVIEW OF SYSTEMS:  Unable to obtain because of the patient's dementia. PHYSICAL EXAMINATION: 
GENERAL APPEARANCE:  The patient appeared ill, in moderate distress. VITAL SIGNS:  On arrival at the emergency room, temperature 97.3, pulse 83, respiratory rate 25, blood pressure 106/70, oxygen saturation 91% on nonrebreathing mask. HEENT:  Head:  Normocephalic, atraumatic. Eyes:  Normal eye movement. No redness, no drainage, no discharge. Ears:  Normal external ears with no evidence of drainage. Nose:  No deformity and no drainage. Mouth and Throat:  No visible oral lesion. NECK:  Neck is supple. No JVD, no thyromegaly. CHEST:  Few expiratory wheezing and bilateral basilar crackles. HEART:  Normal S1 and S2, regular. No clinically appreciable murmur. ABDOMEN:  Soft, nontender. Normal bowel sounds. CNS:  Alert, oriented to person.   No gross focal neurological deficit. EXTREMITIES:  No edema. Pulses 2+ bilaterally. MUSCULOSKELETAL SYSTEM:  No evidence of joint deformity or swelling. SKIN:  No active skin lesions seen in the exposed parts of the body. PSYCHIATRY:  Unable to assess mood and affect. LYMPHATIC SYSTEM:  No cervical lymphadenopathy. DIAGNOSTIC DATA:  EKG shows sinus rhythm, T-wave abnormalities. Chest x-ray shows diffuse interstitial edema. LABORATORY DATA:  Hematology:  WBC 8.2, hemoglobin 11.2, hematocrit 34.1, platelets 241. ABG done in the emergency room, pH 7.46, pCO2 is 32, pO2 is 77, oxygen saturation 96%, this was done on 15 L of oxygen. Chemistry:  Sodium 131, potassium 3.2, chloride 97, CO2 is 22, glucose 79, BUN 18, creatinine 0.62, calcium 8.8, total bilirubin 1.0, ALT 26, AST 38, alkaline phosphatase 120, total protein 6.7, albumin level 2.4, globulin 4.3. Pro-BNP level 10,435. Cardiac profile:  Troponin 0.09. 
 
ASSESSMENT: 
1. Acute-on-chronic respiratory failure with hypoxia. 2.  Pulmonary fibrosis. 3.  Alzheimer's dementia. 4.  Dyslipidemia. 5.  Hypertension. 6.  Hypokalemia. 7.  Acute congestive heart failure. 8.  Suspected non-ST elevation myocardial infarction. 9.  Hyponatremia. PLAN: 
1. Acute-on-chronic respiratory failure with hypoxia. We will admit the patient for further evaluation and treatment. We will identify and treat underlying etiological factors. We will obtain a CTA of the chest to evaluate the patient for pulmonary embolism as a possible cause of acute-on-chronic respiratory failure with hypoxia. We will check serial cardiac markers. We will continue with supplemental oxygen. 2.  Pulmonary fibrosis. This is contributing to the patient's acute-on-chronic respiratory failure with hypoxia. We will continue supportive treatment. The patient will require Pulmonary consult to assist in further evaluation and treatment of pulmonary fibrosis. 3.  Alzheimer's dementia. We will continue supportive treatment. 4.  Dyslipidemia. We will continue with Lipitor. 5.  Hypertension. We will resume preadmission medication. We will monitor the patient's blood pressure closely. 6.  Hypokalemia. We will replace potassium and repeat potassium level. We will also check magnesium level. 7.  Acute congestive heart failure. This is also contributing to the patient's acute-on-chronic respiratory failure with hypoxia. We will continue with Lasix. We will obtain echocardiogram to determine the ejection fraction and the type of congestive heart failure. Cardiology consult has already been requested by the emergency room physician. We will await further recommendation from the cardiologist. 
8.  Suspected non-ST elevation myocardial infarction. The patient was recently admitted to [facility] with myocardial infarction, underwent cardiac catheterization with stent placement. We will start the patient on full-dose heparin. We will continue with aspirin and Plavix. We will obtain echocardiogram.  We will trend troponin level. We will continue treatment for suspected non-ST elevation myocardial infarction until when the patient is seen by the cardiologist to determine whether the patient has non-ST elevation myocardial infarction or not. 9.  Hyponatremia. This is most likely due to the congestive heart failure. We will monitor the patient's sodium closely. We will await the results of CT scan of the chest to evaluate the patient for mass lesion. OTHER ISSUES:  Code status: The patient is a full code. The patient is on full-dose heparin for treatment of suspected non-ST elevation myocardial infarction. Because of that, there is no need for DVT prophylaxis. FUNCTIONAL STATUS PRIOR TO ADMISSION:  The patient came from home. The patient is ambulatory with assistance from family members. COVID PRECAUTION:  The patient was wearing a face mask.   I was wearing a face mask and gloves for this patient's encounter. The patient has been fully vaccinated for COVID-19 virus infection with Moderna vaccine. Ирина Johnson MD 
 
 
[id number] 
D:  05/16/2021 2:53 
T:  05/16/2021 4:45 JOB #:  [id number] CC:  Demetrio Hart.  MARTHA Schmitt

## 2021-05-16 NOTE — PROGRESS NOTES
Bedside and Verbal shift change report given to [address] (oncoming nurse) by Kaylee Stoner (offgoing nurse). Report included the following information Kardex, Intake/Output, MAR, Recent Results and Cardiac Rhythm NSR.

## 2021-05-16 NOTE — PROGRESS NOTES
Hospitalist Progress Note Miguel Aguilera MD 
Answering service: [phone] OR [phone] from in house phone Date of Service:  2021 NAME:  Cullen Christopher :  1935 MRN:  [id number] Admission Summary:  
85F p/w sob Interval history / Subjective:  
Patient seen and examined at bedside, feels ok, still working somewhat for her breathing. On HFNC 40L 90% and keeping sats low 90s. Granddaughter at bedside. Assessment & Plan: #. Acute on chronic CHF: - Diuretics iv, strict I&Os, daily weight. TTE pending. Cardio following. #. Acute on chronic hypoxic respiratory failure: 2nd to above. With baseline of pulm fibrosis. - CTA lungs: no PEs, chronic interstitial disease, some new changes ? edema. Evidence of pulm HTN. - Wean down on supplemental O2 as able. BNP >10K. Recheck prior to dc. PT/OT #. CAD: s/p Stents- placed recently in [facility]. home regimen, monitor #. Elevated trops: likely demand ischemia 2nd to above. Cardio eval. 
#. HTN: chronic, stable, Home regimen, PRN BP meds. Monitor #. HypoKalemia: Acute, replace, monitor #. HypoNatremia: mild, monitor #. HLD: chronic, Stable, Home regimen #. Dementia: mild/mod, Stable, Home regimen, frequent re-orientation, encourage family to stay in. Code status: Full DVT prophylaxis: SCDs Care Plan discussed with: Patient/Family and Nurse Disposition: TBD >2days Hospital Problems  Date Reviewed: 2021 Codes Class Noted POA * (Principal) Acute on chronic respiratory failure with hypoxia (HCC) ICD-10-CM: J96.21 
ICD-9-CM: 518.84, 799.02  5/15/2021 Yes Review of Systems:  
Pertinent items are mentioned in interval history. Vital Signs:  
 Last 24hrs VS reviewed since prior progress note. Most recent are: 
Visit Vitals BP (!) 105/44 (BP 1 Location: Right upper arm) Pulse 91 Temp 97.3 °F (36.3 °C) Resp 23 Wt 52.5 kg (115 lb 11.2 oz) SpO2 92% BMI 21.86 kg/m² No intake or output data in the 24 hours ending 05/16/21 0834 Physical Examination:  
Evaluated face to face and examined 05/16/21 General:  Alert, partially oriented, some distress with dyspnea, elderly BW Card:  S1, S2 without murmur, good peripheral perfusion Resp:  No accessory muscle use, Good AE, no wheezes. no crepitations Abd:  Soft, non-tender, non-distended, BS+ Extremities:  No cyanosis or clubbing, no significant edema Neuro:  Grossly normal, no focal neuro deficits, follows commands, mostly oriented, can answer simple questions. Psych:  Good insight, not agitated. Data Review:  
 Review and/or order of clinical lab test 
Review and/or order of tests in the radiology section of CPT Review and/or order of tests in the medicine section of [facility] Labs:  
 
Recent Labs 05/16/21 
0307 05/15/21 
2031 WBC 7.2 8.2 HGB 11.8 11.2* HCT 36.2 34.1*  
 370 Recent Labs 05/16/21 
0307 05/15/21 
2031 * 131*  
K 2.8* 3.2*  
CL 96* 97  
CO2 23 22 BUN 16 18 CREA 0.56 0.62 GLU 74 79 CA 8.7 8.8 MG 1.9  --   
PHOS 3.6  --   
 
Recent Labs 05/16/21 
0307 05/15/21 
2031 ALT 26 26 * 120* TBILI 1.1* 1.0 TP 6.7 6.7 ALB 2.3* 2.4*  
GLOB 4.4* 4.3* Recent Labs 05/16/21 
3302 APTT 27.9 No results for input(s): FE, TIBC, PSAT, FERR in the last 72 hours. Lab Results Component Value Date/Time Folate 10.2 09/13/2019 08:33 AM  
  
Recent Labs 05/15/21 
2129 PH 7.46* PCO2 32* PO2 77* Recent Labs 05/16/21 
0307 05/15/21 
2031 TROIQ 0.07* 0.09* Lab Results Component Value Date/Time Cholesterol, total 203 (H) 09/13/2019 08:33 AM  
 HDL Cholesterol 80 09/13/2019 08:33 AM  
 LDL, calculated 112 (H) 09/13/2019 08:33 AM  
 Triglyceride 55 09/13/2019 08:33 AM  
 CHOL/HDL Ratio 2.8 05/05/2010 08:45 AM  
 
Lab Results Component Value Date/Time  Glucose (POC) 136 (H) 01/19/2011 07:35 PM  
 
Lab Results Component Value Date/Time Color Pale Yellow 02/04/2021 04:04 PM  
 Appearance Clear 10/08/2019 05:00 PM  
 Specific gravity 1.020 02/04/2021 04:04 PM  
 Specific gravity <1.005 09/23/2019 07:03 PM  
 pH (UA) 6 02/04/2021 04:04 PM  
 Protein Negative 02/04/2021 04:04 PM  
 Glucose NEGATIVE  09/23/2019 07:03 PM  
 Ketone Negative 02/04/2021 04:04 PM  
 Bilirubin Negative 02/04/2021 04:04 PM  
 Urobilinogen Negative 02/04/2021 04:04 PM  
 Nitrites Negative 02/04/2021 04:04 PM  
 Leukocyte Esterase 1+ (A) 02/04/2021 04:04 PM  
 Epithelial cells FEW 11/17/2014 12:47 PM  
 Bacteria Occasional (A) 02/04/2021 04:04 PM  
 WBC 3-5 (A) 02/04/2021 04:04 PM  
 RBC 2-5 (A) 02/04/2021 04:04 PM  
 
Medications Reviewed:  
 
Current Facility-Administered Medications Medication Dose Route Frequency  aspirin delayed-release tablet 81 mg  81 mg Oral DAILY  atorvastatin (LIPITOR) tablet 80 mg  80 mg Oral DAILY  clopidogreL (PLAVIX) tablet 75 mg  75 mg Oral DAILY  furosemide (LASIX) injection 20 mg  20 mg IntraVENous DAILY  lisinopriL (PRINIVIL, ZESTRIL) tablet 10 mg  10 mg Oral DAILY  melatonin tablet 3 mg  3 mg Oral QHS  metoprolol tartrate (LOPRESSOR) tablet 25 mg  25 mg Oral BID  sertraline (ZOLOFT) tablet 25 mg  25 mg Oral DAILY  sodium chloride (NS) flush 5-40 mL  5-40 mL IntraVENous Q8H  
 sodium chloride (NS) flush 5-40 mL  5-40 mL IntraVENous PRN  
 acetaminophen (TYLENOL) tablet 650 mg  650 mg Oral Q6H PRN Or  
 acetaminophen (TYLENOL) suppository 650 mg  650 mg Rectal Q6H PRN  polyethylene glycol (MIRALAX) packet 17 g  17 g Oral DAILY PRN  promethazine (PHENERGAN) tablet 12.5 mg  12.5 mg Oral Q6H PRN Or  
 ondansetron (ZOFRAN) injection 4 mg  4 mg IntraVENous Q6H PRN  
 heparin 25,000 units in D5W 250 ml infusion  12-25 Units/kg/hr IntraVENous TITRATE  
______________________________________________________________________ EXPECTED LENGTH OF STAY: - - - 
ACTUAL LENGTH OF STAY:          1 So Rodriguez MD

## 2021-05-17 NOTE — PROGRESS NOTES
Cardiology Progress Note Admit Date: 5/15/2021 Admit Diagnosis: Acute on chronic respiratory failure with hypoxia ([facility] [J96.21] Date: 5/17/2021     Time: 12:03 PM 
Primary cardiologist:  Dr. Yolanda Luna (last saw 2016). HPI: 81-year-old woman with a past medical history significant for pulmonary fibrosis, on 2 L of oxygen at home around the clock; Alzheimer's dementia; dyslipidemia; hypertension; coronary artery disease, status post stent placement, was in her usual state of health until the day of her presentation at the emergency room when the patient developed sudden worsening of her shortness of breath. The patient has undergone pci in 4/21 at [facility] for MI( no records available at this time). She has h/o CEA in 2011 Subjective: no chest pain. Assessment and Plan 1. SOB 
 -Improved 
 -Suspect related to combination of her pulmonary fibrosis on which there is superimposed pulmonary edema, possible diastolic CHF 
 -Echo with preserved EF, mild hypok basal inferior walls. 05/15/21 ECHO ADULT COMPLETE 05/17/2021 5/17/2021 Narrative · AV: Aortic valve leaflet calcification present. · PV: Mild to moderate pulmonic valve regurgitation is present. · LV: Estimated LVEF is 50 - 55%. Normal cavity size, wall thickness and  
diastolic function. Low normal systolic function. Mild hypokinesis of the  
basal inferolateral wall(s). Normal left ventricular strain. · TV: Mild tricuspid valve regurgitation is present. Signed by: Enzo Mchugh MD  
 
  -continue IV lasix 20 mg BID 
  
  
2. CAD:  
 -Status post PCI of unknown vessel at [facility] for myocardial infarction in April 2021.  
 -Will request records from [facility] 
  -Continue aspirin Plavix and Lipitor, Low dose BB as BP allows  -Minimal troponin elevation with flat trend- EKG NSR with inferior T wave abnormality. 
 -Suspect minimal troponin elevation with flat trend due to fluid overload with underlying pulmonary fibrosis.  
  
3.  Pulmonary pulmonary fibrosis:  
 -Continue supportive therapy. As per primary team. 
  
4. Alzheimer dementia: 
  
5. Hyperlipidemia:  
 - Lipitor. Cardiology Attending:Patient seen and examined. I agree with NP assessment and plans. SOB in bed on oxygen, suspect fibrosis major culprit, obtaining more information from [facility]. Zeynep Weber MD 5/17/2021 1:53 PM  
 
 
 
 
PMH Past Medical History:  
Diagnosis Date  Bilateral dry eyes 2017 Dr. Neela Rose  Cataract Bilaterally. Dr. Judy Stacy. Dr. Maryan Cushing. Dr. Neela Rose.  Chest pain 10/2015, 2016 Dr. Kisha Nazario. negative Stress Test.  
 Dementia ([facility] 07/2017  
 triggered by mild LACHELLE, depression. Dr. Pita Posadas. Dr. Carrie Altamirano  Diverticulosis 09/2008 Dr. Lang Abreu. Dr. Mi Cervantes.  DJD (degenerative joint disease) 09/30/05  
 cervical, worse C6-7, C7-T1. Left AC joint.  DJD (degenerative joint disease) of knee   
 bilateral  Dr. Savita Chand. Dr. Alireza NULL (dyspnea on exertion) 10/2015 Dr. Kisha Nazario.  Essential hypertension, benign 1968  Foster child  GIB (gastrointestinal bleeding) 11/17/14  
 due to internal hemorrhoids. Dr. Min Benitez  Heart palpitations 11/12/15  
 due to PVCs. Dr. Vinicio Moncada.  Heartburn  Hypercholesteremia  Hyperglycemia 2014 Deo Wilson Internal carotid artery stenosis 2007  
 bilateral.  Dr. Red Wilson Internal hemorrhoids 09/2008, 11/2014 Dr. Sha Zabala. Dr. Kate Gutierrez.  Knee pain Left. Dr. Green Dies.  Plantar wart 2018 Dr. Saritha Dodd. 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 Dr. Kisha Nazario. EF 50-55%  PVD (peripheral vascular disease) ([facility] 2007 Dr. Melissa Bentley  Raynaud's phenomenon [id number]  Shortening, leg, congenital   
 left  Vitamin D deficiency 10/10/10 Social Hx Social History Socioeconomic History  Marital status:  Spouse name: Not on file  Number of children: Not on file  Years of education: Not on file  Highest education level: Not on file Occupational History  Not on file Social Needs  Financial resource strain: Not on file  Food insecurity Worry: Not on file Inability: Not on file  Transportation needs Medical: Not on file Non-medical: Not on file Tobacco Use  Smoking status: Former Smoker Packs/day: 1.00 Years: 15.00 Pack years: 15.00 Types: Cigarettes Quit date: 1968 Years since quittin.4  Smokeless tobacco: Never Used Substance and Sexual Activity  Alcohol use: No  
  Alcohol/week: 0.0 standard drinks  Drug use: No  
  Types: Prescription  Sexual activity: Not Currently Partners: Male Birth control/protection: Abstinence Lifestyle  Physical activity Days per week: Not on file Minutes per session: Not on file  Stress: Not on file Relationships  Social connections Talks on phone: Not on file Gets together: Not on file Attends Hinduism service: Not on file Active member of club or organization: Not on file Attends meetings of clubs or organizations: Not on file Relationship status: Not on file  Intimate partner violence Fear of current or ex partner: Not on file Emotionally abused: Not on file Physically abused: Not on file Forced sexual activity: Not on file Other Topics Concern  Not on file Social History Narrative  Not on file Objective: 
  
 Physical Exam: 
             
Visit Vitals BP (!) 86/47 (BP 1 Location: Right arm, BP Patient Position: At rest) Pulse 63 Temp 97.3 °F (36.3 °C) Resp 30 Ht 5' 1\" (1.549 m) Wt 108 lb (49 kg) LMP  (LMP Unknown) SpO2 98% BMI 20.41 kg/m² General Appearance:   Well developed, well nourished,alert 
 individual in no acute distress. Ears/Nose/Mouth/Throat:    Hearing grossly normal. 
  
    Neck:  Supple. Chest:    course bronchial breath sounds. Cardiovascular:   Regular rate and rhythm, S1, S2 normal, no murmur. Abdomen:    Soft, non-tender, . Extremities:  No edema bilaterally. Skin:  Warm and dry. Telemetry: NSR Data Review:  
 Labs:   
Recent Results (from the past 24 hour(s)) TROPONIN I Collection Time: 05/16/21 12:08 PM  
Result Value Ref Range Troponin-I, Qt. 0.05 (H) <0.05 ng/mL PTT Collection Time: 05/16/21 12:08 PM  
Result Value Ref Range aPTT 29.6 22.1 - 31.0 sec  
 aPTT, therapeutic range     58.0 - 77.0 SECS  
CBC WITH AUTOMATED DIFF Collection Time: 05/16/21  8:46 PM  
Result Value Ref Range WBC 8.4 3.6 - 11.0 K/uL  
 RBC 3.87 3.80 - 5.20 M/uL  
 HGB 11.7 11.5 - 16.0 g/dL HCT 35.8 35.0 - 47.0 % MCV 92.5 80.0 - 99.0 FL  
 MCH 30.2 26.0 - 34.0 PG  
 MCHC 32.7 30.0 - 36.5 g/dL  
 RDW 17.4 (H) 11.5 - 14.5 % PLATELET 827 (H) 313 - 400 K/uL MPV 9.2 8.9 - 12.9 FL  
 NRBC 0.0 0  WBC ABSOLUTE NRBC 0.00 0.00 - 0.01 K/uL NEUTROPHILS 78 (H) 32 - 75 % LYMPHOCYTES 12 12 - 49 % MONOCYTES 7 5 - 13 % EOSINOPHILS 2 0 - 7 % BASOPHILS 0 0 - 1 % IMMATURE GRANULOCYTES 1 (H) 0.0 - 0.5 % ABS. NEUTROPHILS 6.5 1.8 - 8.0 K/UL  
 ABS. LYMPHOCYTES 1.0 0.8 - 3.5 K/UL  
 ABS. MONOCYTES 0.6 0.0 - 1.0 K/UL  
 ABS. EOSINOPHILS 0.2 0.0 - 0.4 K/UL  
 ABS. BASOPHILS 0.0 0.0 - 0.1 K/UL  
 ABS. IMM. GRANS. 0.1 (H) 0.00 - 0.04 K/UL  
 DF AUTOMATED MAGNESIUM Collection Time: 05/16/21  8:46 PM  
Result Value Ref Range Magnesium 1.9 1.6 - 2.4 mg/dL METABOLIC PANEL, COMPREHENSIVE Collection Time: 05/16/21  8:46 PM  
Result Value Ref Range Sodium 131 (L) 136 - 145 mmol/L Potassium 4.0 3.5 - 5.1 mmol/L Chloride 97 97 - 108 mmol/L  
 CO2 26 21 - 32 mmol/L Anion gap 8 5 - 15 mmol/L Glucose 97 65 - 100 mg/dL BUN 21 (H) 6 - 20 MG/DL  Creatinine 0.69 0.55 - 1.02 MG/DL  
 BUN/Creatinine ratio 30 (H) 12 - 20 GFR est AA >60 >60 ml/min/1.73m2 GFR est non-AA >60 >60 ml/min/1.73m2 Calcium 8.4 (L) 8.5 - 10.1 MG/DL Bilirubin, total 0.7 0.2 - 1.0 MG/DL  
 ALT (SGPT) 24 12 - 78 U/L  
 AST (SGOT) 36 15 - 37 U/L Alk. phosphatase 136 (H) 45 - 117 U/L Protein, total 6.6 6.4 - 8.2 g/dL Albumin 2.1 (L) 3.5 - 5.0 g/dL Globulin 4.5 (H) 2.0 - 4.0 g/dL A-G Ratio 0.5 (L) 1.1 - 2.2 PHOSPHORUS Collection Time: 05/16/21  8:46 PM  
Result Value Ref Range Phosphorus 2.2 (L) 2.6 - 4.7 MG/DL  
ECHO ADULT COMPLETE Collection Time: 05/17/21  9:15 AM  
Result Value Ref Range IVSd 0.93 (A) 0.60 - 0.90 cm LVIDd 4.75 3.90 - 5.30 cm LVIDs 3.48 cm  
 LVOT d 1.60 cm  
 LVPWd 0.95 (A) 0.60 - 0.90 cm  
 BP EF 47.5 (A) 55.0 - 100.0 percent LV Ejection Fraction MOD 2C 30 percent LV Ejection Fraction MOD 4C 61 percent LV ED Vol A2C 49.95 mL  
 LV ED Vol A4C 60.36 mL  
 LV ED Vol BP 55.09 (A) 56.0 - 104.0 mL  
 LV ES Vol A2C 35.13 mL  
 LV ES Vol A4C 23.74 mL  
 LV ES Vol BP 28.91 19.0 - 49.0 mL  
 LVOT Peak Gradient 3.43 mmHg LVOT SV 41.8 mL  
 LVOT Peak Velocity 92.64 cm/s LVOT VTI 20.77 cm RVSP 41.23 mmHg Left Atrium Major Axis 3.87 cm  
 LA Volume 52.15 22.0 - 52.0 mL  
 LA Area 4C 19.14 cm2 LA Vol 2C 45.36 22.00 - 52.00 mL LA Vol 4C 50.02 22.00 - 52.00 mL LA Volume DISK BP 48.33 22.0 - 52.0 mL Est. RA Pressure 5.00 mmHg Aortic Valve Area by Continuity of Peak Velocity 0.74 cm2 Aortic Valve Area by Continuity of VTI 0.89 cm2 AoV PG 25.54 mmHg Aortic Valve Systolic Mean Gradient 84.68 mmHg Aortic Valve Systolic Peak Velocity 448.96 cm/s AoV VTI 47.04 cm  
 MV A Ben 72.59 cm/s Mitral Valve E Wave Deceleration Time 231.63 ms  
 MV E Ben 38.04 cm/s Pulmonic Valve Systolic Peak Instantaneous Gradient 1.35 mmHg Triscuspid Valve Regurgitation Peak Gradient 36.23 mmHg  
 TR Max Velocity 300.95 cm/s  Ao Root D 3.24 cm  
 MV E/A 0.52   
 LV Mass .9 67.0 - 162.0 g  
 LV Mass AL Index 105.9 43.0 - 95.0 g/m2 LVES Vol Index BP 19.9 mL/m2 LVED Vol Index BP 37.9 mL/m2 Left Atrium Minor Axis 2.66 cm  
 LA Vol Index 35.87 16.00 - 28.00 ml/m2 LA Vol Index 31.20 16.00 - 28.00 ml/m2 LA Vol Index 34.40 16.00 - 28.00 ml/m2 LVED Vol Index A4C 41.5 mL/m2 LVED Vol Index A2C 34.4 mL/m2 LVES Vol Index A4C 16.3 mL/m2 LVES Vol Index A2C 24.2 mL/m2 BARBARA/BSA Pk Ben 0.5 cm2/m2 BARBARA/BSA VTI 0.6 cm2/m2 Radiology:  
 
  
Current Facility-Administered Medications Medication Dose Route Frequency  albuterol-ipratropium (DUO-NEB) 2.5 MG-0.5 MG/3 ML  3 mL Nebulization Q4H PRN  
 aspirin delayed-release tablet 81 mg  81 mg Oral DAILY  atorvastatin (LIPITOR) tablet 80 mg  80 mg Oral DAILY  clopidogreL (PLAVIX) tablet 75 mg  75 mg Oral DAILY  lisinopriL (PRINIVIL, ZESTRIL) tablet 10 mg  10 mg Oral DAILY  melatonin tablet 3 mg  3 mg Oral QHS  metoprolol tartrate (LOPRESSOR) tablet 25 mg  25 mg Oral BID  sertraline (ZOLOFT) tablet 25 mg  25 mg Oral DAILY  sodium chloride (NS) flush 5-40 mL  5-40 mL IntraVENous Q8H  
 sodium chloride (NS) flush 5-40 mL  5-40 mL IntraVENous PRN  
 acetaminophen (TYLENOL) tablet 650 mg  650 mg Oral Q6H PRN Or  
 acetaminophen (TYLENOL) suppository 650 mg  650 mg Rectal Q6H PRN  polyethylene glycol (MIRALAX) packet 17 g  17 g Oral DAILY PRN  promethazine (PHENERGAN) tablet 12.5 mg  12.5 mg Oral Q6H PRN Or  
 ondansetron (ZOFRAN) injection 4 mg  4 mg IntraVENous Q6H PRN  
 enoxaparin (LOVENOX) injection 40 mg  40 mg SubCUTAneous Q24H  
 furosemide (LASIX) injection 20 mg  20 mg IntraVENous BID  LORazepam (ATIVAN) injection 0.26 mg  0.26 mg IntraVENous Q4H PRN Vanesa Thakur. MARIBELL [facility] [address], [address] Rafael Padilla 
 [phone]

## 2021-05-17 NOTE — CONSULTS
Pulmonary Reason:  Acute on chronic hypoxemic resp failure with pulm fibrosis and pulm htn on chronic O2 at 6-8 L/ min Requesting:  Dr Odalis Amezquita 
 
82 yo female with chronic ILD, dementia and on home O2 at 6-8 L/min. She was seen by Dr Napoleon Roa on 5/14 in the office and hospice was recommended. Has been readmitted with shortness of breath CTA without PE - progressive ILD and pulm edema Pro BNP was markedly elevated On high flow O2 Receiving diuretics Allergies Allergen Reactions  Penicillins Hives Past Medical History:  
Diagnosis Date  Bilateral dry eyes 2017 Dr. Miesha Coughlin  Cataract Bilaterally. Dr. Lima Aguillon. Dr. An Beasley. Dr. Miesha Coughlin.  Chest pain 10/2015, 2016 Dr. Zak Miller. negative Stress Test.  
 Dementia ([facility] 07/2017  
 triggered by mild LACHELLE, depression. Dr. Zacarias Andrea. Dr. Ana Alvarez  Diverticulosis 09/2008 Dr. Khoa Marin. Dr. Fer Mock.  DJD (degenerative joint disease) 09/30/05  
 cervical, worse C6-7, C7-T1. Left AC joint.  DJD (degenerative joint disease) of knee   
 bilateral  Dr. Iris Hargrove. Dr. Brittany NULL (dyspnea on exertion) 10/2015 Dr. Zak Miller.  Essential hypertension, benign 1968  Foster child  GIB (gastrointestinal bleeding) 11/17/14  
 due to internal hemorrhoids. Dr. Vasile Whiteside  Heart palpitations 11/12/15  
 due to PVCs. Dr. Andrew Cortez.  Heartburn  Hypercholesteremia  Hyperglycemia 2014 [address] [place] [address] Internal carotid artery stenosis 2007  
 bilateral.  Dr. Joel Martinez [address] [place] [address] Internal hemorrhoids 09/2008, 11/2014 Dr. Gabino Mckee. Dr. Fidel Dean.  Knee pain Left. Dr. Dorothy Randolph.  Plantar wart 2018 Dr. Malinda Lee. 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 Dr. Zak Miller. EF 50-55%  PVD (peripheral vascular disease) ([facility] 2007 Dr. Ivan Bee  Raynaud's phenomenon [id number]  Shortening, leg, congenital   
 left  Vitamin D deficiency 10/10/10 Past Surgical History:  
Procedure Laterality Date  HX ATHERECTOMY Left 2011  
 popliteal atherectomy and angioplasty. Dr. Cuba Mckinley  HX BREAST LUMPECTOMY Right   
 benign. Dr. Camryn Hamm.  HX CAROTID ENDARTERECTOMY Right 2011 ICA. Dr. Cuba Mckinley  HX CATARACT REMOVAL Bilateral 2016, 10/24/2016 L then R Dr. Liane Hammans.  HX COLONOSCOPY  14  
 due to GIB. Dr. Neris Villarreal.  HX COLONOSCOPY  08  
 with polypectomy. benign. Dr. Isaac Leonard. due q 10 yrs. [facility] FISSURECTOMY.  HX GI  10/03/2008 PROCTOPLASTY due to rectal prolapse  HX HEMORRHOIDECTOMY  10/03/2008  
 internal and external  
 HX KNEE REPLACEMENT Left 2013  
 due to Severe OA. Dr. Amanuel Mathis.  HX POLYPECTOMY  2008 rectal Dr. Suman Muñoz Family History Problem Relation Age of Onset  Heart Attack Mother 79  
 Heart Attack Sister   
     x 3 sisters  Heart Attack Brother   
     x 3 brothers Social History Socioeconomic History  Marital status:  Spouse name: Not on file  Number of children: Not on file  Years of education: Not on file  Highest education level: Not on file Occupational History  Not on file Social Needs  Financial resource strain: Not on file  Food insecurity Worry: Not on file Inability: Not on file  Transportation needs Medical: Not on file Non-medical: Not on file Tobacco Use  Smoking status: Former Smoker Packs/day: 1.00 Years: 15.00 Pack years: 15.00 Types: Cigarettes Quit date: 1968 Years since quittin.4  Smokeless tobacco: Never Used Substance and Sexual Activity  Alcohol use: No  
  Alcohol/week: 0.0 standard drinks  Drug use: No  
  Types: Prescription  Sexual activity: Not Currently Partners: Male Birth control/protection: Abstinence Lifestyle  Physical activity Days per week: Not on file Minutes per session: Not on file  Stress: Not on file Relationships  Social connections Talks on phone: Not on file Gets together: Not on file Attends Shinto service: Not on file Active member of club or organization: Not on file Attends meetings of clubs or organizations: Not on file Relationship status: Not on file  Intimate partner violence Fear of current or ex partner: Not on file Emotionally abused: Not on file Physically abused: Not on file Forced sexual activity: Not on file Other Topics Concern  Not on file Social History Narrative  Not on file Patient Vitals for the past 4 hrs: 
 BP Temp Pulse Resp SpO2 Height Weight 21 0854 (!) 114/57 97.8 °F (36.6 °C) 76 26 98 % 5' 1\" (1.549 m) 49 kg (108 lb) 21 0832     100 %   Temp (24hrs), Av.7 °F (36.5 °C), Min:97.1 °F (36.2 °C), Max:98.3 °F (36.8 °C) No intake or output data in the 24 hours ending 21 2946 Lab: 
Recent Labs 21 
2046 21 
1208 21 
0307 05/15/21 
2031 WBC 8.4  --  7.2 8.2 HGB 11.7  --  11.8 11.2*  
*  --  356 370 *  --  132* 131* K 4.0  --  2.8* 3.2*  
CL 97  --  96* 97  
CO2 26  --  23 22 BUN 21*  --  16 18 CREA 0.69  --  0.56 0.62 GLU 97  --  74 79 CA 8.4*  --  8.7 8.8 MG 1.9  --  1.9  --   
PHOS 2.2*  --  3.6  --   
TROIQ  --  0.05* 0.07* 0.09* TBILI 0.7  --  1.1* 1.0 Results for Tod Johnson (MRN [id number]) as of 2021 09:27 Ref. Range 5/15/2021 20:31 NT pro-BNP Latest Ref Range: <450 PG/ML 10,435 (H) Pulmonary Impression Acute on chronic hypoxemic resp failure on high flow O2 and chronically on O2 at home at 6-8 L/min Has progression of underlying ILD with superimposed pulm edema. She has pulm htn secondary to her pulm fibrosis and hydrostatic edema.   Per Dr Devon Mosqueda note from 21, she has dementia and is wheel chair bound and has end stage disease and hospice care was recommended Pulm recommendations 
--O2 - wean as able by sats --diuresis --consider palliative care evaluation 
--follow up with Dr Wendie Fang post discharge Will see as needed Lynn Garcia MD

## 2021-05-17 NOTE — PROGRESS NOTES
Physical Therapy Screening: [facility] was triggered for physical therapy based on results obtained during the nursing admission assessment. The patients chart was reviewed and the patient is appropriate for a skilled therapy evaluation if there is a decline in functional mobility from baseline. Please order a consult for physical therapy if you are in agreement and would like an evaluation to be completed. Thank you.  
 
Blaze Leyva, PT

## 2021-05-17 NOTE — PROGRESS NOTES
Paged on call Hospitalist for decreased blood pressure. Initial blood pressure at 1909 was 90/46, rechecked twice with recurrent BP's 70's/40's. Received lab orders along with Bolus of 250 0.9%NS. Held evening medication for Metoprolol, Lasix and prn ativan due to decreased blood pressure. 2030 Rechecked blood pressure which was 105/47.

## 2021-05-17 NOTE — PROGRESS NOTES
Bedside and Verbal shift change report given to rebecca (oncoming nurse) by Gio Smith (offgoing nurse). Report included the following information SBAR, Kardex, Intake/Output, MAR, Recent Results and Cardiac Rhythm nsr.

## 2021-05-17 NOTE — PROGRESS NOTES
2100 
Unable to get enough blood in blue top lab tube to send. Will try again shortly. Lavender and green tops sent.

## 2021-05-17 NOTE — NURSE NAVIGATOR
Chart reviewed by Heart Failure Nurse Navigator. Heart Failure database completed. EF:  pending ACEi/ARB/ARNi: Lisinopril BB: echo pending Aldosterone Antagonist: echo pending Obstructive Sleep Apnea Screening: N/4 dementia STOP-BANG score: 
 Referred to Sleep Medicine: CRT not indicated NYHA Functional Class requested via provider message on [employer] Heart Failure Teach Back in Patient Education. Heart Failure Avoiding Triggers on Discharge Instructions. Cardiologist: CAV Post discharge follow up phone call to be made within 48-72 hours of discharge.

## 2021-05-17 NOTE — PROGRESS NOTES
Hospitalist Progress Note Adriana Galindo MD 
Answering service: [phone] OR [phone] from in house phone Date of Service:  2021 NAME:  Olya Whatley :  1935 MRN:  [id number] Admission Summary:  
85F p/w sob Interval history / Subjective:  
Patient seen and examined at bedside, feels ok, still working hard on breathing, on HFNC. Keeping sats in high 90s. Daughter at bedside- updated. Assessment & Plan: #. Acute on chronic CHF: - Diuretics iv, strict I&Os, daily weight. TTE pending. Cardio following. #. Acute on chronic hypoxic respiratory failure: 2nd to above. With baseline of pulm fibrosis. - CTA lungs: no PEs, chronic interstitial disease, some new changes ? edema. Evidence of pulm HTN. - Wean down on supplemental O2 as able. BNP >10K. Recheck prior to dc. PT/OT  
- Pulmonology cs pending- known to Dr Lola Lau #. CAD: s/p Stents- placed recently in [facility] [place] [place]. home regimen, monitor #. Elevated trops: likely demand ischemia 2nd to above. Cardio eval. 
#. HTN: chronic, stable, Home regimen, PRN BP meds. Monitor #. HypoKalemia: Acute, replace, monitor #. HypoNatremia: mild, monitor #. HLD: chronic, Stable, Home regimen #. Dementia: mild/mod, Stable, Home regimen, frequent re-orientation, encourage family to stay in. Code status: Full DVT prophylaxis: SCDs Care Plan discussed with: Patient/Family and Nurse Disposition: TBD >2days Hospital Problems  Date Reviewed: 2021 Codes Class Noted POA * (Principal) Acute on chronic respiratory failure with hypoxia (HCC) ICD-10-CM: J96.21 
ICD-9-CM: 518.84, 799.02  5/15/2021 Yes Review of Systems:  
Pertinent items are mentioned in interval history. Vital Signs:  
 Last 24hrs VS reviewed since prior progress note. Most recent are: 
Visit Vitals BP (!) 114/57 (BP 1 Location: Left arm, BP Patient Position: At rest) Pulse 76 Temp 97.8 °F (36.6 °C) Resp 26 Ht 5' 1\" (1.549 m) Wt 49 kg (108 lb) SpO2 98% BMI 20.41 kg/m² No intake or output data in the 24 hours ending 05/17/21 0909 Physical Examination:  
Evaluated face to face and examined 05/17/21 General:  Alert, partially oriented, some distress with dyspnea, elderly, frail BW 
Card:  S1, S2 without murmur, good peripheral perfusion Resp:  No accessory muscle use, Good AE, no wheezes. no crepitations Abd:  Soft, non-tender, non-distended, BS+ Extremities:  No cyanosis or clubbing, no significant edema Neuro:  Grossly normal, no focal neuro deficits, follows commands, mostly oriented, can answer simple questions. Psych:  Good insight, not agitated. Data Review:  
 Review and/or order of clinical lab test 
Review and/or order of tests in the radiology section of CPT Review and/or order of tests in the medicine section of CPT Labs:  
 
Recent Labs 05/16/21 2046 05/16/21 
7395 WBC 8.4 7.2 HGB 11.7 11.8 HCT 35.8 36.2 * 356 Recent Labs 05/16/21 2046 05/16/21 
0307 05/15/21 
2031 * 132* 131* K 4.0 2.8* 3.2*  
CL 97 96* 97  
CO2 26 23 22 BUN 21* 16 18 CREA 0.69 0.56 0.62 GLU 97 74 79 CA 8.4* 8.7 8.8 MG 1.9 1.9  --   
PHOS 2.2* 3.6  --   
 
Recent Labs 05/16/21 2046 05/16/21 
0307 05/15/21 
2031 ALT 24 26 26 * 122* 120* TBILI 0.7 1.1* 1.0 TP 6.6 6.7 6.7 ALB 2.1* 2.3* 2.4*  
GLOB 4.5* 4.4* 4.3* Recent Labs 05/16/21 
1208 05/16/21 
8808 APTT 29.6 27.9 No results for input(s): FE, TIBC, PSAT, FERR in the last 72 hours. Lab Results Component Value Date/Time Folate 10.2 09/13/2019 08:33 AM  
  
Recent Labs 05/15/21 
2129 PH 7.46* PCO2 32* PO2 77* Recent Labs 05/16/21 
1208 05/16/21 
0307 05/15/21 
2031 TROIQ 0.05* 0.07* 0.09* Lab Results Component Value Date/Time  Cholesterol, total 203 (H) 09/13/2019 08:33 AM  
 HDL Cholesterol 80 09/13/2019 08:33 AM  
 LDL, calculated 112 (H) 09/13/2019 08:33 AM  
 Triglyceride 55 09/13/2019 08:33 AM  
 CHOL/HDL Ratio 2.8 05/05/2010 08:45 AM  
 
Lab Results Component Value Date/Time Glucose (POC) 136 (H) 01/19/2011 07:35 PM  
 
Lab Results Component Value Date/Time Color Pale Yellow 02/04/2021 04:04 PM  
 Appearance Clear 10/08/2019 05:00 PM  
 Specific gravity 1.020 02/04/2021 04:04 PM  
 Specific gravity <1.005 09/23/2019 07:03 PM  
 pH (UA) 6 02/04/2021 04:04 PM  
 Protein Negative 02/04/2021 04:04 PM  
 Glucose NEGATIVE  09/23/2019 07:03 PM  
 Ketone Negative 02/04/2021 04:04 PM  
 Bilirubin Negative 02/04/2021 04:04 PM  
 Urobilinogen Negative 02/04/2021 04:04 PM  
 Nitrites Negative 02/04/2021 04:04 PM  
 Leukocyte Esterase 1+ (A) 02/04/2021 04:04 PM  
 Epithelial cells FEW 11/17/2014 12:47 PM  
 Bacteria Occasional (A) 02/04/2021 04:04 PM  
 WBC 3-5 (A) 02/04/2021 04:04 PM  
 RBC 2-5 (A) 02/04/2021 04:04 PM  
 
Medications Reviewed:  
 
Current Facility-Administered Medications Medication Dose Route Frequency  aspirin delayed-release tablet 81 mg  81 mg Oral DAILY  atorvastatin (LIPITOR) tablet 80 mg  80 mg Oral DAILY  clopidogreL (PLAVIX) tablet 75 mg  75 mg Oral DAILY  lisinopriL (PRINIVIL, ZESTRIL) tablet 10 mg  10 mg Oral DAILY  melatonin tablet 3 mg  3 mg Oral QHS  metoprolol tartrate (LOPRESSOR) tablet 25 mg  25 mg Oral BID  sertraline (ZOLOFT) tablet 25 mg  25 mg Oral DAILY  sodium chloride (NS) flush 5-40 mL  5-40 mL IntraVENous Q8H  
 sodium chloride (NS) flush 5-40 mL  5-40 mL IntraVENous PRN  
 acetaminophen (TYLENOL) tablet 650 mg  650 mg Oral Q6H PRN Or  
 acetaminophen (TYLENOL) suppository 650 mg  650 mg Rectal Q6H PRN  polyethylene glycol (MIRALAX) packet 17 g  17 g Oral DAILY PRN  promethazine (PHENERGAN) tablet 12.5 mg  12.5 mg Oral Q6H PRN  Or  
 ondansetron (ZOFRAN) injection 4 mg  4 mg IntraVENous Q6H PRN  
 enoxaparin (LOVENOX) injection 40 mg 40 mg SubCUTAneous Q24H  
 furosemide (LASIX) injection 20 mg  20 mg IntraVENous BID  LORazepam (ATIVAN) injection 0.26 mg  0.26 mg IntraVENous Q4H PRN  
______________________________________________________________________ EXPECTED LENGTH OF STAY: - - - 
ACTUAL LENGTH OF STAY:          2 Tatiana Lakhani MD

## 2021-05-17 NOTE — PROGRESS NOTES
JAVIER: Patient lives at home and family takes turns providing 24/7 care at home. Patient does not own any DME. Patient uses home 02 supplied by [employer]. Patient was open with [facility] for HHPT and nursing prior to admission. Resumption orders will be needed prior to discharge. The daughter Cory Mcelroy #[phone] to transport home via car at discharge. Care Management Interventions PCP Verified by CM: Yes Mode of Transport at Discharge: Other (see comment)(family/car) Transition of Care Consult (CM Consult): Discharge Planning MyChart Signup: No 
Physical Therapy Consult: No 
Occupational Therapy Consult: No 
Speech Therapy Consult: No 
Current Support Network: Own Home, Family Lives Nearby, Has Personal Caregivers Confirm Follow Up Transport: Family The Patient and/or Patient Representative was Provided with a Choice of Provider and Agrees with the Discharge Plan?: Yes Freedom of Choice List was Provided with Basic Dialogue that Supports the Patient's Individualized Plan of Care/Goals, Treatment Preferences and Shares the Quality Data Associated with the Providers?: Yes Discharge Location Discharge Placement: Home with family assistance Reason for Admission:  Shortness of breath RUR Score:     16% Plan for utilizing home health:   Patient was open with home health with [facility] SN and PT prior to admission. PCP: First and Last name:  Flores Seaman NP Name of Practice:  
 Are you a current patient: Yes/No: yes Approximate date of last visit: Daughter stated patient has an appointment with PCP coming up. Can you participate in a virtual visit with your PCP:  
                 
Current Advanced Directive/Advance Care Plan: Full Code Healthcare Decision Maker: daughterCory stated that she is the POA.  CM encouraged daughter to bring paperwork to the hospital. 
Click here to complete [address] including selection of the Healthcare Decision Maker Relationship (ie \"Primary\") Transition of Care Plan:          Home with family and resume home health with [facility]. Chart reviewed. CM met with patient and daughter at bedside. Patient has dementia. Patient lives at home in a private residence with 3 stairs to enter. Family provides 24/7 care between the two daughters, Isa Pino and [place]. Daughter Isa Pino stated that she is the POA. CM encouraged daughter to bring paperwork to be scanned into the hospital system. Family pays for a private duty care aid that comes once per week to assist with bathing, etc. Patient has home 02 supplied by [employer]. Daughter stated that patient does not own any DME but will likely need some equipment upon discharge. Patient gets prescriptions filled at [facility][phone]. CM sent referral to [facility] via [employer]. Resumption orders will be needed prior to discharge. Ximena Valentino, BSW/CRM

## 2021-05-18 NOTE — PROGRESS NOTES
Problem: Falls - Risk of 
Goal: *Absence of Falls Description: Document Shelly Wong Fall Risk and appropriate interventions in the flowsheet. Outcome: Progressing Towards Goal 
Note: Fall Risk Interventions: 
Mobility Interventions: Communicate number of staff needed for ambulation/transfer Mentation Interventions: Reorient patient Medication Interventions: Teach patient to arise slowly, Patient to call before getting OOB Elimination Interventions: Call light in reach, Patient to call for help with toileting needs History of Falls Interventions: Bed/chair exit alarm, Investigate reason for fall Problem: Patient Education: Go to Patient Education Activity Goal: Patient/Family Education Outcome: Progressing Towards Goal

## 2021-05-18 NOTE — PROGRESS NOTES
JAVIER: 
 
RUR - 14% Dispo plans- : Hospice evaluation - referral made to [facility]- call placed to intake to make sure referral was received  -placed on hold for 10 minutes and did not receive a response,. Will attempt again this afternoon. Transport: likely BLS Family contact: Renee Ledbetter # [phone] (daughter) Patient was open to [facility] prior to admit for SN and Pt. Family provides 24/7 care at home.  Eddye Min, MSW

## 2021-05-18 NOTE — PROGRESS NOTES
Bedside shift change report given to [facility] [id number] (oncoming nurse) by Pattie Duarte RN (offgoing nurse). Report included the following information SBAR, Kardex, MAR and Cardiac Rhythm NSR.

## 2021-05-18 NOTE — CONSULTS
Palliative Medicine Consult [place]: [phone] ([phone]) Patient Name: Felipa Monteiro YOB: 1935 Date of Initial Consult: 5/18/21 Reason for Consult: Care decisions Requesting Provider: Dr. Bina Koehler 
Primary Care Physician: Mayra Garcia NP 
 
 SUMMARY:  
Felipa Monteiro is an 81 y.o. female with a past history of Alzheimer's dementia, pulmonary fibrosis, ILD, pulmonary hypertension, chronic respiratory failure, dyslipidemia, hypertension, coronary artery disease, and cardiac stents, who was admitted on 5/15/2021 from home with a diagnosis of acute on chronic heart failure and acute on chronic respiratory failure. She presented with complaints of sudden worsening of shortness of breath. She was found to be in acute on chronic respiratory failure due to a heart failure exacerbation. She was admitted for further management. She was treated with IV diuretics, but her respiratory status has continued to decline. She is requiring HFNC and not tolerating it well. Hospice has been recommended due to her end stage lung disease. Current medical issues leading to Palliative Medicine involvement include: heart and respiratory failure, progressive lung disease, dementia, discuss care decisions. Social: She has five children and numerous grandchildren. Her daughters [place] Navarrete and [place] help care for her. PALLIATIVE DIAGNOSES:  
1. Goals of care 2. Shortness of breath 3. Weakness 4. Altered mental status 5. End stage interstitial lung disease 6. Acute on chronic hypoxic respiratory failure 7. Acute on chronic heart failure 8. Alzheimer's dementia PLAN:  
1. Prior to seeing patient, the medical record was reviewed. 2. Patient seen at the bedside with her daughter Phoenix Navarrete present. Palliative medicine services introduced. 3. We talked about her current medical condition and next steps in her care.  Hospice has been recommended by pulmonary and tim Navarrete is agreeable to moving forward with transitioning her to hospice care. 4. We talked about how this process would work and what their goals are. Marielle Elizalde would like for the patient to be discharged home on hospice services. We talked about the concern that the patient may pass away here at the hospital as we start the oxygen weaning process. Marielle Elizalde became emotional as we talked about this. Psychosocial support offered to her. 5. Plan made to continue current care for now, but to have comfort medications available for symptom management. Hospice consult placed and Marielle Elizalde will check with her family about their preferred hospice agency. Once hospice has met with the family and a plan has been made, then we will start actively transitioning the patient to comfort measures only. Case management updated on the discussion and plan. 6. Orders placed for Morphine 2mg IV PRN and order changed for IV Ativan, dose increased from 0.26mg to 0.5mg PRN. 7. Initial consult note routed to primary continuity provider and/or primary health care team members 8. Communicated plan of care with: Palliative [place] [facility]OSS [facility] Team 
 
Addendum: 
I followed up with the patient and daughter Marielle Elizalde this afternoon. The patient is resting quietly and appears comfortable. Marielle Elizalde again confirms the plan for transitioning to hospice care with the goal of getting the patient home. We talked about resuscitation status and Marielle Elizalde is agreeable to going ahead and changing the patient to a do not resuscitate status. Durable do not resuscitate order completed and placed on chart. Code status order changed to DNR in [address] [place] [address] and nursing updated. Marielle Elizalde did not have any further questions or needs at this time. GOALS OF CARE / TREATMENT PREFERENCES:  
 
GOALS OF CARE: 
Patient/Health Care Proxy Stated Goals: Comfort TREATMENT PREFERENCES:  
Code Status: Full Code Advance Care Planning: 
[] [facility] Interdisciplinary Team has updated the ACP Navigator with Health Care Decision Maker and Patient Capacity Advance Care Planning 5/16/2021 Confirm Advance Directive Yes, on file Medical Interventions: Comfort measures Other Instructions: Other: As far as possible, the palliative care team has discussed with patient / health care proxy about goals of care / treatment preferences for patient. HISTORY:  
 
History obtained from: chart, daughter CHIEF COMPLAINT: Shortness of breath HPI/SUBJECTIVE: The patient is:  
[] Verbal and participatory [x] Non-participatory due to: altered mental status Clinical Pain Assessment (nonverbal scale for severity on nonverbal patients):  
Clinical Pain Assessment Severity: 0 Activity (Movement): Lying quietly, normal position Duration: for how long has pt been experiencing pain (e.g., 2 days, 1 month, years) Frequency: how often pain is an issue (e.g., several times per day, once every few days, constant) FUNCTIONAL ASSESSMENT:  
 
Palliative Performance Scale (PPS): PPS: 20 
 
 
 PSYCHOSOCIAL/SPIRITUAL SCREENING:  
 
Palliative IDT has assessed this patient for cultural preferences / practices and a referral made as appropriate to needs (Cultural Services, Patient Advocacy, Ethics, etc.) Any spiritual / Anglican concerns: 
[] Yes /  [x] No 
 
Caregiver Burnout: 
[] Yes /  [x] No /  [] No Caregiver Present Anticipatory grief assessment:  
[x] Normal  / [] Maladaptive ESAS Anxiety: ESAS Depression:    
 
 
 REVIEW OF SYSTEMS:  
 
Positive and pertinent negative findings in ROS are noted above in HPI. The following systems were [] reviewed / [x] unable to be reviewed as noted in HPI Other findings are noted below. Systems: constitutional, ears/nose/mouth/throat, respiratory, gastrointestinal, genitourinary, musculoskeletal, integumentary, neurologic, psychiatric, endocrine. Positive findings noted below.  
Modified ESAS Completed by: provider Fatigue: 8 Drowsiness: 8 Pain: 0 Dyspnea: 2 PHYSICAL EXAM:  
 
From RN flowsheet: 
Wt Readings from Last 3 Encounters:  
05/18/21 113 lb 15.7 oz (51.7 kg) 02/04/21 118 lb (53.5 kg) 09/03/20 119 lb 6.4 oz (54.2 kg) Blood pressure (!) 104/58, pulse 83, temperature 97.5 °F (36.4 °C), resp. rate 27, height 5' 1\" (1.549 m), weight 113 lb 15.7 oz (51.7 kg), SpO2 98 %. Pain Scale 1: Numeric (0 - 10) Pain Intensity 1: 0 Last bowel movement, if known:  
 
Constitutional: sleeping quietly, no acute distress Eyes: closed ENMT: no nasal discharge, dry mucous membranes Cardiovascular: regular rhythm Respiratory: breathing mildly labored, symmetric Musculoskeletal: no deformity Skin: warm, dry Neurologic: poorly responsive, not following commands HISTORY:  
 
Principal Problem: 
  Acute on chronic respiratory failure with hypoxia ([facility] [address] (5/15/2021) Past Medical History:  
Diagnosis Date  Bilateral dry eyes 2017 Dr. Neela Rose  Cataract Bilaterally. Dr. Judy Stacy. Dr. Maryan Cushing. Dr. Neela Rose.  Chest pain 10/2015, 2016 Dr. Kisha Nazario. negative Stress Test.  
 Dementia ([facility] [address] 07/2017  
 triggered by mild LACHELLE, depression. Dr. Pita Posadas. Dr. Carrie Altamirano  Diverticulosis 09/2008 Dr. Lang Abreu. Dr. Mi Cervantes.  DJD (degenerative joint disease) 09/30/05  
 cervical, worse C6-7, C7-T1. Left AC joint.  DJD (degenerative joint disease) of knee   
 bilateral  Dr. Savita Chand. Dr. Alireza NULL (dyspnea on exertion) 10/2015 Dr. Kisha Nazario.  Essential hypertension, benign 1968  Foster child  GIB (gastrointestinal bleeding) 11/17/14  
 due to internal hemorrhoids. Dr. Min Benitez  Heart palpitations 11/12/15  
 due to PVCs. Dr. Vinicio Moncada.  Heartburn  Hypercholesteremia  Hyperglycemia 2014 Deo Wilson Internal carotid artery stenosis 2007  
 bilateral.  Dr. Red Wilson Internal hemorrhoids 2008, 2014 Dr. Devaughn Levy. Dr. Jeffry Worthington.  Knee pain Left. Dr. Sameera Garcia.  Plantar wart 2018 Dr. Erin Felder. 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 Dr. Emily Espinal. EF 50-55%  PVD (peripheral vascular disease) ([facility] [address]  Dr. Kirstin Campos  Raynaud's phenomenon [id number]  Shortening, leg, congenital   
 left  Vitamin D deficiency 10/10/10 Past Surgical History:  
Procedure Laterality Date  HX ATHERECTOMY Left 2011  
 popliteal atherectomy and angioplasty. Dr. Kirstin Campos  HX BREAST LUMPECTOMY Right   
 benign. Dr. Ke Castro.  HX CAROTID ENDARTERECTOMY Right 2011 ICA. Dr. Kirstin Campos  HX CATARACT REMOVAL Bilateral 2016, 10/24/2016 L then R Dr. Michaelle Vazquez.  HX COLONOSCOPY  14  
 due to GIB. Dr. Jeffry Worthington.  HX COLONOSCOPY  08  
 with polypectomy. benign. Dr. Aneesh Hairston. due q 10 yrs. [facility] FISSURECTOMY.  HX GI  10/03/2008 PROCTOPLASTY due to rectal prolapse  HX HEMORRHOIDECTOMY  10/03/2008  
 internal and external  
 HX KNEE REPLACEMENT Left 2013  
 due to Severe OA. Dr. Pietro Vogt.  HX POLYPECTOMY  2008 rectal Dr. Devaughn Levy Family History Problem Relation Age of Onset  Heart Attack Mother 79  
 Heart Attack Sister   
     x 3 sisters  Heart Attack Brother   
     x 3 brothers History reviewed, no pertinent family history. Social History Tobacco Use  Smoking status: Former Smoker Packs/day: 1.00 Years: 15.00 Pack years: 15.00 Types: Cigarettes Quit date: 1968 Years since quittin.4  Smokeless tobacco: Never Used Substance Use Topics  Alcohol use: No  
  Alcohol/week: 0.0 standard drinks Allergies Allergen Reactions  Penicillins Hives Current Facility-Administered Medications Medication Dose Route Frequency  LORazepam (ATIVAN) injection 0.5 mg  0.5 mg IntraVENous Q4H PRN  
 morphine injection 2 mg  2 mg IntraVENous Q4H PRN  
 albuterol-ipratropium (DUO-NEB) 2.5 MG-0.5 MG/3 ML  3 mL Nebulization Q4H PRN  
 aspirin delayed-release tablet 81 mg  81 mg Oral DAILY  atorvastatin (LIPITOR) tablet 80 mg  80 mg Oral DAILY  clopidogreL (PLAVIX) tablet 75 mg  75 mg Oral DAILY  [Held by provider] lisinopriL (PRINIVIL, ZESTRIL) tablet 10 mg  10 mg Oral DAILY  melatonin tablet 3 mg  3 mg Oral QHS  metoprolol tartrate (LOPRESSOR) tablet 25 mg  25 mg Oral BID  sertraline (ZOLOFT) tablet 25 mg  25 mg Oral DAILY  sodium chloride (NS) flush 5-40 mL  5-40 mL IntraVENous Q8H  
 sodium chloride (NS) flush 5-40 mL  5-40 mL IntraVENous PRN  
 acetaminophen (TYLENOL) tablet 650 mg  650 mg Oral Q6H PRN Or  
 acetaminophen (TYLENOL) suppository 650 mg  650 mg Rectal Q6H PRN  polyethylene glycol (MIRALAX) packet 17 g  17 g Oral DAILY PRN  promethazine (PHENERGAN) tablet 12.5 mg  12.5 mg Oral Q6H PRN Or  
 ondansetron (ZOFRAN) injection 4 mg  4 mg IntraVENous Q6H PRN  
 enoxaparin (LOVENOX) injection 40 mg  40 mg SubCUTAneous Q24H  
 
 
 
 LAB AND IMAGING FINDINGS:  
 
Lab Results Component Value Date/Time WBC 8.4 05/16/2021 08:46 PM  
 HGB 11.7 05/16/2021 08:46 PM  
 PLATELET 189 (H) 26/95/2929 08:46 PM  
 
Lab Results Component Value Date/Time Sodium 129 (L) 05/18/2021 04:07 AM  
 Potassium 3.5 05/18/2021 04:07 AM  
 Chloride 95 (L) 05/18/2021 04:07 AM  
 CO2 25 05/18/2021 04:07 AM  
 BUN 24 (H) 05/18/2021 04:07 AM  
 Creatinine 0.53 (L) 05/18/2021 04:07 AM  
 Calcium 8.6 05/18/2021 04:07 AM  
 Magnesium 1.9 05/16/2021 08:46 PM  
 Phosphorus 2.2 (L) 05/16/2021 08:46 PM  
  
Lab Results Component Value Date/Time Alk. phosphatase 136 (H) 05/16/2021 08:46 PM  
 Protein, total 6.6 05/16/2021 08:46 PM  
 Albumin 2.1 (L) 05/16/2021 08:46 PM  
 Globulin 4.5 (H) 05/16/2021 08:46 PM  
 
Lab Results Component Value Date/Time INR 1.0 06/10/2013 02:22 PM  
 Prothrombin time 10.7 06/10/2013 02:22 PM  
 aPTT 29.6 05/16/2021 12:08 PM  
  
No results found for: IRON, FE, TIBC, IBCT, PSAT, FERR Lab Results Component Value Date/Time pH 7.46 (H) 05/15/2021 09:29 PM  
 PCO2 32 (L) 05/15/2021 09:29 PM  
 PO2 77 (L) 05/15/2021 09:29 PM  
 
No components found for: [place] Lab Results Component Value Date/Time CK 33 05/05/2010 08:45 AM  
  
 
 
   
 
Total time: 50 min Counseling / coordination time, spent as noted above: 35 min 
> 50% counseling / coordination?: yes Prolonged service was provided for  []30 min   []75 min in face to face time in the presence of the patient, spent as noted above. Time Start:  
Time End:  
Note: this can only be billed with 14514 (initial) or 63498 (follow up). If multiple start / stop times, list each separately.

## 2021-05-18 NOTE — PROGRESS NOTES
Problem: Falls - Risk of 
Goal: *Absence of Falls Description: Document Lauren Ruffin Fall Risk and appropriate interventions in the flowsheet. Outcome: Progressing Towards Goal 
Note: Fall Risk Interventions: 
Mobility Interventions: Communicate number of staff needed for ambulation/transfer Mentation Interventions: Adequate sleep, hydration, pain control Medication Interventions: Evaluate medications/consider consulting pharmacy Elimination Interventions: Call light in reach History of Falls Interventions: Bed/chair exit alarm Problem: Breathing Pattern - Ineffective Goal: *Absence of hypoxia Outcome: Progressing Towards Goal 
Goal: *Use of effective breathing techniques Outcome: Progressing Towards Goal 
  
Problem: Patient Education: Go to Patient Education Activity Goal: Patient/Family Education Outcome: Progressing Towards Goal

## 2021-05-18 NOTE — HOSPICE
[facility] 
[phone] Patient Name: Valeri Stone YOB: 1935 Age: 81 y.o. 190 Yvonne Bustos RN Note:  Hospice consult noted. Chart reviewed. Plan of care discussed with patients nurse & care manager. In to meet with patient and her daughterEddie. Discussed Hospice philosophy, general plan of care, levels of care, services and on call procedures. Family information packet provided & reviewed with Mitchell Giles. Mitchell Giles has reached out to [employer] with hopes to get her mother back home. Family have a friend with [employer]. Discussed hospice care in the hospital setting vs home with hospice support. Patient is meeting inpatient hospice criteria, if family elect to enroll with [facility]. Discussed if patient is able to return home, [employer] will assist with transferring patient to hospice of choice. Plan: Family to discuss hospice care, and goals of care. Plan to re-visit with daughter bedside, this afternoon. 17:00: Patient daughter, Mitchell Giles called this nurse to review plan of care under hospice. Mitchell Giles shared that she and her family would like to admit patient into inpatient hospice care. Offered to visit with family within the next 20 minutes. Dr. Paolo Martin to review patient for hospice diagnosis. Verbal CTI received for the hospice diagnosis of Interstitial Cutler Disease. 17:25: Bedside review with patient's daughter Mitchell Giles. Plan to meet with patient family on 5/19/2021. Discussed family electing a member to sign hospice consents, since pt does not have advanced directives in place. Plan to admit into hospice services GIP, and provide bedside symptom management as patient weans from High Flow oxygen. Goal is for patient to wean to oxygen flow that can be safely managed for transport and at home. Reviewed hospice weaning while hospice staff available to support patient, family and staff.  Mitchell Giles states she is in agreement with this plan of care. Edmund Piedra has hospice contact information. Thank you for the opportunity to be of service to this patient. Talia Ireland RN, [facility] Hospice Nurse Liaison [phone] [place] [phone] Office

## 2021-05-18 NOTE — PROGRESS NOTES
Spiritual Care Assessment/Progress Note [address] 
 
 
NAME: Leila Mays      MRN: [id number] AGE: 81 y.o. SEX: female Mormonism Affiliation: [facility]  
Language: [place] 5/18/2021     Total Time (in minutes): 25 Spiritual Assessment begun in [address] [facility] through conversation with: 
  
    []Patient        [x] Family    [] Friend(s) Reason for Consult: Palliative Care, Family Care Spiritual beliefs: (Please include comment if needed) [x] Identifies with a suzanne tradition:     
   [] Supported by a suzanne community:        
   [] Claims no spiritual orientation:       
   [] Seeking spiritual identity:            
   [] Adheres to an individual form of spirituality:       
   [] Not able to assess:                   
 
    
Identified resources for coping:  
   [x] Prayer                           
   [] Music                  [] Guided Imagery [x] Family/friends                 [] Pet visits [] Devotional reading                         [] Unknown 
   [] Other:                                          
 
 
Interventions offered during this visit: (See comments for more details) Family/Friend(s): Affirmation of emotions/emotional suffering, Affirmation of suzanne, Coping skills reviewed/reinforced, End of life issues discussed, Iconic (affirming the presence of God/Higher Power), Normalization of emotional/spiritual concerns, Prayer (assurance of) Plan of Care: 
 
 [] Support spiritual and/or cultural needs  
 [] Support AMD and/or advance care planning process    
 [] Support grieving process 
 [] Coordinate Rites and/or Rituals  
 [] Coordination with community clergy  [] No spiritual needs identified at this time 
 [] Detailed Plan of Care below (See Comments)  [] Make referral to Music Therapy 
[] Make referral to Pet Therapy    
[] Make referral to Addiction services 
[] Make referral to [facility] 
[] Make referral to Spiritual Care Partner 
[] No future visits requested       
[x] Follow up visits as needed Visited pt for initial spiritual assessment. Pt remained asleep for the duration of the visit. Pt's daughter Marielle Elizalde at bedside. Marielle Elizalde reports that the pt has five children and numerous grand children. She is a person of [address] [facility] and her  may visit. Marielle Elizalde became tearful when talking about her mother's health and realizes that her mother is likely near the end of life. Offered presence and listened as she spoke of her mother's life and relationship with her and the family. Chaplain Tahmina, MDiv, MS, [facility] 
[phone] [phone])

## 2021-05-18 NOTE — HOSPICE
[employer] [facility] Those in Need 
[phone] Patient Name: Jolie Maloney YOB: 1935 Age: 81 y.o. [employer] RN Note:  Hospice consult received, reviewing chart. Will follow up with Unit Nurse and Care Manager to discuss plan of care, patient status and discharge disposition within the hour. Thank you for the opportunity to be of service to this patient. Dipak Morley RN, [facility] Hospice Nurse Liaison [phone] [place] [phone] Office

## 2021-05-18 NOTE — PROGRESS NOTES
The CM spoke with Palliative Care NP- family has decided on hospice, currently on High Flow Oxygen, goal would be to discharge home on home hospice, however, would have to monitor how patient tolerates weaning of High Flow- high risk for decompensation while IP, will meet with family and monitor- see additional CM notes. 15:38 p.m.- CM spoke with [facility] RN- The family's goal is to return home with hospice- they prefer [facility] [address] [place] [address], however, TBD at this time if home will be an option pending the weaning of High Flow- patient may be GIP/IP hospice criteria- 
 
CM spoke with Moni Stewart, Hospice RN- the family is going to discuss together, [facility], 5/19- IF patient needs IP Hospice, [facility], and if patient stabilizes to transition home, they can transfer care to [facility] of [address] [place] [address]. The CM will send referral to Dereck Sanchez per family report/[facility]- will monitor how patient tolerates weaning of High Flow. 16:53 p.m.- CM received call from [email or web address] with [facility] [address] [place] [address] ([phone]), they have been following the patient as well referred by Pulmonary, however, [employer] noted patient is on High Flow- will keep [employer] updated if patient stabilizes, will monitor for GIP possibly- TBD, [facility] pending GIP/IP hospice needs.   
 
 
Garnell Bloch, MSW

## 2021-05-18 NOTE — PROGRESS NOTES
Bedside and Verbal shift change report given to April Rn (oncoming nurse) by Wallace Tsai (offgoing nurse). Report included the following information SBAR, Kardex, ED Summary, Procedure Summary, Intake/Output, MAR, Recent Results and Cardiac Rhythm NSR.

## 2021-05-18 NOTE — PROGRESS NOTES
Hospitalist Progress Note Alva Pinto MD 
Answering service: [phone] OR [phone] from in house phone Date of Service:  2021 NAME:  Ina Ledesma :  1935 MRN:  [id number] Admission Summary:  
85F p/w sob Interval history / Subjective:  
Patient seen and examined at bedside, feels ok, stable on HFNC same as yesterday. Appears comfortable. She has got advanced lung disease will consult palliative care. Assessment & Plan: #. Acute on chronic CHF: - Diuretics iv, strict I&Os, daily weight. TTE pending. Cardio following. #. Acute on chronic hypoxic respiratory failure: 2nd to above. With baseline of pulm fibrosis. - CTA lungs: no PEs, chronic interstitial disease, some new changes ? edema. Evidence of pulm HTN. - Wean down on supplemental O2 as able. BNP >10K. Recheck prior to dc. PT/OT  
- Pulmonology following- known to Dr Elyssa Park. As expected her resp condition is endstage- will consult palliative. She is in my opinion appropriate for hospice care. #. CAD: s/p Stents- placed recently in [facility]. home regimen, monitor #. Elevated trops: likely demand ischemia 2nd to above. Cardio eval. 
#. HTN: chronic, stable, Home regimen, PRN BP meds. Monitor #. HypoKalemia: Acute, replace, monitor #. HypoNatremia: mild, monitor #. HLD: chronic, Stable, Home regimen #. Dementia: mild/mod, Stable, Home regimen, frequent re-orientation, encourage family to stay in. Code status: Full DVT prophylaxis: SCDs Care Plan discussed with: Patient/Family and Nurse Disposition: TBD >2days Hospital Problems  Date Reviewed: 2021 Codes Class Noted POA * (Principal) Acute on chronic respiratory failure with hypoxia (HCC) ICD-10-CM: J96.21 
ICD-9-CM: 518.84, 799.02  5/15/2021 Yes Review of Systems:  
Pertinent items are mentioned in interval history.  
 
Vital Signs:  
 Last 24hrs VS reviewed since prior progress note. Most recent are: 
Visit Vitals BP (!) 104/58 Pulse 83 Temp 97.5 °F (36.4 °C) Resp 27 Ht 5' 1\" (1.549 m) Wt 51.7 kg (113 lb 15.7 oz) SpO2 98% BMI 21.54 kg/m² Intake/Output Summary (Last 24 hours) at 5/18/2021 5947 Last data filed at 5/18/2021 9549 Gross per 24 hour Intake 200 ml Output 200 ml Net 0 ml Physical Examination:  
Evaluated face to face and examined 05/18/21 General:  Alert, partially oriented, some distress with dyspnea, elderly, frail BW 
Card:  S1, S2 without murmur, good peripheral perfusion Resp:  No accessory muscle use, Good AE, no wheezes. no crepitations Abd:  Soft, non-tender, non-distended, BS+ Extremities:  No cyanosis or clubbing, no significant edema Neuro:  Grossly normal, no focal neuro deficits, follows commands, mostly oriented, can answer simple questions. Psych:  Good insight, not agitated. Data Review:  
 Review and/or order of clinical lab test 
Review and/or order of tests in the radiology section of CPT Review and/or order of tests in the medicine section of CPT Labs:  
 
Recent Labs 05/16/21 2046 05/16/21 
8179 WBC 8.4 7.2 HGB 11.7 11.8 HCT 35.8 36.2 * 356 Recent Labs 05/18/21 
0407 05/16/21 2046 05/16/21 
7259 * 131* 132* K 3.5 4.0 2.8*  
CL 95* 97 96* CO2 25 26 23 BUN 24* 21* 16  
CREA 0.53* 0.69 0.56 GLU 78 97 74 CA 8.6 8.4* 8.7 MG  --  1.9 1.9 PHOS  --  2.2* 3.6 Recent Labs 05/16/21 2046 05/16/21 
0307 05/15/21 
2031 ALT 24 26 26 * 122* 120* TBILI 0.7 1.1* 1.0 TP 6.6 6.7 6.7 ALB 2.1* 2.3* 2.4*  
GLOB 4.5* 4.4* 4.3* Recent Labs 05/16/21 
1208 05/16/21 
2333 APTT 29.6 27.9 No results for input(s): FE, TIBC, PSAT, FERR in the last 72 hours. Lab Results Component Value Date/Time Folate 10.2 09/13/2019 08:33 AM  
  
Recent Labs 05/15/21 
2129 PH 7.46* PCO2 32* PO2 77* Recent Labs   05/16/21 [id number] 05/16/21 
0307 05/15/21 
2031 TROIQ 0.05* 0.07* 0.09* Lab Results Component Value Date/Time Cholesterol, total 203 (H) 09/13/2019 08:33 AM  
 HDL Cholesterol 80 09/13/2019 08:33 AM  
 LDL, calculated 112 (H) 09/13/2019 08:33 AM  
 Triglyceride 55 09/13/2019 08:33 AM  
 CHOL/HDL Ratio 2.8 05/05/2010 08:45 AM  
 
Lab Results Component Value Date/Time Glucose (POC) 136 (H) 01/19/2011 07:35 PM  
 
Lab Results Component Value Date/Time Color Pale Yellow 02/04/2021 04:04 PM  
 Appearance Clear 10/08/2019 05:00 PM  
 Specific gravity 1.020 02/04/2021 04:04 PM  
 Specific gravity <1.005 09/23/2019 07:03 PM  
 pH (UA) 6 02/04/2021 04:04 PM  
 Protein Negative 02/04/2021 04:04 PM  
 Glucose NEGATIVE  09/23/2019 07:03 PM  
 Ketone Negative 02/04/2021 04:04 PM  
 Bilirubin Negative 02/04/2021 04:04 PM  
 Urobilinogen Negative 02/04/2021 04:04 PM  
 Nitrites Negative 02/04/2021 04:04 PM  
 Leukocyte Esterase 1+ (A) 02/04/2021 04:04 PM  
 Epithelial cells FEW 11/17/2014 12:47 PM  
 Bacteria Occasional (A) 02/04/2021 04:04 PM  
 WBC 3-5 (A) 02/04/2021 04:04 PM  
 RBC 2-5 (A) 02/04/2021 04:04 PM  
 
Medications Reviewed:  
 
Current Facility-Administered Medications Medication Dose Route Frequency  albuterol-ipratropium (DUO-NEB) 2.5 MG-0.5 MG/3 ML  3 mL Nebulization Q4H PRN  
 aspirin delayed-release tablet 81 mg  81 mg Oral DAILY  atorvastatin (LIPITOR) tablet 80 mg  80 mg Oral DAILY  clopidogreL (PLAVIX) tablet 75 mg  75 mg Oral DAILY  [Held by provider] lisinopriL (PRINIVIL, ZESTRIL) tablet 10 mg  10 mg Oral DAILY  melatonin tablet 3 mg  3 mg Oral QHS  metoprolol tartrate (LOPRESSOR) tablet 25 mg  25 mg Oral BID  sertraline (ZOLOFT) tablet 25 mg  25 mg Oral DAILY  sodium chloride (NS) flush 5-40 mL  5-40 mL IntraVENous Q8H  
 sodium chloride (NS) flush 5-40 mL  5-40 mL IntraVENous PRN  
 acetaminophen (TYLENOL) tablet 650 mg  650 mg Oral Q6H PRN  Or  
 acetaminophen (TYLENOL) suppository 650 mg  650 mg Rectal Q6H PRN  polyethylene glycol (MIRALAX) packet 17 g  17 g Oral DAILY PRN  promethazine (PHENERGAN) tablet 12.5 mg  12.5 mg Oral Q6H PRN Or  
 ondansetron (ZOFRAN) injection 4 mg  4 mg IntraVENous Q6H PRN  
 enoxaparin (LOVENOX) injection 40 mg  40 mg SubCUTAneous Q24H  
 LORazepam (ATIVAN) injection 0.26 mg  0.26 mg IntraVENous Q4H PRN  
______________________________________________________________________ EXPECTED LENGTH OF STAY: - - - 
ACTUAL LENGTH OF STAY:          3 Jarad Navarro MD

## 2021-05-18 NOTE — PROGRESS NOTES
0800-Patient pulled off hi-flow NC, had legs over the side of the bed, and was yelling out from the room, sats dropped to the 60's, grandson sleeping at bedside. Non-rebreather placed in addition to hi-flow in order to get sats over 90%. 0900-Patient's daughter called multiple times requesting to come up,  informed daughter that the grandson was currently here and the policy is one visitor per day. Daughter stated her mother has dementia and needed someone there to assist her. Per report by nightshift nurse multiple visitors came per day prior to today and the patient attempted to get out of bed multiple times, pulled off hi-flow, screaming out, and the bed alarm was going off while daughter was present. This RN informed daughter one visitor per day per policy, daughter spoke to charge RN whom allowed family to swap out. 0950-Patient resting, grandson sleeping at bedside. 1020-Daughter at bedside.

## 2021-05-18 NOTE — PROGRESS NOTES
Cardiology Progress Note Admit Date: 5/15/2021 Admit Diagnosis: Acute on chronic respiratory failure with hypoxia ([facility] [address] [J96.21] Date: 5/18/2021     Time: 12:03 PM 
Primary cardiologist:  Dr. Celestino Terry (last saw 2016). HPI: 69-year-old woman with a past medical history significant for pulmonary fibrosis, on 2 L of oxygen at home around the clock; Alzheimer's dementia; dyslipidemia; CEA 2011,  hypertension; coronary artery disease, status post stent placement, was in her usual state of health until the day of her presentation at the emergency room when the patient developed sudden worsening of her shortness of breath. The patient has undergone pci in 4/13/21 at [facility] for STEMI. Records from [facility] indicate PCI proximal to mid left circumflex including/arthrectomy, penumbra mechanical thrombectomy and PARAMJIT to LCx. .  There was residual moderate to severe CAD involving LAD. (cath by Dr. Robin Kim ([facility]). Subjective: Breathing is about the same, still with SOB. Family has met with Palliative care and Hospice is under consideration- hospice consult pending. She is now on high flow O2 35 LPM. . Assessment and Plan 1. SOB 
 -unchanged today. 
 -Suspect related to combination of her pulmonary fibrosis and superimposed pulmonary edema on admission.  
 -Do not suspect CHF- suspect that severe pulmonary fibrosis mostly involved. -Echo with EF 01-30%, normal diastolic function, mild hypok basal inferior walls. 
  -Lasix stopped today due to hyponatremia, hypotension, required albumin overnight 
   
2. CAD:  
 -Status post STEMI,  PCI/arthrectomy PARAMJIT of LCX. 4/13/21 2021.  
 -Continue aspirin Plavix and Lipitor, Low dose Lopressor as BP allows  -Minimal troponin elevation with flat trend- EKG NSR with inferior T wave abnormality. 
 -Suspect minimal troponin elevation with flat trend was due to fluid overload with underlying pulmonary fibrosis.  
  
3.  Pulmonary pulmonary fibrosis:  
 -Continue supportive therapy.   
 -Followed by Dr. Vitaliy Gross 4. Alzheimer dementia: 
  
5. Hyperlipidemia:  
 - Lipitor. 6. Advanced Directives:  
 -Currently full code 
 -Palliative care following. Hospice consult pending. Heaven Tubbs. Sheyla, MARIBELL 5/18/2021 1:53 PM  
Cardiology Attending:Patient seen and examined. I agree with NP assessment and plans. Nothing further to add. Rubén Lombardi MD 5/18/2021 5:47 PM  
 
 
Cardiac testing history: 
4/13/21: STEMI, PCI/arthrectomy PARAMJIT to LCx, diffuse mod-severe LAD disease- medical management. Echo 4/13/21:  EF 55-65%, NWMA, PAS 38mmHg PMH Past Medical History:  
Diagnosis Date  Bilateral dry eyes 2017 Dr. Chrissie Lagunas  Cataract Bilaterally. Dr. Tres Dias. Dr. Paco Yun. Dr. Chrissie Lagunas.  Chest pain 10/2015, 2016 Dr. Yolanda Luna. negative Stress Test.  
 Dementia ([address] 07/2017  
 triggered by mild LACHELLE, depression. Dr. Michi Crow. Dr. Zachary Hairston  Diverticulosis 09/2008 Dr. Eleanor Solis. Dr. Edison aNscimento.  DJD (degenerative joint disease) 09/30/05  
 cervical, worse C6-7, C7-T1. Left AC joint.  DJD (degenerative joint disease) of knee   
 bilateral  Dr. Rodrick May. Dr. Russel NULL (dyspnea on exertion) 10/2015 Dr. Yolanda Luna.  Essential hypertension, benign 1968  Foster child  GIB (gastrointestinal bleeding) 11/17/14  
 due to internal hemorrhoids. Dr. Gooden Epp  Heart palpitations 11/12/15  
 due to PVCs. Dr. Mabel Velasco.  Heartburn  Hypercholesteremia  Hyperglycemia 2014 [address] [place] [address] Internal carotid artery stenosis 2007  
 bilateral.  Dr. Ashley Rios [address] [place] [address] Internal hemorrhoids 09/2008, 11/2014 Dr. Mike Sosa. Dr. Nicanor Bedolla.  Knee pain Left. Dr. Cruzito Amaro.  Plantar wart 2018 Dr. Anita Galdamez. 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 Dr. Yolanda Luna.   EF 50-55%  [facility] 2007 Dr. Rc Banks  Raynaud's phenomenon 26  Shortening, leg, congenital   
 left  Vitamin D deficiency 10/10/10 Social Hx Social History Socioeconomic History  Marital status:  Spouse name: Not on file  Number of children: Not on file  Years of education: Not on file  Highest education level: Not on file Occupational History  Not on file Social Needs  Financial resource strain: Not on file  Food insecurity Worry: Not on file Inability: Not on file  Transportation needs Medical: Not on file Non-medical: Not on file Tobacco Use  Smoking status: Former Smoker Packs/day: 1.00 Years: 15.00 Pack years: 15.00 Types: Cigarettes Quit date: 1968 Years since quittin.4  Smokeless tobacco: Never Used Substance and Sexual Activity  Alcohol use: No  
  Alcohol/week: 0.0 standard drinks  Drug use: No  
  Types: Prescription  Sexual activity: Not Currently Partners: Male Birth control/protection: Abstinence Lifestyle  Physical activity Days per week: Not on file Minutes per session: Not on file  Stress: Not on file Relationships  Social connections Talks on phone: Not on file Gets together: Not on file Attends Sabianist service: Not on file Active member of club or organization: Not on file Attends meetings of clubs or organizations: Not on file Relationship status: Not on file  Intimate partner violence Fear of current or ex partner: Not on file Emotionally abused: Not on file Physically abused: Not on file Forced sexual activity: Not on file Other Topics Concern  Not on file Social History Narrative  Not on file Objective: 
  
 Physical Exam: 
             
Visit Vitals BP (!) 104/58 Pulse 83 Temp 97.5 °F (36.4 °C) Resp 27 Ht 5' 1\" (1.549 m) Wt 113 lb 15.7 oz (51.7 kg) LMP  (LMP Unknown) SpO2 98% BMI 21.54 kg/m² General Appearance:   Well developed, well nourished,alert 
 individual in no acute distress. Ears/Nose/Mouth/Throat:    Hearing grossly normal. 
  
    Neck:  Supple. Chest:    course bronchial breath sounds. Cardiovascular:   Regular rate and rhythm, S1, S2 normal, no murmur. Abdomen:    Soft, non-tender, . Extremities:  No edema bilaterally. Skin:  Warm and dry. Telemetry: NSR Data Review:  
 Labs:   
Recent Results (from the past 24 hour(s)) METABOLIC PANEL, BASIC Collection Time: 05/18/21  4:07 AM  
Result Value Ref Range Sodium 129 (L) 136 - 145 mmol/L Potassium 3.5 3.5 - 5.1 mmol/L Chloride 95 (L) 97 - 108 mmol/L  
 CO2 25 21 - 32 mmol/L Anion gap 9 5 - 15 mmol/L Glucose 78 65 - 100 mg/dL BUN 24 (H) 6 - 20 MG/DL Creatinine 0.53 (L) 0.55 - 1.02 MG/DL  
 BUN/Creatinine ratio 45 (H) 12 - 20 GFR est AA >60 >60 ml/min/1.73m2 GFR est non-AA >60 >60 ml/min/1.73m2 Calcium 8.6 8.5 - 10.1 MG/DL  
SAMPLES BEING HELD Collection Time: 05/18/21  4:07 AM  
Result Value Ref Range SAMPLES BEING HELD 1LAV   
 COMMENT Add-on orders for these samples will be processed based on acceptable specimen integrity and analyte stability, which may vary by analyte. Radiology:  
 
  
Current Facility-Administered Medications Medication Dose Route Frequency  LORazepam (ATIVAN) injection 0.5 mg  0.5 mg IntraVENous Q4H PRN  
 morphine injection 2 mg  2 mg IntraVENous Q4H PRN  
 albuterol-ipratropium (DUO-NEB) 2.5 MG-0.5 MG/3 ML  3 mL Nebulization Q4H PRN  
 aspirin delayed-release tablet 81 mg  81 mg Oral DAILY  atorvastatin (LIPITOR) tablet 80 mg  80 mg Oral DAILY  clopidogreL (PLAVIX) tablet 75 mg  75 mg Oral DAILY  [Held by provider] lisinopriL (PRINIVIL, ZESTRIL) tablet 10 mg  10 mg Oral DAILY  melatonin tablet 3 mg  3 mg Oral QHS  metoprolol tartrate (LOPRESSOR) tablet 25 mg  25 mg Oral BID  sertraline (ZOLOFT) tablet 25 mg  25 mg Oral DAILY  sodium chloride (NS) flush 5-40 mL  5-40 mL IntraVENous Q8H  
 sodium chloride (NS) flush 5-40 mL  5-40 mL IntraVENous PRN  
 acetaminophen (TYLENOL) tablet 650 mg  650 mg Oral Q6H PRN Or  
 acetaminophen (TYLENOL) suppository 650 mg  650 mg Rectal Q6H PRN  polyethylene glycol (MIRALAX) packet 17 g  17 g Oral DAILY PRN  promethazine (PHENERGAN) tablet 12.5 mg  12.5 mg Oral Q6H PRN Or  
 ondansetron (ZOFRAN) injection 4 mg  4 mg IntraVENous Q6H PRN  
 enoxaparin (LOVENOX) injection 40 mg  40 mg SubCUTAneous Q24H Heaven Tubbs. MARIBELL [facility] [address] [place] [address], [address] Philipp Padilla 
 [phone]

## 2021-05-19 PROBLEM — J96.00 ACUTE RESPIRATORY FAILURE (HCC): Status: ACTIVE | Noted: 2021-01-01

## 2021-05-19 NOTE — HOSPICE
[employer] Good Help to Those in Need 
[phone] Patient Name: Rose Parnell YOB: 1935 Age: 81 y.o. [employer] RN Note:  Hospice consult noted. Chart reviewed. Plan of care discussed with patients nurse & care manager. In to meet with daughters Adri Reyes and Petr Rg. Discussed Hospice philosophy, general plan of care, levels of care, services and on call procedures. Family information packet provided & reviewed with daughters Family wish to move forward with high flow wean under [facility] hospice at this time. Thank you for the opportunity to be of service to this patient.

## 2021-05-19 NOTE — PROGRESS NOTES
[facility] 5 panel on-site drug screen   JAVIER: Pt accepted with [facility]; GIP; Awaiting hospice bed RUR: 8% 
 
1040-CM was approached by [facility] hospice liaison, Lissy Santos, advising that pt has been accepted for [facility] Inpatient hospice. BRI Aguilar Hospice liaison advised pt will be GIP awaiting inpatient hospice bed availability.  
Prim Found RN BSN CCM

## 2021-05-19 NOTE — DISCHARGE INSTRUCTIONS
Discharge Instructions       PATIENT ID: Rose Parnell  MRN: [id number]   YOB: 1935    DATE OF ADMISSION: 5/15/2021  8:12 PM    DATE OF DISCHARGE: 5/19/2021    PRIMARY CARE PROVIDER: Kofi Horne NP     ATTENDING PHYSICIAN: Chavez Caldwell MD  DISCHARGING PROVIDER: Tatiana Lakhani MD    To contact this individual call [phone] and ask the  to page. If unavailable ask to be transferred the Adult Hospitalist Department. DISCHARGE DIAGNOSES advanced /end stage lung disease. Dc to IP hospice    CONSULTATIONS: IP CONSULT TO CARDIOLOGY  IP CONSULT TO PULMONOLOGY  IP CONSULT TO PALLIATIVE CARE - PROVIDER    PROCEDURES/SURGERIES: * No surgery found *    FOLLOW UP APPOINTMENTS:   Follow-up Information     Follow up With Specialties Details Why Contact Info    Kofi Horne NP Internal Medicine   [address]  [phone]           hospice           ADDITIONAL CARE RECOMMENDATIONS: follow up with PCP and hospice team    DIET: Resume previous diet    DISCHARGE MEDICATIONS:  Per hospice team    · It is important that you take the medication exactly as they are prescribed. · Keep your medication in the bottles provided by the pharmacist and keep a list of the medication names, dosages, and times to be taken in your wallet. · Do not take other medications without consulting your doctor. NOTIFY YOUR PHYSICIAN FOR ANY OF THE FOLLOWING:   Fever over 101 degrees for 24 hours. Chest pain, shortness of breath, fever, chills, nausea, vomiting, diarrhea, change in mentation, falling, weakness, bleeding. Severe pain or pain not relieved by medications. Or, any other signs or symptoms that you may have questions about. It was our pleasure to help take care of you and we hope you get well very soon.     DISPOSITION:    Home With:   OT  PT  HH  RN       SNF/Inpatient Rehab/LTAC   x Independent/assisted living    Hospice    Other:     CDMP Checked:   Yes x PROBLEM LIST Updated:  Yes x     Signed:   Bryn Shook MD  5/19/2021  9:32 AM

## 2021-05-19 NOTE — HOSPICE
[facility] Good Help to Those in Need 
[phone] Patient Name: Ina Ledesma YOB: 1935 Age: 81 y.o. [facility] RN Note:  Hospice consult noted. Chart reviewed. Plan of care discussed with patients nurse & care manager. In to meet with daughters Stephanie Katt and Nir Osei. Discussed Hospice philosophy, general plan of care, levels of care, services and on call procedures. Family information packet provided & reviewed with daughters. MSW Kristina Doty and myself discussed plan with family. They wish to move forward with GIP hospice admission and wean from high flow. Thank you for the opportunity to be of service to this patient. Kristy Alejo RN Clinical Nurse Liaison [facility] [phone]

## 2021-05-19 NOTE — [facility] IDG SOCIAL WORKER NOTES
Pt is an 80 y/o AAF with a hospcie diagnosis of acute respiratory failure. Pt and multiple comorbidiets including pulmonary fibrosis, AD, CAD s/p sents, and acute congestive heart failure. Pt was admitted 5/16/2021. Pt is  and has 5 children Problem: Daughters are in need of support in coping with mother's EOL due to acute respiratory failure. Intervention: LCSW will provide emotional support and supportive counseling for Violetta Watson, and family in coping with pts EOL due to acute respiratory failure. Plan: Continue to provide emotional support and counseling for daughters/family as pt is weaned and nears EOL. Problem: Daughters are experiencing anticipatory grief and loss due to mother's anticipated death. Intervention: LCSW will provided grief support for Violetta Watson and family in processing anticipatory grief and loss in the context of pts pending death Plan: LCSW will continue to provide grief support for daughter and family as pts nears EOL. Problem: Daughters are in need of support and education to better understand the EOL process. Intervention: LCSW will provide support and education for daughters/family  to better understand the EOL process. Plan: LCSW will continue to provide support and education as well as normalize EOL symptoms. Low risk for Bereavement, family accepting and well supported.  TBD Kristina Doty LCSW, MSG [facility] [phone]

## 2021-05-19 NOTE — PROGRESS NOTES
Bedside shift change report given to ANTHONY Cortez (oncoming nurse) by ANTHONY Toussaint (offgoing nurse). Report included the following information SBAR, Kardex, Intake/Output, MAR, Accordion and Cardiac Rhythm NSR.

## 2021-05-19 NOTE — ROUTINE PROCESS
Bedside and Verbal shift change report given to Lizette Fitzgerald (oncoming nurse) by Arlene Thompson (offgoing nurse). Report included the following information SBAR. Patient on Hospice, providing Ativan and Morphine every 15min to maintain respirations <20. Weening oxygen to allow for nasal cannula 4 family members at bedside. Comfort cart ordered.

## 2021-05-19 NOTE — H&P
[employer] Good Help to Those in Need 
[phone] Patient Name: Livier Cohen YOB: 1935 Date of Provider Hospice Visit: 05/19/21 Level of Care:   [x] General Inpatient (GIP)    [] Routine   [] Respite Current Location of Care: 
[x] [facility] [] [facility] [] [facility] [] [facility] [] Hospice [facility], patient referred from: 
[] [facility] [] [facility] [] [facility] [] [facility] [] Home [] Other:  
 
Date of Original Hospice Admission: 5/19/2021 Hospice Medical Director at time of admission: Rica Melara MD 
 
Principle Hospice Diagnosis: Acute respiratory failure Diagnoses RELATED to the terminal prognosis: Interstitial lung disease Other Diagnoses: CAD s/p STEMI in 04/2021, HTN, HLD, Dementia No tobacco use since 1968 Nicole Cohen is a 81y.o. year old who was admitted to [facility]. She has chronic interstitial lung disease and wears oxygen at baseline. Of note she was also treated for a STEMI with PCI/arthrectomy PARAMJIT of LCX in 4/2021. She was admitted to the hospital on 5/15/21 with shortness of breath and acute on chronic respiratory failure with hypoxia. This was suspected to be related to her ILD with superimposed pulmonary edema. She received medication for diuresis but ultimately did not improve and was not able to be weaned from HFNC. Palliative Care evaluation was recommended by her Pulmonologist. The family chose to pursue comfort measures with the support of Hospice. Objective information:  
5/16/2021 CTA Chest: 
IMPRESSION 1. No evidence of acute pulmonary thromboembolism. 2. Background of chronic interstitial lung disease with suspected superimposed 
acute interstitial edema or other interstitial pneumonitis. No pleural fluid. Cardiomegaly. Pulmonary arterial hypertension. HOSPICE ASSESSMENT Active Symptoms and Issues: 
-Generalized pain 
-Labored breathing and tachypnea 
-Anxiety/agitation/restlessness 
-Decreased responsiveness/Unresponsiveness 
-Hospice care Since midnight and prior to my exam pt has received two doses ativan 0.5 mg IV and morphine 2 mg IV for symptoms. Pt would benefit from GIP LOC for skilled monitoring of nonverbal signs of symptoms and administration/titration of parenteral medications for sx mgt. PLAN 1. GIP level of care needed for symptoms necessitating frequent skilled nursing assessment and administration of parenteral medications. Needs monitoring for need to titrate sx mgt regimen for optimization of comfort. 2. Pt is at high risk of rapid decline and death due to terminal disease process as we wean her HFNC. 3. Our team discussed with family. We will ensure patient's comfort prior to weaning her oxygen further. We pre-medicated with a dose of ativan 1 mg IV and morphine 4 mg IV given persistent symptoms on a lower dose. 4. For pain and labored breathing we have titrated morphine to 4 mg IV every 3 hours routinely and prn pain, air hunger. 5. For restlessness and labored breathing we will schedule ativan 1 mg IV every 3 hours routinely and prn anxiety, agitation, restlessness. 6. We will have glycopyrrolate 0.2 mg IV available prn to help prevent formation of new secretions. Recommend recovery positioning for drainage of current secretions. Gentle anterior suction of secretions okay (i.e. suction around lips/teeth). Recommend avoiding deep suction to prevent irritation, pain, bleeding. 7. Prn bisacodyl for constipation. 8. Prn toradol for fever. Recommend utilizing cool washcloth on forehead and recommend against using ice packs. 5. Counseled family on nonverbal signs of pain and restlessness. 10. D/c all previous medications which are not contributing to comfort at this time. 6.  and SW to support family needs. 12. Disposition: anticipate that pt will decline and die in the coming hours-days without stabilizing enough for care in the home setting 13.  Hospice plan of care discussed with hospice team, family at bedside Prognosis estimated based on today's clinical assessment is:  
[x] Hours to Days   
[] Days to Weeks   
[] Other: 
 
 GOALS OF CARE Patient/Medical POA stated Goal of Care: optimize patient comfort [x] I have reviewed and/or updated ACP information in the Advance Care Planning Navigator. This information is available in the [address] link in the patient's chart header. Primary Decision MakeMonserrat Cordova - [phone] Resuscitation Status: DNR If DNR is there a Durable DNR on file? : [] Yes [] No (If no, complete Durable DNR) HISTORY History obtained from: chart review, hospice team, family CHIEF COMPLAINT: patient cannot give as unresponsive The patient is:  
[] Verbal 
[] Nonverbal 
[x] Unresponsive HPI/SUBJECTIVE:   
5/19: patient unresponsive. Wean from [address] has begun. Family feels comfort medications are helping her symptoms. REVIEW OF SYSTEMS The following systems were: [] reviewed  [x] unable to be reviewed as pt is unresponsive Positive ROS include bold items: 
Constitutional: fatigue, weakness, in pain, short of breath Ears/nose/mouth/throat: increased airway secretions Respiratory:shortness of breath, wheezing Gastrointestinal:poor appetite, nausea, vomiting, abdominal pain, constipation, diarrhea Musculoskeletal:pain, deformities, swelling legs Neurologic:confusion, hallucinations, weakness Psychiatric:anxiety, feeling depressed, poor sleep Endocrine: increased thirst, increased hunger Adult Non-Verbal Pain Assessment Score: 2 Face 
[] 0   No particular expression or smile 
[x] 1   Occasional grimace, tearing, frowning, wrinkled forehead 
[] 2   Frequent grimace, tearing, frowning, wrinkled forehead Activity (movement) [x] 0   Lying quietly, normal position 
[] 1   Seeking attention through movement or slow, cautious movement 
[] 2   Restless, excessive activity and/or withdrawal reflexes Guarding 
[x] 0   Lying quietly, no positioning of hands over areas of body 
[] 1   Splinting areas of the body, tense 
[] 2   Rigid, stiff Physiology (vital signs) [x] 0   Stable vital signs [] 1   Change in any of the following: SBP > 20mm Hg; HR > 20/minute 
[] 2   Change in any of the following: SBP > 30mm Hg; HR > 25/minute Respiratory 
[] 0   Baseline RR/SpO2, compliant with ventilator 
[x] 1   RR > 10 above baseline, or 5% drop SpO2, mild asynchrony with ventilator 
[] 2   RR > 20 above baseline, or 10% drop SpO2, asynchrony with ventilator FUNCTIONAL ASSESSMENT Palliative Performance Scale (PPS): 10 PSYCHOSOCIAL/SPIRITUAL ASSESSMENT Active Problems: 
  [facility] ([facility]21) Past Medical History:  
Diagnosis Date  Bilateral dry eyes 2017 Dr. Zara Oliveros  Cataract Bilaterally. Dr. Brad Edmond. Dr. Perlita Cano. Dr. Zara Oliveros.  Chest pain 10/2015, 2016 Dr. Donell Leiva. negative Stress Test.  
 Dementia ([facility] 07/2017  
 triggered by mild LACHELLE, depression. Dr. Francisca Clay. Dr. Reggie Kline  Diverticulosis 09/2008 Dr. Gita Garza. Dr. Roetta Gottron.  DJD (degenerative joint disease) 09/30/05  
 cervical, worse C6-7, C7-T1. Left AC joint.  DJD (degenerative joint disease) of knee   
 bilateral  Dr. Alondra Hernandez. Dr. Lauren NULL (dyspnea on exertion) 10/2015 Dr. Donell Leiva.  Essential hypertension, benign 1968  Foster child  GIB (gastrointestinal bleeding) 11/17/14  
 due to internal hemorrhoids. Dr. Nam Perdomo  Heart palpitations 11/12/15  
 due to PVCs. Dr. Dejah Osorio.  Heartburn  Hypercholesteremia  Hyperglycemia 2014 [facility] Internal carotid artery stenosis 2007  
 bilateral.  Dr. Ele Fuller [facility] Internal hemorrhoids 09/2008, 11/2014 Dr. Cathy Hall. Dr. Charu Laughlin.  Knee pain Left. Dr. Gerald Burton.  Plantar wart 2018 Dr. Naveen mendez  Pulmonic valve insufficiency 10/2015 Mild to Mod. Dr. Emily Espinal. EF 50-55%  PVD (peripheral vascular disease) ([facility]  Dr. Kirstin Campos  Raynaud's phenomenon [id number]  Shortening, leg, congenital   
 left  Vitamin D deficiency 10/10/10 Past Surgical History:  
Procedure Laterality Date  HX ATHERECTOMY Left 2011  
 popliteal atherectomy and angioplasty. Dr. Kirstin Campos  HX BREAST LUMPECTOMY Right   
 benign. Dr. Ke Castro.  HX CAROTID ENDARTERECTOMY Right 2011 ICA. Dr. Kirstin Campos  HX CATARACT REMOVAL Bilateral 2016, 10/24/2016 L then R Dr. Michaelle Vazquez.  HX COLONOSCOPY  14  
 due to GIB. Dr. Terrie England.  HX COLONOSCOPY  08  
 with polypectomy. benign. Dr. Aneesh Hairston. due q 10 yrs. [facility] FISSURECTOMY.  HX GI  10/03/2008 PROCTOPLASTY due to rectal prolapse  HX HEMORRHOIDECTOMY  10/03/2008  
 internal and external  
 HX KNEE REPLACEMENT Left 2013  
 due to Severe OA. Dr. Pietro Vogt.  HX POLYPECTOMY  2008 rectal Dr. Devaughn Levy Social History Tobacco Use  Smoking status: Former Smoker Packs/day: 1.00 Years: 15.00 Pack years: 15.00 Types: Cigarettes Quit date: 1968 Years since quittin.4  Smokeless tobacco: Never Used Substance Use Topics  Alcohol use: No  
  Alcohol/week: 0.0 standard drinks Family History Problem Relation Age of Onset  Heart Attack Mother 79  
 Heart Attack Sister   
     x 3 sisters  Heart Attack Brother   
     x 3 brothers Allergies Allergen Reactions  Penicillins Hives Current Facility-Administered Medications Medication Dose Route Frequency  morphine injection 4 mg  4 mg IntraVENous NOW  
 LORazepam (ATIVAN) injection 1 mg  1 mg IntraVENous Q15MIN PRN  
 ketorolac (TORADOL) injection 30 mg  30 mg IntraVENous Q8H PRN  
 bisacodyL (DULCOLAX) suppository 10 mg  10 mg Rectal DAILY PRN  
 glycopyrrolate (ROBINUL) injection 0.2 mg  0.2 mg IntraVENous Q4H PRN  
 morphine injection 2 mg  2 mg IntraVENous Q15MIN PRN  
 morphine injection 2 mg  2 mg IntraVENous Q3H  
 LORazepam (ATIVAN) injection 1 mg  1 mg IntraVENous Q3H  
 
 
 PHYSICAL EXAM  
 
Wt Readings from Last 3 Encounters:  
05/18/21 51.7 kg (113 lb 15.7 oz) 02/04/21 53.5 kg (118 lb) 09/03/20 54.2 kg (119 lb 6.4 oz) Visit Vitals BP (!) 100/50 (BP 1 Location: Right upper arm, BP Patient Position: At rest) Pulse (!) 107 Temp 98.7 °F (37.1 °C) Resp 26 SpO2 (!) 85% Supplemental O2  [x] Yes  [] NO Last bowel movement:  
 
Currently this patient has: 
[x] Peripheral IV [] PICC  [] PORT [] ICD [] Larsen Catheter [] NG Tube   [] PEG Tube   
[] Rectal Tube [] Drain 
[] Other:  
 
Constitutional: lying abed, elderly, frail Eyes: lids normal, no drainage ENMT: dry mm, no exudate, nares normal 
Cardiovascular: tachycardia, radial pulses difficult to palpate Respiratory: rate teens to 20s, quick and shallow breaths Gastrointestinal: soft, NT 
Musculoskeletal: no gross deformity or TTP Skin: warm, dry, no mottling Neurologic: does not wake or stir to exam 
Psychiatric: unresponsive, not restless at this time Pertinent Lab and or Imaging Tests: 
Lab Results Component Value Date/Time Sodium 129 (L) 05/18/2021 04:07 AM  
 Potassium 3.5 05/18/2021 04:07 AM  
 Chloride 95 (L) 05/18/2021 04:07 AM  
 CO2 25 05/18/2021 04:07 AM  
 Anion gap 9 05/18/2021 04:07 AM  
 Glucose 78 05/18/2021 04:07 AM  
 BUN 24 (H) 05/18/2021 04:07 AM  
 Creatinine 0.53 (L) 05/18/2021 04:07 AM  
 BUN/Creatinine ratio 45 (H) 05/18/2021 04:07 AM  
 GFR est AA >60 05/18/2021 04:07 AM  
 GFR est non-AA >60 05/18/2021 04:07 AM  
 Calcium 8.6 05/18/2021 04:07 AM  
 
Lab Results Component Value Date/Time  Protein, total 6.6 05/16/2021 08:46 PM  
 Albumin 2.1 (L) 05/16/2021 08:46 PM  
 
   
 
Trevor Clemente MD 
[facility] [address]

## 2021-05-19 NOTE — HOSPICE
[facility] to Those in Need 
[phone] Inpatient Nursing Admission Patient Name: Felipa Monteiro YOB: 1935 Age: 81 y.o. Date of Hospice Admission: 5/19/2021 Hospice Attending Elected by Patient: Dariusz Garcia MD 
Primary Care Physician: Mayra Garcia NP Admitting RN: Zackery Starks RN : LIZY Beltrán Level of Care (GIP/Routine/Respite): GIP Facility of Care: [facility] Patient Room: 444/ HOSPICE SUMMARY  
ER Visits/ Hospitalizations in past year: 1 Hospice Diagnosis: Acute respiratory failure ([facility] [address] [J96.00] Onset Date of Hospice Diagnosis: 5/15/2021 Summary of Disease Progression Leading to Hospice Diagnosis:  
Felipa Monteiro is an 81 y.o. female with a past history of Alzheimer's dementia, pulmonary fibrosis, ILD, pulmonary hypertension, chronic respiratory failure, dyslipidemia, hypertension, coronary artery disease, and cardiac stents, who was admitted on 5/15/2021 from home with a diagnosis of acute on chronic heart failure and acute on chronic respiratory failure. 
  
She presented with complaints of sudden worsening of shortness of breath. She was found to be in acute on chronic respiratory failure due to a heart failure exacerbation. She was admitted for further management. She was treated with IV diuretics, but her respiratory status has continued to decline. She is requiring HFNC and not tolerating it well. Hospice has been recommended due to her end stage lung disease. 
  
Current medical issues leading to Palliative Medicine involvement include: heart and respiratory failure, progressive lung disease, dementia, discuss care decisions. 
  
Social: She has five children and numerous grandchildren. Her daughters Phoenix Navarrete and Charo help care for her. 
  
 PALLIATIVE DIAGNOSES:  
1. Goals of care 2. Shortness of breath 3. Weakness 4. Altered mental status 5. End stage interstitial lung disease 6.  Acute on chronic hypoxic respiratory failure 7. Acute on chronic heart failure 8. Alzheimer's dementia Hospice consult 5/19/21: 
Daughters at bedside. Patient is unresponsive with labored breathing on high flow. Furrowed brow and grimaces when touched. Family is very realistic and at this time they are wanting hospice admission with high flow wean knowing she will likely not survive. Daughter Nafisa Branch states \" she's tired and it's time for her to go home\" Will pre-medicate with morphine and ativan prior to wean and Q 15 min as needed. Co-Morbidities:  
Patient Active Problem List  
Diagnosis Code  Internal carotid artery stenosis I65.29  
 PVD (peripheral vascular disease) ([facility]) I73.9  Raynaud's phenomenon I73.00  GERD (gastroesophageal reflux disease) K21.9  Diverticulosis K57.90  
 Internal hemorrhoids K64.8  Hypercholesteremia E78.00  Obesity, Class I, BMI 30-34.9 E66.9  
 Essential hypertension I10  
 Adjustment disorder with mixed anxiety and depressed mood F43.23  Shortness of breath R06.02  
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  
 Aortic valve sclerosis I35.8  Hyperglycemia R73.9  Vitamin D deficiency E55.9  History of tobacco abuse Z87.891  
 History of GI bleed Z87.19  
 History of CEA (carotid endarterectomy) Z98.890  
 Advance care planning Z71.89  Dementia without behavioral disturbance ([facility] [address] F03.90  
 History of knee replacement, total, left Z40.175  Chronically dry eyes, bilateral H04.123  
 H/O carotid endarterectomy Z98.890  
 Family history of heart attack Z82.49  Cortical senile cataract H25.019  
 Tear film insufficiency H04.129  
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  
 Urinary tract infection N39.0  Vasovagal episode R55  Acute on chronic respiratory failure with hypoxia (HCC) J96.21  
 Goals of care, counseling/discussion Z71.89  
 Acute respiratory failure (HCC) J96.00 Rationale for a prognosis of life expectancy of 6 months or less if the disease follows its normal course (Disease Specific History): Livier Cohen is a 81 y. o. who was admitted to [facility]. The patient's principle diagnosis of acute respiratory faiure has resulted in pursuit of hospice. Functionally, the patient's Palliative Performance Scale has declined over a period of days  and is estimated at 10. Objective information that support this patients limited prognosis includes: unresponsive, labored breathing with use of accessory muscles, generalized pain as evidenced by furrowed brow and grimaces when touched. The patient/family chose comfort measures with the support of Hospice. Patient meets for GIP LOC as evidenced by : Need for frequent IV medications to provide comfort during high flow wean. Prognosis estimated based on 05/19/21 clinical assessment is:  
[x] Few to Many Hours [] Hours to Days  
[] Few to Many Days  
[] Days to Weeks  
[] Few to Many Weeks  
[] Weeks to Months  
[] Few to Many Months ASSESSMENT Patient self-reports:  []  Yes    [x] No 
 
SYMPTOMS: dyspnea, unresponsive, generalized pain and anxiety SIGNS/PHYSICAL FINDINGS: labored breathing with use of accessory muscles, furrowed brow and grimacing. KARNOFSKY: 10 
 
FAST for all dementia:   
 
Learning Assessment: 
 
Caregiver Is caregiver willing to learn care for patient? yes What is the highest level of education completed? unknown Learning preference (written material, demonstration, visual)? verbal 
Learning barriers (ESOL, [place], poor vision)?none CLINICAL INFORMATION Wt Readings from Last 3 Encounters:  
05/18/21 51.7 kg (113 lb 15.7 oz) 02/04/21 53.5 kg (118 lb) 09/03/20 54.2 kg (119 lb 6.4 oz) Ht Readings from Last 3 Encounters:  
05/17/21 5' 1\" (1.549 m)  
21 5' 1\" (1.549 m)  
20 5' 1\" (1.549 m) There is no height or weight on file to calculate BMI. Visit Vitals BP (!) 100/50 (BP 1 Location: Right upper arm, BP Patient Position: At rest) Pulse (!) 107 Temp 98.7 °F (37.1 °C) Resp 27 SpO2 (!) 85% LAB VALUES No results found for this visit on 21 (from the past 12 hour(s)). No results found for this visit on 21 (from the past 6 hour(s)). Lab Results Component Value Date/Time Protein, total 6.6 2021 08:46 PM  
 Albumin 2.1 (L) 2021 08:46 PM  
 
 
Currently this patient has: 
[x] Supplemental O2 [x] Peripheral IV  [] PICC    [] PORT [x] Larsen Catheter [] NG Tube   [] PEG Tube [] Ostomy   
[] AICD: Has ICD been deactivated? [] Yes [] No:______ PLAN 1. Admit GIP for wean from high flow 2. Pre-medicate with morphine 4mg and ativan 1mg prior to beginning wean 3. Decrease high flow by 10L increments and medicate as needed to provide comfort 4. PRN comfort set 5. Provide emotional support to daughters at bedside. Hospice Team Frequency Orders: 
Skilled Nurse -   Daily x 7 days /every other day x 7 days with 5 PRN visits for symptom control. MSDILLAN  1 visit for initial assessment/evaluation for family support and need for volunteer services. Meliza Trujillo  1 visit for initial assessment/evaluation for spiritual support. ADVANCE CARE PLANNING (Complete in ACP Flow Sheet) Code Status: DNR Durable DNR: [x]  Yes  []  No 
Code Status Discussed/Confirmed:yesPreference for Other Life Sustaining Treatment Discussed/Confirmed:yes Hospitalization Preference:[facility] Advance Care Planning 2021 Confirm Advance Directive Yes, on file  Service: [] Yes  [x]  No      [] Unknown Appropriate for Pinning Ceremony:  [] Yes     [] No 
Taoism: Quaker  Home: TBD 
 
DISCHARGE PLANNING 1.  Discharge Plan: will likely  in the hospital but should she stabilize family will take her home with support from Crow Odonnell 2. Patient/Family teaching: hospice philosophy, levels of care and services to be provided. End of life symptoms and care. 3. Response to patient/family teaching: daughters Jaimee Craft and Xuan Silverman verbalized understanding SOCIAL/EMOTIONAL/SPIRITUAL NEEDS Spiritual Issues Identified: Hospice and hospital chaplains available Psych/ Social/ Emotional Issues Identified: daughters at bedside, grieving approprietly Caregiver Support: 
[x] Provided information on End of Life Care. Material Provided: Gone From My Sight or [facility]  
 
CARE COORDINATION  
 contacted, discharge to hospice order received Dr. Bruce Cisneros  contacted, agrees to serve as attending provider for hospice and provided verbal certification of terminal illness with life expectancy of 6 months or less. Orders for hospice admission, medications and plan of treatment received. Medication reconciliation completed. MEDS: See medication list below DME: Per hospital 
Supplies: Per hospital 
IDT communication to include MD, SN, SW, CH and support team 
 
ALLERGIES AND MEDICATIONS Allergies: Allergies Allergen Reactions  Penicillins Hives Current Facility-Administered Medications Medication Dose Route Frequency  morphine injection 4 mg  4 mg IntraVENous NOW  
 LORazepam (ATIVAN) injection 1 mg  1 mg IntraVENous Q15MIN PRN  
 ketorolac (TORADOL) injection 30 mg  30 mg IntraVENous Q8H PRN  
 bisacodyL (DULCOLAX) suppository 10 mg  10 mg Rectal DAILY PRN  
 glycopyrrolate (ROBINUL) injection 0.2 mg  0.2 mg IntraVENous Q4H PRN  
 morphine injection 2 mg  2 mg IntraVENous Q15MIN PRN  
 morphine injection 2 mg  2 mg IntraVENous Q3H  
 LORazepam (ATIVAN) injection 1 mg  1 mg IntraVENous Q3H

## 2021-05-19 NOTE — PROGRESS NOTES
Hospitalist Progress Note Angelique Kaye MD 
Answering service: [phone] OR [phone] from in house phone Date of Service:  2021 NAME:  Huber Day :  1935 MRN:  [id number] Admission Summary:  
85F p/w sob Interval history / Subjective:  
Patient seen and examined at bedside, looks comfortable, earlier was agitated and requied some sedation, on HFNC. Hospice team following, plans to admit to IP hospice today? Then gradually work to wean her off HFNC and if can do that aim to dc home with hospice. Assessment & Plan: #. Acute on chronic CHF: - Diuretics iv, strict I&Os, daily weight. TTE pending. Cardio following. #. Acute on chronic hypoxic respiratory failure: 2nd to above. With baseline of pulm fibrosis. - CTA lungs: no PEs, chronic interstitial disease, some new changes ? edema. Evidence of pulm HTN. - Wean down on supplemental O2 as able. BNP >10K. Recheck prior to dc. PT/OT  
- Pulmonology following- known to Dr Michelle Ortiz. As expected her resp condition is endstage - Palliative care evaluated, hospice following, plans to admit to IP hospice #. CAD: s/p Stents- placed recently in [facility]. home regimen, monitor #. Elevated trops: likely demand ischemia 2nd to above. Cardio eval. 
#. HTN: chronic, stable, Home regimen, PRN BP meds. Monitor #. HypoKalemia: Acute, replace, monitor #. HypoNatremia: mild, monitor #. HLD: chronic, Stable, Home regimen #. Dementia: mild/mod, Stable, Home regimen, frequent re-orientation, encourage family to stay in. Code status: Full DVT prophylaxis: SCDs Care Plan discussed with: Patient/Family and Nurse Disposition: TBD >2days Hospital Problems  Date Reviewed: 2021 Codes Class Noted POA Goals of care, counseling/discussion ICD-10-CM: Z71.89 ICD-9-CM: V65.49  Unknown Unknown  * (Principal) Acute on chronic respiratory failure with hypoxia [facility]) ICD-10-CM: E94.24 
ICD-9-CM: 518.84, 799.02  5/15/2021 Yes Shortness of breath ICD-10-CM: R06.02 
ICD-9-CM: 786.05  10/1/2015 Unknown Overview Signed 1/21/2016 11:17 AM by Reji Crump. Review of Systems:  
Pertinent items are mentioned in interval history. Vital Signs:  
 Last 24hrs VS reviewed since prior progress note. Most recent are: 
Visit Vitals /61 (BP 1 Location: Right upper arm, BP Patient Position: At rest) Pulse 98 Temp 98.6 °F (37 °C) Resp (!) 41 Ht 5' 1\" (1.549 m) Wt 51.7 kg (113 lb 15.7 oz) SpO2 97% BMI 21.54 kg/m² Intake/Output Summary (Last 24 hours) at 5/19/2021 3374 Last data filed at 5/19/2021 9984 Gross per 24 hour Intake  Output 100 ml Net -100 ml Physical Examination:  
Evaluated face to face and examined 05/19/21 General:  Alert, partially oriented, some distress with dyspnea, elderly, frail BW 
Card:  S1, S2 without murmur, good peripheral perfusion Resp:  No accessory muscle use, Good AE, no wheezes. no crepitations Abd:  Soft, non-tender, non-distended, BS+ Extremities:  No cyanosis or clubbing, no significant edema Neuro:  Grossly normal, no focal neuro deficits, follows commands, mostly oriented, can answer simple questions. Psych:  Good insight, not agitated. Data Review:  
 Review and/or order of clinical lab test 
Review and/or order of tests in the radiology section of CPT Review and/or order of tests in the medicine section of CPT Labs:  
 
Recent Labs 05/16/21 2046 WBC 8.4 HGB 11.7 HCT 35.8 * Recent Labs 05/18/21 
0407 05/16/21 2046 * 131*  
K 3.5 4.0  
CL 95* 97  
CO2 25 26 BUN 24* 21* CREA 0.53* 0.69 GLU 78 97 CA 8.6 8.4* MG  --  1.9 PHOS  --  2.2* Recent Labs 05/16/21 
2046 ALT 24 * TBILI 0.7 TP 6.6 ALB 2.1*  
GLOB 4.5* Recent Labs 05/16/21 
1208 APTT 29.6 No results for input(s): FE, TIBC, PSAT, FERR in the last 72 hours. Lab Results Component Value Date/Time Folate 10.2 09/13/2019 08:33 AM  
  
No results for input(s): PH, PCO2, PO2 in the last 72 hours. Recent Labs 05/16/21 
1208 TROIQ 0.05* Lab Results Component Value Date/Time Cholesterol, total 203 (H) 09/13/2019 08:33 AM  
 HDL Cholesterol 80 09/13/2019 08:33 AM  
 LDL, calculated 112 (H) 09/13/2019 08:33 AM  
 Triglyceride 55 09/13/2019 08:33 AM  
 CHOL/HDL Ratio 2.8 05/05/2010 08:45 AM  
 
Lab Results Component Value Date/Time Glucose (POC) 136 (H) 01/19/2011 07:35 PM  
 
Lab Results Component Value Date/Time Color Pale Yellow 02/04/2021 04:04 PM  
 Appearance Clear 10/08/2019 05:00 PM  
 Specific gravity 1.020 02/04/2021 04:04 PM  
 Specific gravity <1.005 09/23/2019 07:03 PM  
 pH (UA) 6 02/04/2021 04:04 PM  
 Protein Negative 02/04/2021 04:04 PM  
 Glucose NEGATIVE  09/23/2019 07:03 PM  
 Ketone Negative 02/04/2021 04:04 PM  
 Bilirubin Negative 02/04/2021 04:04 PM  
 Urobilinogen Negative 02/04/2021 04:04 PM  
 Nitrites Negative 02/04/2021 04:04 PM  
 Leukocyte Esterase 1+ (A) 02/04/2021 04:04 PM  
 Epithelial cells FEW 11/17/2014 12:47 PM  
 Bacteria Occasional (A) 02/04/2021 04:04 PM  
 WBC 3-5 (A) 02/04/2021 04:04 PM  
 RBC 2-5 (A) 02/04/2021 04:04 PM  
 
Medications Reviewed:  
 
Current Facility-Administered Medications Medication Dose Route Frequency  LORazepam (ATIVAN) injection 0.5 mg  0.5 mg IntraVENous Q4H PRN  
 morphine injection 2 mg  2 mg IntraVENous Q4H PRN  
 albuterol-ipratropium (DUO-NEB) 2.5 MG-0.5 MG/3 ML  3 mL Nebulization Q4H PRN  
 aspirin delayed-release tablet 81 mg  81 mg Oral DAILY  atorvastatin (LIPITOR) tablet 80 mg  80 mg Oral DAILY  clopidogreL (PLAVIX) tablet 75 mg  75 mg Oral DAILY  [Held by provider] lisinopriL (PRINIVIL, ZESTRIL) tablet 10 mg  10 mg Oral DAILY  melatonin tablet 3 mg  3 mg Oral QHS  metoprolol tartrate (LOPRESSOR) tablet 25 mg  25 mg Oral BID  sertraline (ZOLOFT) tablet 25 mg  25 mg Oral DAILY  sodium chloride (NS) flush 5-40 mL  5-40 mL IntraVENous Q8H  
 sodium chloride (NS) flush 5-40 mL  5-40 mL IntraVENous PRN  
 acetaminophen (TYLENOL) tablet 650 mg  650 mg Oral Q6H PRN Or  
 acetaminophen (TYLENOL) suppository 650 mg  650 mg Rectal Q6H PRN  polyethylene glycol (MIRALAX) packet 17 g  17 g Oral DAILY PRN  promethazine (PHENERGAN) tablet 12.5 mg  12.5 mg Oral Q6H PRN Or  
 ondansetron (ZOFRAN) injection 4 mg  4 mg IntraVENous Q6H PRN  
 enoxaparin (LOVENOX) injection 40 mg  40 mg SubCUTAneous Q24H  
______________________________________________________________________ EXPECTED LENGTH OF STAY: 4d 0h 
ACTUAL LENGTH OF STAY:          4 Zeina Rosa MD

## 2021-05-19 NOTE — PROGRESS NOTES
2000 Report received from ANTHONY Toussaint. 
MEWS 4, Mophine given. Will continue to monitor. Patient continues to pull off her HFNC, desats to the 40s and is tachypneic RR 50s. Patient given ativan and morphine to relax. Will continue to monitor. 8401 Bedside shift change report given to Tamia Morgan RN by Terjosefina Domínguez RN. Report included the following information SBAR, Kardex, MAR, Accordion, and Recent Results.

## 2021-05-19 NOTE — PROGRESS NOTES
TRANSFER - OUT REPORT: 
 
Verbal report given to ANTHONY Roldan(name) on Jalen Hedrick  being transferred to (unit) for routine progression of care Report consisted of patients Situation, Background, Assessment and  
Recommendations(SBAR). Information from the following report(s) SBAR was reviewed with the receiving nurse. Lines:    
 
Opportunity for questions and clarification was provided. Patient transported with: 
 [place]

## 2021-05-19 NOTE — PROGRESS NOTES
Problem: Dyspnea Due to End of Life Goal: Demonstrate understanding of and ability to manage respiratory symptoms at end of life Outcome: Progressing Towards Goal 
  
Problem: Communication Deficit Goal: Effectively communicate symptoms, needs, and concerns Outcome: Progressing Towards Goal 
  
Problem: Imminent death Goal: Collaborate with patient/family/caregiver/interdisciplinary team to minimize and manage end of life symptoms Outcome: Progressing Towards Goal 
  
Problem: Hospice Orientation Goal: Demonstrate understanding of hospice philosophy, plan of care, and home hospice program 
Description: The patient/family/caregiver will demonstrate understanding of hospice philosophy, plan of care and the home hospice program as evidenced by participation in meeting the patient's psychosocial, spiritual, medical, and physical needs inclusive of medical supplies/equipment focusing on symptoms. Outcome: Progressing Towards Goal 
  
Problem: Potential for Skin Breakdown Goal: Demonstrate ability to care for skin, monitor areas of breakdown and demonstrate methods to prevent breakdown Description: Patient/family/caregiver will demonstrate ability to care for patient's skin, monitor for areas of breakdown, and demonstrate methods to prevent breakdown during hospice care. Outcome: Progressing Towards Goal 
  
Problem: Potential for Skin Breakdown Goal: Demonstrate ability to care for skin, monitor areas of breakdown and demonstrate methods to prevent breakdown Description: Patient/family/caregiver will demonstrate ability to care for patient's skin, monitor for areas of breakdown, and demonstrate methods to prevent breakdown during hospice care. Outcome: Progressing Towards Goal 
  
Problem: Risk for Falls Goal: Free of falls during episode of care Description: Patient will be free of falls during episode of care. Outcome: Progressing Towards Goal 
  
Problem: Comfort Deficit Goal: Reduce/control pain Description: Patient will report that pain has been reduced or controlled through verbal and nonverbal means and that measures to promote comfort are effective. Outcome: Progressing Towards Goal 
  
Problem: Pain Goal: Verbalize satisfaction of level of comfort and symptom control Outcome: Progressing Towards Goal 
  
Problem: Anticipatory Grief Goal: Explore reactions to and verbalize acceptance of impending loss Description: Patient/family/caregiver will explore reactions to and verbalize acceptance of impending loss. Outcome: Progressing Towards Goal 
  
Problem: Anxiety/Agitation Goal: Verbalize and demonstrate ability to manage anxiety Description: The patient/family/caregiver will verbalize and demonstrate ability to manage the patient's anxiety throughout hospice care. Outcome: Progressing Towards Goal 
  
Problem: Communication Deficit Goal: Effectively communicate symptoms, needs, and concerns Description: Patient/family/caregiver will effectively communicate symptoms, needs and concerns. Outcome: Progressing Towards Goal 
  
Problem: End of Life Process Goal: Demonstrate understanding of end of life processes Description: Patient/caregiver will understand end of life processes.  
Outcome: Progressing Towards Goal

## 2021-05-19 NOTE — HOSPICE
[address] [facility] Good Help to Those in Need 
[phone] Social Work Admission Note Patient Name: Yaw Lebron YOB: 1935 Age: 81 y.o. Date of Visit: 05/19/21 Facility of Care: [facility] Patient [facility] Attending: Dominga Dean MD 
Hospice Diagnosis: Acute respiratory failure ([address] [J96.00] Level of Care:  
 [x]  GIP []  Respite 
 []  Routine Consents/NCD Documentation:  
 
Consents Reviewed: Yes Person Reviewed/Signed with: Daughters Helen Givens and Jenn Obrien Right to NCD Reviewed: Yes NCD Requested: No 
Admission Nurse/Intake Notified NCD was requested: N/A Planned Start of Care Date:  5/19/2021 Hospice Witness Representative: Kristina VILLASENORW, MSG 
 
NARRATIVE This LCSW and Praful Edward RN met with pt, who is unresponsive and her daughter Jolie Shown Primary MPOA, and daughter Wei Alvarengaw Secondary MPOA for GIP admission. Pt is an 82 y/o AAF with a hospcie diagnosis of acute respiratory failure. Pt and multiple comorbidiets including pulmonary fibrosis, AD, CAD s/p sents, and acute congestive heart failure. Pt was admitted 5/16/2021. Pt is  and has 5 children Willy Mckeon, Yakelin Carey, and Dajuan Mccormick. Pt retired from [employer] Family reports pt is a very loving and kind person, \" mother to [facility]" including her children grandchildren, great grands, and now great great grands. Daughter are coping as best as can be expected. Helen Givens reports she is coping \" better\" now her sister and family can be at bedside. LCSW and daughters dicussed loss due to pts decline in health and dementia. LCSW and family discussed visitation now pt is on hospcie. LCSW and family discussed needs/resources. LCSW shared GFMS. Low risk for bereavement. AMD now on file, Helen Givens primary and Iris secondary.  If pt were to stabilize she would return home to her apt she shares with her son Alexa Self with Kehinde Anderson as they have a family connection there. LCSW will continue to assess and monitor pt and family needs. ADVANCE CARE PLANNING Code Status: DDNR Durable DNR: Edykaterine Rosales  _ No 
Advance Care Planning 2021 Confirm Advance Directive Yes, on file Relationship Status: 
[]  Single    
[]       
[]     
[]  Domestic Partner    
[x]  / 
[]  Common Law 
[]   
[]  Unknown If in a relationship, name of partner/spouse: 
Duration of relationship: 
 
Buddhism: Jain  Home: Family is discussing Resources Provided: [facility] Social Work Initial Assessment Gender: 
female Race/Ethnicity: (jhon all that apply) []  [place] ([facility]) or [place] Native 
[]  [place] 
[x]  [place] or [place] 
[]   or  
[]  Native Aurelio or Ren 
[]  Dorrocio Amador 
[]  Unknown  Service:   
[]  Yes  
[]  No      
[]  Unknown Appropriate for Pinning Ceremony:  
[]  Yes     
[]  No 
Is patient using VA benefits? []  Yes     
[]  No 
  
Primary Language: English  
[]   Needed 
[]   utilized during visit Ability to express thoughts/needs/feelings 
[]  Expressed thoughts/feelings/needs without difficulty 
[]  Requires extra time and cuing 
[]  Speech limited single words 
[]  Uses only gestures (eye, blinking eye or head movement/pointing) []  Unable to express thoughts/feelings/needs (speech unintelligible or inappropriate) [x]  Unresponsive Notes:  
  
Mental Status: 
[]  Alert-oriented to:   
 []  Person   
 []  Place   
 []  Time 
[]  Comatose-responds to:  
 []   Verbal stimuli  
 []  Tactile stimuli  
 []  Painful stimuli 
[]  Forgetful 
[]  Disoriented/Confused 
[]  Lethargic 
[]  Agitated 
[x]  Other (specify): Unresponsive Notes:  
  
Patients description of Illness/Current Health Status:   
[x]  Patient unable to discuss, Unresponsive 
 
[]  Patient unwilling to discuss 
[]  (Specify) Knowledge/Understanding of Disease Process Patient:  
 []  Demonstrates knowledge/understanding of disease process 
 []  Demonstrates knowledge/understanding of treatment plan 
 []  Demonstrates knowledge/understanding of prognosis []  Demonstrates acceptance of prognosis []  Demonstrates knowledge/understanding of resuscitation status [x]  Other (specify), Unresponsive Caregiver: 
 [x]  Demonstrates knowledge/understanding of disease process [x]  Demonstrates knowledge/understanding of treatment plan 
 [x]  Demonstrates knowledge/understanding of prognosis [x]  Demonstrates acceptance of prognosis [x]  Demonstrates knowledge/understanding of resuscitation status 
 []  Other (specify) Notes:  
  
Patients living arrangement/care setting: 
Use the PRIOR COLUMN when the PATIENTS current health status necessitated a change in his/her primary residence. Prior Current Response [x]             []    Patients own home/residence []             []    Home of family member/friend []             []    Boarding home  
           []             []    Assisted living facility/halfway center []             []    Hospital/Acute care facility []             []    Skilled nursing facility []             []    Long term care facility/Nursing home  
           []             [x]    Hospice in Patient Primary Caregiver: 
[]  No Primary Caregiver Name of Primary Caregiver: Madan Ramirez Relationship or Primary Caregiver:  
 []  Spouse/Significant other     
 [x]  Natural Child      
 []  Step child     
 []  Sibling 
 []  Parent 
 []  Friend/Neighbor 
 []  Community/Islam Volunteer 
 []  Paid help 
 []  Other (specify):___________ Notes:   
  
Family members/Significant others: 
Name: Madan Ramirez Relationship: daughter/ MPOA primary Phone [phone] Actively involved in care? [x]  Yes  []  No 
 
Name: Charlee Marksocent Relationship: daughter secondary MPOA Phone Number: Actively involved in care? [x]  Yes  []  No 
 
Name: Denzel Rebolledo Relationship: daughter Phone Number: Actively involved in care? [x]  Yes  []  No 
 
Social support systems: (select ONE best description) [x]  Excellent social support system which includes three or more family members or friends 
[]  Good social support system which includes two or less members or friends 
[]  [address] support which includes one family member or friend 
[]  Poor social support; no family members or friends; basically ALONE Notes:  
  
Emotional Status: (jhon all that apply) Patient Caregiver Response [x]                [x]    Mood/Affect stable and appropriate []                []    Angry  
              []                []    Anxious []                []    Apprehensive []                []    Avoidant  
              []                []    Clinging  
              []                []    Depressed  
              []                []    Distraught  
              []                []    Elated []                []    Euphoric  
              []                []    Fearful  
              []                []    Flat Affect  
              []                []    Helpless []                []    Hostile []                []    Impulsive []                []    Irritable  
              []                []    Labile  
              []                []    Manic  
              []                []    Restlessness []                [x]    Sad  
              []                []    Suspicious []                [x]    Tearful  
              []                []    Withdrawn Notes:  
 
Coping Skills (strengths/weakness):  
 Patient: Coping Skills (strength/weakness): Unresponsive  Family/caregiver (strength/weakness): Well supported, realistic and accepting [place] of care (jhon all that apply):    
[x]  No burden evident  
[]  Family must administer medications  
[]  Illness causing financial strain  
[]  Family/Support feels overwhelmed  
[]  Family/Support sleep disturbed with patients care  
[]  Patients care causes extra physical stress  of death 
[]  Illness causes changes in family lifestyle 
[]  Illness impacting family/support employment 
[]  Family experiencing increased time demands 
[]  Patients behavior endangers family 
[]  Denial of patients illness 
[]  Concern over outcome of illness/fear 
[]  Patients behavior embarrassing to family Notes:  
  
Risk Factors: (jhon all that apply):   
[x]  No burden evident  
[]  Alcohol abuse 
[]  Financial resources inadequate to meet basic needs (food/house/etc) []  Financial resources inadequate to meet health care needs (supplies/equipment/medications) 
[]  Food/nutrition resources inadequate 
[]  Home environment unsafe/inadequate for home care 
[]  Homicidal risk 
[]  Lives alone or without concerned relatives 
[]  Multiple medications/complex schedule 
[]  Physical limitations increase likelihood of falls 
[]  Plan of care/treatments complicated 
[]  Substance use/abuse 
[]  Suicidal risk 
[]  Visual impairment threatens safety/ability to perform self-care 
[]  Other (specify): Abuse/Neglect (actual/potential risks): 
[x]  No signs of abuse/neglect 
[]  History of abuse/neglect                 []  Physical          []  Sexual 
[]  History of domestic violence 
[]  Lacks adequate physical care 
[]  Lacks emotional nurturing/support 
[]  Lacks appropriate stimulation/cognitive experiences 
[]  Left alone inappropriately 
[]  Lacks necessary supervision 
[]  Inadequate or delayed medical care 
[]  Unsafe environment (i.e guns/drug use/history of violence in the home/etc.) []  Bruising or other physical signs of injury present 
[]  Other (specify): 
Notes:  
[]  Refer to child/adult protective services Current Sources of Stress (in Addition to Current Illness):  
[x]  None reported 
[]  Bills/Debt   
[]  Career/Job change   
[]   (short term) []   (long term)   
[]  Death of a child (recent) []  Death of a parent (recent) []  Death of a spouse (recent) []  Employment status changed  
[]  Family discord   
[]  Financial loss/Inadequate inther (specify):come 
[]  Job loss 
[]  Legal issues unresolved 
[]  Lifestyle change 
[]  Marital discord 
[]  Marriage within the last year 
[]  Paperwork (insurance/legal/etc) overwhelming 
[]  Separation/Divorce 
[]  Other (specify): 
Notes:  
  
Current Community Resources Being Utilized 1. Interventions/Plan of Care 1. Assess social and emotional factors related to coping with end of life issues 2. Community resource planning/referral  
3. Relocation to different care setting if/when symptoms stabilize 4. Discharge Planning 1. [facility] with [facility] if stable, they have a family connection there. MSW Assessment Completed by: Eladia Foote 05/19/21 Time In: 10: 00 am      
Time Out: 11: 30 am

## 2021-05-19 NOTE — PROGRESS NOTES
Physician Progress Note Macario Saba 
CSN #:                  [id number] :                       1935 ADMIT DATE:       5/15/2021 8:12 PM 
[address] [place] DATE: 
RESPONDING 
PROVIDER #:        RANDAL ESPINAL MD 
 
 
 
 
QUERY TEXT: 
 
Pt admitted with acute on chronic hypoxic resp failure and has acute on chronic CHF documented. If possible, please document in progress notes and discharge summary further specificity regarding the type of CHF: 
 
The medical record reflects the following: 
Risk Factors: 86yo with HTN and CHF Clinical Indicators: 
-  Echo: 
Normal cavity size, wall thickness and diastolic function. The muscle mass is normal. The cavity shape is normal. 
Mild hypokinesis of the basal inferolateral wall(s). The estimated EF is 50 - 55%. Low normal systolic function. End-systolic volume is normal. Normal left ventricular strain. Normal left ventricular diastolic pressure. End-diastolic volume is normal. 
-pro-BNP: 10,435 Treatment: Cardiology following, Echo, Lasix, strict I&O, daily weight Thank you, Jhonatan Gifford [phone] Options provided: 
-- Acute on Chronic Systolic CHF/HFrEF 
-- Acute on Chronic Diastolic CHF/HFpEF 
-- Acute on Chronic Systolic and Diastolic CHF 
-- Other - I will add my own diagnosis -- Disagree - Not applicable / Not valid -- Disagree - Clinically unable to determine / Unknown 
-- Refer to Clinical Documentation Reviewer PROVIDER RESPONSE TEXT: 
 
This patient is in acute on chronic diastolic CHF/HFpEF.  
 
Query created by: Quentin Arreaga on 2021 3:55 PM 
 
 
Electronically signed by:  RANDAL WALKER [facility] MD 2021 7:47 AM

## 2021-05-19 NOTE — DISCHARGE SUMMARY
Discharge Summary PATIENT ID: Yaw Lebron MRN: [id number] YOB: 1935 DATE OF ADMISSION: 5/19/2021 10:42 AM   
DATE OF DISCHARGE: 5/19/2021 PRIMARY CARE PROVIDER: Nikolay Archibald NP  
 
ATTENDING PHYSICIAN: Christiane Solis MD 
DISCHARGING PROVIDER: Christiane Solis MD   
To contact this individual call [phone] and ask the  to page. If unavailable ask to be transferred the Adult Hospitalist Department. CONSULTATIONS: None PROCEDURES/SURGERIES: * No surgery found * [address] COURSE:  
Evaluated and treated for advanced/end stage lung disease and respiratory failure- requiring lots of oxygen. And given advanced age and comorbidities. Family/patient agreed on hospice care. Will dc to IP hospice care. Risk of Re-Admission: low DISCHARGE DIAGNOSES / PLAN:   
 
#. Acute on chronic CHF: - Diuretics iv, strict I&Os, daily weight. TTE pending. Cardio following. #. Acute on chronic hypoxic respiratory failure: 2nd to above. With baseline of pulm fibrosis. - CTA lungs: no PEs, chronic interstitial disease, some new changes ? edema. Evidence of pulm HTN. - Wean down on supplemental O2 as able. BNP >10K. Recheck prior to dc. PT/OT  
- Pulmonology following- known to Dr Argelia Galeano. As expected her resp condition is endstage - Palliative care evaluated, hospice following, plans to admit to IP hospice 
  #. CAD: s/p Stents- placed recently in [facility]. home regimen, monitor #. Elevated trops: likely demand ischemia 2nd to above. Cardio eval. 
#. HTN: chronic, stable, Home regimen, PRN BP meds. Monitor #. HypoKalemia: Acute, replace, monitor #. HypoNatremia: mild, monitor #. HLD: chronic, Stable, Home regimen #. Dementia: mild/mod, Stable, Home regimen, frequent re-orientation, encourage family to stay in. FOLLOW UP APPOINTMENTS:   
Follow-up Information None ADDITIONAL CARE RECOMMENDATIONS:  Follow up with PCP, IP hospice DIET: Resume previous diet ACTIVITY: Activity as tolerated DISCHARGE MEDICATIONS: 
Current Discharge Medication List  
  
 
NOTIFY YOUR PHYSICIAN FOR ANY OF THE FOLLOWING:  
Fever over 101 degrees for 24 hours. Chest pain, shortness of breath, fever, chills, nausea, vomiting, diarrhea, change in mentation, falling, weakness, bleeding. Severe pain or pain not relieved by medications. Or, any other signs or symptoms that you may have questions about. DISPOSITION: 
  Home With: 
 OT  PT  HH  RN  
  
 Long term SNF/Inpatient Rehab  
x Independent/assisted living Hospice Other:  
 
PATIENT CONDITION AT DISCHARGE:  
 
Functional status Poor Deconditioned Independent Cognition Blanca Infante Forgetful Dementia Catheters/lines (plus indication) Larsen PICC   
 PEG None Code status Full code DNR   
 
PHYSICAL EXAMINATION AT DISCHARGE: 
Patient seen and examined at bedside, Condition stable, explained discharge and follow up plans. BP (!) 100/50 (BP 1 Location: Right upper arm, BP Patient Position: At rest)   Pulse (!) 107   Temp 98.7 °F (37.1 °C)   Resp 27   LMP  (LMP Unknown)   SpO2 (!) 85% General:  Alert, No acute distress. Comfortable on HFNC, frail elderly cachectic lady Resp:  No accessory muscle use, Good AE. Neuro:  no focal neuro deficits, follows commands CHRONIC MEDICAL DIAGNOSES: 
Problem List as of 5/19/2021 Date Reviewed: 5/16/2021 Codes Class Noted - Resolved Acute respiratory failure ([facility] ICD-10-CM: J96.00 
ICD-9-CM: 518.81  5/19/2021 - Present Goals of care, counseling/discussion ICD-10-CM: Z71.89 ICD-9-CM: V65.49  Unknown - Present Acute on chronic respiratory failure with hypoxia [facility]) ICD-10-CM: O25.68 
ICD-9-CM: 518.84, 799.02  5/15/2021 - Present Extrinsic allergic alveolitis ([facility] ICD-10-CM: J67.9 ICD-9-CM: 495.9  2/4/2021 - Present  Fall from ground level ICD-10-CM: L43.49UD ICD-9-CM: E888.9  2/4/2021 - Present Injury of head ICD-10-CM: S09. 90XA ICD-9-CM: 959.01  2/4/2021 - Present Orthostatic hypotension ICD-10-CM: I95.1 ICD-9-CM: 458.0  2/4/2021 - Present Shoulder pain ICD-10-CM: M25.519 ICD-9-CM: 719.41  2/4/2021 - Present Urinary tract infection ICD-10-CM: N39.0 ICD-9-CM: 599.0  2/4/2021 - Present Vasovagal episode ICD-10-CM: R55 
ICD-9-CM: 780.2  2/4/2021 - Present Chronically dry eyes, bilateral ICD-10-CM: O63.163 ICD-9-CM: 375.15  8/21/2018 - Present H/O carotid endarterectomy ICD-10-CM: Z98.890 ICD-9-CM: V45.89  8/21/2018 - Present Overview Signed 8/21/2018  9:46 AM by Winston Lund DO Right, 2011, stable Family history of heart attack ICD-10-CM: Z82.49 
ICD-9-CM: V17.3  8/21/2018 - Present Dementia without behavioral disturbance (HCC) ICD-10-CM: F03.90 ICD-9-CM: 294.20  2/19/2018 - Present Overview Signed 2/19/2018 11:37 AM by Winston Lund DO Mild History of knee replacement, total, left ICD-10-CM: M42.751 ICD-9-CM: V43.65  2/19/2018 - Present Advance care planning ICD-10-CM: Z71.89 ICD-9-CM: V65.49  8/22/2016 - Present History of tobacco abuse ICD-10-CM: Z87.891 ICD-9-CM: V15.82  8/17/2016 - Present History of GI bleed ICD-10-CM: Z87.19 ICD-9-CM: V12.79  8/17/2016 - Present History of CEA (carotid endarterectomy) ICD-10-CM: F56.647 ICD-9-CM: V45.89  8/17/2016 - Present Overview Signed 8/17/2016 10:59 AM by Winston Lund, DO Right in 2011 Cortical senile cataract ICD-10-CM: H25.019 
ICD-9-CM: 366.15  2/9/2016 - Present Tear film insufficiency ICD-10-CM: V28.042 ICD-9-CM: 375.15  2/9/2016 - Present Shortening, leg, congenital ICD-10-CM: Q72.819 ICD-9-CM: 755.30  1/21/2016 - Present Aortic valve sclerosis ICD-10-CM: I35.8 ICD-9-CM: 424.1  1/21/2016 - Present  Hyperglycemia ICD-10-CM: R73.9 ICD-9-CM: 790.29  Unknown - Present Heart palpitations ICD-10-CM: R00.2 ICD-9-CM: 785.1  11/12/2015 - Present Overview Signed 1/21/2016 11:17 AM by Claire Pulliam,   
  due to PVCs. Dr. Andrew Cortez. Shortness of breath ICD-10-CM: R06.02 
ICD-9-CM: 786.05  10/1/2015 - Present Overview Signed 1/21/2016 11:17 AM by Vega Miller. Pulmonic valve insufficiency ICD-10-CM: I37.1 ICD-9-CM: 424.3  10/1/2015 - Present Overview Signed 1/21/2016 11:17 AM by Claire Pulliam,  Mild to Mod. Dr. Zak Miller. EF 50-55% Essential hypertension ICD-10-CM: I10 
ICD-9-CM: 401.9  7/13/2015 - Present Adjustment disorder with mixed anxiety and depressed mood ICD-10-CM: R53.31 
ICD-9-CM: 309.28  7/13/2015 - Present Overview Signed 7/13/2015  9:44 AM by Claire Pulliam,   
  due to multiple family deaths in a year, improving Obesity, Class I, BMI 30-34.9 ICD-10-CM: E66.9 ICD-9-CM: 278.00  4/9/2014 - Present Vitamin D deficiency ICD-10-CM: E55.9 ICD-9-CM: 268.9  10/10/2010 - Present Hypercholesteremia ICD-10-CM: E78.00 ICD-9-CM: 272.0  Unknown - Present Internal carotid artery stenosis ICD-10-CM: I65.29 ICD-9-CM: 433.10  Unknown - Present Overview Signed 10/22/2009  9:35 AM by Claire Pulliam, DO  
  bilateral.  Dr. Joel Martinez PVD (peripheral vascular disease) ([facility] ICD-10-CM: I73.9 ICD-9-CM: 443.9  Unknown - Present Overview Signed 10/22/2009  9:35 AM by Claire Pulliam, DO Dr. Blair Candle Raynaud's phenomenon ICD-10-CM: I73.00 ICD-9-CM: 443.0  Unknown - Present GERD (gastroesophageal reflux disease) ICD-10-CM: K21.9 ICD-9-CM: 530.81  Unknown - Present Diverticulosis ICD-10-CM: K57.90 ICD-9-CM: 562.10  9/1/2008 - Present  Overview Signed 10/22/2009  9:36 AM by DO Dr. Leonor Osorio Kaylen Thurman Internal hemorrhoids ICD-10-CM: K64.8 ICD-9-CM: 455.0  9/1/2008 - Present Overview Signed 10/22/2009  9:36 AM by DO Dr. Kaylen Talbert RESOLVED: Cognitive impairment ICD-10-CM: R41.89 ICD-9-CM: 294.9  8/17/2016 - 2/19/2018 Overview Signed 8/17/2016 11:12 AM by Yenifer Marvin DO  
  CIT score of 12 RESOLVED: Chest pain ICD-10-CM: R07.9 ICD-9-CM: 786.50  10/1/2015 - 4/14/2016 Overview Signed 1/21/2016 11:17 AM by Charlie Siddiqi. RESOLVED: Cataract ICD-10-CM: H26.9 ICD-9-CM: 366.9  4/1/2015 - 2/17/2017 RESOLVED: GIB (gastrointestinal bleeding) ICD-10-CM: K92.2 ICD-9-CM: 578.9  11/17/2014 - 8/17/2016 Overview Signed 4/1/2015  3:57 PM by Yenifer Marvin DO  
  due to internal hemorrhoids. Dr. Lasha Rivero RESOLVED: Arthritis of knee, left ICD-10-CM: M17.12 
ICD-9-CM: 716.96  7/1/2013 - 4/9/2014 RESOLVED: Pain in right ankle or foot joint ICD-10-CM: M25.571 ICD-9-CM: 719.47  10/11/2011 - 8/18/2017 RESOLVED: Hypertension ICD-10-CM: I10 
ICD-9-CM: 401.9  Unknown - 7/13/2015 RESOLVED: Unspecified vitamin D deficiency ICD-10-CM: E55.9 ICD-9-CM: 268.9  Unknown - 4/1/2015 RESOLVED: DJD (degenerative joint disease) ICD-10-CM: M19.90 ICD-9-CM: 715.90  9/30/2005 - 8/18/2017 Overview Signed 12/8/2010  9:14 AM by Adriano BRAIN DO  
  cervical, worse C6-7, C7-T1. Left AC joint. 35 minutes were spent with the patient on counseling and coordination of care.  
 
Signed:  
Tatiana Lakhani MD 
5/19/2021 
11:54 AM

## 2021-05-20 NOTE — HOSPICE
[facility] [employer] Good Help to Those in Need 
[phone] Discharge/Death Nursing Note Patient Name: Phil Tatum YOB: 1935 Age: 81 y.o. Date of Death: 22 Admitted Date: 2021 Time of Death: 6714 Facility of Care: [facility] Level of Care: GIP Patient Room: 603/ Hospice Attending: Estiven Pradhan MD 
Hospice Diagnosis: Acute respiratory failure ([facility] [J96.00] Death Pronouncement Pronouncement of death completed by: Alma Anderson NP Agency staff  Was not At the time of death the patient was documented as: · No response to verbal and tactile stimuli. · No respiratory effort. · Absent heart sounds and pulses. · Pupils fixed and dilated. The pt  within [facility] The following were notified of the patient's death: family at bedside Medications were disposed of per facility protocol Discharge Summary Discharge Reason: Death Summary of Care Provided: high flow wean with inpatient hospice support and scheduled IV medications for comfort at end of life. Emotional support provided to family at  bedside 
 
[x] Post mortem care provided by floor nurse 
[x] Notification of  home by nursing supervisor 
[] Referrals/Community resources provided:  
[] Goals completed 
[] Durable Medical Equipment vendor notified Disciplines involved: [x] RN [x] SW [x]  [] RODRIGES [] Vol [] PT [] OT [] ST [] BC 
 
[x] IDT communication/notification Attending Physician, , notified of death Bereaved Parveen Mayo, daughter  [phone] 
4 other siblings: all low risk and all were with patient yesterday prior to her passing Advance Care Planning 2021 Confirm Advance Directive Yes, on file

## 2021-05-20 NOTE — DISCHARGE SUMMARY
Discharge Summary [facility] Good Help to Those in Need 
[phone] Date of Admission: 5/19/2021 Date of Discharge:  05/20/21 Ina Ledesma is a 81y.o. year old who was admitted to [facility] at [facility] with a Hospice diagnosis of Acute respiratory failure ([address] [J96.00]. Pt was admitted for [facility] level care. Per HPI: 
\"Margaux Antoine is a 81y.o. year old who was admitted to [facility]. She has chronic interstitial lung disease and wears oxygen at baseline. Of note she was also treated for a STEMI with PCI/arthrectomy PARAMJIT of LCX in 4/2021. She was admitted to the hospital on 5/15/21 with shortness of breath and acute on chronic respiratory failure with hypoxia. This was suspected to be related to her ILD with superimposed pulmonary edema. She received medication for diuresis but ultimately did not improve and was not able to be weaned from HFNC. Palliative Care evaluation was recommended by her Pulmonologist. The family chose to pursue comfort measures with the support of Hospice. \" The patient's care was focused on comfort, and the patient passed away on  05/20/21.  
 
Kun Connor MD

## 2021-05-20 NOTE — PROGRESS NOTES
Visited in response to notification of pt's death. Pt's daughter Ale Ulloa, who knows  from previous visit, and one other daughter at bedside. Other family members are en route to hospital. Offered presence and listened as the daughters spoke of their mother's life. Chaplain Fernando, Sudhir, MS, [address] ([phone])

## 2021-05-20 NOTE — PROGRESS NOTES
Called to examine patient who has .  No response to verbal and tactile stimuli.  No respiratory effort.  Absent heart sounds and pulses.  Pupils fixed and dilated. Death pronounced at [address] on [address] Patient's family at bedside. Pastoral care aware. Unit personnel will contact:  Hospice,  MARIBELL Rogers MSN, RN, NP-C 
[phone] or via [employer]

## 2021-05-21 NOTE — HOSPICE
[facility] LCSW Bereavement/Condolence Call: This LCSW called pts daughter Cirs Goff ( [phone]) to offer condolences and support. Family very apperciate of pts care at [facility] [place]. Daughter reports mother had a peaceful passing. LCSW and daughter discussed Oleksandr Lapping information for ongoing support. She is open to a follow up call in a few weeks. Shaun Boy [address] [facility] [employer] Worker [phone]

## 2021-05-26 NOTE — PROGRESS NOTES
Physician Progress Note Monique oJhnston 
CSN #:                  [id number] :                       1935 ADMIT DATE:       5/15/2021 8:12 PM 
[address] DATE:        2021 10:38 AM 
RESPONDING 
PROVIDER #:        RANDAL Zhu MD 
 
 
 
 
QUERY TEXT: 
 
Patient admitted with SOB. Noted documentation of  SOB related to patient's pulmonary fibrosis and superimposed pulmonary edema on admission in Cardiology's  note and Acute on chronic hypoxic respiratory failure 2nd to acute on chronic CHF in Hospitalist's  note. If possible, please document in progress notes and discharge summary if patient's SOB and respiratory failure is related to the patient's pulmonary fibrosis and superimposed pulmonary edema or patient's CHF: 
 
The medical record reflects the following: 
Risk Factors: 84yo with pulmonary fibrosis, pulmonary edema and acute on chronic diastolic CHF Clinical Indicators: 
-  Cardiology: SOB 
-unchanged today. 
-Suspect related to combination of her pulmonary fibrosis and superimposed pulmonary edema on admission. 
-Do not suspect CHF- suspect that severe pulmonary fibrosis mostly involved. -Echo with EF 29-44%, normal diastolic function, mild hypok basal inferior walls. 
-Lasix stopped today due to hyponatremia, hypotension, required albumin overnight 
-  Hospitalist: - Acute on chronic CHF: - Diuretics iv, strict I&Os, daily weight. TTE pending. Cardio following. 
- Acute on chronic hypoxic respiratory failure: 2nd to above. With baseline of pulm fibrosis. - CTA lungs: no PEs, chronic interstitial disease, some new changes ? edema. Evidence of pulm HTN. - Wean down on supplemental O2 as able. BNP >10K. Recheck prior to dc. PT/OT 
- Pulmonology following- known to Dr Yoan Mccarthy. As expected her resp condition is endstage - Palliative care evaluated, hospice following, plans to admit to IP hospice 
-  Pulmonary: Acute on chronic hypoxemic resp failure on high flow O2 and chronically on O2 at home at 6-8 L/min  Has progression of underlying ILD with superimposed pulm edema. She has pulm htn secondary to her pulm fibrosis and hydrostatic edema. Treatment: Echo, Lasix, Cardiology and Pulmonary following, supplemental O2 Thank you, Beth Burgess [phone] Options provided: 
-- Respiratory failure related to pulmonary fibrosis and superimposed pulmonary edema 
-- Respiratory failure related to CHF dx 
-- Other - I will add my own diagnosis -- Disagree - Not applicable / Not valid -- Disagree - Clinically unable to determine / Unknown 
-- Refer to Clinical Documentation Reviewer PROVIDER RESPONSE TEXT: 
 
This patient's respiratory failure is related to related to pulmonary fibrosis and superimposed pulmonary edema.  
 
Query created by: Jabari Eastman on 5/21/2021 1:08 PM 
 
 
Electronically signed by:  RANDAL Ramachandran MD 5/26/2021 2:21 PM